# Patient Record
Sex: FEMALE | Race: WHITE | NOT HISPANIC OR LATINO | Employment: OTHER | URBAN - METROPOLITAN AREA
[De-identification: names, ages, dates, MRNs, and addresses within clinical notes are randomized per-mention and may not be internally consistent; named-entity substitution may affect disease eponyms.]

---

## 2017-08-18 ENCOUNTER — LAB REQUISITION (OUTPATIENT)
Dept: LAB | Facility: HOSPITAL | Age: 27
End: 2017-08-18

## 2017-08-18 DIAGNOSIS — R13.10 DYSPHAGIA: ICD-10-CM

## 2017-08-18 PROCEDURE — 88305 TISSUE EXAM BY PATHOLOGIST: CPT | Performed by: INTERNAL MEDICINE

## 2017-08-18 PROCEDURE — 88342 IMHCHEM/IMCYTCHM 1ST ANTB: CPT | Performed by: INTERNAL MEDICINE

## 2017-08-22 LAB
CYTO UR: NORMAL
LAB AP CASE REPORT: NORMAL
LAB AP CLINICAL INFORMATION: NORMAL
Lab: NORMAL
PATH REPORT.FINAL DX SPEC: NORMAL
PATH REPORT.GROSS SPEC: NORMAL

## 2017-09-25 ENCOUNTER — HOSPITAL ENCOUNTER (EMERGENCY)
Facility: HOSPITAL | Age: 27
Discharge: HOME | End: 2017-09-25
Attending: EMERGENCY MEDICINE | Admitting: EMERGENCY MEDICINE

## 2017-09-25 VITALS
SYSTOLIC BLOOD PRESSURE: 109 MMHG | OXYGEN SATURATION: 98 % | HEIGHT: 62 IN | RESPIRATION RATE: 14 BRPM | BODY MASS INDEX: 21.71 KG/M2 | HEART RATE: 77 BPM | TEMPERATURE: 97.5 F | WEIGHT: 118 LBS | DIASTOLIC BLOOD PRESSURE: 85 MMHG

## 2017-09-25 DIAGNOSIS — E83.51 HYPOCALCEMIA: ICD-10-CM

## 2017-09-25 DIAGNOSIS — E20.9: Primary | ICD-10-CM

## 2017-09-25 LAB — CA-I SERPL ISE-MCNC: 1.33 MMOL/L (ref 1.12–1.32)

## 2017-09-25 PROCEDURE — 82330 ASSAY OF CALCIUM: CPT | Performed by: EMERGENCY MEDICINE

## 2017-09-25 PROCEDURE — 25010000002 ONDANSETRON PER 1 MG: Performed by: EMERGENCY MEDICINE

## 2017-09-25 PROCEDURE — 25010000002 FENTANYL CITRATE (PF) 100 MCG/2ML SOLUTION: Performed by: EMERGENCY MEDICINE

## 2017-09-25 PROCEDURE — 96375 TX/PRO/DX INJ NEW DRUG ADDON: CPT

## 2017-09-25 PROCEDURE — 25010000002 CALCIUM GLUCONATE PER 10 ML: Performed by: EMERGENCY MEDICINE

## 2017-09-25 PROCEDURE — 96365 THER/PROPH/DIAG IV INF INIT: CPT

## 2017-09-25 PROCEDURE — 99284 EMERGENCY DEPT VISIT MOD MDM: CPT

## 2017-09-25 PROCEDURE — 25010000002 HYDROMORPHONE PER 4 MG: Performed by: EMERGENCY MEDICINE

## 2017-09-25 PROCEDURE — 25010000002 HEPARIN FLUSH (PORCINE) 100 UNIT/ML SOLUTION: Performed by: EMERGENCY MEDICINE

## 2017-09-25 PROCEDURE — 96376 TX/PRO/DX INJ SAME DRUG ADON: CPT

## 2017-09-25 RX ORDER — ALBUTEROL SULFATE 90 UG/1
2 AEROSOL, METERED RESPIRATORY (INHALATION) EVERY 4 HOURS PRN
COMMUNITY
Start: 2017-05-22

## 2017-09-25 RX ORDER — ONDANSETRON 2 MG/ML
8 INJECTION INTRAMUSCULAR; INTRAVENOUS ONCE
Status: COMPLETED | OUTPATIENT
Start: 2017-09-25 | End: 2017-09-25

## 2017-09-25 RX ORDER — CALCIUM GLUCONATE 94 MG/ML
1 INJECTION, SOLUTION INTRAVENOUS ONCE
Status: DISCONTINUED | OUTPATIENT
Start: 2017-09-25 | End: 2017-09-25 | Stop reason: ALTCHOICE

## 2017-09-25 RX ORDER — DRONABINOL 5 MG/1
5 CAPSULE ORAL 4 TIMES DAILY PRN
COMMUNITY
Start: 2015-10-06

## 2017-09-25 RX ORDER — HYDROCORTISONE 20 MG/1
20 TABLET ORAL EVERY MORNING
COMMUNITY

## 2017-09-25 RX ORDER — FENTANYL CITRATE 50 UG/ML
100 INJECTION, SOLUTION INTRAMUSCULAR; INTRAVENOUS AS NEEDED
Status: COMPLETED | OUTPATIENT
Start: 2017-09-25 | End: 2017-09-25

## 2017-09-25 RX ORDER — FLUTICASONE PROPIONATE 50 MCG
1 SPRAY, SUSPENSION (ML) NASAL DAILY
COMMUNITY
Start: 2016-06-03

## 2017-09-25 RX ORDER — CALCITRIOL 0.5 UG/1
0.5 CAPSULE, LIQUID FILLED ORAL 2 TIMES DAILY
COMMUNITY

## 2017-09-25 RX ORDER — CALCIUM CARBONATE 750 MG/1
750 TABLET, CHEWABLE ORAL 3 TIMES DAILY
COMMUNITY
End: 2020-01-15 | Stop reason: HOSPADM

## 2017-09-25 RX ORDER — RANITIDINE HCL 75 MG
150 TABLET ORAL DAILY
COMMUNITY
Start: 2016-06-03 | End: 2019-01-28 | Stop reason: DRUGHIGH

## 2017-09-25 RX ORDER — BUTALBITAL, ACETAMINOPHEN AND CAFFEINE 50; 325; 40 MG/1; MG/1; MG/1
1 TABLET ORAL EVERY 24 HOURS
COMMUNITY
Start: 2017-08-30 | End: 2021-04-02 | Stop reason: ALTCHOICE

## 2017-09-25 RX ORDER — DIAZEPAM 5 MG/1
5 TABLET ORAL
COMMUNITY
Start: 2017-08-09 | End: 2021-04-02 | Stop reason: SDUPTHER

## 2017-09-25 RX ORDER — CYPROHEPTADINE HYDROCHLORIDE 4 MG/1
4 TABLET ORAL 2 TIMES DAILY
COMMUNITY
End: 2021-04-14 | Stop reason: HOSPADM

## 2017-09-25 RX ORDER — LEVOTHYROXINE SODIUM 0.05 MG/1
75 TABLET ORAL
COMMUNITY
Start: 2016-06-03 | End: 2019-01-28 | Stop reason: DRUGHIGH

## 2017-09-25 RX ORDER — FLUOXETINE 10 MG/1
20 TABLET, FILM COATED ORAL DAILY
COMMUNITY
End: 2019-10-18 | Stop reason: HOSPADM

## 2017-09-25 RX ORDER — FLUDROCORTISONE ACETATE 0.1 MG/1
0.1 TABLET ORAL DAILY
COMMUNITY
Start: 2015-07-22

## 2017-09-25 RX ORDER — FAMOTIDINE 20 MG
1000 TABLET ORAL 2 TIMES DAILY
COMMUNITY
Start: 2016-06-03

## 2017-09-25 RX ORDER — CALCIUM GLUCONATE 94 MG/ML
2 INJECTION, SOLUTION INTRAVENOUS ONCE
Status: DISCONTINUED | OUTPATIENT
Start: 2017-09-25 | End: 2017-09-25

## 2017-09-25 RX ORDER — AZATHIOPRINE 50 MG/1
150 TABLET ORAL
COMMUNITY
End: 2019-10-22 | Stop reason: SDUPTHER

## 2017-09-25 RX ORDER — CALCIUM GLUCONATE 94 MG/ML
2 INJECTION, SOLUTION INTRAVENOUS ONCE
Status: COMPLETED | OUTPATIENT
Start: 2017-09-25 | End: 2017-09-25

## 2017-09-25 RX ORDER — GABAPENTIN 300 MG/1
900 CAPSULE ORAL 3 TIMES DAILY
COMMUNITY
Start: 2017-01-02 | End: 2019-01-28 | Stop reason: DRUGHIGH

## 2017-09-25 RX ORDER — CYCLOSPORINE 0.5 MG/ML
1 EMULSION OPHTHALMIC EVERY 12 HOURS
COMMUNITY

## 2017-09-25 RX ORDER — NADOLOL 20 MG/1
20 TABLET ORAL 2 TIMES DAILY
COMMUNITY
End: 2021-09-13 | Stop reason: SDUPTHER

## 2017-09-25 RX ADMIN — FENTANYL CITRATE 100 MCG: 50 INJECTION, SOLUTION INTRAMUSCULAR; INTRAVENOUS at 15:10

## 2017-09-25 RX ADMIN — CALCIUM GLUCONATE 1 G: 94 INJECTION, SOLUTION INTRAVENOUS at 16:06

## 2017-09-25 RX ADMIN — ONDANSETRON 8 MG: 2 INJECTION INTRAMUSCULAR; INTRAVENOUS at 12:59

## 2017-09-25 RX ADMIN — CALCIUM GLUCONATE 2 G: 94 INJECTION, SOLUTION INTRAVENOUS at 12:56

## 2017-09-25 RX ADMIN — FENTANYL CITRATE 100 MCG: 50 INJECTION, SOLUTION INTRAMUSCULAR; INTRAVENOUS at 15:41

## 2017-09-25 RX ADMIN — HYDROMORPHONE HYDROCHLORIDE 1 MG: 1 INJECTION, SOLUTION INTRAMUSCULAR; INTRAVENOUS; SUBCUTANEOUS at 17:43

## 2017-09-25 RX ADMIN — HEPARIN 300 UNITS: 100 SYRINGE at 17:45

## 2017-09-25 RX ADMIN — HYDROMORPHONE HYDROCHLORIDE 1 MG: 1 INJECTION, SOLUTION INTRAMUSCULAR; INTRAVENOUS; SUBCUTANEOUS at 12:47

## 2017-09-25 NOTE — ED PROVIDER NOTES
Subjective   HPI Comments: Krista Richter is a 27 y.o.female who presents to the ED with c/o hypocalcemia. She reports waking up this morning in severe diffuse pain with muscle spasms and nausea. She presented to [facility] and had blood drawn showing evidence of low calcium levels. Her total calcium measured 8.6 and her ionized calcium measured 1.12. She presents to [facility] for further evaluation and treatment. She has history of recurrent hypocalcemic episodes characterized by the same symptoms she developed this morning. Her most recent episode occurred 3 days ago. She was treated with 2 grams of calcium at that time with resolution of her symptoms. She denies any other complaints at this time.       Patient is a 27 y.o. female presenting with general illness.   History provided by:  Patient  Illness   Location:  Abnormal lab  Quality:  Hypocalcemia  Severity:  Moderate  Onset quality:  Sudden  Timing:  Constant  Progression:  Unchanged  Chronicity:  Recurrent  Context:  She reports waking up this morning in severe diffuse pain with muscle spasms and nausea. She presented to [facility] and had blood drawn showing evidence of low calcium levels. Her total calcium measured 8.6 and her ionized calcium measured 1.12. She presents to [facility] for further evaluation and treatment. She has history of recurrent hypocalcemic episodes characterized by the same symptoms she developed this morning.  Relieved by:  None tried so far today  Worsened by:  Nothing  Ineffective treatments:  None tried so far today   Associated symptoms: nausea        Review of Systems   Gastrointestinal: Positive for nausea.   Musculoskeletal:        Diffuse pain and muscle spasms.       Past Medical History:   Diagnosis Date   • APS type 1    • Disease of thyroid gland    • Insulin dependent diabetes mellitus    • Long Q-T syndrome    • Parathyroid abnormality    • Spleen absent        Allergies   Allergen Reactions   • Penicillins Hives        Past Surgical History:   Procedure Laterality Date   • CARDIAC SURGERY      LOOP RECORDER   • CHOLECYSTECTOMY     • HERNIA REPAIR     • MUSCLE BIOPSY     • PORTACATH PLACEMENT         History reviewed. No pertinent family history.    Social History     Social History   • Marital status: Single     Spouse name: N/A   • Number of children: N/A   • Years of education: N/A     Social History Main Topics   • Smoking status: Never Smoker   • Smokeless tobacco: Never Used   • Alcohol use Yes      Comment: SOCIAL   • Drug use: No   • Sexual activity: Defer     Other Topics Concern   • None     Social History Narrative   • None         Objective   Physical Exam   Constitutional: She is oriented to person, place, and time. She appears well-developed and well-nourished. No distress.   Young adult female. Teeth tightly clenched. Arms behind her back. Legs are flexed fully at the knees. Plantar flexed at the ankles.    HENT:   Head: Normocephalic and atraumatic.   Nose: Nose normal.   Airway patent. Unable to evaluate pharynx secondary to the teeth being clenched.    Eyes: Conjunctivae are normal. No scleral icterus.   Neck: Neck supple.   Cardiovascular: Normal rate, regular rhythm and normal heart sounds.    No murmur heard.  Pulmonary/Chest: Effort normal and breath sounds normal. No respiratory distress.   Abdominal: Soft. Bowel sounds are normal. There is no tenderness.   Musculoskeletal: She exhibits no edema.   Significant spasm and rigidity at the arms, legs, neck, and face.    Neurological: She is alert and oriented to person, place, and time.   Skin: Skin is warm and dry.   Psychiatric: She has a normal mood and affect. Her behavior is normal.   Nursing note and vitals reviewed.      Procedures         ED Course  ED Course   Comment By Time   Upon reevaluation the patient is much more relaxed and is no longer experiencing the spasms she had been having upon arrival. She is now talking, eating, and drinking.  Noni Rocha 09/25 1521   Tetany resolved, still having some pain.  Difficult, long-standing problem with no easy answers. Andrea Medley MD 09/25 1729     Recent Results (from the past 24 hour(s))   Calcium, Ionized    Collection Time: 09/25/17  3:02 PM   Result Value Ref Range    Ionized Calcium 1.33 (H) 1.12 - 1.32 mmol/L     Note: In addition to lab results from this visit, the labs listed above may include labs taken at another facility or during a different encounter within the last 24 hours. Please correlate lab times with ED admission and discharge times for further clarification of the services performed during this visit.    No orders to display     Vitals:    09/25/17 1353 09/25/17 1512 09/25/17 1630 09/25/17 1752   BP:  104/73 109/70 109/85   BP Location:  Right arm  Right arm   Patient Position:  Lying  Lying   Pulse: 70 67 67 77   Resp:  16  14   Temp:    97.5 °F (36.4 °C)   TempSrc:    Oral   SpO2: 98% 96% 94% 98%   Weight:       Height:         Medications   calcium gluconate injection 2 g (2 g Intravenous Given 9/25/17 1256)   HYDROmorphone (DILAUDID) injection 1 mg (1 mg Intravenous Given 9/25/17 1247)   ondansetron (ZOFRAN) injection 8 mg (8 mg Intravenous Given 9/25/17 1259)   fentaNYL citrate (PF) (SUBLIMAZE) injection 100 mcg (100 mcg Intravenous Given 9/25/17 1541)   calcium gluconate 1 g in sodium chloride 0.9 % 100 mL IVPB (0 g Intravenous Stopped 9/25/17 1718)   HYDROmorphone (DILAUDID) injection 1 mg (1 mg Intravenous Given 9/25/17 1743)   heparin flush (porcine) 100 UNIT/ML injection 300 Units (300 Units Intravenous Given 9/25/17 1745)     ECG/EMG Results (last 24 hours)     ** No results found for the last 24 hours. **                        MDM    Final diagnoses:   Tetany due to low calcium from parathyroid disease   Hypocalcemia       Documentation assistance provided by artemio Rocha.  Information recorded by the scribe was done at my direction and has been verified  and validated by me.     Noni Rocha  09/25/17 1256       Noni Rocha  09/25/17 1518       Noni Rocha  09/25/17 1714       Andrea Medley MD  09/25/17 0882

## 2017-09-25 NOTE — DISCHARGE INSTRUCTIONS
Continue your care with your doctors at [facility] as well as in [place].     Follow up with Dr. Pearce as needed. Call for appointment.    Immediately return to the ED for any concerns or worsening symptoms.

## 2017-09-30 ENCOUNTER — HOSPITAL ENCOUNTER (EMERGENCY)
Facility: HOSPITAL | Age: 27
Discharge: HOME | End: 2017-09-30
Attending: EMERGENCY MEDICINE | Admitting: EMERGENCY MEDICINE

## 2017-09-30 VITALS
HEIGHT: 62 IN | DIASTOLIC BLOOD PRESSURE: 88 MMHG | WEIGHT: 125 LBS | RESPIRATION RATE: 16 BRPM | OXYGEN SATURATION: 97 % | BODY MASS INDEX: 23 KG/M2 | HEART RATE: 97 BPM | TEMPERATURE: 98.7 F | SYSTOLIC BLOOD PRESSURE: 127 MMHG

## 2017-09-30 DIAGNOSIS — R29.0 TETANY: Primary | ICD-10-CM

## 2017-09-30 DIAGNOSIS — E83.51 HYPOCALCEMIA: ICD-10-CM

## 2017-09-30 LAB
ALBUMIN SERPL-MCNC: 3.5 G/DL (ref 3.2–4.8)
ALBUMIN/GLOB SERPL: 1.2 G/DL (ref 1.5–2.5)
ALP SERPL-CCNC: 153 U/L (ref 25–100)
ALT SERPL W P-5'-P-CCNC: 40 U/L (ref 7–40)
AMPHET+METHAMPHET UR QL: NEGATIVE
AMPHETAMINES UR QL: NEGATIVE
ANION GAP SERPL CALCULATED.3IONS-SCNC: 5 MMOL/L (ref 3–11)
AST SERPL-CCNC: 42 U/L (ref 0–33)
B-HCG UR QL: NEGATIVE
BARBITURATES UR QL SCN: NEGATIVE
BASOPHILS # BLD AUTO: 0.1 10*3/MM3 (ref 0–0.2)
BASOPHILS NFR BLD AUTO: 0.8 % (ref 0–1)
BENZODIAZ UR QL SCN: POSITIVE
BILIRUB SERPL-MCNC: 0.5 MG/DL (ref 0.3–1.2)
BILIRUB UR QL STRIP: NEGATIVE
BUN BLD-MCNC: 15 MG/DL (ref 9–23)
BUN/CREAT SERPL: 30 (ref 7–25)
BUPRENORPHINE SERPL-MCNC: NEGATIVE NG/ML
CA-I SERPL ISE-MCNC: 1.1 MMOL/L (ref 1.12–1.32)
CALCIUM SPEC-SCNC: 7.8 MG/DL (ref 8.7–10.4)
CANNABINOIDS SERPL QL: POSITIVE
CHLORIDE SERPL-SCNC: 108 MMOL/L (ref 99–109)
CLARITY UR: CLEAR
CO2 SERPL-SCNC: 27 MMOL/L (ref 20–31)
COCAINE UR QL: NEGATIVE
COLOR UR: YELLOW
CREAT BLD-MCNC: 0.5 MG/DL (ref 0.6–1.3)
DEPRECATED RDW RBC AUTO: 58.8 FL (ref 37–54)
EOSINOPHIL # BLD AUTO: 0.13 10*3/MM3 (ref 0–0.3)
EOSINOPHIL NFR BLD AUTO: 1.1 % (ref 0–3)
ERYTHROCYTE [DISTWIDTH] IN BLOOD BY AUTOMATED COUNT: 17.4 % (ref 11.3–14.5)
GFR SERPL CREATININE-BSD FRML MDRD: 147 ML/MIN/1.73
GLOBULIN UR ELPH-MCNC: 2.9 GM/DL
GLUCOSE BLD-MCNC: 177 MG/DL (ref 70–100)
GLUCOSE UR STRIP-MCNC: NEGATIVE MG/DL
HCT VFR BLD AUTO: 29.1 % (ref 34.5–44)
HGB BLD-MCNC: 8.8 G/DL (ref 11.5–15.5)
HGB UR QL STRIP.AUTO: NEGATIVE
IMM GRANULOCYTES # BLD: 0.08 10*3/MM3 (ref 0–0.03)
IMM GRANULOCYTES NFR BLD: 0.7 % (ref 0–0.6)
INTERNAL NEGATIVE CONTROL: NEGATIVE
INTERNAL POSITIVE CONTROL: POSITIVE
KETONES UR QL STRIP: NEGATIVE
LEUKOCYTE ESTERASE UR QL STRIP.AUTO: NEGATIVE
LYMPHOCYTES # BLD AUTO: 0.96 10*3/MM3 (ref 0.6–4.8)
LYMPHOCYTES NFR BLD AUTO: 7.8 % (ref 24–44)
Lab: NORMAL
MAGNESIUM SERPL-MCNC: 1.6 MG/DL (ref 1.3–2.7)
MCH RBC QN AUTO: 28 PG (ref 27–31)
MCHC RBC AUTO-ENTMCNC: 30.2 G/DL (ref 32–36)
MCV RBC AUTO: 92.7 FL (ref 80–99)
METHADONE UR QL SCN: NEGATIVE
MONOCYTES # BLD AUTO: 0.66 10*3/MM3 (ref 0–1)
MONOCYTES NFR BLD AUTO: 5.4 % (ref 0–12)
NEUTROPHILS # BLD AUTO: 10.31 10*3/MM3 (ref 1.5–8.3)
NEUTROPHILS NFR BLD AUTO: 84.2 % (ref 41–71)
NITRITE UR QL STRIP: NEGATIVE
OPIATES UR QL: NEGATIVE
OXYCODONE UR QL SCN: NEGATIVE
PCP UR QL SCN: NEGATIVE
PH UR STRIP.AUTO: 7 [PH] (ref 5–8)
PLATELET # BLD AUTO: 328 10*3/MM3 (ref 150–450)
PMV BLD AUTO: 10.2 FL (ref 6–12)
POTASSIUM BLD-SCNC: 4.2 MMOL/L (ref 3.5–5.5)
PROPOXYPH UR QL: NEGATIVE
PROT SERPL-MCNC: 6.4 G/DL (ref 5.7–8.2)
PROT UR QL STRIP: NEGATIVE
RBC # BLD AUTO: 3.14 10*6/MM3 (ref 3.89–5.14)
SODIUM BLD-SCNC: 140 MMOL/L (ref 132–146)
SP GR UR STRIP: 1.01 (ref 1–1.03)
TRICYCLICS UR QL SCN: NEGATIVE
TSH SERPL DL<=0.05 MIU/L-ACNC: 0.38 MIU/ML (ref 0.35–5.35)
UROBILINOGEN UR QL STRIP: NORMAL
WBC NRBC COR # BLD: 12.24 10*3/MM3 (ref 3.5–10.8)

## 2017-09-30 PROCEDURE — 84443 ASSAY THYROID STIM HORMONE: CPT | Performed by: NURSE PRACTITIONER

## 2017-09-30 PROCEDURE — 96375 TX/PRO/DX INJ NEW DRUG ADDON: CPT

## 2017-09-30 PROCEDURE — 25010000002 FENTANYL CITRATE (PF) 100 MCG/2ML SOLUTION: Performed by: EMERGENCY MEDICINE

## 2017-09-30 PROCEDURE — 80053 COMPREHEN METABOLIC PANEL: CPT | Performed by: NURSE PRACTITIONER

## 2017-09-30 PROCEDURE — 25010000002 HYDROMORPHONE PER 4 MG: Performed by: EMERGENCY MEDICINE

## 2017-09-30 PROCEDURE — 85025 COMPLETE CBC W/AUTO DIFF WBC: CPT | Performed by: NURSE PRACTITIONER

## 2017-09-30 PROCEDURE — 81003 URINALYSIS AUTO W/O SCOPE: CPT | Performed by: NURSE PRACTITIONER

## 2017-09-30 PROCEDURE — 96361 HYDRATE IV INFUSION ADD-ON: CPT

## 2017-09-30 PROCEDURE — 25010000002 CALCIUM GLUCONATE PER 10 ML: Performed by: EMERGENCY MEDICINE

## 2017-09-30 PROCEDURE — 25010000002 ONDANSETRON PER 1 MG: Performed by: EMERGENCY MEDICINE

## 2017-09-30 PROCEDURE — 99284 EMERGENCY DEPT VISIT MOD MDM: CPT

## 2017-09-30 PROCEDURE — 83735 ASSAY OF MAGNESIUM: CPT | Performed by: NURSE PRACTITIONER

## 2017-09-30 PROCEDURE — 96376 TX/PRO/DX INJ SAME DRUG ADON: CPT

## 2017-09-30 PROCEDURE — 25010000002 LORAZEPAM PER 2 MG: Performed by: EMERGENCY MEDICINE

## 2017-09-30 PROCEDURE — 82330 ASSAY OF CALCIUM: CPT | Performed by: NURSE PRACTITIONER

## 2017-09-30 PROCEDURE — 80306 DRUG TEST PRSMV INSTRMNT: CPT | Performed by: NURSE PRACTITIONER

## 2017-09-30 PROCEDURE — 96374 THER/PROPH/DIAG INJ IV PUSH: CPT

## 2017-09-30 PROCEDURE — 93005 ELECTROCARDIOGRAM TRACING: CPT | Performed by: NURSE PRACTITIONER

## 2017-09-30 PROCEDURE — 25010000002 CALCIUM GLUCONATE PER 10 ML: Performed by: NURSE PRACTITIONER

## 2017-09-30 RX ORDER — HYDROMORPHONE HYDROCHLORIDE 1 MG/ML
0.5 INJECTION, SOLUTION INTRAMUSCULAR; INTRAVENOUS; SUBCUTANEOUS ONCE
Status: COMPLETED | OUTPATIENT
Start: 2017-09-30 | End: 2017-09-30

## 2017-09-30 RX ORDER — LORAZEPAM 2 MG/ML
1 INJECTION INTRAMUSCULAR ONCE
Status: COMPLETED | OUTPATIENT
Start: 2017-09-30 | End: 2017-09-30

## 2017-09-30 RX ORDER — ONDANSETRON 2 MG/ML
4 INJECTION INTRAMUSCULAR; INTRAVENOUS ONCE
Status: DISCONTINUED | OUTPATIENT
Start: 2017-09-30 | End: 2017-09-30

## 2017-09-30 RX ORDER — ONDANSETRON 2 MG/ML
8 INJECTION INTRAMUSCULAR; INTRAVENOUS ONCE
Status: COMPLETED | OUTPATIENT
Start: 2017-09-30 | End: 2017-09-30

## 2017-09-30 RX ORDER — SODIUM CHLORIDE 0.9 % (FLUSH) 0.9 %
10 SYRINGE (ML) INJECTION AS NEEDED
Status: DISCONTINUED | OUTPATIENT
Start: 2017-09-30 | End: 2017-09-30 | Stop reason: HOSPADM

## 2017-09-30 RX ORDER — DIAZEPAM 5 MG/ML
5 INJECTION, SOLUTION INTRAMUSCULAR; INTRAVENOUS ONCE
Status: DISCONTINUED | OUTPATIENT
Start: 2017-09-30 | End: 2017-09-30

## 2017-09-30 RX ORDER — CALCIUM GLUCONATE 94 MG/ML
1 INJECTION, SOLUTION INTRAVENOUS ONCE
Status: COMPLETED | OUTPATIENT
Start: 2017-09-30 | End: 2017-09-30

## 2017-09-30 RX ORDER — FENTANYL CITRATE 50 UG/ML
100 INJECTION, SOLUTION INTRAMUSCULAR; INTRAVENOUS ONCE
Status: COMPLETED | OUTPATIENT
Start: 2017-09-30 | End: 2017-09-30

## 2017-09-30 RX ADMIN — HYDROMORPHONE HYDROCHLORIDE 0.5 MG: 1 INJECTION, SOLUTION INTRAMUSCULAR; INTRAVENOUS; SUBCUTANEOUS at 16:06

## 2017-09-30 RX ADMIN — Medication 10 ML: at 18:37

## 2017-09-30 RX ADMIN — CALCIUM GLUCONATE 1 G: 94 INJECTION, SOLUTION INTRAVENOUS at 18:34

## 2017-09-30 RX ADMIN — ONDANSETRON 8 MG: 2 INJECTION INTRAMUSCULAR; INTRAVENOUS at 17:22

## 2017-09-30 RX ADMIN — FENTANYL CITRATE 100 MCG: 50 INJECTION, SOLUTION INTRAMUSCULAR; INTRAVENOUS at 19:49

## 2017-09-30 RX ADMIN — LORAZEPAM 1 MG: 2 INJECTION INTRAMUSCULAR; INTRAVENOUS at 16:38

## 2017-09-30 RX ADMIN — CALCIUM GLUCONATE 1 G: 94 INJECTION, SOLUTION INTRAVENOUS at 17:08

## 2017-09-30 RX ADMIN — SODIUM CHLORIDE 1000 ML: 9 INJECTION, SOLUTION INTRAVENOUS at 15:55

## 2017-09-30 RX ADMIN — SODIUM CHLORIDE 100 ML: 9 INJECTION, SOLUTION INTRAVENOUS at 17:08

## 2017-09-30 RX ADMIN — HYDROMORPHONE HYDROCHLORIDE 0.5 MG: 1 INJECTION, SOLUTION INTRAMUSCULAR; INTRAVENOUS; SUBCUTANEOUS at 17:22

## 2017-09-30 RX ADMIN — LORAZEPAM 1 MG: 2 INJECTION INTRAMUSCULAR; INTRAVENOUS at 18:37

## 2017-12-27 ENCOUNTER — LAB REQUISITION (OUTPATIENT)
Dept: LAB | Facility: HOSPITAL | Age: 27
End: 2017-12-27

## 2017-12-27 DIAGNOSIS — R13.10 DYSPHAGIA: ICD-10-CM

## 2017-12-27 PROCEDURE — 88305 TISSUE EXAM BY PATHOLOGIST: CPT | Performed by: INTERNAL MEDICINE

## 2018-04-26 ENCOUNTER — ANESTHESIA (OUTPATIENT)
Dept: GASTROENTEROLOGY | Facility: HOSPITAL | Age: 28
End: 2018-04-26

## 2018-04-26 ENCOUNTER — HOSPITAL ENCOUNTER (OUTPATIENT)
Facility: HOSPITAL | Age: 28
Setting detail: HOSPITAL OUTPATIENT SURGERY
Discharge: HOME | End: 2018-04-26
Attending: INTERNAL MEDICINE | Admitting: INTERNAL MEDICINE

## 2018-04-26 ENCOUNTER — ANESTHESIA EVENT (OUTPATIENT)
Dept: GASTROENTEROLOGY | Facility: HOSPITAL | Age: 28
End: 2018-04-26

## 2018-04-26 VITALS
SYSTOLIC BLOOD PRESSURE: 133 MMHG | DIASTOLIC BLOOD PRESSURE: 104 MMHG | TEMPERATURE: 97 F | OXYGEN SATURATION: 95 % | RESPIRATION RATE: 18 BRPM | HEART RATE: 114 BPM

## 2018-04-26 LAB
ALBUMIN SERPL-MCNC: 4.3 G/DL (ref 3.2–4.8)
ALBUMIN/GLOB SERPL: 1.3 G/DL (ref 1.5–2.5)
ALP SERPL-CCNC: 135 U/L (ref 25–100)
ALT SERPL W P-5'-P-CCNC: 30 U/L (ref 7–40)
ANION GAP SERPL CALCULATED.3IONS-SCNC: 8 MMOL/L (ref 3–11)
AST SERPL-CCNC: 25 U/L (ref 0–33)
BILIRUB SERPL-MCNC: 0.4 MG/DL (ref 0.3–1.2)
BUN BLD-MCNC: 18 MG/DL (ref 9–23)
BUN/CREAT SERPL: 25.7 (ref 7–25)
CALCIUM SPEC-SCNC: 8.9 MG/DL (ref 8.7–10.4)
CHLORIDE SERPL-SCNC: 96 MMOL/L (ref 99–109)
CO2 SERPL-SCNC: 29 MMOL/L (ref 20–31)
CREAT BLD-MCNC: 0.7 MG/DL (ref 0.6–1.3)
GFR SERPL CREATININE-BSD FRML MDRD: 100 ML/MIN/1.73
GLOBULIN UR ELPH-MCNC: 3.3 GM/DL
GLUCOSE BLD-MCNC: 159 MG/DL (ref 70–100)
MAGNESIUM SERPL-MCNC: 1.6 MG/DL (ref 1.3–2.7)
POTASSIUM BLD-SCNC: 3.7 MMOL/L (ref 3.5–5.5)
PROT SERPL-MCNC: 7.6 G/DL (ref 5.7–8.2)
SODIUM BLD-SCNC: 133 MMOL/L (ref 132–146)

## 2018-04-26 PROCEDURE — 25010000002 PROPOFOL 10 MG/ML EMULSION: Performed by: NURSE ANESTHETIST, CERTIFIED REGISTERED

## 2018-04-26 PROCEDURE — 25010000002 DEXAMETHASONE PER 1 MG: Performed by: NURSE ANESTHETIST, CERTIFIED REGISTERED

## 2018-04-26 PROCEDURE — 25010000002 PROPOFOL 1000 MG/ML EMULSION: Performed by: NURSE ANESTHETIST, CERTIFIED REGISTERED

## 2018-04-26 PROCEDURE — 25010000002 HYDROMORPHONE PER 4 MG: Performed by: NURSE ANESTHETIST, CERTIFIED REGISTERED

## 2018-04-26 PROCEDURE — 25010000002 PROMETHAZINE PER 50 MG: Performed by: NURSE ANESTHETIST, CERTIFIED REGISTERED

## 2018-04-26 PROCEDURE — 25010000002 DIPHENHYDRAMINE PER 50 MG: Performed by: INTERNAL MEDICINE

## 2018-04-26 PROCEDURE — 25010000002 HYDROMORPHONE PER 4 MG: Performed by: INTERNAL MEDICINE

## 2018-04-26 PROCEDURE — 25010000002 ONDANSETRON PER 1 MG: Performed by: NURSE ANESTHETIST, CERTIFIED REGISTERED

## 2018-04-26 PROCEDURE — 83735 ASSAY OF MAGNESIUM: CPT | Performed by: INTERNAL MEDICINE

## 2018-04-26 PROCEDURE — 80053 COMPREHEN METABOLIC PANEL: CPT | Performed by: INTERNAL MEDICINE

## 2018-04-26 PROCEDURE — C1726 CATH, BAL DIL, NON-VASCULAR: HCPCS | Performed by: INTERNAL MEDICINE

## 2018-04-26 PROCEDURE — 25010000002 CALCIUM GLUCONATE PER 10 ML: Performed by: INTERNAL MEDICINE

## 2018-04-26 RX ORDER — PROMETHAZINE HYDROCHLORIDE 25 MG/1
25 TABLET ORAL ONCE AS NEEDED
Status: DISCONTINUED | OUTPATIENT
Start: 2018-04-26 | End: 2018-04-26 | Stop reason: HOSPADM

## 2018-04-26 RX ORDER — IPRATROPIUM BROMIDE AND ALBUTEROL SULFATE 2.5; .5 MG/3ML; MG/3ML
3 SOLUTION RESPIRATORY (INHALATION) ONCE AS NEEDED
Status: DISCONTINUED | OUTPATIENT
Start: 2018-04-26 | End: 2018-04-26 | Stop reason: HOSPADM

## 2018-04-26 RX ORDER — PROPOFOL 10 MG/ML
VIAL (ML) INTRAVENOUS AS NEEDED
Status: DISCONTINUED | OUTPATIENT
Start: 2018-04-26 | End: 2018-04-26 | Stop reason: SURG

## 2018-04-26 RX ORDER — DEXAMETHASONE SODIUM PHOSPHATE 4 MG/ML
INJECTION, SOLUTION INTRA-ARTICULAR; INTRALESIONAL; INTRAMUSCULAR; INTRAVENOUS; SOFT TISSUE AS NEEDED
Status: DISCONTINUED | OUTPATIENT
Start: 2018-04-26 | End: 2018-04-26 | Stop reason: SURG

## 2018-04-26 RX ORDER — PROMETHAZINE HYDROCHLORIDE 25 MG/ML
6.25 INJECTION, SOLUTION INTRAMUSCULAR; INTRAVENOUS ONCE AS NEEDED
Status: DISCONTINUED | OUTPATIENT
Start: 2018-04-26 | End: 2018-04-26 | Stop reason: HOSPADM

## 2018-04-26 RX ORDER — FAMOTIDINE 20 MG/1
20 TABLET, FILM COATED ORAL ONCE
Status: DISCONTINUED | OUTPATIENT
Start: 2018-04-26 | End: 2018-04-26 | Stop reason: HOSPADM

## 2018-04-26 RX ORDER — CALCIUM CHLORIDE 100 MG/ML
1 INJECTION INTRAVENOUS; INTRAVENTRICULAR ONCE
Status: DISCONTINUED | OUTPATIENT
Start: 2018-04-26 | End: 2018-04-26

## 2018-04-26 RX ORDER — ACETAMINOPHEN 325 MG/1
650 TABLET ORAL ONCE AS NEEDED
Status: DISCONTINUED | OUTPATIENT
Start: 2018-04-26 | End: 2018-04-26 | Stop reason: HOSPADM

## 2018-04-26 RX ORDER — ONDANSETRON 2 MG/ML
INJECTION INTRAMUSCULAR; INTRAVENOUS AS NEEDED
Status: DISCONTINUED | OUTPATIENT
Start: 2018-04-26 | End: 2018-04-26 | Stop reason: SURG

## 2018-04-26 RX ORDER — LIDOCAINE HYDROCHLORIDE 10 MG/ML
0.5 INJECTION, SOLUTION EPIDURAL; INFILTRATION; INTRACAUDAL; PERINEURAL ONCE AS NEEDED
Status: DISCONTINUED | OUTPATIENT
Start: 2018-04-26 | End: 2018-04-26 | Stop reason: HOSPADM

## 2018-04-26 RX ORDER — PROMETHAZINE HYDROCHLORIDE 25 MG/ML
INJECTION, SOLUTION INTRAMUSCULAR; INTRAVENOUS AS NEEDED
Status: DISCONTINUED | OUTPATIENT
Start: 2018-04-26 | End: 2018-04-26 | Stop reason: SURG

## 2018-04-26 RX ORDER — CALCIUM CHLORIDE 100 MG/ML
2 INJECTION INTRAVENOUS; INTRAVENTRICULAR ONCE
Status: COMPLETED | OUTPATIENT
Start: 2018-04-26 | End: 2018-04-26

## 2018-04-26 RX ORDER — HYDROMORPHONE HCL 110MG/55ML
PATIENT CONTROLLED ANALGESIA SYRINGE INTRAVENOUS AS NEEDED
Status: DISCONTINUED | OUTPATIENT
Start: 2018-04-26 | End: 2018-04-26 | Stop reason: SURG

## 2018-04-26 RX ORDER — PANTOPRAZOLE SODIUM 40 MG/10ML
40 INJECTION, POWDER, LYOPHILIZED, FOR SOLUTION INTRAVENOUS ONCE
Status: COMPLETED | OUTPATIENT
Start: 2018-04-26 | End: 2018-04-26

## 2018-04-26 RX ORDER — HYDROMORPHONE HYDROCHLORIDE 1 MG/ML
1 INJECTION, SOLUTION INTRAMUSCULAR; INTRAVENOUS; SUBCUTANEOUS
Status: DISCONTINUED | OUTPATIENT
Start: 2018-04-26 | End: 2018-04-26 | Stop reason: HOSPADM

## 2018-04-26 RX ORDER — SODIUM CHLORIDE 0.9 % (FLUSH) 0.9 %
1-10 SYRINGE (ML) INJECTION AS NEEDED
Status: DISCONTINUED | OUTPATIENT
Start: 2018-04-26 | End: 2018-04-26 | Stop reason: HOSPADM

## 2018-04-26 RX ORDER — SODIUM CHLORIDE, SODIUM LACTATE, POTASSIUM CHLORIDE, CALCIUM CHLORIDE 600; 310; 30; 20 MG/100ML; MG/100ML; MG/100ML; MG/100ML
9 INJECTION, SOLUTION INTRAVENOUS CONTINUOUS
Status: DISCONTINUED | OUTPATIENT
Start: 2018-04-26 | End: 2018-04-27 | Stop reason: HOSPADM

## 2018-04-26 RX ORDER — HYDROMORPHONE HYDROCHLORIDE 1 MG/ML
0.5 INJECTION, SOLUTION INTRAMUSCULAR; INTRAVENOUS; SUBCUTANEOUS
Status: DISCONTINUED | OUTPATIENT
Start: 2018-04-26 | End: 2018-04-26 | Stop reason: HOSPADM

## 2018-04-26 RX ORDER — PROMETHAZINE HYDROCHLORIDE 25 MG/1
25 SUPPOSITORY RECTAL ONCE AS NEEDED
Status: DISCONTINUED | OUTPATIENT
Start: 2018-04-26 | End: 2018-04-26 | Stop reason: HOSPADM

## 2018-04-26 RX ORDER — DIPHENHYDRAMINE HYDROCHLORIDE 50 MG/ML
25 INJECTION INTRAMUSCULAR; INTRAVENOUS ONCE
Status: COMPLETED | OUTPATIENT
Start: 2018-04-26 | End: 2018-04-26

## 2018-04-26 RX ORDER — ONDANSETRON 2 MG/ML
4 INJECTION INTRAMUSCULAR; INTRAVENOUS ONCE AS NEEDED
Status: DISCONTINUED | OUTPATIENT
Start: 2018-04-26 | End: 2018-04-26 | Stop reason: HOSPADM

## 2018-04-26 RX ADMIN — HYDROMORPHONE HYDROCHLORIDE 0.5 MG: 1 INJECTION, SOLUTION INTRAMUSCULAR; INTRAVENOUS; SUBCUTANEOUS at 16:36

## 2018-04-26 RX ADMIN — HYDROMORPHONE HYDROCHLORIDE 1 MG: 10 INJECTION INTRAMUSCULAR; INTRAVENOUS; SUBCUTANEOUS at 15:34

## 2018-04-26 RX ADMIN — SODIUM CHLORIDE, POTASSIUM CHLORIDE, SODIUM LACTATE AND CALCIUM CHLORIDE: 600; 310; 30; 20 INJECTION, SOLUTION INTRAVENOUS at 13:53

## 2018-04-26 RX ADMIN — DEXAMETHASONE SODIUM PHOSPHATE 8 MG: 4 INJECTION, SOLUTION INTRAMUSCULAR; INTRAVENOUS at 14:04

## 2018-04-26 RX ADMIN — HYDROMORPHONE HYDROCHLORIDE 1 MG: 10 INJECTION INTRAMUSCULAR; INTRAVENOUS; SUBCUTANEOUS at 13:36

## 2018-04-26 RX ADMIN — PROMETHAZINE HYDROCHLORIDE 12.5 MG: 25 INJECTION INTRAMUSCULAR; INTRAVENOUS at 14:33

## 2018-04-26 RX ADMIN — HYDROMORPHONE HYDROCHLORIDE 1 MG: 10 INJECTION INTRAMUSCULAR; INTRAVENOUS; SUBCUTANEOUS at 15:52

## 2018-04-26 RX ADMIN — PROPOFOL 160 MCG/KG/MIN: 10 INJECTION, EMULSION INTRAVENOUS at 13:56

## 2018-04-26 RX ADMIN — PANTOPRAZOLE SODIUM 40 MG: 40 INJECTION, POWDER, FOR SOLUTION INTRAVENOUS at 13:15

## 2018-04-26 RX ADMIN — PROMETHAZINE HYDROCHLORIDE 12.5 MG: 25 INJECTION INTRAMUSCULAR; INTRAVENOUS at 14:20

## 2018-04-26 RX ADMIN — ONDANSETRON 4 MG: 2 INJECTION INTRAMUSCULAR; INTRAVENOUS at 14:04

## 2018-04-26 RX ADMIN — PROPOFOL 40 MG: 10 INJECTION, EMULSION INTRAVENOUS at 14:01

## 2018-04-26 RX ADMIN — HYDROMORPHONE HYDROCHLORIDE 1 MG: 2 INJECTION, SOLUTION INTRAMUSCULAR; INTRAVENOUS; SUBCUTANEOUS at 14:09

## 2018-04-26 RX ADMIN — CALCIUM GLUCONATE 2 G: 98 INJECTION, SOLUTION INTRAVENOUS at 12:53

## 2018-04-26 RX ADMIN — HYDROMORPHONE HYDROCHLORIDE 1 MG: 10 INJECTION INTRAMUSCULAR; INTRAVENOUS; SUBCUTANEOUS at 12:43

## 2018-04-26 RX ADMIN — HYDROMORPHONE HYDROCHLORIDE 0.5 MG: 2 INJECTION, SOLUTION INTRAMUSCULAR; INTRAVENOUS; SUBCUTANEOUS at 14:05

## 2018-04-26 RX ADMIN — PROPOFOL 60 MG: 10 INJECTION, EMULSION INTRAVENOUS at 13:56

## 2018-04-26 RX ADMIN — HYDROMORPHONE HYDROCHLORIDE 1 MG: 2 INJECTION, SOLUTION INTRAMUSCULAR; INTRAVENOUS; SUBCUTANEOUS at 14:15

## 2018-04-26 RX ADMIN — HYDROMORPHONE HYDROCHLORIDE 0.5 MG: 2 INJECTION, SOLUTION INTRAMUSCULAR; INTRAVENOUS; SUBCUTANEOUS at 14:00

## 2018-04-26 RX ADMIN — CALCIUM CHLORIDE 2 G: 100 INJECTION INTRAVENOUS; INTRAVENTRICULAR at 15:55

## 2018-04-26 RX ADMIN — SODIUM CHLORIDE, POTASSIUM CHLORIDE, SODIUM LACTATE AND CALCIUM CHLORIDE 9 ML/HR: 600; 310; 30; 20 INJECTION, SOLUTION INTRAVENOUS at 13:14

## 2018-04-26 RX ADMIN — HYDROMORPHONE HYDROCHLORIDE 1 MG: 2 INJECTION, SOLUTION INTRAMUSCULAR; INTRAVENOUS; SUBCUTANEOUS at 14:19

## 2018-04-26 RX ADMIN — DIPHENHYDRAMINE HYDROCHLORIDE 25 MG: 50 INJECTION, SOLUTION INTRAMUSCULAR; INTRAVENOUS at 14:53

## 2018-04-26 NOTE — ANESTHESIA POSTPROCEDURE EVALUATION
Patient: Krista Richter    Procedure Summary     Date:  04/26/18 Room / Location:   [facility]    Anesthesia Start:  1353 Anesthesia Stop:      Procedure:  ESOPHAGOGASTRODUODENOSCOPY (N/A Esophagus) Diagnosis:      Surgeon:  Gavin Vargas MD Provider:  Jorje Robin MD    Anesthesia Type:  general ASA Status:  4          Anesthesia Type: general  Last vitals  BP   110/75 (04/26/18 1429)   Temp   97 °F (36.1 °C) (04/26/18 1429)   Pulse   104 (04/26/18 1429)   Resp   18 (04/26/18 1429)     SpO2   95 % (04/26/18 1429)     Post Anesthesia Care and Evaluation    Patient location during evaluation: PACU  Patient participation: complete - patient participated  Level of consciousness: awake and alert  Pain score: 0  Pain management: adequate  Airway patency: patent  Anesthetic complications: No anesthetic complications  PONV Status: none  Cardiovascular status: hemodynamically stable and acceptable  Respiratory status: nonlabored ventilation, acceptable and nasal cannula  Hydration status: acceptable

## 2018-04-26 NOTE — ANESTHESIA PREPROCEDURE EVALUATION
Anesthesia Evaluation                  Airway   TM distance: >3 FB  Neck ROM: full  No difficulty expected  Dental      Pulmonary    Cardiovascular       ROS comment: Long qt    Neuro/Psych  GI/Hepatic/Renal/Endo    (+)   diabetes mellitus,     ROS Comment: mutiple endocrine problems noted    Musculoskeletal     Abdominal    Substance History      OB/GYN          Other                        Anesthesia Plan    ASA 4     general     intravenous induction   Anesthetic plan and risks discussed with patient.    Plan discussed with CRNA.

## 2018-08-08 ENCOUNTER — ANESTHESIA EVENT (OUTPATIENT)
Dept: GASTROENTEROLOGY | Facility: HOSPITAL | Age: 28
End: 2018-08-08

## 2018-08-08 RX ORDER — FENTANYL CITRATE 50 UG/ML
50 INJECTION, SOLUTION INTRAMUSCULAR; INTRAVENOUS
Status: DISCONTINUED | OUTPATIENT
Start: 2018-08-08 | End: 2018-08-09 | Stop reason: SDUPTHER

## 2018-08-09 ENCOUNTER — ANESTHESIA (OUTPATIENT)
Dept: GASTROENTEROLOGY | Facility: HOSPITAL | Age: 28
End: 2018-08-09

## 2018-08-09 ENCOUNTER — HOSPITAL ENCOUNTER (OUTPATIENT)
Facility: HOSPITAL | Age: 28
Setting detail: HOSPITAL OUTPATIENT SURGERY
Discharge: HOME | End: 2018-08-09
Attending: INTERNAL MEDICINE | Admitting: ANESTHESIOLOGY

## 2018-08-09 VITALS
OXYGEN SATURATION: 96 % | WEIGHT: 192 LBS | TEMPERATURE: 97.4 F | HEART RATE: 99 BPM | BODY MASS INDEX: 35.33 KG/M2 | HEIGHT: 62 IN | SYSTOLIC BLOOD PRESSURE: 112 MMHG | RESPIRATION RATE: 16 BRPM | DIASTOLIC BLOOD PRESSURE: 79 MMHG

## 2018-08-09 PROBLEM — R13.10 DYSPHAGIA: Status: ACTIVE | Noted: 2018-08-09

## 2018-08-09 LAB
B-HCG UR QL: NEGATIVE
CA-I BLDA-SCNC: 1.19 MMOL/L (ref 1.12–1.3)
INTERNAL NEGATIVE CONTROL: NORMAL
INTERNAL POSITIVE CONTROL: NORMAL
Lab: NORMAL
POTASSIUM BLDA-SCNC: 4.01 MMOL/L (ref 3.5–5.3)

## 2018-08-09 PROCEDURE — 93005 ELECTROCARDIOGRAM TRACING: CPT | Performed by: ANESTHESIOLOGY

## 2018-08-09 PROCEDURE — 25010000002 HEPARIN (PORCINE) PER 1000 UNITS: Performed by: INTERNAL MEDICINE

## 2018-08-09 PROCEDURE — 82330 ASSAY OF CALCIUM: CPT | Performed by: ANESTHESIOLOGY

## 2018-08-09 PROCEDURE — 84132 ASSAY OF SERUM POTASSIUM: CPT | Performed by: ANESTHESIOLOGY

## 2018-08-09 PROCEDURE — C1726 CATH, BAL DIL, NON-VASCULAR: HCPCS

## 2018-08-09 PROCEDURE — 93010 ELECTROCARDIOGRAM REPORT: CPT | Performed by: INTERNAL MEDICINE

## 2018-08-09 PROCEDURE — 25010000002 PROPOFOL 10 MG/ML EMULSION: Performed by: NURSE ANESTHETIST, CERTIFIED REGISTERED

## 2018-08-09 PROCEDURE — 25010000002 HYDROMORPHONE PER 4 MG: Performed by: NURSE ANESTHETIST, CERTIFIED REGISTERED

## 2018-08-09 PROCEDURE — 25010000002 DIPHENHYDRAMINE PER 50 MG: Performed by: ANESTHESIOLOGY

## 2018-08-09 PROCEDURE — 25010000002 METHYLPREDNISOLONE PER 125 MG: Performed by: NURSE ANESTHETIST, CERTIFIED REGISTERED

## 2018-08-09 PROCEDURE — 25010000002 HYDROMORPHONE PER 4 MG: Performed by: ANESTHESIOLOGY

## 2018-08-09 PROCEDURE — 81025 URINE PREGNANCY TEST: CPT | Performed by: ANESTHESIOLOGY

## 2018-08-09 RX ORDER — PROPOFOL 10 MG/ML
VIAL (ML) INTRAVENOUS AS NEEDED
Status: DISCONTINUED | OUTPATIENT
Start: 2018-08-09 | End: 2018-08-09 | Stop reason: SURG

## 2018-08-09 RX ORDER — FENTANYL CITRATE 50 UG/ML
50 INJECTION, SOLUTION INTRAMUSCULAR; INTRAVENOUS
Status: DISCONTINUED | OUTPATIENT
Start: 2018-08-09 | End: 2018-08-09 | Stop reason: HOSPADM

## 2018-08-09 RX ORDER — HEPARIN SODIUM 10000 [USP'U]/ML
5000 INJECTION, SOLUTION INTRAVENOUS; SUBCUTANEOUS ONCE
Status: COMPLETED | OUTPATIENT
Start: 2018-08-09 | End: 2018-08-09

## 2018-08-09 RX ORDER — FAMOTIDINE 10 MG/ML
20 INJECTION, SOLUTION INTRAVENOUS ONCE
Status: DISCONTINUED | OUTPATIENT
Start: 2018-08-09 | End: 2018-08-09 | Stop reason: HOSPADM

## 2018-08-09 RX ORDER — VENLAFAXINE 25 MG/1
75 TABLET ORAL 3 TIMES DAILY
COMMUNITY

## 2018-08-09 RX ORDER — OMEPRAZOLE 20 MG/1
20 CAPSULE, DELAYED RELEASE ORAL DAILY
COMMUNITY
End: 2020-06-06 | Stop reason: HOSPADM

## 2018-08-09 RX ORDER — DIPHENHYDRAMINE HYDROCHLORIDE 50 MG/ML
25 INJECTION INTRAMUSCULAR; INTRAVENOUS ONCE
Status: COMPLETED | OUTPATIENT
Start: 2018-08-09 | End: 2018-08-09

## 2018-08-09 RX ORDER — LIDOCAINE HYDROCHLORIDE 10 MG/ML
0.5 INJECTION, SOLUTION EPIDURAL; INFILTRATION; INTRACAUDAL; PERINEURAL ONCE AS NEEDED
Status: DISCONTINUED | OUTPATIENT
Start: 2018-08-09 | End: 2018-08-09 | Stop reason: HOSPADM

## 2018-08-09 RX ORDER — SODIUM CHLORIDE 0.9 % (FLUSH) 0.9 %
1-10 SYRINGE (ML) INJECTION AS NEEDED
Status: DISCONTINUED | OUTPATIENT
Start: 2018-08-09 | End: 2018-08-09 | Stop reason: HOSPADM

## 2018-08-09 RX ORDER — METHYLPREDNISOLONE SODIUM SUCCINATE 125 MG/2ML
INJECTION, POWDER, LYOPHILIZED, FOR SOLUTION INTRAMUSCULAR; INTRAVENOUS AS NEEDED
Status: DISCONTINUED | OUTPATIENT
Start: 2018-08-09 | End: 2018-08-09 | Stop reason: SURG

## 2018-08-09 RX ORDER — HYDROMORPHONE HCL 110MG/55ML
PATIENT CONTROLLED ANALGESIA SYRINGE INTRAVENOUS AS NEEDED
Status: DISCONTINUED | OUTPATIENT
Start: 2018-08-09 | End: 2018-08-09 | Stop reason: SURG

## 2018-08-09 RX ORDER — FAMOTIDINE 20 MG/1
20 TABLET, FILM COATED ORAL ONCE
Status: DISCONTINUED | OUTPATIENT
Start: 2018-08-09 | End: 2018-08-09 | Stop reason: HOSPADM

## 2018-08-09 RX ORDER — SODIUM CHLORIDE, SODIUM LACTATE, POTASSIUM CHLORIDE, CALCIUM CHLORIDE 600; 310; 30; 20 MG/100ML; MG/100ML; MG/100ML; MG/100ML
9 INJECTION, SOLUTION INTRAVENOUS CONTINUOUS
Status: DISCONTINUED | OUTPATIENT
Start: 2018-08-09 | End: 2018-08-09 | Stop reason: HOSPADM

## 2018-08-09 RX ADMIN — METHYLPREDNISOLONE SODIUM SUCCINATE 125 MG: 125 INJECTION, POWDER, FOR SOLUTION INTRAMUSCULAR; INTRAVENOUS at 11:20

## 2018-08-09 RX ADMIN — HYDROMORPHONE HYDROCHLORIDE 1 MG: 2 INJECTION, SOLUTION INTRAMUSCULAR; INTRAVENOUS; SUBCUTANEOUS at 11:32

## 2018-08-09 RX ADMIN — HEPARIN SODIUM 500 UNITS: 10000 INJECTION, SOLUTION INTRAVENOUS; SUBCUTANEOUS at 12:10

## 2018-08-09 RX ADMIN — DIPHENHYDRAMINE HYDROCHLORIDE 25 MG: 50 INJECTION, SOLUTION INTRAMUSCULAR; INTRAVENOUS at 12:07

## 2018-08-09 RX ADMIN — PROPOFOL 100 MG: 10 INJECTION, EMULSION INTRAVENOUS at 11:26

## 2018-08-09 RX ADMIN — HYDROMORPHONE HYDROCHLORIDE 1 MG: 2 INJECTION, SOLUTION INTRAMUSCULAR; INTRAVENOUS; SUBCUTANEOUS at 11:20

## 2018-08-09 RX ADMIN — PROPOFOL 100 MG: 10 INJECTION, EMULSION INTRAVENOUS at 11:30

## 2018-08-09 RX ADMIN — SODIUM CHLORIDE, POTASSIUM CHLORIDE, SODIUM LACTATE AND CALCIUM CHLORIDE: 600; 310; 30; 20 INJECTION, SOLUTION INTRAVENOUS at 11:14

## 2018-08-09 RX ADMIN — HYDROMORPHONE HYDROCHLORIDE 0.5 MG: 1 INJECTION, SOLUTION INTRAMUSCULAR; INTRAVENOUS; SUBCUTANEOUS at 11:59

## 2018-08-09 NOTE — H&P
See scanned in 8-1-18 office H&P .  No interval changes noted since visit.   IMP: Dysphagia  PLAN: dilation to 20mm.

## 2018-08-09 NOTE — ANESTHESIA POSTPROCEDURE EVALUATION
"Patient: Krista Richter    Procedure Summary     Date:  08/09/18 Room / Location:   [facility]    Anesthesia Start:  1120 Anesthesia Stop:      Procedure:  ESOPHAGOGASTRODUODENOSCOPY-DILATION (N/A ) Diagnosis:      Surgeon:  Gavin Vargas MD Provider:  Neal Danielle MD    Anesthesia Type:  general ASA Status:  3          Anesthesia Type: general  Last vitals  BP   106/66 (08/09/18 1140)   Temp   97.4 °F (36.3 °C) (08/09/18 1140)   Pulse   97 (08/09/18 1140)   Resp   18 (08/09/18 1140)     SpO2   91 % (08/09/18 1140)     Post Anesthesia Care and Evaluation    Patient location during evaluation: PACU  Patient participation: complete - patient participated  Level of consciousness: awake and responsive to verbal stimuli  Pain score: 2  Pain management: adequate  Airway patency: patent  Anesthetic complications: No anesthetic complications    Cardiovascular status: acceptable  Respiratory status: acceptable  Hydration status: acceptable    Comments: Pt awake and responsive. SV. VSS. Report to RN. Patient Vitals in the past 24 hrs:  08/09/18 1140, BP:106/66, Temp:97.4 °F (36.3 °C), Temp src:Temporal Art, Pulse:97, Resp:18, SpO2:91 %  08/09/18 1036, BP:121/82, Temp:98 °F (36.7 °C), Temp src:Temporal Art, Pulse:93, Resp:17, SpO2:96 %, Height:157.5 cm (62\"), Weight:87.1 kg (192 lb)  133/78. p 72. r 16. t 98.1                  "

## 2018-08-09 NOTE — ANESTHESIA PREPROCEDURE EVALUATION
Anesthesia Evaluation     Patient summary reviewed and Nursing notes reviewed                Airway   Mallampati: I  TM distance: >3 FB  Neck ROM: full  No difficulty expected  Dental - normal exam     Pulmonary - negative pulmonary ROS and normal exam   Cardiovascular - negative cardio ROS and normal exam      ROS comment: H/O LONG QT SYNDROME    Neuro/Psych- negative ROS  GI/Hepatic/Renal/Endo    (+)   diabetes mellitus, hypothyroidism,     ROS Comment: ABSENT SPLEEN    Musculoskeletal (-) negative ROS    Abdominal  - normal exam    Bowel sounds: normal.   Substance History - negative use     OB/GYN negative ob/gyn ROS         Other          Other Comment: AUTOIMMUNE POLYGLANDULAR SYNDROME  ADRENAL INSUFFICIENCY                Anesthesia Plan    ASA 3     general   (PROPOFOL)  intravenous induction   Anesthetic plan and risks discussed with patient.    Plan discussed with CRNA.

## 2018-09-24 ENCOUNTER — HOSPITAL ENCOUNTER (OUTPATIENT)
Dept: INFUSION THERAPY | Facility: HOSPITAL | Age: 28
Discharge: HOME | End: 2018-09-24
Attending: INTERNAL MEDICINE

## 2018-09-24 ENCOUNTER — TRANSCRIBE ORDERS (OUTPATIENT)
Dept: ADMINISTRATIVE | Facility: HOSPITAL | Age: 28
End: 2018-09-24

## 2018-09-24 VITALS
OXYGEN SATURATION: 90 % | HEART RATE: 98 BPM | DIASTOLIC BLOOD PRESSURE: 70 MMHG | RESPIRATION RATE: 18 BRPM | TEMPERATURE: 98.3 F | SYSTOLIC BLOOD PRESSURE: 105 MMHG

## 2018-09-24 DIAGNOSIS — T85.868D THROMBUS DUE TO ANY DEVICE, IMPLANT OR GRAFT, SUBSEQUENT ENCOUNTER: ICD-10-CM

## 2018-09-24 DIAGNOSIS — T85.868D THROMBUS DUE TO ANY DEVICE, IMPLANT OR GRAFT, SUBSEQUENT ENCOUNTER: Primary | ICD-10-CM

## 2018-09-24 PROCEDURE — 63710000001 PROMETHAZINE PER 25 MG: Performed by: INTERNAL MEDICINE

## 2018-09-24 RX ORDER — PROMETHAZINE HYDROCHLORIDE 25 MG/1
25 TABLET ORAL ONCE
Status: COMPLETED | OUTPATIENT
Start: 2018-09-24 | End: 2018-09-24

## 2018-09-24 RX ADMIN — PROMETHAZINE HYDROCHLORIDE 25 MG: 25 TABLET ORAL at 13:42

## 2018-09-26 ENCOUNTER — HOSPITAL ENCOUNTER (EMERGENCY)
Facility: HOSPITAL | Age: 28
Discharge: HOME | End: 2018-09-26
Attending: EMERGENCY MEDICINE | Admitting: EMERGENCY MEDICINE

## 2018-09-26 VITALS
TEMPERATURE: 98.7 F | SYSTOLIC BLOOD PRESSURE: 131 MMHG | OXYGEN SATURATION: 90 % | WEIGHT: 190 LBS | BODY MASS INDEX: 34.96 KG/M2 | HEART RATE: 82 BPM | HEIGHT: 62 IN | RESPIRATION RATE: 24 BRPM | DIASTOLIC BLOOD PRESSURE: 78 MMHG

## 2018-09-26 DIAGNOSIS — R52 PAIN: ICD-10-CM

## 2018-09-26 DIAGNOSIS — D72.829 LEUKOCYTOSIS, UNSPECIFIED TYPE: Primary | ICD-10-CM

## 2018-09-26 DIAGNOSIS — R29.0 TETANY: ICD-10-CM

## 2018-09-26 LAB
ALBUMIN SERPL-MCNC: 3.95 G/DL (ref 3.2–4.8)
ALBUMIN/GLOB SERPL: 1.3 G/DL (ref 1.5–2.5)
ALP SERPL-CCNC: 84 U/L (ref 25–100)
ALT SERPL W P-5'-P-CCNC: 23 U/L (ref 7–40)
ANION GAP SERPL CALCULATED.3IONS-SCNC: 11 MMOL/L (ref 3–11)
AST SERPL-CCNC: 36 U/L (ref 0–33)
BASOPHILS # BLD AUTO: 0.08 10*3/MM3 (ref 0–0.2)
BASOPHILS NFR BLD AUTO: 0.4 % (ref 0–1)
BILIRUB SERPL-MCNC: 0.4 MG/DL (ref 0.3–1.2)
BUN BLD-MCNC: 16 MG/DL (ref 9–23)
BUN/CREAT SERPL: 24.2 (ref 7–25)
BURR CELLS BLD QL SMEAR: NORMAL
CA-I SERPL ISE-MCNC: 1.28 MMOL/L (ref 1.12–1.32)
CALCIUM SPEC-SCNC: 9.2 MG/DL (ref 8.7–10.4)
CHLORIDE SERPL-SCNC: 99 MMOL/L (ref 99–109)
CO2 SERPL-SCNC: 29 MMOL/L (ref 20–31)
CREAT BLD-MCNC: 0.66 MG/DL (ref 0.6–1.3)
DEPRECATED RDW RBC AUTO: 56 FL (ref 37–54)
EOSINOPHIL # BLD AUTO: 0.19 10*3/MM3 (ref 0–0.3)
EOSINOPHIL NFR BLD AUTO: 1 % (ref 0–3)
ERYTHROCYTE [DISTWIDTH] IN BLOOD BY AUTOMATED COUNT: 18.9 % (ref 11.3–14.5)
GFR SERPL CREATININE-BSD FRML MDRD: 106 ML/MIN/1.73
GLOBULIN UR ELPH-MCNC: 3.2 GM/DL
GLUCOSE BLD-MCNC: 113 MG/DL (ref 70–100)
HCT VFR BLD AUTO: 35.2 % (ref 34.5–44)
HGB BLD-MCNC: 10.2 G/DL (ref 11.5–15.5)
HOLD SPECIMEN: NORMAL
HOLD SPECIMEN: NORMAL
HYPOCHROMIA BLD QL: NORMAL
IMM GRANULOCYTES # BLD: 0.14 10*3/MM3 (ref 0–0.03)
IMM GRANULOCYTES NFR BLD: 0.7 % (ref 0–0.6)
LARGE PLATELETS: NORMAL
LYMPHOCYTES # BLD AUTO: 1.41 10*3/MM3 (ref 0.6–4.8)
LYMPHOCYTES NFR BLD AUTO: 7.5 % (ref 24–44)
MAGNESIUM SERPL-MCNC: 2 MG/DL (ref 1.3–2.7)
MCH RBC QN AUTO: 23.6 PG (ref 27–31)
MCHC RBC AUTO-ENTMCNC: 29 G/DL (ref 32–36)
MCV RBC AUTO: 81.5 FL (ref 80–99)
MONOCYTES # BLD AUTO: 2.44 10*3/MM3 (ref 0–1)
MONOCYTES NFR BLD AUTO: 13 % (ref 0–12)
NEUTROPHILS # BLD AUTO: 14.58 10*3/MM3 (ref 1.5–8.3)
NEUTROPHILS NFR BLD AUTO: 77.4 % (ref 41–71)
PLATELET # BLD AUTO: 549 10*3/MM3 (ref 150–450)
PMV BLD AUTO: 11.5 FL (ref 6–12)
POTASSIUM BLD-SCNC: 5.2 MMOL/L (ref 3.5–5.5)
PROT SERPL-MCNC: 7.1 G/DL (ref 5.7–8.2)
RBC # BLD AUTO: 4.32 10*6/MM3 (ref 3.89–5.14)
SODIUM BLD-SCNC: 139 MMOL/L (ref 132–146)
WBC MORPH BLD: NORMAL
WBC NRBC COR # BLD: 18.84 10*3/MM3 (ref 3.5–10.8)
WHOLE BLOOD HOLD SPECIMEN: NORMAL

## 2018-09-26 PROCEDURE — 80053 COMPREHEN METABOLIC PANEL: CPT | Performed by: EMERGENCY MEDICINE

## 2018-09-26 PROCEDURE — 83735 ASSAY OF MAGNESIUM: CPT | Performed by: EMERGENCY MEDICINE

## 2018-09-26 PROCEDURE — 25010000002 PROMETHAZINE PER 50 MG: Performed by: EMERGENCY MEDICINE

## 2018-09-26 PROCEDURE — 99285 EMERGENCY DEPT VISIT HI MDM: CPT

## 2018-09-26 PROCEDURE — 96375 TX/PRO/DX INJ NEW DRUG ADDON: CPT

## 2018-09-26 PROCEDURE — 85025 COMPLETE CBC W/AUTO DIFF WBC: CPT | Performed by: EMERGENCY MEDICINE

## 2018-09-26 PROCEDURE — 25010000002 HYDROMORPHONE PER 4 MG: Performed by: EMERGENCY MEDICINE

## 2018-09-26 PROCEDURE — 96365 THER/PROPH/DIAG IV INF INIT: CPT

## 2018-09-26 PROCEDURE — 82330 ASSAY OF CALCIUM: CPT | Performed by: EMERGENCY MEDICINE

## 2018-09-26 PROCEDURE — 25010000002 CALCIUM GLUCONATE PER 10 ML: Performed by: EMERGENCY MEDICINE

## 2018-09-26 PROCEDURE — 85007 BL SMEAR W/DIFF WBC COUNT: CPT | Performed by: EMERGENCY MEDICINE

## 2018-09-26 PROCEDURE — 96376 TX/PRO/DX INJ SAME DRUG ADON: CPT

## 2018-09-26 RX ORDER — SODIUM CHLORIDE 0.9 % (FLUSH) 0.9 %
10 SYRINGE (ML) INJECTION AS NEEDED
Status: DISCONTINUED | OUTPATIENT
Start: 2018-09-26 | End: 2018-09-26 | Stop reason: HOSPADM

## 2018-09-26 RX ORDER — HYDROMORPHONE HYDROCHLORIDE 1 MG/ML
0.5 INJECTION, SOLUTION INTRAMUSCULAR; INTRAVENOUS; SUBCUTANEOUS ONCE
Status: COMPLETED | OUTPATIENT
Start: 2018-09-26 | End: 2018-09-26

## 2018-09-26 RX ORDER — SODIUM CHLORIDE 9 MG/ML
50 INJECTION, SOLUTION INTRAVENOUS ONCE
Status: COMPLETED | OUTPATIENT
Start: 2018-09-26 | End: 2018-09-26

## 2018-09-26 RX ORDER — PROMETHAZINE HYDROCHLORIDE 25 MG/ML
12.5 INJECTION, SOLUTION INTRAMUSCULAR; INTRAVENOUS ONCE
Status: COMPLETED | OUTPATIENT
Start: 2018-09-26 | End: 2018-09-26

## 2018-09-26 RX ADMIN — HYDROMORPHONE HYDROCHLORIDE 0.5 MG: 1 INJECTION, SOLUTION INTRAMUSCULAR; INTRAVENOUS; SUBCUTANEOUS at 15:46

## 2018-09-26 RX ADMIN — PROMETHAZINE HYDROCHLORIDE 12.5 MG: 25 INJECTION, SOLUTION INTRAMUSCULAR; INTRAVENOUS at 14:39

## 2018-09-26 RX ADMIN — Medication 10 ML: at 15:47

## 2018-09-26 RX ADMIN — SODIUM CHLORIDE 50 ML/HR: 9 INJECTION, SOLUTION INTRAVENOUS at 13:26

## 2018-09-26 RX ADMIN — SODIUM CHLORIDE 1000 ML: 9 INJECTION, SOLUTION INTRAVENOUS at 14:39

## 2018-09-26 RX ADMIN — Medication 10 ML: at 14:39

## 2018-09-26 RX ADMIN — HYDROMORPHONE HYDROCHLORIDE 0.5 MG: 1 INJECTION, SOLUTION INTRAMUSCULAR; INTRAVENOUS; SUBCUTANEOUS at 14:40

## 2018-09-26 RX ADMIN — CALCIUM GLUCONATE: 98 INJECTION, SOLUTION INTRAVENOUS at 14:42

## 2018-09-26 RX ADMIN — Medication 10 ML: at 15:48

## 2018-09-26 NOTE — DISCHARGE INSTRUCTIONS
Follow up with your [facility] vascular physician this week as previously scheduled.     Return to the emergency department immediately for new or changing concerns.  Take medications as prescribed.  Follow up as instructed.    Please review the medications you are supposed to be taking according to prior physician recommendations. I have not changed your home medications during this visit. If your discharge instructions indicate that I have changed your home medications, this is not the case, and you should disregard. If you have any questions about the medication you should be taking at home, please call your physician.

## 2018-09-26 NOTE — ED PROVIDER NOTES
Subjective   This patient is a 28-year-old female with a long-standing history of unique disease states and multiple emergency department visits to a variety of hospitals in the local area.  Patient tells me that approximately once per week, she'll develop tetany.  The tetany is unrelated to actual calcium levels.  She cannot low calcium, high calcium, or normal calcium during these episodes.  She tells me that she has a port at home and that her mom generally will give her 1-2 g of calcium gluconate, as well as pain medication and nausea medication, following which time, she is able to get back around as before.  She was with the infectious disease team today in the clinic when she developed one of these tetany episodes.  She comes in to the emergency department with her hands locked behind her back and her legs crossed, per usual.  I confirmed that this is her usual presentation from a body positioning standpoint by talking to outside physicians as well as reviewing the patient's past medical documentation.  The patient is here with her mother who provides most of the history.  Patient is very knowledgeable about her past medical history and tends to confirm diagnoses with multiple details related to position seen, test ordered, and test results.  In summary, we have a 28-year-old female here for chronic tetany episode, stating that this occurs approximately 40-60 times per year and is not new.  The patient recently had her port removed secondary to a clot and has a peripheral IV at this time.    Past Medical Hx: APS Type 1, parathyroid abnormality, DM, Long Q-T Syndrome, recurrent tetany, anemia, port-a-cath placement, hernia repair, cholecystectomy, EGD, exploratory laparotomy, anemia, port-a-cath infection/blockage, tetany, adrenal insufficiency, splenic infarct, polyglandular syndrome, autoimmune enteropathy, ovarian failure, chronic myocutaneous,alopecia totalis     Past Family Hx: APS, maternal grandmother.              History provided by:  Patient  Illness   Location:  Generalized Spasms  Severity:  Moderate  Onset quality:  Sudden  Duration:  1 day  Timing:  Constant  Progression:  Unchanged  Chronicity:  Recurrent  Context:  APS Type 1  Associated symptoms: no chest pain, no diarrhea, no ear pain, no fatigue, no fever, no headaches, no myalgias, no nausea, no shortness of breath and no vomiting        Review of Systems   Constitutional: Negative for chills, fatigue, fever and unexpected weight change.   HENT: Negative for dental problem, ear pain, hearing loss and sinus pressure.    Eyes: Negative for pain and visual disturbance.   Respiratory: Negative for chest tightness and shortness of breath.    Cardiovascular: Negative for chest pain, palpitations and leg swelling.   Gastrointestinal: Negative for diarrhea, nausea and vomiting.   Genitourinary: Negative for difficulty urinating, dyspareunia, hematuria and urgency.   Musculoskeletal: Positive for arthralgias. Negative for myalgias, neck pain and neck stiffness.        Generalized tetany and muscle spasms.  Arm stuck behind back and legs crossed without ability to move them.   Neurological: Negative for seizures, syncope, speech difficulty, light-headedness and headaches.   Psychiatric/Behavioral: Negative for confusion.   All other systems reviewed and are negative.      Past Medical History:   Diagnosis Date   • Anemia    • APS type 1 (CMS/HCC)    • Disease of thyroid gland    • Insulin dependent diabetes mellitus (CMS/HCC)    • Long Q-T syndrome    • Parathyroid abnormality (CMS/HCC)    • Spleen absent    • Tetany     HAS EPISODES   • Transfusion history        Allergies   Allergen Reactions   • Cefpodoxime Hives   • Clarithromycin Hives     biaxin   • Nystatin Hives   • Penicillins Hives   • Sulfa Antibiotics Hives       Past Surgical History:   Procedure Laterality Date   • CARDIAC SURGERY      LOOP RECORDER   • CHOLECYSTECTOMY     • ENDOSCOPY N/A 4/26/2018     Procedure: ESOPHAGOGASTRODUODENOSCOPY;  Surgeon: Gavin Vargas MD;  Location:  [facility];  Service: Gastroenterology   • ENDOSCOPY N/A 8/9/2018    Procedure: ESOPHAGOGASTRODUODENOSCOPY-DILATION;  Surgeon: Gavin Vargas MD;  Location:  [facility];  Service: Gastroenterology   • EXPLORATORY LAPAROTOMY      MULTIPLE   • HAND SURGERY      4 TOTAL   • HERNIA REPAIR     • LIVER BIOPSY     • MUSCLE BIOPSY     • PEG TUBE INSERTION     • PEG TUBE REMOVAL     • PORTACATH PLACEMENT         History reviewed. No pertinent family history.    Social History     Social History   • Marital status: Single     Social History Main Topics   • Smoking status: Never Smoker   • Smokeless tobacco: Never Used   • Alcohol use Yes      Comment: SOCIAL   • Drug use: No   • Sexual activity: Defer     Other Topics Concern   • Not on file         Objective   Physical Exam   Constitutional: She is oriented to person, place, and time. She appears well-developed. She appears distressed.   HENT:   Head: Normocephalic and atraumatic.   Right Ear: External ear normal.   Left Ear: External ear normal.   Nose: Nose normal.   Mouth/Throat: Oropharynx is clear and moist.   Eyes: Pupils are equal, round, and reactive to light. EOM are normal.   Neck: Normal range of motion. Neck supple. No JVD present.   Cardiovascular: Normal rate, regular rhythm and normal heart sounds.  Exam reveals no gallop and no friction rub.    Pulmonary/Chest: Effort normal and breath sounds normal. She has no wheezes. She has no rales. She exhibits no tenderness.   Abdominal: Soft. Bowel sounds are normal. She exhibits no distension and no mass. There is no tenderness. There is no rebound and no guarding.   No signs of acute abdomen.  No pain at McBurney's point.  No pulsatile abdominal mass   Musculoskeletal: She exhibits tenderness. She exhibits no edema.   Patient with contortion.  Arms wrapped around her body and behind her back with  legs crossed.  Muscles tight and clenched.  Patient with muscles of face clenched.  Able to talk but does not move muscles of face to a great deal.   Lymphadenopathy:     She has no cervical adenopathy.   Neurological: She is alert and oriented to person, place, and time. No cranial nerve deficit or sensory deficit. She exhibits normal muscle tone.   5/5 strength bilaterally with flexion and extension of fingers, wrist, elbows, knees and hips as well as plantar and dorsiflexion of the foot.   Skin: Skin is warm and dry. No erythema. No pallor.   Healing port site at the right chest with no signs of infection, fluctuance, drainage or induration.   Psychiatric: She has a normal mood and affect. Her behavior is normal. Judgment and thought content normal.   Nursing note and vitals reviewed.      Procedures         ED Course  ED Course as of Sep 26 1538   Wed Sep 26, 2018   1343 Dr. Tubbs discussed with Dr. Nash at [facility]  [AT]   1408 I spent a fair amount of time on the phone talking to a provider at an outside hospital included recently cared for the patient and who has seen the patient numerous times in the past.  I confirmed that the patient has received calcium gluconate administration as well as pain medication and nausea medication as well as IV fluids.  This provider was very helpful and also confirmed that he had confirmed the patient's diagnosis by calling the [facility] in the recent past.  Her ionized calcium here is normal.  It is 1.28.  Magnesium is 2.0.  CBC shows a white count of 18,000 with a hemoglobin and hematocrit of 10.2 35.2 and a platelet count of 549.  CMP shows a calcium of 9.2 glucose 113 otherwise no normalities.  I ordered calcium, anti-medic, and pain medication as well as IV fluid rehydration.  I anticipate the patient will respond nicely, as she has several times in the past.  When she is moving around and feeling better, do anticipate ultimate discharge home.  Patient and brother  are appreciative for care and in agreement with the plan.  [AMARI]   1440 Dr. Tubbs re-examined pt and updated on current results.   [AT]   1443 I checked in on the patient at approximately 240.  She is just now receiving her medications.  Her legs are actually far less contorted that they were earlier.  Mom is now the bedside and had a good conversation with her.  She was very happy with care and reinforced the story that I heard earlier from both patient and patient's brother.  Patient is receiving the calcium, pain medication, and anti-medic as well as IV fluid rehydration ordered.  I do anticipate ultimate discharge home with mom.  Mom confirmed that a new port is being placed by the vascular surgery team at [facility].  Their evaluation is in 2 days.  [AMARI]   1526 I reexamined the patient at approximately 3:15 PM.  She was sitting up in the bed, talking actively completely back to normal.  She was very appreciative for care and asked if she can get one more dose of pain medication before she left.  I told her I would be okay with this.  Patient will be discharged home with an impression that includes leukocytosis, tetany, acute on chronic pain syndrome.  Patient will be discharged home to follow-up with her primary care physician in vascular surgery as scheduled.  Return for new concerns.  Patient, brother, and mom were appreciative for care and patient was discharged home accordingly.  [AMARI]      ED Course User Index  [AT] Silverio Posadas  [AMARI] Jimenez Tubbs MD     Recent Results (from the past 24 hour(s))   Green Top (Gel)    Collection Time: 09/26/18  1:18 PM   Result Value Ref Range    Extra Tube Hold for add-ons.    Lavender Top    Collection Time: 09/26/18  1:18 PM   Result Value Ref Range    Extra Tube hold for add-on    Gold Top - SST    Collection Time: 09/26/18  1:18 PM   Result Value Ref Range    Extra Tube Hold for add-ons.    Comprehensive Metabolic Panel    Collection Time:  09/26/18  1:18 PM   Result Value Ref Range    Glucose 113 (H) 70 - 100 mg/dL    BUN 16 9 - 23 mg/dL    Creatinine 0.66 0.60 - 1.30 mg/dL    Sodium 139 132 - 146 mmol/L    Potassium 5.2 3.5 - 5.5 mmol/L    Chloride 99 99 - 109 mmol/L    CO2 29.0 20.0 - 31.0 mmol/L    Calcium 9.2 8.7 - 10.4 mg/dL    Total Protein 7.1 5.7 - 8.2 g/dL    Albumin 3.95 3.20 - 4.80 g/dL    ALT (SGPT) 23 7 - 40 U/L    AST (SGOT) 36 (H) 0 - 33 U/L    Alkaline Phosphatase 84 25 - 100 U/L    Total Bilirubin 0.4 0.3 - 1.2 mg/dL    eGFR Non African Amer 106 >60 mL/min/1.73    Globulin 3.2 gm/dL    A/G Ratio 1.3 (L) 1.5 - 2.5 g/dL    BUN/Creatinine Ratio 24.2 7.0 - 25.0    Anion Gap 11.0 3.0 - 11.0 mmol/L   Magnesium    Collection Time: 09/26/18  1:18 PM   Result Value Ref Range    Magnesium 2.0 1.3 - 2.7 mg/dL   Calcium, Ionized    Collection Time: 09/26/18  1:18 PM   Result Value Ref Range    Ionized Calcium 1.28 1.12 - 1.32 mmol/L   CBC Auto Differential    Collection Time: 09/26/18  1:18 PM   Result Value Ref Range    WBC 18.84 (H) 3.50 - 10.80 10*3/mm3    RBC 4.32 3.89 - 5.14 10*6/mm3    Hemoglobin 10.2 (L) 11.5 - 15.5 g/dL    Hematocrit 35.2 34.5 - 44.0 %    MCV 81.5 80.0 - 99.0 fL    MCH 23.6 (L) 27.0 - 31.0 pg    MCHC 29.0 (L) 32.0 - 36.0 g/dL    RDW 18.9 (H) 11.3 - 14.5 %    RDW-SD 56.0 (H) 37.0 - 54.0 fl    MPV 11.5 6.0 - 12.0 fL    Platelets 549 (H) 150 - 450 10*3/mm3    Neutrophil % 77.4 (H) 41.0 - 71.0 %    Lymphocyte % 7.5 (L) 24.0 - 44.0 %    Monocyte % 13.0 (H) 0.0 - 12.0 %    Eosinophil % 1.0 0.0 - 3.0 %    Basophil % 0.4 0.0 - 1.0 %    Immature Grans % 0.7 (H) 0.0 - 0.6 %    Neutrophils, Absolute 14.58 (H) 1.50 - 8.30 10*3/mm3    Lymphocytes, Absolute 1.41 0.60 - 4.80 10*3/mm3    Monocytes, Absolute 2.44 (H) 0.00 - 1.00 10*3/mm3    Eosinophils, Absolute 0.19 0.00 - 0.30 10*3/mm3    Basophils, Absolute 0.08 0.00 - 0.20 10*3/mm3    Immature Grans, Absolute 0.14 (H) 0.00 - 0.03 10*3/mm3   Scan Slide    Collection Time: 09/26/18   1:18 PM   Result Value Ref Range    [place] Cells Slight/1+ None Seen    Hypochromia Mod/2+ None Seen    WBC Morphology Normal Normal    Large Platelets Slight/1+ None Seen     Note: In addition to lab results from this visit, the labs listed above may include labs taken at another facility or during a different encounter within the last 24 hours. Please correlate lab times with ED admission and discharge times for further clarification of the services performed during this visit.    No orders to display     Vitals:    09/26/18 1330 09/26/18 1430 09/26/18 1500 09/26/18 1530   BP: 119/72 124/93 131/78    BP Location:       Patient Position:       Pulse: 92 91 87 82   Resp: 24 24 24    Temp:       TempSrc:       SpO2: 91% 90% 90% 90%   Weight:       Height:         Medications   sodium chloride 0.9 % flush 10 mL (10 mL Intravenous Given 9/26/18 1439)   calcium gluconate 1 g in sodium chloride 0.9 % 100 mL ( Intravenous New Bag 9/26/18 1442)   sodium chloride 0.9 % bolus 1,000 mL (1,000 mL Intravenous New Bag 9/26/18 1439)   HYDROmorphone (DILAUDID) injection 0.5 mg (not administered)   sodium chloride 0.9 % infusion (50 mL/hr Intravenous New Bag 9/26/18 1326)   HYDROmorphone (DILAUDID) injection 0.5 mg (0.5 mg Intravenous Given 9/26/18 1440)   promethazine (PHENERGAN) injection 12.5 mg (12.5 mg Intravenous Given 9/26/18 1439)     ECG/EMG Results (last 24 hours)     ** No results found for the last 24 hours. **                        [facility]    Final diagnoses:   Leukocytosis, unspecified type   Tetany   Pain       Documentation assistance provided by artemio Posadas.  Information recorded by the scribe was done at my direction and has been verified and validated by me.     Silverio Posadas  09/26/18 5916       Jimenez Tubbs MD  09/26/18 4739

## 2018-12-09 ENCOUNTER — HOSPITAL ENCOUNTER (EMERGENCY)
Facility: HOSPITAL | Age: 28
Discharge: HOME | End: 2018-12-09
Attending: EMERGENCY MEDICINE | Admitting: EMERGENCY MEDICINE

## 2018-12-09 VITALS
WEIGHT: 183 LBS | OXYGEN SATURATION: 96 % | RESPIRATION RATE: 20 BRPM | BODY MASS INDEX: 33.68 KG/M2 | DIASTOLIC BLOOD PRESSURE: 71 MMHG | SYSTOLIC BLOOD PRESSURE: 114 MMHG | TEMPERATURE: 98.2 F | HEART RATE: 77 BPM | HEIGHT: 62 IN

## 2018-12-09 DIAGNOSIS — M62.40 INTRACTABLE MUSCLE SPASM: Primary | ICD-10-CM

## 2018-12-09 LAB
ACANTHOCYTES BLD QL SMEAR: NORMAL
ALBUMIN SERPL-MCNC: 4.61 G/DL (ref 3.2–4.8)
ALBUMIN/GLOB SERPL: 1.4 G/DL (ref 1.5–2.5)
ALP SERPL-CCNC: 114 U/L (ref 25–100)
ALT SERPL W P-5'-P-CCNC: 24 U/L (ref 7–40)
ANION GAP SERPL CALCULATED.3IONS-SCNC: 14 MMOL/L (ref 3–11)
AST SERPL-CCNC: 36 U/L (ref 0–33)
BASOPHILS # BLD AUTO: 0.08 10*3/MM3 (ref 0–0.2)
BASOPHILS NFR BLD AUTO: 0.4 % (ref 0–1)
BILIRUB SERPL-MCNC: 0.4 MG/DL (ref 0.3–1.2)
BUN BLD-MCNC: 17 MG/DL (ref 9–23)
BUN/CREAT SERPL: 23 (ref 7–25)
BURR CELLS BLD QL SMEAR: NORMAL
CALCIUM SPEC-SCNC: 10.2 MG/DL (ref 8.7–10.4)
CHLORIDE SERPL-SCNC: 100 MMOL/L (ref 99–109)
CO2 SERPL-SCNC: 24 MMOL/L (ref 20–31)
CREAT BLD-MCNC: 0.74 MG/DL (ref 0.6–1.3)
DEPRECATED RDW RBC AUTO: 58.7 FL (ref 37–54)
EOSINOPHIL # BLD AUTO: 0.04 10*3/MM3 (ref 0–0.3)
EOSINOPHIL NFR BLD AUTO: 0.2 % (ref 0–3)
ERYTHROCYTE [DISTWIDTH] IN BLOOD BY AUTOMATED COUNT: 20.7 % (ref 11.3–14.5)
GFR SERPL CREATININE-BSD FRML MDRD: 93 ML/MIN/1.73
GLOBULIN UR ELPH-MCNC: 3.3 GM/DL
GLUCOSE BLD-MCNC: 102 MG/DL (ref 70–100)
HCT VFR BLD AUTO: 39.8 % (ref 34.5–44)
HGB BLD-MCNC: 12 G/DL (ref 11.5–15.5)
HOLD SPECIMEN: NORMAL
HOLD SPECIMEN: NORMAL
IMM GRANULOCYTES # BLD: 0.11 10*3/MM3 (ref 0–0.03)
IMM GRANULOCYTES NFR BLD: 0.5 % (ref 0–0.6)
LARGE PLATELETS: NORMAL
LIPASE SERPL-CCNC: 44 U/L (ref 6–51)
LYMPHOCYTES # BLD AUTO: 2.29 10*3/MM3 (ref 0.6–4.8)
LYMPHOCYTES NFR BLD AUTO: 11.2 % (ref 24–44)
MCH RBC QN AUTO: 24 PG (ref 27–31)
MCHC RBC AUTO-ENTMCNC: 30.2 G/DL (ref 32–36)
MCV RBC AUTO: 79.4 FL (ref 80–99)
MONOCYTES # BLD AUTO: 1.7 10*3/MM3 (ref 0–1)
MONOCYTES NFR BLD AUTO: 8.3 % (ref 0–12)
NEUTROPHILS # BLD AUTO: 16.16 10*3/MM3 (ref 1.5–8.3)
NEUTROPHILS NFR BLD AUTO: 79.4 % (ref 41–71)
PLATELET # BLD AUTO: 257 10*3/MM3 (ref 150–450)
PMV BLD AUTO: 11.2 FL (ref 6–12)
POTASSIUM BLD-SCNC: 4.6 MMOL/L (ref 3.5–5.5)
PROT SERPL-MCNC: 7.9 G/DL (ref 5.7–8.2)
RBC # BLD AUTO: 5.01 10*6/MM3 (ref 3.89–5.14)
SODIUM BLD-SCNC: 138 MMOL/L (ref 132–146)
WBC MORPH BLD: NORMAL
WBC NRBC COR # BLD: 20.38 10*3/MM3 (ref 3.5–10.8)
WHOLE BLOOD HOLD SPECIMEN: NORMAL
WHOLE BLOOD HOLD SPECIMEN: NORMAL

## 2018-12-09 PROCEDURE — 25010000002 CALCIUM GLUCONATE PER 10 ML: Performed by: NURSE PRACTITIONER

## 2018-12-09 PROCEDURE — 80053 COMPREHEN METABOLIC PANEL: CPT | Performed by: EMERGENCY MEDICINE

## 2018-12-09 PROCEDURE — 96375 TX/PRO/DX INJ NEW DRUG ADDON: CPT

## 2018-12-09 PROCEDURE — 25010000002 HYDROMORPHONE 1 MG/ML SOLUTION: Performed by: EMERGENCY MEDICINE

## 2018-12-09 PROCEDURE — 25010000002 ONDANSETRON PER 1 MG: Performed by: EMERGENCY MEDICINE

## 2018-12-09 PROCEDURE — 25010000002 ONDANSETRON PER 1 MG: Performed by: NURSE PRACTITIONER

## 2018-12-09 PROCEDURE — 83690 ASSAY OF LIPASE: CPT | Performed by: EMERGENCY MEDICINE

## 2018-12-09 PROCEDURE — 96361 HYDRATE IV INFUSION ADD-ON: CPT

## 2018-12-09 PROCEDURE — 85007 BL SMEAR W/DIFF WBC COUNT: CPT | Performed by: EMERGENCY MEDICINE

## 2018-12-09 PROCEDURE — 96376 TX/PRO/DX INJ SAME DRUG ADON: CPT

## 2018-12-09 PROCEDURE — 99283 EMERGENCY DEPT VISIT LOW MDM: CPT

## 2018-12-09 PROCEDURE — 85025 COMPLETE CBC W/AUTO DIFF WBC: CPT | Performed by: EMERGENCY MEDICINE

## 2018-12-09 PROCEDURE — 96374 THER/PROPH/DIAG INJ IV PUSH: CPT

## 2018-12-09 RX ORDER — CALCIUM GLUCONATE 94 MG/ML
2 INJECTION, SOLUTION INTRAVENOUS ONCE
Status: COMPLETED | OUTPATIENT
Start: 2018-12-09 | End: 2018-12-09

## 2018-12-09 RX ORDER — ONDANSETRON 2 MG/ML
INJECTION INTRAMUSCULAR; INTRAVENOUS
Status: DISCONTINUED
Start: 2018-12-09 | End: 2018-12-09 | Stop reason: HOSPADM

## 2018-12-09 RX ORDER — ONDANSETRON 2 MG/ML
4 INJECTION INTRAMUSCULAR; INTRAVENOUS ONCE
Status: COMPLETED | OUTPATIENT
Start: 2018-12-09 | End: 2018-12-09

## 2018-12-09 RX ORDER — SODIUM CHLORIDE 0.9 % (FLUSH) 0.9 %
10 SYRINGE (ML) INJECTION AS NEEDED
Status: DISCONTINUED | OUTPATIENT
Start: 2018-12-09 | End: 2018-12-09 | Stop reason: HOSPADM

## 2018-12-09 RX ADMIN — SODIUM CHLORIDE 1000 ML: 9 INJECTION, SOLUTION INTRAVENOUS at 01:18

## 2018-12-09 RX ADMIN — ONDANSETRON 4 MG: 2 INJECTION INTRAMUSCULAR; INTRAVENOUS at 02:35

## 2018-12-09 RX ADMIN — HEPARIN 300 UNITS: 100 SYRINGE at 03:37

## 2018-12-09 RX ADMIN — ONDANSETRON 4 MG: 2 INJECTION INTRAMUSCULAR; INTRAVENOUS at 01:18

## 2018-12-09 RX ADMIN — HYDROMORPHONE HYDROCHLORIDE 1 MG: 1 INJECTION, SOLUTION INTRAMUSCULAR; INTRAVENOUS; SUBCUTANEOUS at 01:18

## 2018-12-09 RX ADMIN — CALCIUM GLUCONATE 2 G: 98 INJECTION, SOLUTION INTRAVENOUS at 02:15

## 2018-12-09 RX ADMIN — HYDROMORPHONE HYDROCHLORIDE 1 MG: 1 INJECTION, SOLUTION INTRAMUSCULAR; INTRAVENOUS; SUBCUTANEOUS at 02:35

## 2018-12-09 NOTE — ED PROVIDER NOTES
"Subjective   Ms. Krista Richter is a 28 y.o. female who presents to the ED with c/o vomiting and nausea onset approximately 30 minutes ago. Pt reports approximately 30 minutes ago she ate chicken tenders and had sudden onset of nausea and vomiting. She states she has hx of APS type 1 with recurrent exacerbations consisting of tetany and hypocalcemia. She states exacerbations of these episodes are preceded by nausea and vomiting, both of which are consistent with current sx. Since ED arrival she has developed tetany which she describes as \"whole body kandi horse\" due to hypocalcemia. She denies fever and any other acute sx at this time. She is followed by a specialist regarding her APS type I.     Pt also has hx of parathyroidectomy, insulin dependent DM, long QT syndrome, tachycardia, anemia.             History provided by:  Patient  Vomiting   The primary symptoms include vomiting. Primary symptoms do not include fever. The illness began today. The onset was sudden.       Review of Systems   Constitutional: Negative for fever.   Gastrointestinal: Positive for vomiting.   Musculoskeletal:        Tetany   All other systems reviewed and are negative.      Past Medical History:   Diagnosis Date   • Anemia    • APS type 1 (CMS/HCC)    • Disease of thyroid gland    • Insulin dependent diabetes mellitus (CMS/HCC)    • Long Q-T syndrome    • Parathyroid abnormality (CMS/HCC)    • Spleen absent    • Tetany     HAS EPISODES   • Transfusion history        Allergies   Allergen Reactions   • Cefpodoxime Hives   • Clarithromycin Hives     biaxin   • Nystatin Hives   • Penicillins Hives   • Sulfa Antibiotics Hives       Past Surgical History:   Procedure Laterality Date   • CARDIAC SURGERY      LOOP RECORDER   • CHOLECYSTECTOMY     • EXPLORATORY LAPAROTOMY      MULTIPLE   • HAND SURGERY      4 TOTAL   • HERNIA REPAIR     • LIVER BIOPSY     • MUSCLE BIOPSY     • PEG TUBE INSERTION     • PEG TUBE REMOVAL     • PORTACATH " PLACEMENT         History reviewed. No pertinent family history.    Social History     Socioeconomic History   • Marital status: Single     Spouse name: Not on file   • Number of children: Not on file   • Years of education: Not on file   • Highest education level: Not on file   Tobacco Use   • Smoking status: Never Smoker   • Smokeless tobacco: Never Used   Substance and Sexual Activity   • Alcohol use: Yes     Comment: SOCIAL   • Drug use: No   • Sexual activity: Defer         Objective   Physical Exam   Constitutional: She is oriented to person, place, and time. She appears well-developed and well-nourished. No distress.   HENT:   Head: Normocephalic and atraumatic.   Right Ear: External ear normal.   Left Ear: External ear normal.   Nose: Nose normal.   Pt gives very thorough history through grit teeth.    Eyes: Conjunctivae are normal. No scleral icterus.   Neck: Normal range of motion.   Cardiovascular: Normal rate, regular rhythm and normal heart sounds. Exam reveals no gallop and no friction rub.   No murmur heard.  Pulmonary/Chest: Effort normal and breath sounds normal. No respiratory distress. She has no wheezes. She has no rales. She exhibits no tenderness.   Abdominal: Soft. Bowel sounds are normal.   Musculoskeletal:   She is in posture where she is involuntary has right arm behind her. Bilateral knees are in flexion.    Neurological: She is alert and oriented to person, place, and time.   Skin: Skin is warm and dry. She is not diaphoretic.   Psychiatric: She has a normal mood and affect. Her behavior is normal.   Nursing note and vitals reviewed.      Procedures         ED Course  ED Course as of Dec 09 0333   Sun Dec 09, 2018   0111 WBC: (!) 20.38 [ML]      ED Course User Index  [ML] Judi Alvarenga APRN     Recent Results (from the past 24 hour(s))   Comprehensive Metabolic Panel    Collection Time: 12/09/18 12:51 AM   Result Value Ref Range    Glucose 102 (H) 70 - 100 mg/dL    BUN 17 9 - 23  mg/dL    Creatinine 0.74 0.60 - 1.30 mg/dL    Sodium 138 132 - 146 mmol/L    Potassium 4.6 3.5 - 5.5 mmol/L    Chloride 100 99 - 109 mmol/L    CO2 24.0 20.0 - 31.0 mmol/L    Calcium 10.2 8.7 - 10.4 mg/dL    Total Protein 7.9 5.7 - 8.2 g/dL    Albumin 4.61 3.20 - 4.80 g/dL    ALT (SGPT) 24 7 - 40 U/L    AST (SGOT) 36 (H) 0 - 33 U/L    Alkaline Phosphatase 114 (H) 25 - 100 U/L    Total Bilirubin 0.4 0.3 - 1.2 mg/dL    eGFR Non African Amer 93 >60 mL/min/1.73    Globulin 3.3 gm/dL    A/G Ratio 1.4 (L) 1.5 - 2.5 g/dL    BUN/Creatinine Ratio 23.0 7.0 - 25.0    Anion Gap 14.0 (H) 3.0 - 11.0 mmol/L   Lipase    Collection Time: 12/09/18 12:51 AM   Result Value Ref Range    Lipase 44 6 - 51 U/L   Light Blue Top    Collection Time: 12/09/18 12:51 AM   Result Value Ref Range    Extra Tube hold for add-on    Green Top (Gel)    Collection Time: 12/09/18 12:51 AM   Result Value Ref Range    Extra Tube Hold for add-ons.    Lavender Top    Collection Time: 12/09/18 12:51 AM   Result Value Ref Range    Extra Tube hold for add-on    Gold Top - SST    Collection Time: 12/09/18 12:51 AM   Result Value Ref Range    Extra Tube Hold for add-ons.    CBC Auto Differential    Collection Time: 12/09/18 12:51 AM   Result Value Ref Range    WBC 20.38 (H) 3.50 - 10.80 10*3/mm3    RBC 5.01 3.89 - 5.14 10*6/mm3    Hemoglobin 12.0 11.5 - 15.5 g/dL    Hematocrit 39.8 34.5 - 44.0 %    MCV 79.4 (L) 80.0 - 99.0 fL    MCH 24.0 (L) 27.0 - 31.0 pg    MCHC 30.2 (L) 32.0 - 36.0 g/dL    RDW 20.7 (H) 11.3 - 14.5 %    RDW-SD 58.7 (H) 37.0 - 54.0 fl    MPV 11.2 6.0 - 12.0 fL    Platelets 257 150 - 450 10*3/mm3    Neutrophil % 79.4 (H) 41.0 - 71.0 %    Lymphocyte % 11.2 (L) 24.0 - 44.0 %    Monocyte % 8.3 0.0 - 12.0 %    Eosinophil % 0.2 0.0 - 3.0 %    Basophil % 0.4 0.0 - 1.0 %    Immature Grans % 0.5 0.0 - 0.6 %    Neutrophils, Absolute 16.16 (H) 1.50 - 8.30 10*3/mm3    Lymphocytes, Absolute 2.29 0.60 - 4.80 10*3/mm3    Monocytes, Absolute 1.70 (H) 0.00 - 1.00  "10*3/mm3    Eosinophils, Absolute 0.04 0.00 - 0.30 10*3/mm3    Basophils, Absolute 0.08 0.00 - 0.20 10*3/mm3    Immature Grans, Absolute 0.11 (H) 0.00 - 0.03 10*3/mm3   Scan Slide    Collection Time: 12/09/18 12:51 AM   Result Value Ref Range    Acanthocytes Slight/1+ None Seen    [place] Cells Slight/1+ None Seen    WBC Morphology Normal Normal    Large Platelets Slight/1+ None Seen     Note: In addition to lab results from this visit, the labs listed above may include labs taken at another facility or during a different encounter within the last 24 hours. Please correlate lab times with ED admission and discharge times for further clarification of the services performed during this visit.    No orders to display     Vitals:    12/09/18 0022 12/09/18 0031 12/09/18 0230 12/09/18 0254   BP: 135/89  114/71    BP Location: Left arm      Patient Position: Sitting      Pulse: 110  81 77   Resp: 28 20     Temp: 98.2 °F (36.8 °C)      TempSrc: Oral      SpO2: 93%  92% 96%   Weight: 83 kg (183 lb)      Height: 157.5 cm (62\")        Medications   sodium chloride 0.9 % flush 10 mL (not administered)   HYDROmorphone (DILAUDID) injection 1 mg (1 mg Intravenous Given 12/9/18 0235)   heparin flush (porcine) 100 UNIT/ML injection 300 Units (not administered)   sodium chloride 0.9 % bolus 1,000 mL (0 mL Intravenous Stopped 12/9/18 0326)   ondansetron (ZOFRAN) injection 4 mg (4 mg Intravenous Given 12/9/18 0118)   calcium gluconate injection 2 g (2 g Intravenous Given 12/9/18 0215)   ondansetron (ZOFRAN) injection 4 mg (4 mg Intravenous Given 12/9/18 0235)     ECG/EMG Results (last 24 hours)     ** No results found for the last 24 hours. **                        [facility]    Final diagnoses:   Intractable muscle spasm       Documentation assistance provided by artemio Posadas.  Information recorded by the scribindira was done at my direction and has been verified and validated by me.     Silverio Posadas  12/09/18 0113     "   Lyle, Judi Rose, APRN  12/09/18 0330

## 2019-01-14 ENCOUNTER — HOSPITAL ENCOUNTER (EMERGENCY)
Facility: HOSPITAL | Age: 29
Discharge: HOME | End: 2019-01-14
Attending: EMERGENCY MEDICINE | Admitting: EMERGENCY MEDICINE

## 2019-01-14 VITALS
HEART RATE: 87 BPM | TEMPERATURE: 100.1 F | WEIGHT: 180 LBS | DIASTOLIC BLOOD PRESSURE: 84 MMHG | RESPIRATION RATE: 26 BRPM | BODY MASS INDEX: 33.13 KG/M2 | HEIGHT: 62 IN | SYSTOLIC BLOOD PRESSURE: 119 MMHG | OXYGEN SATURATION: 92 %

## 2019-01-14 DIAGNOSIS — E20.9: Primary | ICD-10-CM

## 2019-01-14 DIAGNOSIS — M79.18 MUSCLE PAIN, MYOFASCIAL: ICD-10-CM

## 2019-01-14 LAB
ALBUMIN SERPL-MCNC: 4.48 G/DL (ref 3.2–4.8)
ALBUMIN/GLOB SERPL: 1.4 G/DL (ref 1.5–2.5)
ALP SERPL-CCNC: 121 U/L (ref 25–100)
ALT SERPL W P-5'-P-CCNC: 22 U/L (ref 7–40)
ANION GAP SERPL CALCULATED.3IONS-SCNC: 8 MMOL/L (ref 3–11)
AST SERPL-CCNC: 23 U/L (ref 0–33)
BASOPHILS # BLD AUTO: 0.04 10*3/MM3 (ref 0–0.2)
BASOPHILS NFR BLD AUTO: 0.2 % (ref 0–1)
BILIRUB SERPL-MCNC: 0.6 MG/DL (ref 0.3–1.2)
BUN BLD-MCNC: 20 MG/DL (ref 9–23)
BUN/CREAT SERPL: 28.6 (ref 7–25)
CA-I SERPL ISE-MCNC: 1.27 MMOL/L (ref 1.12–1.32)
CALCIUM SPEC-SCNC: 9.4 MG/DL (ref 8.7–10.4)
CHLORIDE SERPL-SCNC: 97 MMOL/L (ref 99–109)
CK SERPL-CCNC: 51 U/L (ref 26–174)
CO2 SERPL-SCNC: 26 MMOL/L (ref 20–31)
CREAT BLD-MCNC: 0.7 MG/DL (ref 0.6–1.3)
DEPRECATED RDW RBC AUTO: 61.8 FL (ref 37–54)
EOSINOPHIL # BLD AUTO: 0.02 10*3/MM3 (ref 0–0.3)
EOSINOPHIL NFR BLD AUTO: 0.1 % (ref 0–3)
ERYTHROCYTE [DISTWIDTH] IN BLOOD BY AUTOMATED COUNT: 21.4 % (ref 11.3–14.5)
GFR SERPL CREATININE-BSD FRML MDRD: 99 ML/MIN/1.73
GLOBULIN UR ELPH-MCNC: 3.1 GM/DL
GLUCOSE BLD-MCNC: 130 MG/DL (ref 70–100)
HCT VFR BLD AUTO: 39.6 % (ref 34.5–44)
HGB BLD-MCNC: 11.2 G/DL (ref 11.5–15.5)
HOLD SPECIMEN: NORMAL
IMM GRANULOCYTES # BLD AUTO: 0.23 10*3/MM3 (ref 0–0.03)
IMM GRANULOCYTES NFR BLD AUTO: 1 % (ref 0–0.6)
LYMPHOCYTES # BLD AUTO: 1.96 10*3/MM3 (ref 0.6–4.8)
LYMPHOCYTES NFR BLD AUTO: 8.6 % (ref 24–44)
MAGNESIUM SERPL-MCNC: 1.8 MG/DL (ref 1.3–2.7)
MCH RBC QN AUTO: 22.9 PG (ref 27–31)
MCHC RBC AUTO-ENTMCNC: 28.3 G/DL (ref 32–36)
MCV RBC AUTO: 80.8 FL (ref 80–99)
MONOCYTES # BLD AUTO: 1.83 10*3/MM3 (ref 0–1)
MONOCYTES NFR BLD AUTO: 8.1 % (ref 0–12)
NEUTROPHILS # BLD AUTO: 18.86 10*3/MM3 (ref 1.5–8.3)
NEUTROPHILS NFR BLD AUTO: 83 % (ref 41–71)
PLATELET # BLD AUTO: 581 10*3/MM3 (ref 150–450)
PMV BLD AUTO: 11.9 FL (ref 6–12)
POTASSIUM BLD-SCNC: 4.6 MMOL/L (ref 3.5–5.5)
PROT SERPL-MCNC: 7.6 G/DL (ref 5.7–8.2)
RBC # BLD AUTO: 4.9 10*6/MM3 (ref 3.89–5.14)
SODIUM BLD-SCNC: 131 MMOL/L (ref 132–146)
WBC NRBC COR # BLD: 22.71 10*3/MM3 (ref 3.5–10.8)
WHOLE BLOOD HOLD SPECIMEN: NORMAL
WHOLE BLOOD HOLD SPECIMEN: NORMAL

## 2019-01-14 PROCEDURE — 82550 ASSAY OF CK (CPK): CPT | Performed by: EMERGENCY MEDICINE

## 2019-01-14 PROCEDURE — 36415 COLL VENOUS BLD VENIPUNCTURE: CPT | Performed by: EMERGENCY MEDICINE

## 2019-01-14 PROCEDURE — 25010000002 CALCIUM GLUCONATE PER 10 ML: Performed by: EMERGENCY MEDICINE

## 2019-01-14 PROCEDURE — 80053 COMPREHEN METABOLIC PANEL: CPT | Performed by: EMERGENCY MEDICINE

## 2019-01-14 PROCEDURE — 25010000002 PROMETHAZINE PER 50 MG: Performed by: EMERGENCY MEDICINE

## 2019-01-14 PROCEDURE — 96365 THER/PROPH/DIAG IV INF INIT: CPT

## 2019-01-14 PROCEDURE — 96375 TX/PRO/DX INJ NEW DRUG ADDON: CPT

## 2019-01-14 PROCEDURE — 99284 EMERGENCY DEPT VISIT MOD MDM: CPT

## 2019-01-14 PROCEDURE — 25010000002 ONDANSETRON PER 1 MG: Performed by: EMERGENCY MEDICINE

## 2019-01-14 PROCEDURE — 82330 ASSAY OF CALCIUM: CPT | Performed by: EMERGENCY MEDICINE

## 2019-01-14 PROCEDURE — 85025 COMPLETE CBC W/AUTO DIFF WBC: CPT | Performed by: EMERGENCY MEDICINE

## 2019-01-14 PROCEDURE — 83735 ASSAY OF MAGNESIUM: CPT | Performed by: EMERGENCY MEDICINE

## 2019-01-14 PROCEDURE — 25010000002 HYDROMORPHONE PER 4 MG: Performed by: EMERGENCY MEDICINE

## 2019-01-14 PROCEDURE — 96376 TX/PRO/DX INJ SAME DRUG ADON: CPT

## 2019-01-14 RX ORDER — HYDROMORPHONE HYDROCHLORIDE 1 MG/ML
0.5 INJECTION, SOLUTION INTRAMUSCULAR; INTRAVENOUS; SUBCUTANEOUS ONCE
Status: COMPLETED | OUTPATIENT
Start: 2019-01-14 | End: 2019-01-14

## 2019-01-14 RX ORDER — ONDANSETRON 2 MG/ML
4 INJECTION INTRAMUSCULAR; INTRAVENOUS ONCE
Status: COMPLETED | OUTPATIENT
Start: 2019-01-14 | End: 2019-01-14

## 2019-01-14 RX ORDER — PROMETHAZINE HYDROCHLORIDE 25 MG/ML
12.5 INJECTION, SOLUTION INTRAMUSCULAR; INTRAVENOUS
Status: DISCONTINUED | OUTPATIENT
Start: 2019-01-14 | End: 2019-01-15 | Stop reason: HOSPADM

## 2019-01-14 RX ORDER — HYDROMORPHONE HYDROCHLORIDE 1 MG/ML
0.5 INJECTION, SOLUTION INTRAMUSCULAR; INTRAVENOUS; SUBCUTANEOUS
Status: DISCONTINUED | OUTPATIENT
Start: 2019-01-14 | End: 2019-01-15 | Stop reason: HOSPADM

## 2019-01-14 RX ADMIN — CALCIUM GLUCONATE: 98 INJECTION, SOLUTION INTRAVENOUS at 20:16

## 2019-01-14 RX ADMIN — PROMETHAZINE HYDROCHLORIDE 12.5 MG: 25 INJECTION INTRAMUSCULAR; INTRAVENOUS at 22:04

## 2019-01-14 RX ADMIN — ONDANSETRON 4 MG: 2 INJECTION INTRAMUSCULAR; INTRAVENOUS at 20:02

## 2019-01-14 RX ADMIN — HYDROMORPHONE HYDROCHLORIDE 0.5 MG: 1 INJECTION, SOLUTION INTRAMUSCULAR; INTRAVENOUS; SUBCUTANEOUS at 20:03

## 2019-01-14 RX ADMIN — HYDROMORPHONE HYDROCHLORIDE 0.5 MG: 1 INJECTION, SOLUTION INTRAMUSCULAR; INTRAVENOUS; SUBCUTANEOUS at 22:12

## 2019-01-14 NOTE — ED NOTES
Answered Call bell for room 4, advised patient to undress from the waist up. Patient states that her arms will not come out from behind her back and her legs are contorted into a crossed position. patient has implanted port. Advised patient that in a hospital setting the nurse is required to use sterile technique when accessing ports. Patient advises her mother accesses the port at home. Dr. Dorothea pompa.     Silverio Ballard RN  01/14/19 [id number]

## 2019-01-14 NOTE — ED PROVIDER NOTES
Subjective   Krista Wallerred is a 28 y.o.female who presents to the ED with complaints of extremity weakness. The patient reports her symptoms suddenly onset earlier today. She has not taken any medication for her discomfort. She also complains of nausea and vomiting. Additionally, she does not have adrenal glands, for she has a history of APS1. There are no other complaints at this time.         History provided by:  Patient and parent  Illness   Location:  Extremity  Quality:  Weakness  Severity:  Severe  Onset quality:  Sudden  Duration:  1 day  Timing:  Constant  Progression:  Unchanged  Chronicity:  New  Context:  Pt suddenly became weak in her extremities  Relieved by:  None tried  Worsened by:  Nothing  Ineffective treatments:  None tried  Associated symptoms: nausea and vomiting      Review of Previous Visits    Reviewed the patient's previous ER visits.    Review of Systems   Gastrointestinal: Positive for nausea and vomiting.   Neurological: Positive for weakness (extremities).   All other systems reviewed and are negative.      Past Medical History:   Diagnosis Date   • Anemia    • APS type 1 (CMS/HCC)    • Disease of thyroid gland    • Insulin dependent diabetes mellitus (CMS/HCC)    • Long Q-T syndrome    • Parathyroid abnormality (CMS/HCC)    • Spleen absent    • Tetany     HAS EPISODES   • Transfusion history        Allergies   Allergen Reactions   • Cefpodoxime Hives   • Clarithromycin Hives     biaxin   • Nystatin Hives   • Penicillins Hives   • Sulfa Antibiotics Hives       Past Surgical History:   Procedure Laterality Date   • CARDIAC SURGERY      LOOP RECORDER   • CHOLECYSTECTOMY     • ENDOSCOPY N/A 4/26/2018    Procedure: ESOPHAGOGASTRODUODENOSCOPY;  Surgeon: Gavin Vargas MD;  Location: [facility] ENDOSCOPY;  Service: Gastroenterology   • ENDOSCOPY N/A 8/9/2018    Procedure: ESOPHAGOGASTRODUODENOSCOPY-DILATION;  Surgeon: Gavin Vargas MD;  Location: [facility]  ENDOSCOPY;  Service: Gastroenterology   • EXPLORATORY LAPAROTOMY      MULTIPLE   • HAND SURGERY      4 TOTAL   • HERNIA REPAIR     • LIVER BIOPSY     • MUSCLE BIOPSY     • PEG TUBE INSERTION     • PEG TUBE REMOVAL     • PORTACATH PLACEMENT         History reviewed. No pertinent family history.    Social History     Socioeconomic History   • Marital status: Single     Spouse name: Not on file   • Number of children: Not on file   • Years of education: Not on file   • Highest education level: Not on file   Tobacco Use   • Smoking status: Never Smoker   • Smokeless tobacco: Never Used   Substance and Sexual Activity   • Alcohol use: Yes     Comment: SOCIAL   • Drug use: No   • Sexual activity: Defer         Objective   Physical Exam   Constitutional: She is oriented to person, place, and time. She appears well-developed and well-nourished.   Pt is able to speak   HENT:   Head: Normocephalic and atraumatic.   Nose: Nose normal.   Mouth/Throat: Oropharynx is clear and moist.   Eyes: Conjunctivae and EOM are normal. Pupils are equal, round, and reactive to light. No scleral icterus.   Neck: Normal range of motion. Neck supple. Carotid bruit is not present. No thyromegaly present.   Cardiovascular: Regular rhythm and normal heart sounds. Tachycardia present. Exam reveals no gallop and no friction rub.   No murmur heard.  Pulmonary/Chest: Effort normal. No respiratory distress. She has no wheezes. She has rhonchi. She has no rales.   Abdominal: Soft. Bowel sounds are normal. She exhibits no mass. There is no tenderness. There is no rebound and no guarding.   Musculoskeletal: She exhibits no edema.   Pt has arms and legs contracted severely, for she cannot be moved without applying pressure.    Lymphadenopathy:     She has no cervical adenopathy.   Neurological: She is alert and oriented to person, place, and time.   Knee jerks are depressed bilaterally.    Skin: Skin is warm. She is diaphoretic.   Psychiatric: She has a  normal mood and affect. Her behavior is normal.   Nursing note and vitals reviewed.      Procedures         ED Course     Recent Results (from the past 24 hour(s))   Comprehensive Metabolic Panel    Collection Time: 01/14/19  7:29 PM   Result Value Ref Range    Glucose 130 (H) 70 - 100 mg/dL    BUN 20 9 - 23 mg/dL    Creatinine 0.70 0.60 - 1.30 mg/dL    Sodium 131 (L) 132 - 146 mmol/L    Potassium 4.6 3.5 - 5.5 mmol/L    Chloride 97 (L) 99 - 109 mmol/L    CO2 26.0 20.0 - 31.0 mmol/L    Calcium 9.4 8.7 - 10.4 mg/dL    Total Protein 7.6 5.7 - 8.2 g/dL    Albumin 4.48 3.20 - 4.80 g/dL    ALT (SGPT) 22 7 - 40 U/L    AST (SGOT) 23 0 - 33 U/L    Alkaline Phosphatase 121 (H) 25 - 100 U/L    Total Bilirubin 0.6 0.3 - 1.2 mg/dL    eGFR Non African Amer 99 >60 mL/min/1.73    Globulin 3.1 gm/dL    A/G Ratio 1.4 (L) 1.5 - 2.5 g/dL    BUN/Creatinine Ratio 28.6 (H) 7.0 - 25.0    Anion Gap 8.0 3.0 - 11.0 mmol/L   Magnesium    Collection Time: 01/14/19  7:29 PM   Result Value Ref Range    Magnesium 1.8 1.3 - 2.7 mg/dL   CK    Collection Time: 01/14/19  7:29 PM   Result Value Ref Range    Creatine Kinase 51 26 - 174 U/L   Calcium, Ionized    Collection Time: 01/14/19  7:29 PM   Result Value Ref Range    Ionized Calcium 1.27 1.12 - 1.32 mmol/L   CBC Auto Differential    Collection Time: 01/14/19  7:29 PM   Result Value Ref Range    WBC 22.71 (H) 3.50 - 10.80 10*3/mm3    RBC 4.90 3.89 - 5.14 10*6/mm3    Hemoglobin 11.2 (L) 11.5 - 15.5 g/dL    Hematocrit 39.6 34.5 - 44.0 %    MCV 80.8 80.0 - 99.0 fL    MCH 22.9 (L) 27.0 - 31.0 pg    MCHC 28.3 (L) 32.0 - 36.0 g/dL    RDW 21.4 (H) 11.3 - 14.5 %    RDW-SD 61.8 (H) 37.0 - 54.0 fl    MPV 11.9 6.0 - 12.0 fL    Platelets 581 (H) 150 - 450 10*3/mm3    Neutrophil % 83.0 (H) 41.0 - 71.0 %    Lymphocyte % 8.6 (L) 24.0 - 44.0 %    Monocyte % 8.1 0.0 - 12.0 %    Eosinophil % 0.1 0.0 - 3.0 %    Basophil % 0.2 0.0 - 1.0 %    Immature Grans % 1.0 (H) 0.0 - 0.6 %    Neutrophils, Absolute 18.86 (H) 1.50  - 8.30 10*3/mm3    Lymphocytes, Absolute 1.96 0.60 - 4.80 10*3/mm3    Monocytes, Absolute 1.83 (H) 0.00 - 1.00 10*3/mm3    Eosinophils, Absolute 0.02 0.00 - 0.30 10*3/mm3    Basophils, Absolute 0.04 0.00 - 0.20 10*3/mm3    Immature Grans, Absolute 0.23 (H) 0.00 - 0.03 10*3/mm3   Light Blue Top    Collection Time: 01/14/19  7:29 PM   Result Value Ref Range    Extra Tube hold for add-on    Green Top (Gel)    Collection Time: 01/14/19  7:29 PM   Result Value Ref Range    Extra Tube Hold for add-ons.    Lavender Top    Collection Time: 01/14/19  7:29 PM   Result Value Ref Range    Extra Tube hold for add-on      Note: In addition to lab results from this visit, the labs listed above may include labs taken at another facility or during a different encounter within the last 24 hours. Please correlate lab times with ED admission and discharge times for further clarification of the services performed during this visit.    No orders to display     Vitals:    01/14/19 2029 01/14/19 2030 01/14/19 2100 01/14/19 2200   BP:  123/87  128/91   BP Location:    Right arm   Patient Position:    Lying   Pulse: 91  113 91   Resp:       Temp:       TempSrc:       SpO2: 95%   91%   Weight:       Height:         Medications   HYDROmorphone (DILAUDID) injection 0.5 mg (0.5 mg Intravenous Given 1/14/19 2003)   promethazine (PHENERGAN) injection 12.5 mg (12.5 mg Intravenous Given 1/14/19 2204)   ondansetron (ZOFRAN) injection 4 mg (4 mg Intravenous Given 1/14/19 2002)   calcium gluconate 2 g in dextrose (D5W) 5 % 500 mL IVPB ( Intravenous Currently Infusing 1/14/19 2216)   HYDROmorphone (DILAUDID) injection 0.5 mg (0.5 mg Intravenous Given 1/14/19 2212)     ECG/EMG Results (last 24 hours)     ** No results found for the last 24 hours. **                        MDM  Number of Diagnoses or Management Options  Muscle pain, myofascial:   Tetany, parathyroid gland (CMS/HCC):   Diagnosis management comments:       Lady has a very complex an unusual  syndrome involving autoimmune endocrine problems.    She presents today and Knee which apparently happens even though she has a normal ionized calcium level.  Some be due to a fairly sudden drop in her calcium.    She is improved dramatically here with IV calcium and some pain medicine.    On reexamination her weight is in place and she feels ready to go home.    We'll discharge her to home and follow-up with her primary care doctor and her expert in [place].    All are agreeable plan       Amount and/or Complexity of Data Reviewed  Clinical lab tests: reviewed  Decide to obtain previous medical records or to obtain history from someone other than the patient: yes        Final diagnoses:   Tetany, parathyroid gland (CMS/HCC)   Muscle pain, myofascial       Documentation assistance provided by artemio Lion.  Information recorded by the scribe was done at my direction and has been verified and validated by me.     Pilo Lion  01/14/19 1946       Henry Piedra MD  01/14/19 7230

## 2019-01-28 ENCOUNTER — PREP FOR SURGERY (OUTPATIENT)
Dept: OTHER | Facility: HOSPITAL | Age: 29
End: 2019-01-28

## 2019-01-28 ENCOUNTER — OFFICE VISIT (OUTPATIENT)
Dept: GASTROENTEROLOGY | Facility: CLINIC | Age: 29
End: 2019-01-28

## 2019-01-28 VITALS
HEART RATE: 96 BPM | DIASTOLIC BLOOD PRESSURE: 59 MMHG | HEIGHT: 62 IN | SYSTOLIC BLOOD PRESSURE: 102 MMHG | BODY MASS INDEX: 35.19 KG/M2 | WEIGHT: 191.2 LBS

## 2019-01-28 DIAGNOSIS — R13.19 ESOPHAGEAL DYSPHAGIA: ICD-10-CM

## 2019-01-28 DIAGNOSIS — K58.1 IRRITABLE BOWEL SYNDROME WITH CONSTIPATION: ICD-10-CM

## 2019-01-28 DIAGNOSIS — K31.84 GASTROPARESIS: Primary | ICD-10-CM

## 2019-01-28 DIAGNOSIS — R13.19 ESOPHAGEAL DYSPHAGIA: Primary | ICD-10-CM

## 2019-01-28 PROCEDURE — 99213 OFFICE O/P EST LOW 20 MIN: CPT | Performed by: INTERNAL MEDICINE

## 2019-01-28 RX ORDER — CLARITHROMYCIN 500 MG/1
TABLET, COATED ORAL EVERY 12 HOURS
COMMUNITY
End: 2020-01-15 | Stop reason: HOSPADM

## 2019-01-28 RX ORDER — BACLOFEN 20 MG/1
20 TABLET ORAL
COMMUNITY
Start: 2019-01-25 | End: 2019-10-18 | Stop reason: HOSPADM

## 2019-01-28 RX ORDER — UBIQUINOL 100 MG
CAPSULE ORAL
COMMUNITY
Start: 2017-01-25

## 2019-01-28 RX ORDER — ALBUTEROL SULFATE 90 UG/1
AEROSOL, METERED RESPIRATORY (INHALATION)
COMMUNITY
Start: 2017-05-22 | End: 2020-06-06 | Stop reason: HOSPADM

## 2019-01-28 RX ORDER — BLOOD-GLUCOSE METER
EACH MISCELLANEOUS
COMMUNITY
Start: 2017-01-25

## 2019-01-28 RX ORDER — EPINEPHRINE 0.3 MG/.3ML
INJECTION SUBCUTANEOUS
COMMUNITY

## 2019-01-28 RX ORDER — ZINC SULFATE 50(220)MG
220 CAPSULE ORAL DAILY
COMMUNITY
Start: 2017-04-11

## 2019-01-28 RX ORDER — FENTANYL 25 UG/H
1 PATCH TRANSDERMAL
COMMUNITY
Start: 2019-01-25 | End: 2019-03-26

## 2019-01-28 RX ORDER — LANCETS 33 GAUGE
EACH MISCELLANEOUS
COMMUNITY
Start: 2017-01-25

## 2019-01-28 RX ORDER — PROMETHAZINE HYDROCHLORIDE 25 MG/1
TABLET ORAL
COMMUNITY
Start: 2017-11-30 | End: 2019-01-30 | Stop reason: ALTCHOICE

## 2019-01-28 RX ORDER — CITALOPRAM 10 MG/1
TABLET ORAL
COMMUNITY
Start: 2017-10-17 | End: 2020-01-15 | Stop reason: HOSPADM

## 2019-01-28 RX ORDER — HYDROCORTISONE 10 MG/1
10 TABLET ORAL NIGHTLY
COMMUNITY
Start: 2017-03-01

## 2019-01-28 RX ORDER — VALACYCLOVIR HYDROCHLORIDE 1 G/1
TABLET, FILM COATED ORAL
COMMUNITY
End: 2021-04-02 | Stop reason: ALTCHOICE

## 2019-01-28 RX ORDER — BLOOD-GLUCOSE CONTROL, NORMAL
EACH MISCELLANEOUS
COMMUNITY
Start: 2017-01-25

## 2019-01-28 RX ORDER — GABAPENTIN 600 MG/1
900 TABLET ORAL 3 TIMES DAILY
COMMUNITY
Start: 2019-01-25 | End: 2023-02-20 | Stop reason: SDUPTHER

## 2019-01-28 RX ORDER — RANITIDINE 150 MG/1
TABLET ORAL
COMMUNITY
End: 2021-01-12

## 2019-01-28 RX ORDER — TRANSPARENT DRESSING 2.37X2.75"
BANDAGE TOPICAL
COMMUNITY
Start: 2015-04-16

## 2019-01-28 RX ORDER — CALCIUM CARBONATE/VITAMIN D3 500-10/5ML
LIQUID (ML) ORAL
COMMUNITY
Start: 2017-10-23 | End: 2020-01-15 | Stop reason: HOSPADM

## 2019-01-28 RX ORDER — LEVOTHYROXINE SODIUM 0.07 MG/1
75 TABLET ORAL DAILY
COMMUNITY
Start: 2017-10-23

## 2019-01-28 RX ORDER — AZELASTINE 1 MG/ML
SPRAY, METERED NASAL
COMMUNITY
Start: 2014-08-20 | End: 2021-04-02 | Stop reason: ALTCHOICE

## 2019-01-28 NOTE — PROGRESS NOTES
GASTROENTEROLOGY OFFICE NOTE  Krista Richter  [id number]  1990    CARE TEAM  Patient Care Team:  Arlene Pearce MD as PCP - General (Internal Medicine)    No ref. provider found     Chief Complaint   Patient presents with   • Follow-up   • Nausea   • Vomiting   • Abdominal Pain   • Constipation   • Diarrhea        HISTORY OF PRESENT ILLNESS:  28-year-old white female with multiple significant medical problems who is here today with mostly GI complaints characterized by exacerbation of her known gastroparesis.  She has postprandial vomiting abdominal distention and abdominal pain as well as some constipation and nausea.    She denies odynophagia but she does have intermittent dysphagia to solids which has required esophageal dilations in the past and is due for repeat dilation.  In fact she had a recent appointment she had cancel because of problems with her Port-A-Cath which required replacement.    She is currently vomiting perhaps once per day or more.  She states that she is known to have a mildly elevated QT interval.  She is requesting EGD for esophageal dilation and to discuss further therapy of her gastroparesis    PAST MEDICAL HISTORY  Past Medical History:   Diagnosis Date   • Adrenal insufficiency (CMS/HCC)    • Alopecia    • Anemia    • Anxiety    • APS type 1 (CMS/HCC)    • Asthma    • Autoimmune hepatitis (CMS/HCC)    • Depression    • Dermatomyositis (CMS/HCC)    • Disease of thyroid gland    • Fibromyalgia    • Functional asplenia    • History of kidney stones    • Hypoparathyroidism (CMS/HCC)    • Insulin dependent diabetes mellitus (CMS/HCC)    • Long Q-T syndrome    • Pancreatic insufficiency    • Parathyroid abnormality (CMS/HCC)    • Polyglandular deficiency syndrome (CMS/HCC)    • Premature ovarian failure    • Sleep apnea    • Spleen absent    • Tetany     HAS EPISODES   • Transfusion history         PAST SURGICAL HISTORY  Past Surgical History:   Procedure Laterality Date   •  APPENDECTOMY     • CARDIAC SURGERY      LOOP RECORDER   • CHOLECYSTECTOMY     • ENDOSCOPY N/A 4/26/2018    Procedure: ESOPHAGOGASTRODUODENOSCOPY;  Surgeon: Gavin Vargas MD;  Location:  [facility];  Service: Gastroenterology   • ENDOSCOPY N/A 8/9/2018    Procedure: ESOPHAGOGASTRODUODENOSCOPY-DILATION;  Surgeon: Gavin Vargas MD;  Location:  [facility];  Service: Gastroenterology   • ENDOSCOPY     • EXPLORATORY LAPAROTOMY      MULTIPLE   • HAND SURGERY      4 TOTAL   • HERNIA REPAIR     • LIVER BIOPSY     • MUSCLE BIOPSY     • MUSCLE BIOPSY     • PEG TUBE INSERTION     • PEG TUBE REMOVAL     • PORTACATH PLACEMENT          MEDICATIONS:    Current Outpatient Medications:   •  albuterol sulfate HFA (PROAIR HFA) 108 (90 Base) MCG/ACT inhaler, ProAir HFA 90 mcg/actuation aerosol inhaler  2 PUFFS Q 4 HOURS SHORTNESS OF BREATH, Disp: , Rfl:   •  Alcohol Swabs (ALCOHOL PREP) 70 % pads, Check blood glucose up to 2 times daily, Disp: , Rfl:   •  azelastine (ASTELIN) 0.1 % nasal spray, Astelin 137 mcg (0.1 %) nasal spray aerosol  Daily, Disp: , Rfl:   •  baclofen (LIORESAL) 20 MG tablet, Take 20 mg by mouth., Disp: , Rfl:   •  Blood Glucose Calibration (OT ULTRA/FASTTK CNTRL SOLN) solution, Use weekly and as needed to calibrate meter, Disp: , Rfl:   •  Blood Glucose Monitoring Suppl (ONE TOUCH ULTRA 2) w/Device kit, Use to check blood glucose up to 2 times daily, Disp: , Rfl:   •  calcium gluconate 1 g/100 mL, Infuse 2 g into a venous catheter., Disp: , Rfl:   •  citalopram (CELEXA) 10 MG tablet, Celexa 10 mg tablet, Disp: , Rfl:   •  Erenumab-aooe (AIMOVIG) 70 MG/ML prefilled syringe, Inject 70 mg under the skin into the appropriate area as directed., Disp: , Rfl:   •  fentaNYL (DURAGESIC) 25 MCG/HR patch, Place 1 patch on the skin as directed by provider., Disp: , Rfl:   •  gabapentin (NEURONTIN) 600 MG tablet, Take 600 mg by mouth., Disp: , Rfl:   •  glucose blood test strip, Use as  "directed by provider to test up to 2 times a day, Disp: , Rfl:   •  hydrocortisone (CORTEF) 10 MG tablet, Cortef 10 mg tablet, Disp: , Rfl:   •  hydrocortisone sodium succinate (SOLU-CORTEF) 100 MG injection, Give 100 mg ( 2 mL ) intramuscular as needed for vomiting, severe illness Dispense actovial, Disp: , Rfl:   •  Insulin Pen Needle 32G X 4 MM misc, Use to administer Natpara subcutaneously daily, Disp: , Rfl:   •  levothyroxine (SYNTHROID, LEVOTHROID) 75 MCG tablet, levothyroxine 75 mcg tablet, Disp: , Rfl:   •  Magnesium Oxide 400 MG capsule, magnesium oxide 400 mg (241.3 mg magnesium) tablet, Disp: , Rfl:   •  Misc Natural Products (RA XYDRA EF PO), Take  by mouth., Disp: , Rfl:   •  ONETOUCH DELICA LANCETS 33G misc, Use to check blood glucose up to 2 times daily, Disp: , Rfl:   •  promethazine (PHENERGAN) 25 MG tablet, promethazine 25 mg tablet  Take one tab every 8 hours prn nausea/vomiting, Disp: , Rfl:   •  Transparent Dressings (TEGADERM FILM 2-3/8\"X2-3/4\") misc, Apply over pump insertion site every 2 days.., Disp: , Rfl:   •  zinc sulfate (ZINCATE) 220 (50 Zn) MG capsule, zinc sulfate 220 mg (50 mg) capsule, Disp: , Rfl:   •  albuterol (PROVENTIL HFA;VENTOLIN HFA) 108 (90 Base) MCG/ACT inhaler, Take 2 Puffs by inhalation every 4 hours as needed for wheezing., Disp: , Rfl:   •  apixaban (ELIQUIS) 5 MG tablet tablet, Take 5 mg by mouth 2 (Two) Times a Day., Disp: , Rfl:   •  azaTHIOprine (IMURAN) 50 MG tablet, Take 150 mg by mouth., Disp: , Rfl:   •  butalbital-acetaminophen-caffeine (FIORICET, ESGIC) -40 MG per tablet, Take 1 tablet by mouth Daily., Disp: , Rfl:   •  calcitriol (ROCALTROL) 0.5 MCG capsule, Take 0.5 mcg by mouth 2 (Two) Times a Day., Disp: , Rfl:   •  calcium carbonate EX (TUMS EX) 750 MG chewable tablet, Chew 750 mg 3 (Three) Times a Day. 3 TUMS PER TIME, Disp: , Rfl:   •  CALCIUM CARBONATE PO, calcium carbonate  1250mg 1 tid with meals, Disp: , Rfl:   •  clarithromycin (BIAXIN) 500 " MG tablet, Every 12 (Twelve) Hours., Disp: , Rfl:   •  cycloSPORINE (RESTASIS) 0.05 % ophthalmic emulsion, 1 drop. PRN, Disp: , Rfl:   •  cyproheptadine (PERIACTIN) 4 MG tablet, Take 4 mg by mouth 2 (Two) Times a Day., Disp: , Rfl:   •  diazePAM (VALIUM) 5 MG tablet, 5 mg., Disp: , Rfl:   •  diclofenac (VOLTAREN) 1 % gel gel, Voltaren 1 % topical gel  APPLY 2 GRAM TO THE AFFECTED AREA(S) BY TOPICAL ROUTE 4 TIMES PER DAY  WASH HANDS AFTER APPLICATION, Disp: , Rfl:   •  dronabinol (MARINOL) 2.5 MG capsule, 5 mg 4 (Four) Times a Day As Needed., Disp: , Rfl:   •  EPINEPHrine (EPIPEN 2-TEDDY) 0.3 MG/0.3ML solution auto-injector injection, EpiPen 0.3 mg/0.3 mL injection, auto-injector  Take 1 auto by injection route., Disp: , Rfl:   •  fludrocortisone 0.1 MG tablet, TAKE ONE TABLET BY MOUTH DAILY..05MG PER PT, Disp: , Rfl:   •  FLUoxetine (PROzac) 10 MG tablet, Take 20 mg by mouth Daily., Disp: , Rfl:   •  fluticasone (FLONASE) 50 MCG/ACT nasal spray, 1 spray., Disp: , Rfl:   •  hydrocortisone (CORTEF) 20 MG tablet, Take 20 mg by mouth 2 (Two) Times a Day. 20 MG IN AM AND 10 MG AT NIGHT, Disp: , Rfl:   •  Hypromellose (SYSTANE OVERNIGHT THERAPY) 0.3 % gel, Systane Gel 0.3 % eye gel, Disp: , Rfl:   •  lidocaine viscous (XYLOCAINE) 2 % solution, 15 mL., Disp: , Rfl:   •  magnesium oxide (MAGOX) 400 (241.3 Mg) MG tablet tablet, Take 4,000 mg by mouth Daily., Disp: , Rfl:   •  Multiple Vitamins-Minerals (MULTIVITAMIN ADULT PO), Take 1 tablet by mouth., Disp: , Rfl:   •  nadolol (CORGARD) 40 MG tablet, Take 40 mg by mouth., Disp: , Rfl:   •  norelgestromin-ethinyl estradiol (ORTHO EVRA) 150-35 MCG/24HR, Apply 1 patch topically every 7 days., Disp: , Rfl:   •  nystatin (MYCOSTATIN) 496266 UNIT/ML suspension, 5 mL Every 6 (Six) Hours., Disp: , Rfl:   •  omeprazole (priLOSEC) 20 MG capsule, Take 20 mg by mouth Daily., Disp: , Rfl:   •  Potassium Chloride (KLOR-CON 10 PO), Take 40 mEq by mouth 3 (Three) Times a Day., Disp: , Rfl:    •  raNITIdine (ZANTAC) 150 MG tablet, ranitidine 150 mg tablet  1 Two times a day, Disp: , Rfl:   •  SPECIALTY VITAMINS PRODUCTS PO, , Disp: , Rfl:   •  teriparatide (FORTEO) 600 MCG/2.4ML injection, Inject 20 mcg under the skin into the appropriate area as directed. .2UNITS/HR, Disp: , Rfl:   •  valACYclovir (VALTREX) 1000 MG tablet, Valtrex 1 gram tablet  Two times a day, Disp: , Rfl:   •  venlafaxine (EFFEXOR) 25 MG tablet, Take 5 mg by mouth 2 (Two) Times a Day., Disp: , Rfl:   •  Vitamin D, Cholecalciferol, 1000 units capsule, Take 1,000 unit marking on U-100 syringe by mouth 2 (Two) Times a Day., Disp: , Rfl:     ALLERGIES  Allergies   Allergen Reactions   • Cefpodoxime Hives   • Cephalosporins Other (See Comments)   • Clarithromycin Hives     biaxin   • Levofloxacin Other (See Comments)   • Morphine Other (See Comments)   • Nitrofurantoin Hives   • Nitrofurantoin Macrocrystal Other (See Comments)   • Nystatin Hives   • Ondansetron Other (See Comments)   • Penicillins Hives   • Sulfa Antibiotics Hives       FAMILY HISTORY:  Family History   Problem Relation Age of Onset   • Kidney disease Father        SOCIAL HISTORY  Social History     Socioeconomic History   • Marital status: Single     Spouse name: Not on file   • Number of children: Not on file   • Years of education: Not on file   • Highest education level: Not on file   Tobacco Use   • Smoking status: Never Smoker   • Smokeless tobacco: Never Used   Substance and Sexual Activity   • Alcohol use: Yes     Comment: SOCIAL   • Drug use: No   • Sexual activity: Defer     Socioeconomic History: She is single.  She is a law student.  She lives with her parents.  She does not have children.         REVIEW OF SYSTEMS  Review of Systems   Constitutional: Negative for unexpected weight loss.   HENT: Positive for trouble swallowing.    Eyes: Negative.    Respiratory: Negative.    Gastrointestinal: Positive for abdominal distention, abdominal pain, constipation,  "diarrhea, nausea, vomiting, GERD and indigestion. Negative for anal bleeding, blood in stool and rectal pain.   Endocrine: Negative.    Genitourinary: Negative.    Musculoskeletal: Negative.    Skin: Negative.    Allergic/Immunologic: Negative.    Neurological: Negative.    Hematological: Negative.    Psychiatric/Behavioral: Negative.        PHYSICAL EXAM   /59 (BP Location: Right arm, Patient Position: Sitting, Cuff Size: Adult)   Pulse 96   Ht 157.5 cm (62\")   Wt 86.7 kg (191 lb 3.2 oz)   BMI 34.97 kg/m²   General: Alert and oriented x 3. In no apparent or acute distress.  and No stigmata of chronic liver disease  HEENT: Anicteric slcera. Normal oropharynx  Neck: Supple. Without lymphadenopathy  CV: Regular rate and rhythm, S1, S2  Lungs: Clear to ausculation. Without rales, robchi and wheezing  Abdomen:  Soft,non-distended without palpable masses or hepatosplenomeagaly, areas of rebound tenderness or guarding.   Extremeties: without clubbing, cyanosis or edema  Neurologic:  Alert and oriented x 3 without focal motor or sensory deficits  Rectal exam: deferred     Results for orders placed or performed during the hospital encounter of 01/14/19   Comprehensive Metabolic Panel   Result Value Ref Range    Glucose 130 (H) 70 - 100 mg/dL    BUN 20 9 - 23 mg/dL    Creatinine 0.70 0.60 - 1.30 mg/dL    Sodium 131 (L) 132 - 146 mmol/L    Potassium 4.6 3.5 - 5.5 mmol/L    Chloride 97 (L) 99 - 109 mmol/L    CO2 26.0 20.0 - 31.0 mmol/L    Calcium 9.4 8.7 - 10.4 mg/dL    Total Protein 7.6 5.7 - 8.2 g/dL    Albumin 4.48 3.20 - 4.80 g/dL    ALT (SGPT) 22 7 - 40 U/L    AST (SGOT) 23 0 - 33 U/L    Alkaline Phosphatase 121 (H) 25 - 100 U/L    Total Bilirubin 0.6 0.3 - 1.2 mg/dL    eGFR Non African Amer 99 >60 mL/min/1.73    Globulin 3.1 gm/dL    A/G Ratio 1.4 (L) 1.5 - 2.5 g/dL    BUN/Creatinine Ratio 28.6 (H) 7.0 - 25.0    Anion Gap 8.0 3.0 - 11.0 mmol/L   Magnesium   Result Value Ref Range    Magnesium 1.8 1.3 - 2.7 " mg/dL   CK   Result Value Ref Range    Creatine Kinase 51 26 - 174 U/L   Calcium, Ionized   Result Value Ref Range    Ionized Calcium 1.27 1.12 - 1.32 mmol/L   CBC Auto Differential   Result Value Ref Range    WBC 22.71 (H) 3.50 - 10.80 10*3/mm3    RBC 4.90 3.89 - 5.14 10*6/mm3    Hemoglobin 11.2 (L) 11.5 - 15.5 g/dL    Hematocrit 39.6 34.5 - 44.0 %    MCV 80.8 80.0 - 99.0 fL    MCH 22.9 (L) 27.0 - 31.0 pg    MCHC 28.3 (L) 32.0 - 36.0 g/dL    RDW 21.4 (H) 11.3 - 14.5 %    RDW-SD 61.8 (H) 37.0 - 54.0 fl    MPV 11.9 6.0 - 12.0 fL    Platelets 581 (H) 150 - 450 10*3/mm3    Neutrophil % 83.0 (H) 41.0 - 71.0 %    Lymphocyte % 8.6 (L) 24.0 - 44.0 %    Monocyte % 8.1 0.0 - 12.0 %    Eosinophil % 0.1 0.0 - 3.0 %    Basophil % 0.2 0.0 - 1.0 %    Immature Grans % 1.0 (H) 0.0 - 0.6 %    Neutrophils, Absolute 18.86 (H) 1.50 - 8.30 10*3/mm3    Lymphocytes, Absolute 1.96 0.60 - 4.80 10*3/mm3    Monocytes, Absolute 1.83 (H) 0.00 - 1.00 10*3/mm3    Eosinophils, Absolute 0.02 0.00 - 0.30 10*3/mm3    Basophils, Absolute 0.04 0.00 - 0.20 10*3/mm3    Immature Grans, Absolute 0.23 (H) 0.00 - 0.03 10*3/mm3   Light Blue Top   Result Value Ref Range    Extra Tube hold for add-on    Green Top (Gel)   Result Value Ref Range    Extra Tube Hold for add-ons.    Lavender Top   Result Value Ref Range    Extra Tube hold for add-on         Results Review:  I reviewed the patient's new clinical results.      ASSESSMENT  1.-Dysphagia to solids. EGD with dilation is appropriate and therefore offered.  She wishes to proceed.  2.-Gastroparesis.  Her prolonged QT interval makes her a poor candidate for cisapride however she tells me her cardiologist as indicated that any drug never lungs QT interval is probably safe for her because her QT interval prolongation is minimal.  I think domperidone may be an option for her as erythromycin and cisapride are certainly not   3.-Polyglandular insufficiency  syndrome.  4.-Dermatomyositis  5.-Hypoparathyroidism    PLAN   1.-Patient will obtain domperidone from [employer] online and let us know when she starts taking it.  10 mg by mouth 3 times a day half Before meals is recommended  2.-Schedule EGD with esophageal dilation.  [facility].      I discussed the patients findings and my recommendations with patient    Gavin Vikram Vargas MD  1/28/2019   1:40 PM    Much of this note is an electronic transcription of spoken language to printed text. Electronic transcription of spoken language may permit erroneous, nonsensical word phrases to be inadvertently transcribed.  Although I have reviewed the note for these errors, some may still be present.

## 2019-01-29 ENCOUNTER — PREP FOR SURGERY (OUTPATIENT)
Dept: OTHER | Facility: HOSPITAL | Age: 29
End: 2019-01-29

## 2019-01-29 PROBLEM — R13.19 ESOPHAGEAL DYSPHAGIA: Status: ACTIVE | Noted: 2018-08-09

## 2019-01-29 PROBLEM — K58.1 IRRITABLE BOWEL SYNDROME WITH CONSTIPATION: Status: ACTIVE | Noted: 2019-01-29

## 2019-01-29 PROBLEM — K31.84 GASTROPARESIS: Status: ACTIVE | Noted: 2019-01-29

## 2019-01-30 RX ORDER — PROMETHAZINE HYDROCHLORIDE 25 MG/1
25 SUPPOSITORY RECTAL EVERY 6 HOURS PRN
Qty: 20 SUPPOSITORY | Refills: 0 | Status: SHIPPED | OUTPATIENT
Start: 2019-01-30 | End: 2019-03-29 | Stop reason: SDUPTHER

## 2019-02-05 ENCOUNTER — HOSPITAL ENCOUNTER (OUTPATIENT)
Dept: NUTRITION | Facility: HOSPITAL | Age: 29
Setting detail: RECURRING SERIES
Discharge: HOME | End: 2019-02-05

## 2019-02-05 ENCOUNTER — ANESTHESIA EVENT (OUTPATIENT)
Dept: GASTROENTEROLOGY | Facility: HOSPITAL | Age: 29
End: 2019-02-05

## 2019-02-05 VITALS — WEIGHT: 191 LBS | BODY MASS INDEX: 35.15 KG/M2 | HEIGHT: 62 IN

## 2019-02-05 PROCEDURE — 97802 MEDICAL NUTRITION INDIV IN: CPT | Performed by: DIETITIAN, REGISTERED

## 2019-02-06 ENCOUNTER — HOSPITAL ENCOUNTER (OUTPATIENT)
Facility: HOSPITAL | Age: 29
Setting detail: HOSPITAL OUTPATIENT SURGERY
Discharge: HOME | End: 2019-02-06
Attending: INTERNAL MEDICINE | Admitting: INTERNAL MEDICINE

## 2019-02-06 ENCOUNTER — ANESTHESIA (OUTPATIENT)
Dept: GASTROENTEROLOGY | Facility: HOSPITAL | Age: 29
End: 2019-02-06

## 2019-02-06 VITALS
HEIGHT: 62 IN | OXYGEN SATURATION: 92 % | HEART RATE: 74 BPM | DIASTOLIC BLOOD PRESSURE: 57 MMHG | WEIGHT: 191 LBS | TEMPERATURE: 97.6 F | BODY MASS INDEX: 35.15 KG/M2 | SYSTOLIC BLOOD PRESSURE: 121 MMHG | RESPIRATION RATE: 18 BRPM

## 2019-02-06 LAB
B-HCG UR QL: NEGATIVE
INTERNAL NEGATIVE CONTROL: NORMAL
INTERNAL POSITIVE CONTROL: NEGATIVE
Lab: NORMAL

## 2019-02-06 PROCEDURE — 93005 ELECTROCARDIOGRAM TRACING: CPT | Performed by: ANESTHESIOLOGY

## 2019-02-06 PROCEDURE — 25010000002 FENTANYL CITRATE (PF) 100 MCG/2ML SOLUTION: Performed by: NURSE ANESTHETIST, CERTIFIED REGISTERED

## 2019-02-06 PROCEDURE — C1726 CATH, BAL DIL, NON-VASCULAR: HCPCS | Performed by: INTERNAL MEDICINE

## 2019-02-06 PROCEDURE — 93010 ELECTROCARDIOGRAM REPORT: CPT | Performed by: INTERNAL MEDICINE

## 2019-02-06 PROCEDURE — 25010000002 HYDROCORTISONE SODIUM SUCCINATE 100 MG RECONSTITUTED SOLUTION: Performed by: NURSE ANESTHETIST, CERTIFIED REGISTERED

## 2019-02-06 PROCEDURE — 25010000002 PROMETHAZINE PER 50 MG: Performed by: ANESTHESIOLOGY

## 2019-02-06 PROCEDURE — 25010000002 PROPOFOL 10 MG/ML EMULSION: Performed by: NURSE ANESTHETIST, CERTIFIED REGISTERED

## 2019-02-06 PROCEDURE — 81025 URINE PREGNANCY TEST: CPT | Performed by: ANESTHESIOLOGY

## 2019-02-06 PROCEDURE — 25010000002 HYDROMORPHONE 1 MG/ML SOLUTION: Performed by: NURSE ANESTHETIST, CERTIFIED REGISTERED

## 2019-02-06 RX ORDER — FAMOTIDINE 20 MG/1
20 TABLET, FILM COATED ORAL ONCE
Status: DISCONTINUED | OUTPATIENT
Start: 2019-02-06 | End: 2019-02-06 | Stop reason: HOSPADM

## 2019-02-06 RX ORDER — PROPOFOL 10 MG/ML
VIAL (ML) INTRAVENOUS AS NEEDED
Status: DISCONTINUED | OUTPATIENT
Start: 2019-02-06 | End: 2019-02-06 | Stop reason: SURG

## 2019-02-06 RX ORDER — SODIUM CHLORIDE 0.9 % (FLUSH) 0.9 %
3-10 SYRINGE (ML) INJECTION AS NEEDED
Status: DISCONTINUED | OUTPATIENT
Start: 2019-02-06 | End: 2019-02-06 | Stop reason: HOSPADM

## 2019-02-06 RX ORDER — FAMOTIDINE 10 MG/ML
20 INJECTION, SOLUTION INTRAVENOUS ONCE
Status: COMPLETED | OUTPATIENT
Start: 2019-02-06 | End: 2019-02-06

## 2019-02-06 RX ORDER — FENTANYL CITRATE 50 UG/ML
50 INJECTION, SOLUTION INTRAMUSCULAR; INTRAVENOUS
Status: DISCONTINUED | OUTPATIENT
Start: 2019-02-06 | End: 2019-02-06 | Stop reason: HOSPADM

## 2019-02-06 RX ORDER — SODIUM CHLORIDE 0.9 % (FLUSH) 0.9 %
3 SYRINGE (ML) INJECTION EVERY 12 HOURS SCHEDULED
Status: DISCONTINUED | OUTPATIENT
Start: 2019-02-06 | End: 2019-02-06 | Stop reason: HOSPADM

## 2019-02-06 RX ORDER — PROMETHAZINE HYDROCHLORIDE 25 MG/ML
25 INJECTION, SOLUTION INTRAMUSCULAR; INTRAVENOUS EVERY 6 HOURS PRN
Status: DISCONTINUED | OUTPATIENT
Start: 2019-02-06 | End: 2019-02-06 | Stop reason: HOSPADM

## 2019-02-06 RX ORDER — LIDOCAINE HYDROCHLORIDE 10 MG/ML
0.5 INJECTION, SOLUTION EPIDURAL; INFILTRATION; INTRACAUDAL; PERINEURAL ONCE AS NEEDED
Status: DISCONTINUED | OUTPATIENT
Start: 2019-02-06 | End: 2019-02-06 | Stop reason: HOSPADM

## 2019-02-06 RX ORDER — SODIUM CHLORIDE, SODIUM LACTATE, POTASSIUM CHLORIDE, CALCIUM CHLORIDE 600; 310; 30; 20 MG/100ML; MG/100ML; MG/100ML; MG/100ML
9 INJECTION, SOLUTION INTRAVENOUS CONTINUOUS
Status: DISCONTINUED | OUTPATIENT
Start: 2019-02-06 | End: 2019-02-06 | Stop reason: HOSPADM

## 2019-02-06 RX ORDER — PROMETHAZINE HYDROCHLORIDE 25 MG/ML
12.5 INJECTION, SOLUTION INTRAMUSCULAR; INTRAVENOUS EVERY 6 HOURS PRN
Status: DISCONTINUED | OUTPATIENT
Start: 2019-02-06 | End: 2019-02-06

## 2019-02-06 RX ADMIN — HYDROMORPHONE HYDROCHLORIDE 1 MG: 1 INJECTION, SOLUTION INTRAMUSCULAR; INTRAVENOUS; SUBCUTANEOUS at 09:31

## 2019-02-06 RX ADMIN — HYDROCORTISONE SODIUM SUCCINATE 100 MG: 100 INJECTION, POWDER, FOR SOLUTION INTRAMUSCULAR; INTRAVENOUS at 09:53

## 2019-02-06 RX ADMIN — FAMOTIDINE 20 MG: 10 INJECTION, SOLUTION INTRAVENOUS at 08:04

## 2019-02-06 RX ADMIN — FENTANYL CITRATE 50 MCG: 50 INJECTION INTRAMUSCULAR; INTRAVENOUS at 10:25

## 2019-02-06 RX ADMIN — SODIUM CHLORIDE, POTASSIUM CHLORIDE, SODIUM LACTATE AND CALCIUM CHLORIDE: 600; 310; 30; 20 INJECTION, SOLUTION INTRAVENOUS at 09:22

## 2019-02-06 RX ADMIN — PROPOFOL 100 MG: 10 INJECTION, EMULSION INTRAVENOUS at 09:31

## 2019-02-06 RX ADMIN — HYDROMORPHONE HYDROCHLORIDE 1 MG: 1 INJECTION, SOLUTION INTRAMUSCULAR; INTRAVENOUS; SUBCUTANEOUS at 09:21

## 2019-02-06 RX ADMIN — FENTANYL CITRATE 25 MCG: 50 INJECTION INTRAMUSCULAR; INTRAVENOUS at 10:50

## 2019-02-06 RX ADMIN — PROMETHAZINE HYDROCHLORIDE 3.12 MG: 25 INJECTION INTRAMUSCULAR; INTRAVENOUS at 10:50

## 2019-02-06 RX ADMIN — PROPOFOL 50 MG: 10 INJECTION, EMULSION INTRAVENOUS at 09:34

## 2019-02-06 RX ADMIN — SODIUM CHLORIDE, PRESERVATIVE FREE 500 UNITS: 5 INJECTION INTRAVENOUS at 11:02

## 2019-02-06 RX ADMIN — PROMETHAZINE HYDROCHLORIDE 25 MG: 25 INJECTION INTRAMUSCULAR; INTRAVENOUS at 08:20

## 2019-02-06 RX ADMIN — SODIUM CHLORIDE, POTASSIUM CHLORIDE, SODIUM LACTATE AND CALCIUM CHLORIDE 9 ML/HR: 600; 310; 30; 20 INJECTION, SOLUTION INTRAVENOUS at 08:04

## 2019-02-06 NOTE — ANESTHESIA PREPROCEDURE EVALUATION
Anesthesia Evaluation     Patient summary reviewed and Nursing notes reviewed   no history of anesthetic complications:  NPO Solid Status: > 8 hours  NPO Liquid Status: > 8 hours           Airway   Mallampati: II  TM distance: >3 FB  Neck ROM: full  No difficulty expected  Dental - normal exam     Pulmonary - normal exam    breath sounds clear to auscultation  (+) sleep apnea (No CPAP),     ROS comment: Pulmonary process secondary to autoimmune disease  Cardiovascular - normal exam    ECG reviewed  Rhythm: regular  Rate: normal        Neuro/Psych    ROS Comment: Tetany  GI/Hepatic/Renal/Endo    (+) obesity,  GERD well controlled,  hepatitis (autoimmune non-infection hepatitis), liver disease, diabetes mellitus type 1 using insulin,     ROS Comment: Autoimmune polyendocrine syndrome type 1    Musculoskeletal (-) negative ROS    Abdominal    Substance History - negative use     OB/GYN          Other - negative ROS                       Anesthesia Plan    ASA 3     general     intravenous induction   Anesthetic plan, all risks, benefits, and alternatives have been provided, discussed and informed consent has been obtained with: patient.    Plan discussed with CRNA.

## 2019-02-06 NOTE — ANESTHESIA POSTPROCEDURE EVALUATION
"Patient: Krista Richter    Procedure Summary     Date:  02/06/19 Room / Location:   [facility]    Anesthesia Start:  0927 Anesthesia Stop:      Procedure:  ESOPHAGOGASTRODUODENOSCOPY (N/A ) Diagnosis:       Esophageal dysphagia      (Esophageal dysphagia [R13.10])    Surgeon:  Gavin Vargas MD Provider:  Bakari Valiente MD    Anesthesia Type:  general ASA Status:  3          Anesthesia Type: general  Last vitals  BP   103/89 (02/06/19 0955)   Temp   97.6 °F (36.4 °C) (02/06/19 0955)   Pulse   90 (02/06/19 0955)   Resp   16 (02/06/19 0955)     SpO2   93 % (02/06/19 0955)     Post Anesthesia Care and Evaluation    Patient location during evaluation: PACU  Patient participation: complete - patient participated  Level of consciousness: awake and responsive to verbal stimuli  Pain score: 2  Pain management: adequate  Airway patency: patent  Anesthetic complications: No anesthetic complications    Cardiovascular status: acceptable  Respiratory status: acceptable  Hydration status: acceptable    Comments: Pt awake and responsive. SV. VSS. Report to RN. Patient Vitals in the past 24 hrs:  02/06/19 0955, BP:103/89, Temp:97.6 °F (36.4 °C), Pulse:90, Resp:16, SpO2:93 %  02/06/19 0945, BP:99/60, Temp:97.6 °F (36.4 °C), Pulse:82, Resp:16, SpO2:95 %  02/06/19 0751, BP:121/80, Temp:97 °F (36.1 °C), Temp src:Tympanic, Pulse:90, Resp:15, SpO2:93 %, Height:157.5 cm (62\"), Weight:86.6 kg (191 lb)  133/78. p 72. r 16. t 98.1                  "

## 2019-02-06 NOTE — PERIOPERATIVE NURSING NOTE
Port a cath flushed with NSx2 and heparin flush-see mar.  Saline lock left on port, RN mother manages accessing port and did so this am prior to procedure, wants saline lock left in place in case meds are needed at home for tetany.

## 2019-02-14 ENCOUNTER — HOSPITAL ENCOUNTER (EMERGENCY)
Facility: HOSPITAL | Age: 29
Discharge: HOME | End: 2019-02-15
Attending: EMERGENCY MEDICINE | Admitting: EMERGENCY MEDICINE

## 2019-02-14 DIAGNOSIS — E83.51 HYPOCALCEMIA: ICD-10-CM

## 2019-02-14 DIAGNOSIS — M79.18 MUSCLE PAIN, MYOFASCIAL: ICD-10-CM

## 2019-02-14 DIAGNOSIS — E20.9: Primary | ICD-10-CM

## 2019-02-14 LAB
ACANTHOCYTES BLD QL SMEAR: NORMAL
ALBUMIN SERPL-MCNC: 4.17 G/DL (ref 3.2–4.8)
ALBUMIN/GLOB SERPL: 1.4 G/DL (ref 1.5–2.5)
ALP SERPL-CCNC: 94 U/L (ref 25–100)
ALT SERPL W P-5'-P-CCNC: 22 U/L (ref 7–40)
ANION GAP SERPL CALCULATED.3IONS-SCNC: 10 MMOL/L (ref 3–11)
AST SERPL-CCNC: 23 U/L (ref 0–33)
BASOPHILS # BLD AUTO: 0.05 10*3/MM3 (ref 0–0.2)
BASOPHILS NFR BLD AUTO: 0.2 % (ref 0–1)
BILIRUB SERPL-MCNC: 0.4 MG/DL (ref 0.3–1.2)
BUN BLD-MCNC: 9 MG/DL (ref 9–23)
BUN/CREAT SERPL: 14.5 (ref 7–25)
CALCIUM SPEC-SCNC: 8.4 MG/DL (ref 8.7–10.4)
CHLORIDE SERPL-SCNC: 101 MMOL/L (ref 99–109)
CO2 SERPL-SCNC: 28 MMOL/L (ref 20–31)
CREAT BLD-MCNC: 0.62 MG/DL (ref 0.6–1.3)
DEPRECATED RDW RBC AUTO: 58.5 FL (ref 37–54)
EOSINOPHIL # BLD AUTO: 0.04 10*3/MM3 (ref 0–0.3)
EOSINOPHIL NFR BLD AUTO: 0.2 % (ref 0–3)
ERYTHROCYTE [DISTWIDTH] IN BLOOD BY AUTOMATED COUNT: 19.9 % (ref 11.3–14.5)
GFR SERPL CREATININE-BSD FRML MDRD: 114 ML/MIN/1.73
GLOBULIN UR ELPH-MCNC: 2.9 GM/DL
GLUCOSE BLD-MCNC: 129 MG/DL (ref 70–100)
HCT VFR BLD AUTO: 31.9 % (ref 34.5–44)
HGB BLD-MCNC: 9.6 G/DL (ref 11.5–15.5)
HOWELL-JOLLY BOD BLD QL SMEAR: PRESENT
IMM GRANULOCYTES # BLD AUTO: 0.14 10*3/MM3 (ref 0–0.05)
IMM GRANULOCYTES NFR BLD AUTO: 0.6 % (ref 0–0.6)
LIPASE SERPL-CCNC: 43 U/L (ref 6–51)
LYMPHOCYTES # BLD AUTO: 2.96 10*3/MM3 (ref 0.6–4.8)
LYMPHOCYTES NFR BLD AUTO: 13.7 % (ref 24–44)
MCH RBC QN AUTO: 23.9 PG (ref 27–31)
MCHC RBC AUTO-ENTMCNC: 30.1 G/DL (ref 32–36)
MCV RBC AUTO: 79.6 FL (ref 80–99)
MONOCYTES # BLD AUTO: 2.39 10*3/MM3 (ref 0–1)
MONOCYTES NFR BLD AUTO: 11 % (ref 0–12)
NEUTROPHILS # BLD AUTO: 16.06 10*3/MM3 (ref 1.5–8.3)
NEUTROPHILS NFR BLD AUTO: 74.3 % (ref 41–71)
PLAT MORPH BLD: NORMAL
PLATELET # BLD AUTO: 415 10*3/MM3 (ref 150–450)
PMV BLD AUTO: 10.6 FL (ref 6–12)
POTASSIUM BLD-SCNC: 3.4 MMOL/L (ref 3.5–5.5)
PROT SERPL-MCNC: 7.1 G/DL (ref 5.7–8.2)
RBC # BLD AUTO: 4.01 10*6/MM3 (ref 3.89–5.14)
SODIUM BLD-SCNC: 139 MMOL/L (ref 132–146)
TARGETS BLD QL SMEAR: NORMAL
WBC MORPH BLD: NORMAL
WBC NRBC COR # BLD: 21.64 10*3/MM3 (ref 3.5–10.8)

## 2019-02-14 PROCEDURE — 85025 COMPLETE CBC W/AUTO DIFF WBC: CPT | Performed by: NURSE PRACTITIONER

## 2019-02-14 PROCEDURE — 85007 BL SMEAR W/DIFF WBC COUNT: CPT | Performed by: NURSE PRACTITIONER

## 2019-02-14 PROCEDURE — 96365 THER/PROPH/DIAG IV INF INIT: CPT

## 2019-02-14 PROCEDURE — 25010000002 PROMETHAZINE PER 50 MG: Performed by: EMERGENCY MEDICINE

## 2019-02-14 PROCEDURE — 25010000002 HYDROMORPHONE PER 4 MG: Performed by: EMERGENCY MEDICINE

## 2019-02-14 PROCEDURE — 99284 EMERGENCY DEPT VISIT MOD MDM: CPT

## 2019-02-14 PROCEDURE — 25010000002 CALCIUM GLUCONATE PER 10 ML: Performed by: NURSE PRACTITIONER

## 2019-02-14 PROCEDURE — 83690 ASSAY OF LIPASE: CPT | Performed by: NURSE PRACTITIONER

## 2019-02-14 PROCEDURE — 93005 ELECTROCARDIOGRAM TRACING: CPT | Performed by: NURSE PRACTITIONER

## 2019-02-14 PROCEDURE — 96375 TX/PRO/DX INJ NEW DRUG ADDON: CPT

## 2019-02-14 PROCEDURE — 96376 TX/PRO/DX INJ SAME DRUG ADON: CPT

## 2019-02-14 PROCEDURE — 80053 COMPREHEN METABOLIC PANEL: CPT | Performed by: NURSE PRACTITIONER

## 2019-02-14 RX ORDER — CALCIUM GLUCONATE 94 MG/ML
2 INJECTION, SOLUTION INTRAVENOUS ONCE
Status: DISCONTINUED | OUTPATIENT
Start: 2019-02-14 | End: 2019-02-14 | Stop reason: SDUPTHER

## 2019-02-14 RX ORDER — PROMETHAZINE HYDROCHLORIDE 25 MG/ML
12.5 INJECTION, SOLUTION INTRAMUSCULAR; INTRAVENOUS ONCE
Status: COMPLETED | OUTPATIENT
Start: 2019-02-14 | End: 2019-02-14

## 2019-02-14 RX ORDER — SODIUM CHLORIDE 0.9 % (FLUSH) 0.9 %
10 SYRINGE (ML) INJECTION AS NEEDED
Status: DISCONTINUED | OUTPATIENT
Start: 2019-02-14 | End: 2019-02-15 | Stop reason: HOSPADM

## 2019-02-14 RX ORDER — HYDROMORPHONE HYDROCHLORIDE 1 MG/ML
0.5 INJECTION, SOLUTION INTRAMUSCULAR; INTRAVENOUS; SUBCUTANEOUS ONCE
Status: COMPLETED | OUTPATIENT
Start: 2019-02-14 | End: 2019-02-14

## 2019-02-14 RX ORDER — HYDROCODONE BITARTRATE AND ACETAMINOPHEN 5; 325 MG/1; MG/1
1 TABLET ORAL ONCE
Status: COMPLETED | OUTPATIENT
Start: 2019-02-14 | End: 2019-02-15

## 2019-02-14 RX ADMIN — HYDROMORPHONE HYDROCHLORIDE 0.5 MG: 1 INJECTION, SOLUTION INTRAMUSCULAR; INTRAVENOUS; SUBCUTANEOUS at 23:02

## 2019-02-14 RX ADMIN — PROMETHAZINE HYDROCHLORIDE 12.5 MG: 25 INJECTION INTRAMUSCULAR; INTRAVENOUS at 21:24

## 2019-02-14 RX ADMIN — HYDROMORPHONE HYDROCHLORIDE 0.5 MG: 1 INJECTION, SOLUTION INTRAMUSCULAR; INTRAVENOUS; SUBCUTANEOUS at 21:23

## 2019-02-14 RX ADMIN — PROMETHAZINE HYDROCHLORIDE 12.5 MG: 25 INJECTION INTRAMUSCULAR; INTRAVENOUS at 23:01

## 2019-02-14 RX ADMIN — SODIUM CHLORIDE 1000 ML: 9 INJECTION, SOLUTION INTRAVENOUS at 21:31

## 2019-02-14 RX ADMIN — CALCIUM GLUCONATE 2 G: 98 INJECTION, SOLUTION INTRAVENOUS at 21:33

## 2019-02-15 VITALS
WEIGHT: 185 LBS | HEIGHT: 62 IN | HEART RATE: 89 BPM | BODY MASS INDEX: 34.04 KG/M2 | DIASTOLIC BLOOD PRESSURE: 63 MMHG | SYSTOLIC BLOOD PRESSURE: 110 MMHG | RESPIRATION RATE: 16 BRPM | OXYGEN SATURATION: 91 % | TEMPERATURE: 98.2 F

## 2019-02-15 RX ADMIN — HYDROCODONE BITARTRATE AND ACETAMINOPHEN 1 TABLET: 5; 325 TABLET ORAL at 00:13

## 2019-02-15 RX ADMIN — HEPARIN 300 UNITS: 100 SYRINGE at 00:14

## 2019-02-15 NOTE — ED PROVIDER NOTES
"Grace Richter is a 28 y.o.female who presents to the emergency department with complaints of weakness. The patient states that her nausea and vomiting onset 30 minutes PTA following eating at [employer]. She has a history of autoimmune polyglandular syndrome type 1 and the vomiting triggered tetany. She mentions that she is having myalgias which she describes as \"full body kandi horse.\" She reports that she is in a [employer] study along with 70 other people. She denies fever or chills. These symptoms are recurrent and this is similar to prior episodes. There are no other acute complaints at this time.              History provided by:  Patient  Vomiting   The primary symptoms include nausea, vomiting and myalgias. Primary symptoms do not include fever or abdominal pain. The illness began today. The onset was sudden. The problem has not changed since onset.  The myalgias are not associated with weakness.   The illness does not include chills.       Review of Systems   Constitutional: Negative for chills and fever.   Respiratory: Negative for cough and shortness of breath.    Cardiovascular: Negative for chest pain.   Gastrointestinal: Positive for nausea and vomiting. Negative for abdominal pain.   Musculoskeletal: Positive for myalgias.   Neurological: Negative for dizziness and weakness.   All other systems reviewed and are negative.      Past Medical History:   Diagnosis Date   • Adrenal insufficiency (CMS/HCC)    • Alopecia    • Anemia    • Anxiety    • APS type 1 (CMS/HCC)    • Asthma    • Autoimmune hepatitis (CMS/HCC)    • Depression    • Dermatomyositis (CMS/HCC)    • Disease of thyroid gland    • Fibromyalgia    • Functional asplenia    • History of kidney stones    • Hypoparathyroidism (CMS/HCC)    • Insulin dependent diabetes mellitus (CMS/HCC)    • Long Q-T syndrome    • Pancreatic insufficiency    • Parathyroid abnormality (CMS/HCC)    • Polyglandular deficiency syndrome (CMS/HCC)    • " Premature ovarian failure    • Sleep apnea    • Spleen absent    • Tetany     HAS EPISODES   • Transfusion history        Allergies   Allergen Reactions   • Cefpodoxime Hives   • Cephalosporins Other (See Comments)   • Clarithromycin Hives     biaxin   • Levofloxacin Other (See Comments)   • Morphine Other (See Comments)   • Nitrofurantoin Hives   • Nitrofurantoin Macrocrystal Other (See Comments)   • Nystatin Hives   • Ondansetron Other (See Comments)   • Penicillins Hives   • Sulfa Antibiotics Hives       Past Surgical History:   Procedure Laterality Date   • APPENDECTOMY     • CARDIAC SURGERY      LOOP RECORDER   • CHOLECYSTECTOMY     • ENDOSCOPY N/A 4/26/2018    Procedure: ESOPHAGOGASTRODUODENOSCOPY;  Surgeon: Gavin Vargas MD;  Location:  [facility];  Service: Gastroenterology   • ENDOSCOPY N/A 8/9/2018    Procedure: ESOPHAGOGASTRODUODENOSCOPY-DILATION;  Surgeon: Gavin Vargas MD;  Location:  [facility];  Service: Gastroenterology   • ENDOSCOPY     • ENDOSCOPY N/A 2/6/2019    Procedure: ESOPHAGOGASTRODUODENOSCOPY;  Surgeon: Gavin Vargas MD;  Location:  [facility];  Service: Gastroenterology   • EXPLORATORY LAPAROTOMY      MULTIPLE   • HAND SURGERY      4 TOTAL   • HERNIA REPAIR     • LIVER BIOPSY     • MUSCLE BIOPSY     • MUSCLE BIOPSY     • PEG TUBE INSERTION     • PEG TUBE REMOVAL     • PORTACATH PLACEMENT         Family History   Problem Relation Age of Onset   • Kidney disease Father        Social History     Socioeconomic History   • Marital status: Single     Spouse name: Not on file   • Number of children: Not on file   • Years of education: Not on file   • Highest education level: Not on file   Tobacco Use   • Smoking status: Never Smoker   • Smokeless tobacco: Never Used   Substance and Sexual Activity   • Alcohol use: Yes     Comment: SOCIAL   • Drug use: No   • Sexual activity: Defer         Objective   Physical Exam   Constitutional: She  is oriented to person, place, and time. She appears well-developed and well-nourished. She appears distressed.   Pt is contracted with both arms behind her back, and legs folded underneath her. Pt is talking through grit teeth,    HENT:   Head: Normocephalic and atraumatic.   Nose: Nose normal.   Eyes: Conjunctivae are normal. No scleral icterus.   Neck: Normal range of motion. Neck supple.   Cardiovascular: Normal rate, regular rhythm and normal heart sounds.   Pulmonary/Chest: Effort normal and breath sounds normal. No respiratory distress.   Abdominal: Soft. Bowel sounds are normal. She exhibits no distension. There is no tenderness.   Musculoskeletal: Normal range of motion.   Contracted arms, contracted legs.    Neurological: She is alert and oriented to person, place, and time.   Skin: Skin is warm and dry.   Psychiatric: She has a normal mood and affect. Her behavior is normal.   Nursing note and vitals reviewed.      Procedures         ED Course  ED Course as of Feb 15 0434   Thu Feb 14, 2019   2304 Calcium: (!) 8.4 [KG]   2304 WBC: (!) 21.64 [KG]   2357 Pt is feeling better at this time. Pt will be dchome. Pt to f/u with PCP. Pt agrees and verb understanding.    [KG]      ED Course User Index  [KG] Emma Cervantes, BEAR     Recent Results (from the past 24 hour(s))   Comprehensive Metabolic Panel    Collection Time: 02/14/19  9:38 PM   Result Value Ref Range    Glucose 129 (H) 70 - 100 mg/dL    BUN 9 9 - 23 mg/dL    Creatinine 0.62 0.60 - 1.30 mg/dL    Sodium 139 132 - 146 mmol/L    Potassium 3.4 (L) 3.5 - 5.5 mmol/L    Chloride 101 99 - 109 mmol/L    CO2 28.0 20.0 - 31.0 mmol/L    Calcium 8.4 (L) 8.7 - 10.4 mg/dL    Total Protein 7.1 5.7 - 8.2 g/dL    Albumin 4.17 3.20 - 4.80 g/dL    ALT (SGPT) 22 7 - 40 U/L    AST (SGOT) 23 0 - 33 U/L    Alkaline Phosphatase 94 25 - 100 U/L    Total Bilirubin 0.4 0.3 - 1.2 mg/dL    eGFR Non African Amer 114 >60 mL/min/1.73    Globulin 2.9 gm/dL    A/G Ratio 1.4 (L) 1.5  - 2.5 g/dL    BUN/Creatinine Ratio 14.5 7.0 - 25.0    Anion Gap 10.0 3.0 - 11.0 mmol/L   Lipase    Collection Time: 02/14/19  9:38 PM   Result Value Ref Range    Lipase 43 6 - 51 U/L   CBC Auto Differential    Collection Time: 02/14/19  9:38 PM   Result Value Ref Range    WBC 21.64 (H) 3.50 - 10.80 10*3/mm3    RBC 4.01 3.89 - 5.14 10*6/mm3    Hemoglobin 9.6 (L) 11.5 - 15.5 g/dL    Hematocrit 31.9 (L) 34.5 - 44.0 %    MCV 79.6 (L) 80.0 - 99.0 fL    MCH 23.9 (L) 27.0 - 31.0 pg    MCHC 30.1 (L) 32.0 - 36.0 g/dL    RDW 19.9 (H) 11.3 - 14.5 %    RDW-SD 58.5 (H) 37.0 - 54.0 fl    MPV 10.6 6.0 - 12.0 fL    Platelets 415 150 - 450 10*3/mm3    Neutrophil % 74.3 (H) 41.0 - 71.0 %    Lymphocyte % 13.7 (L) 24.0 - 44.0 %    Monocyte % 11.0 0.0 - 12.0 %    Eosinophil % 0.2 0.0 - 3.0 %    Basophil % 0.2 0.0 - 1.0 %    Immature Grans % 0.6 0.0 - 0.6 %    Neutrophils, Absolute 16.06 (H) 1.50 - 8.30 10*3/mm3    Lymphocytes, Absolute 2.96 0.60 - 4.80 10*3/mm3    Monocytes, Absolute 2.39 (H) 0.00 - 1.00 10*3/mm3    Eosinophils, Absolute 0.04 0.00 - 0.30 10*3/mm3    Basophils, Absolute 0.05 0.00 - 0.20 10*3/mm3    Immature Grans, Absolute 0.14 (H) 0.00 - 0.05 10*3/mm3   Scan Slide    Collection Time: 02/14/19  9:38 PM   Result Value Ref Range    Acanthocytes Slight/1+ None Seen    Yarbrough-Hargill Bodies Present None Seen    Target Cells Slight/1+ None Seen    WBC Morphology Normal Normal    Platelet Morphology Normal Normal     Note: In addition to lab results from this visit, the labs listed above may include labs taken at another facility or during a different encounter within the last 24 hours. Please correlate lab times with ED admission and discharge times for further clarification of the services performed during this visit.    No orders to display     Vitals:    02/14/19 2330 02/15/19 0000 02/15/19 0002 02/15/19 0014   BP: 121/79 110/63     BP Location:       Patient Position:       Pulse: 95  85 89   Resp:       Temp:        TempSrc:       SpO2: 93%  95% 91%   Weight:       Height:         Medications   sodium chloride 0.9 % bolus 1,000 mL (0 mL Intravenous Stopped 2/14/19 2331)   promethazine (PHENERGAN) injection 12.5 mg (12.5 mg Intravenous Given 2/14/19 2124)   HYDROmorphone (DILAUDID) injection 0.5 mg (0.5 mg Intravenous Given 2/14/19 2123)   calcium gluconate 2 g in sodium chloride 0.9 % 100 mL IVPB (0 g Intravenous Stopped 2/14/19 2301)   HYDROmorphone (DILAUDID) injection 0.5 mg (0.5 mg Intravenous Given 2/14/19 2302)   promethazine (PHENERGAN) injection 12.5 mg (12.5 mg Intravenous Given 2/14/19 2301)   HYDROcodone-acetaminophen (NORCO) 5-325 MG per tablet 1 tablet (1 tablet Oral Given 2/15/19 0013)   heparin flush (porcine) 100 UNIT/ML injection 300 Units (300 Units Intravenous Given 2/15/19 0014)     ECG/EMG Results (last 24 hours)     ** No results found for the last 24 hours. **        ECG 12 Lead                             MDM    Final diagnoses:   Tetany, parathyroid gland (CMS/HCC)   Muscle pain, myofascial   Hypocalcemia       Documentation assistance provided by artemio Gutierrez.  Information recorded by the artemio was done at my direction and has been verified and validated by me.     Omar Gutierrez  02/14/19 2019       Emma Cervantes, BEAR  02/15/19 2936

## 2019-03-29 RX ORDER — PROMETHAZINE HYDROCHLORIDE 25 MG/1
25 SUPPOSITORY RECTAL EVERY 6 HOURS PRN
Qty: 10 SUPPOSITORY | Refills: 0 | Status: SHIPPED | OUTPATIENT
Start: 2019-03-29 | End: 2020-06-06 | Stop reason: HOSPADM

## 2019-03-29 RX ORDER — OMEPRAZOLE 40 MG/1
CAPSULE, DELAYED RELEASE ORAL
Qty: 30 CAPSULE | Refills: 4 | Status: SHIPPED | OUTPATIENT
Start: 2019-03-29 | End: 2019-08-30 | Stop reason: SDUPTHER

## 2019-03-29 NOTE — ADDENDUM NOTE
Addended by: JACOBO URBAN on: 3/29/2019 08:57 AM     Modules accepted: Level of Service, SmartSet

## 2019-04-03 ENCOUNTER — HOSPITAL ENCOUNTER (EMERGENCY)
Facility: HOSPITAL | Age: 29
Discharge: HOME | End: 2019-04-03
Attending: EMERGENCY MEDICINE | Admitting: EMERGENCY MEDICINE

## 2019-04-03 VITALS
WEIGHT: 185 LBS | BODY MASS INDEX: 32.78 KG/M2 | HEIGHT: 63 IN | RESPIRATION RATE: 18 BRPM | SYSTOLIC BLOOD PRESSURE: 115 MMHG | DIASTOLIC BLOOD PRESSURE: 69 MMHG | TEMPERATURE: 98.2 F | HEART RATE: 79 BPM | OXYGEN SATURATION: 100 %

## 2019-04-03 DIAGNOSIS — E20.9: Primary | ICD-10-CM

## 2019-04-03 LAB
ALBUMIN SERPL-MCNC: 4.42 G/DL (ref 3.2–4.8)
ALBUMIN/GLOB SERPL: 1.4 G/DL (ref 1.5–2.5)
ALP SERPL-CCNC: 95 U/L (ref 25–100)
ALT SERPL W P-5'-P-CCNC: 26 U/L (ref 7–40)
ANION GAP SERPL CALCULATED.3IONS-SCNC: 15 MMOL/L (ref 3–11)
AST SERPL-CCNC: 30 U/L (ref 0–33)
BASOPHILS # BLD AUTO: 0.05 10*3/MM3 (ref 0–0.2)
BASOPHILS NFR BLD AUTO: 0.3 % (ref 0–1)
BILIRUB SERPL-MCNC: 0.6 MG/DL (ref 0.3–1.2)
BUN BLD-MCNC: 14 MG/DL (ref 9–23)
BUN BLDA-MCNC: 13 MG/DL (ref 8–26)
BUN/CREAT SERPL: 21.5 (ref 7–25)
CA-I BLDA-SCNC: 1.08 MMOL/L (ref 1.2–1.32)
CA-I SERPL ISE-MCNC: 1.2 MMOL/L (ref 1.12–1.32)
CALCIUM SPEC-SCNC: 9.3 MG/DL (ref 8.7–10.4)
CHLORIDE BLDA-SCNC: 93 MMOL/L (ref 98–109)
CHLORIDE SERPL-SCNC: 95 MMOL/L (ref 99–109)
CK SERPL-CCNC: 145 U/L (ref 26–174)
CO2 BLDA-SCNC: 28 MMOL/L (ref 24–29)
CO2 SERPL-SCNC: 27 MMOL/L (ref 20–31)
CREAT BLD-MCNC: 0.65 MG/DL (ref 0.6–1.3)
CREAT BLDA-MCNC: 0.6 MG/DL (ref 0.6–1.3)
DEPRECATED RDW RBC AUTO: 53.9 FL (ref 37–54)
EOSINOPHIL # BLD AUTO: 0.02 10*3/MM3 (ref 0–0.3)
EOSINOPHIL NFR BLD AUTO: 0.1 % (ref 0–3)
ERYTHROCYTE [DISTWIDTH] IN BLOOD BY AUTOMATED COUNT: 19.1 % (ref 11.3–14.5)
GFR SERPL CREATININE-BSD FRML MDRD: 108 ML/MIN/1.73
GLOBULIN UR ELPH-MCNC: 3.1 GM/DL
GLUCOSE BLD-MCNC: 95 MG/DL (ref 70–100)
GLUCOSE BLDC GLUCOMTR-MCNC: 98 MG/DL (ref 70–130)
HCT VFR BLD AUTO: 35.8 % (ref 34.5–44)
HCT VFR BLDA CALC: 39 % (ref 38–51)
HGB BLD-MCNC: 10.1 G/DL (ref 11.5–15.5)
HGB BLDA-MCNC: 13.3 G/DL (ref 12–17)
IMM GRANULOCYTES # BLD AUTO: 0.05 10*3/MM3 (ref 0–0.05)
IMM GRANULOCYTES NFR BLD AUTO: 0.3 % (ref 0–0.6)
LYMPHOCYTES # BLD AUTO: 2.49 10*3/MM3 (ref 0.6–4.8)
LYMPHOCYTES NFR BLD AUTO: 13 % (ref 24–44)
MAGNESIUM SERPL-MCNC: 1.5 MG/DL (ref 1.3–2.7)
MCH RBC QN AUTO: 22 PG (ref 27–31)
MCHC RBC AUTO-ENTMCNC: 28.2 G/DL (ref 32–36)
MCV RBC AUTO: 78 FL (ref 80–99)
MONOCYTES # BLD AUTO: 2.15 10*3/MM3 (ref 0–1)
MONOCYTES NFR BLD AUTO: 11.3 % (ref 0–12)
NEUTROPHILS # BLD AUTO: 14.39 10*3/MM3 (ref 1.5–8.3)
NEUTROPHILS NFR BLD AUTO: 75.3 % (ref 41–71)
PLATELET # BLD AUTO: 330 10*3/MM3 (ref 150–450)
PMV BLD AUTO: 11.3 FL (ref 6–12)
POTASSIUM BLD-SCNC: 3.8 MMOL/L (ref 3.5–5.5)
POTASSIUM BLDA-SCNC: 3.9 MMOL/L (ref 3.5–4.9)
PROT SERPL-MCNC: 7.5 G/DL (ref 5.7–8.2)
RBC # BLD AUTO: 4.59 10*6/MM3 (ref 3.89–5.14)
SODIUM BLD-SCNC: 137 MMOL/L (ref 132–146)
SODIUM BLDA-SCNC: 135 MMOL/L (ref 138–146)
WBC NRBC COR # BLD: 19.1 10*3/MM3 (ref 3.5–10.8)

## 2019-04-03 PROCEDURE — 80053 COMPREHEN METABOLIC PANEL: CPT | Performed by: EMERGENCY MEDICINE

## 2019-04-03 PROCEDURE — 96375 TX/PRO/DX INJ NEW DRUG ADDON: CPT

## 2019-04-03 PROCEDURE — 99284 EMERGENCY DEPT VISIT MOD MDM: CPT

## 2019-04-03 PROCEDURE — 96365 THER/PROPH/DIAG IV INF INIT: CPT

## 2019-04-03 PROCEDURE — 25010000002 HYDROCORTISONE SODIUM SUCCINATE 100 MG RECONSTITUTED SOLUTION: Performed by: EMERGENCY MEDICINE

## 2019-04-03 PROCEDURE — 96376 TX/PRO/DX INJ SAME DRUG ADON: CPT

## 2019-04-03 PROCEDURE — 25010000002 HYDROMORPHONE PER 4 MG: Performed by: EMERGENCY MEDICINE

## 2019-04-03 PROCEDURE — 82550 ASSAY OF CK (CPK): CPT | Performed by: EMERGENCY MEDICINE

## 2019-04-03 PROCEDURE — 83735 ASSAY OF MAGNESIUM: CPT | Performed by: EMERGENCY MEDICINE

## 2019-04-03 PROCEDURE — 80047 BASIC METABLC PNL IONIZED CA: CPT

## 2019-04-03 PROCEDURE — 85025 COMPLETE CBC W/AUTO DIFF WBC: CPT | Performed by: EMERGENCY MEDICINE

## 2019-04-03 PROCEDURE — 25010000002 CALCIUM GLUCONATE PER 10 ML: Performed by: EMERGENCY MEDICINE

## 2019-04-03 PROCEDURE — 85014 HEMATOCRIT: CPT

## 2019-04-03 PROCEDURE — 82330 ASSAY OF CALCIUM: CPT | Performed by: EMERGENCY MEDICINE

## 2019-04-03 PROCEDURE — 25010000002 PROMETHAZINE PER 50 MG: Performed by: EMERGENCY MEDICINE

## 2019-04-03 PROCEDURE — 25010000002 HYDROMORPHONE 1 MG/ML SOLUTION: Performed by: EMERGENCY MEDICINE

## 2019-04-03 RX ORDER — HYDROMORPHONE HYDROCHLORIDE 1 MG/ML
0.5 INJECTION, SOLUTION INTRAMUSCULAR; INTRAVENOUS; SUBCUTANEOUS ONCE
Status: COMPLETED | OUTPATIENT
Start: 2019-04-03 | End: 2019-04-03

## 2019-04-03 RX ORDER — PROMETHAZINE HYDROCHLORIDE 25 MG/ML
12.5 INJECTION, SOLUTION INTRAMUSCULAR; INTRAVENOUS ONCE
Status: COMPLETED | OUTPATIENT
Start: 2019-04-03 | End: 2019-04-03

## 2019-04-03 RX ORDER — HYDROMORPHONE HYDROCHLORIDE 1 MG/ML
0.25 INJECTION, SOLUTION INTRAMUSCULAR; INTRAVENOUS; SUBCUTANEOUS ONCE
Status: COMPLETED | OUTPATIENT
Start: 2019-04-03 | End: 2019-04-03

## 2019-04-03 RX ADMIN — SODIUM CHLORIDE 1000 ML: 9 INJECTION, SOLUTION INTRAVENOUS at 20:53

## 2019-04-03 RX ADMIN — PROMETHAZINE HYDROCHLORIDE 12.5 MG: 25 INJECTION, SOLUTION INTRAMUSCULAR; INTRAVENOUS at 19:39

## 2019-04-03 RX ADMIN — HYDROCORTISONE SODIUM SUCCINATE 100 MG: 100 INJECTION, POWDER, FOR SOLUTION INTRAMUSCULAR; INTRAVENOUS at 20:55

## 2019-04-03 RX ADMIN — HYDROMORPHONE HYDROCHLORIDE 0.25 MG: 1 INJECTION, SOLUTION INTRAMUSCULAR; INTRAVENOUS; SUBCUTANEOUS at 22:09

## 2019-04-03 RX ADMIN — CALCIUM GLUCONATE: 98 INJECTION, SOLUTION INTRAVENOUS at 19:23

## 2019-04-03 RX ADMIN — PROMETHAZINE HYDROCHLORIDE 12.5 MG: 25 INJECTION INTRAMUSCULAR; INTRAVENOUS at 20:51

## 2019-04-03 RX ADMIN — HYDROMORPHONE HYDROCHLORIDE 1 MG: 1 INJECTION, SOLUTION INTRAMUSCULAR; INTRAVENOUS; SUBCUTANEOUS at 19:11

## 2019-04-03 RX ADMIN — HYDROMORPHONE HYDROCHLORIDE 0.5 MG: 1 INJECTION, SOLUTION INTRAMUSCULAR; INTRAVENOUS; SUBCUTANEOUS at 20:48

## 2019-04-03 RX ADMIN — SODIUM CHLORIDE, PRESERVATIVE FREE 300 UNITS: 5 INJECTION INTRAVENOUS at 22:34

## 2019-04-03 RX ADMIN — SODIUM CHLORIDE 1000 ML: 9 INJECTION, SOLUTION INTRAVENOUS at 18:48

## 2019-04-03 NOTE — ED PROVIDER NOTES
Subjective   Ms. Krista Richter is a 28-year-old female presenting to the emergency department with complaints of diffuse muscle spasms. The patient has a history of parathyroidectomy and states that this has caused recurrent episodes of symptomatic hypocalcemia. Today, she complains of significant diffuse muscular spasms and pain, which she states is similar to her prior episodes. She also notes that her symptomatic hypocalcemia often occurs in absence of significantly depressed calcium concentration in her blood. On arrival, her body is contorted and she is in severe distress. She is able to provide the history and confirms that this is similar to past flare-ups. She denies any shortness of breath, fevers, nausea, or vomiting. There are no further acute complaints at this time.        History provided by:  Patient  Muscle Pain   Location:  Diffuse  Quality:  Painful, spasms  Severity:  Severe  Onset quality:  Sudden  Duration:  1 hour  Timing:  Constant  Progression:  Worsening  Chronicity:  Recurrent  Context:  Typically related to symptomatic hypocalcemia  Relieved by:  None tried  Worsened by:  Nothing  Ineffective treatments:  None tried  Associated symptoms: myalgias (Diffuse)    Associated symptoms: no fever, no nausea, no shortness of breath and no vomiting        Review of Systems   Constitutional: Negative.  Negative for fever.   HENT: Negative.    Respiratory: Negative.  Negative for shortness of breath.    Cardiovascular: Negative.    Gastrointestinal: Negative.  Negative for nausea and vomiting.   Genitourinary: Negative.    Musculoskeletal: Positive for myalgias (Diffuse).   Skin: Negative.    Neurological: Negative.    Psychiatric/Behavioral: Negative.    All other systems reviewed and are negative.      Past Medical History:   Diagnosis Date   • Adrenal insufficiency (CMS/HCC)    • Alopecia    • Anemia    • Anxiety    • APS type 1 (CMS/HCC)    • Asthma    • Autoimmune hepatitis (CMS/HCC)    •  Depression    • Dermatomyositis (CMS/HCC)    • Disease of thyroid gland    • Fibromyalgia    • Functional asplenia    • History of kidney stones    • Hypoparathyroidism (CMS/HCC)    • Insulin dependent diabetes mellitus (CMS/HCC)    • Long Q-T syndrome    • Pancreatic insufficiency    • Parathyroid abnormality (CMS/HCC)    • Polyglandular deficiency syndrome (CMS/HCC)    • Premature ovarian failure    • Sleep apnea    • Spleen absent    • Tetany     HAS EPISODES   • Transfusion history        Allergies   Allergen Reactions   • Cefpodoxime Hives   • Cephalosporins Other (See Comments)   • Clarithromycin Hives     biaxin   • Levofloxacin Other (See Comments)   • Morphine Other (See Comments)   • Nitrofurantoin Hives   • Nitrofurantoin Macrocrystal Other (See Comments)   • Nystatin Hives   • Ondansetron Other (See Comments)   • Penicillins Hives   • Sulfa Antibiotics Hives       Past Surgical History:   Procedure Laterality Date   • APPENDECTOMY     • CARDIAC SURGERY      LOOP RECORDER   • CHOLECYSTECTOMY     • ENDOSCOPY N/A 4/26/2018    Procedure: ESOPHAGOGASTRODUODENOSCOPY;  Surgeon: Gavin Vargas MD;  Location:  [facility];  Service: Gastroenterology   • ENDOSCOPY N/A 8/9/2018    Procedure: ESOPHAGOGASTRODUODENOSCOPY-DILATION;  Surgeon: Gavin Vargas MD;  Location:  [facility];  Service: Gastroenterology   • ENDOSCOPY     • ENDOSCOPY N/A 2/6/2019    Procedure: ESOPHAGOGASTRODUODENOSCOPY;  Surgeon: Gavin Vargas MD;  Location:  [facility];  Service: Gastroenterology   • EXPLORATORY LAPAROTOMY      MULTIPLE   • HAND SURGERY      4 TOTAL   • HERNIA REPAIR     • LIVER BIOPSY     • MUSCLE BIOPSY     • MUSCLE BIOPSY     • PEG TUBE INSERTION     • PEG TUBE REMOVAL     • PORTACATH PLACEMENT         Family History   Problem Relation Age of Onset   • Kidney disease Father        Social History     Socioeconomic History   • Marital status: Single     Spouse name:  Not on file   • Number of children: Not on file   • Years of education: Not on file   • Highest education level: Not on file   Tobacco Use   • Smoking status: Never Smoker   • Smokeless tobacco: Never Used   Substance and Sexual Activity   • Alcohol use: Yes     Comment: SOCIAL   • Drug use: No   • Sexual activity: Defer         Objective   Physical Exam   Constitutional: She is oriented to person, place, and time. She appears well-developed and well-nourished.   Appears quite uncomfortable and in spasm.   HENT:   Head: Normocephalic and atraumatic.   Nose: Nose normal.   Eyes: Conjunctivae are normal. No scleral icterus.   Neck: Normal range of motion. Neck supple.   Cardiovascular: Regular rhythm and normal heart sounds. Tachycardia present.   No murmur heard.  Pulmonary/Chest: Effort normal and breath sounds normal. No respiratory distress.   Abdominal: Soft. Bowel sounds are normal. There is no tenderness.   Musculoskeletal:   Patient is contorted with both forearms touching the lower back. Both legs are contracted with heels close to the buttocks.   Neurological: She is alert and oriented to person, place, and time.   Skin: Skin is warm and dry.   Scars from port surgeries in the anterior upper chest.   Nursing note and vitals reviewed.      Procedures         ED Course  ED Course as of Apr 05 1714 Wed Apr 03, 2019 1949 Dr. Sharma re-evaluated the patient at the bedside. The patient is feeling somewhat improved and is receiving a liter of normal saline, calcium glucanate (2g) and small doses of Dilaudid. Arms are in front of her body and she appears much more comfortable than on arrival.  [CB]   2207 Patient feeling much improved and feels almost ready for discharge.  We will give a very small dose in addition to previous half milligram doses of Dilaudid prior to discharge.  [MS]      ED Course User Index  [CB] Shad Ward  [MS] Juancarlos Sharma MD     No results found for this or any previous visit  (from the past 24 hour(s)).  Note: In addition to lab results from this visit, the labs listed above may include labs taken at another facility or during a different encounter within the last 24 hours. Please correlate lab times with ED admission and discharge times for further clarification of the services performed during this visit.    No orders to display     Vitals:    04/03/19 2201 04/03/19 2204 04/03/19 2211 04/03/19 2244   BP:   115/69    BP Location:       Patient Position:       Pulse: 87 82 82 79   Resp:    18   Temp:       TempSrc:       SpO2:  100%     Weight:       Height:         Medications   sodium chloride 0.9 % bolus 1,000 mL (0 mL Intravenous Stopped 4/3/19 2022)   calcium gluconate 2 g in sodium chloride 0.9 % 100 mL ( Intravenous Stopped 4/3/19 2022)   HYDROmorphone (DILAUDID) injection 1 mg (1 mg Intravenous Given 4/3/19 1911)   promethazine (PHENERGAN) injection 12.5 mg (12.5 mg Intravenous Given 4/3/19 1939)   sodium chloride 0.9 % bolus 1,000 mL (0 mL Intravenous Stopped 4/3/19 2203)   HYDROmorphone (DILAUDID) injection 0.5 mg (0.5 mg Intravenous Given 4/3/19 2048)   promethazine (PHENERGAN) injection 12.5 mg (12.5 mg Intravenous Given 4/3/19 2051)   hydrocortisone sodium succinate (Solu-CORTEF) injection 100 mg (100 mg Intravenous Given 4/3/19 2055)   HYDROmorphone (DILAUDID) injection 0.25 mg (0.25 mg Intravenous Given 4/3/19 2209)   heparin flush (porcine) 100 UNIT/ML injection 300 Units (300 Units Intravenous Given 4/3/19 2234)     ECG/EMG Results (last 24 hours)     ** No results found for the last 24 hours. **        No orders to display                       MDM    Final diagnoses:   Parathyroid tetany (CMS/HCC)       Documentation assistance provided by artemio Ward.  Information recorded by the artemio was done at my direction and has been verified and validated by me.     Shad Ward  04/03/19 1857       Shad Ward  04/03/19 2022       Juancarlos Sharma MD  04/05/19  1712

## 2019-05-06 ENCOUNTER — HOSPITAL ENCOUNTER (EMERGENCY)
Facility: HOSPITAL | Age: 29
Discharge: HOME | End: 2019-05-06
Attending: EMERGENCY MEDICINE | Admitting: EMERGENCY MEDICINE

## 2019-05-06 VITALS
HEART RATE: 87 BPM | DIASTOLIC BLOOD PRESSURE: 83 MMHG | BODY MASS INDEX: 27.6 KG/M2 | OXYGEN SATURATION: 93 % | WEIGHT: 150 LBS | RESPIRATION RATE: 17 BRPM | HEIGHT: 62 IN | TEMPERATURE: 97.9 F | SYSTOLIC BLOOD PRESSURE: 131 MMHG

## 2019-05-06 DIAGNOSIS — R29.0 TETANY: Primary | ICD-10-CM

## 2019-05-06 LAB
ACANTHOCYTES BLD QL SMEAR: NORMAL
ANION GAP SERPL CALCULATED.3IONS-SCNC: 14 MMOL/L
BASOPHILS # BLD AUTO: 0.08 10*3/MM3 (ref 0–0.2)
BASOPHILS NFR BLD AUTO: 0.5 % (ref 0–1.5)
BUN BLD-MCNC: 15 MG/DL (ref 6–20)
BUN/CREAT SERPL: 21.7 (ref 7–25)
CA-I SERPL ISE-MCNC: 1.28 MMOL/L (ref 1.12–1.32)
CALCIUM SPEC-SCNC: 9.2 MG/DL (ref 8.6–10.5)
CHLORIDE SERPL-SCNC: 96 MMOL/L (ref 98–107)
CK SERPL-CCNC: 99 U/L (ref 20–180)
CO2 SERPL-SCNC: 26 MMOL/L (ref 22–29)
CREAT BLD-MCNC: 0.69 MG/DL (ref 0.57–1)
DEPRECATED RDW RBC AUTO: 59.6 FL (ref 37–54)
EOSINOPHIL # BLD AUTO: 0.02 10*3/MM3 (ref 0–0.4)
EOSINOPHIL NFR BLD AUTO: 0.1 % (ref 0.3–6.2)
ERYTHROCYTE [DISTWIDTH] IN BLOOD BY AUTOMATED COUNT: 20.9 % (ref 12.3–15.4)
GFR SERPL CREATININE-BSD FRML MDRD: 101 ML/MIN/1.73
GLUCOSE BLD-MCNC: 169 MG/DL (ref 65–99)
HCT VFR BLD AUTO: 36.6 % (ref 34–46.6)
HGB BLD-MCNC: 10.1 G/DL (ref 12–15.9)
HYPOCHROMIA BLD QL: NORMAL
IMM GRANULOCYTES # BLD AUTO: 0.08 10*3/MM3 (ref 0–0.05)
IMM GRANULOCYTES NFR BLD AUTO: 0.5 % (ref 0–0.5)
LARGE PLATELETS: NORMAL
LYMPHOCYTES # BLD AUTO: 1.92 10*3/MM3 (ref 0.7–3.1)
LYMPHOCYTES NFR BLD AUTO: 12.8 % (ref 19.6–45.3)
MAGNESIUM SERPL-MCNC: 1.9 MG/DL (ref 1.6–2.6)
MCH RBC QN AUTO: 21.9 PG (ref 26.6–33)
MCHC RBC AUTO-ENTMCNC: 27.6 G/DL (ref 31.5–35.7)
MCV RBC AUTO: 79.2 FL (ref 79–97)
MICROCYTES BLD QL: NORMAL
MONOCYTES # BLD AUTO: 1.71 10*3/MM3 (ref 0.1–0.9)
MONOCYTES NFR BLD AUTO: 11.4 % (ref 5–12)
NEUTROPHILS # BLD AUTO: 11.28 10*3/MM3 (ref 1.7–7)
NEUTROPHILS NFR BLD AUTO: 75.2 % (ref 42.7–76)
PHOSPHATE SERPL-MCNC: 2.5 MG/DL (ref 2.5–4.5)
PLATELET # BLD AUTO: 562 10*3/MM3 (ref 140–450)
PMV BLD AUTO: 11.2 FL (ref 6–12)
POTASSIUM BLD-SCNC: 4.8 MMOL/L (ref 3.5–5.2)
RBC # BLD AUTO: 4.62 10*6/MM3 (ref 3.77–5.28)
SMALL PLATELETS BLD QL SMEAR: NORMAL
SODIUM BLD-SCNC: 136 MMOL/L (ref 136–145)
WBC MORPH BLD: NORMAL
WBC NRBC COR # BLD: 15.01 10*3/MM3 (ref 3.4–10.8)

## 2019-05-06 PROCEDURE — 82330 ASSAY OF CALCIUM: CPT | Performed by: EMERGENCY MEDICINE

## 2019-05-06 PROCEDURE — 25010000002 HYDROMORPHONE PER 4 MG: Performed by: EMERGENCY MEDICINE

## 2019-05-06 PROCEDURE — 96376 TX/PRO/DX INJ SAME DRUG ADON: CPT

## 2019-05-06 PROCEDURE — 99284 EMERGENCY DEPT VISIT MOD MDM: CPT

## 2019-05-06 PROCEDURE — 25010000002 HYDROCORTISONE SODIUM SUCCINATE 100 MG RECONSTITUTED SOLUTION: Performed by: EMERGENCY MEDICINE

## 2019-05-06 PROCEDURE — 96366 THER/PROPH/DIAG IV INF ADDON: CPT

## 2019-05-06 PROCEDURE — 84100 ASSAY OF PHOSPHORUS: CPT | Performed by: EMERGENCY MEDICINE

## 2019-05-06 PROCEDURE — 85025 COMPLETE CBC W/AUTO DIFF WBC: CPT | Performed by: EMERGENCY MEDICINE

## 2019-05-06 PROCEDURE — 25010000002 CALCIUM GLUCONATE PER 10 ML: Performed by: EMERGENCY MEDICINE

## 2019-05-06 PROCEDURE — 25010000002 PROMETHAZINE PER 50 MG: Performed by: EMERGENCY MEDICINE

## 2019-05-06 PROCEDURE — 93005 ELECTROCARDIOGRAM TRACING: CPT | Performed by: EMERGENCY MEDICINE

## 2019-05-06 PROCEDURE — 96365 THER/PROPH/DIAG IV INF INIT: CPT

## 2019-05-06 PROCEDURE — 82550 ASSAY OF CK (CPK): CPT | Performed by: EMERGENCY MEDICINE

## 2019-05-06 PROCEDURE — 85007 BL SMEAR W/DIFF WBC COUNT: CPT | Performed by: EMERGENCY MEDICINE

## 2019-05-06 PROCEDURE — 25010000002 HYDROMORPHONE 1 MG/ML SOLUTION: Performed by: EMERGENCY MEDICINE

## 2019-05-06 PROCEDURE — 96375 TX/PRO/DX INJ NEW DRUG ADDON: CPT

## 2019-05-06 PROCEDURE — 80048 BASIC METABOLIC PNL TOTAL CA: CPT | Performed by: EMERGENCY MEDICINE

## 2019-05-06 PROCEDURE — 83735 ASSAY OF MAGNESIUM: CPT | Performed by: EMERGENCY MEDICINE

## 2019-05-06 RX ORDER — HYDROMORPHONE HYDROCHLORIDE 1 MG/ML
0.5 INJECTION, SOLUTION INTRAMUSCULAR; INTRAVENOUS; SUBCUTANEOUS ONCE
Status: COMPLETED | OUTPATIENT
Start: 2019-05-06 | End: 2019-05-06

## 2019-05-06 RX ORDER — PROMETHAZINE HYDROCHLORIDE 25 MG/ML
6.25 INJECTION, SOLUTION INTRAMUSCULAR; INTRAVENOUS ONCE
Status: COMPLETED | OUTPATIENT
Start: 2019-05-06 | End: 2019-05-06

## 2019-05-06 RX ORDER — PROMETHAZINE HYDROCHLORIDE 25 MG/ML
12.5 INJECTION, SOLUTION INTRAMUSCULAR; INTRAVENOUS ONCE
Status: COMPLETED | OUTPATIENT
Start: 2019-05-06 | End: 2019-05-06

## 2019-05-06 RX ADMIN — CALCIUM GLUCONATE: 98 INJECTION, SOLUTION INTRAVENOUS at 18:51

## 2019-05-06 RX ADMIN — HYDROMORPHONE HYDROCHLORIDE 1 MG: 1 INJECTION, SOLUTION INTRAMUSCULAR; INTRAVENOUS; SUBCUTANEOUS at 16:27

## 2019-05-06 RX ADMIN — SODIUM CHLORIDE, PRESERVATIVE FREE 300 UNITS: 5 INJECTION INTRAVENOUS at 20:50

## 2019-05-06 RX ADMIN — PROMETHAZINE HYDROCHLORIDE 6.25 MG: 25 INJECTION INTRAMUSCULAR; INTRAVENOUS at 16:46

## 2019-05-06 RX ADMIN — SODIUM CHLORIDE 1000 ML: 9 INJECTION, SOLUTION INTRAVENOUS at 18:06

## 2019-05-06 RX ADMIN — HYDROMORPHONE HYDROCHLORIDE 0.5 MG: 1 INJECTION, SOLUTION INTRAMUSCULAR; INTRAVENOUS; SUBCUTANEOUS at 18:04

## 2019-05-06 RX ADMIN — CALCIUM GLUCONATE: 98 INJECTION, SOLUTION INTRAVENOUS at 16:30

## 2019-05-06 RX ADMIN — PROMETHAZINE HYDROCHLORIDE 12.5 MG: 25 INJECTION INTRAMUSCULAR; INTRAVENOUS at 20:05

## 2019-05-06 RX ADMIN — HYDROMORPHONE HYDROCHLORIDE 0.5 MG: 1 INJECTION, SOLUTION INTRAMUSCULAR; INTRAVENOUS; SUBCUTANEOUS at 20:07

## 2019-05-06 RX ADMIN — HYDROCORTISONE SODIUM SUCCINATE 100 MG: 100 INJECTION, POWDER, FOR SOLUTION INTRAMUSCULAR; INTRAVENOUS at 20:09

## 2019-05-06 NOTE — ED PROVIDER NOTES
Grace Aiken Neelam is a 28 y.o.female who presents to the ED with c/o tetany with onset today. Of note, the patient has history of hypoparathyroidism and hypocalcemia and develops frequent episodes of tetany. The patient reports that she developed general spasm like sensations which prompted her visit to the ED. Family states that they are in the process of acquiring a synthetic parathyroid hormone medication. Family also states that the patient develops these episodes often even without a drop in her calcium. Further history and ROS is otherwise limited secondary to acuity of patient condition.        History provided by:  Patient and relative  Illness   Severity:  Moderate  Onset quality:  Sudden  Timing:  Constant  Progression:  Worsening  Chronicity:  New  Context:  Tetany      Review of Systems   Unable to perform ROS: Acuity of condition   Neurological:        Spasm sensations       Past Medical History:   Diagnosis Date   • Adrenal insufficiency (CMS/HCC)    • Alopecia    • Anemia    • Anxiety    • APS type 1 (CMS/HCC)    • Asthma    • Autoimmune hepatitis (CMS/HCC)    • Depression    • Dermatomyositis (CMS/HCC)    • Disease of thyroid gland    • Fibromyalgia    • Functional asplenia    • History of kidney stones    • Hypoparathyroidism (CMS/HCC)    • Insulin dependent diabetes mellitus (CMS/HCC)    • Long Q-T syndrome    • Pancreatic insufficiency    • Parathyroid abnormality (CMS/HCC)    • Polyglandular deficiency syndrome (CMS/HCC)    • Premature ovarian failure    • Sleep apnea    • Spleen absent    • Tetany     HAS EPISODES   • Transfusion history        Allergies   Allergen Reactions   • Cefpodoxime Hives   • Cephalosporins Other (See Comments)   • Clarithromycin Hives     biaxin   • Levofloxacin Other (See Comments)   • Morphine Other (See Comments)   • Nitrofurantoin Hives   • Nitrofurantoin Macrocrystal Other (See Comments)   • Nystatin Hives   • Ondansetron Other (See Comments)   •  Penicillins Hives   • Sulfa Antibiotics Hives       Past Surgical History:   Procedure Laterality Date   • APPENDECTOMY     • CARDIAC SURGERY      LOOP RECORDER   • CHOLECYSTECTOMY     • ENDOSCOPY N/A 4/26/2018    Procedure: ESOPHAGOGASTRODUODENOSCOPY;  Surgeon: Gavin Vargas MD;  Location:  [facility];  Service: Gastroenterology   • ENDOSCOPY N/A 8/9/2018    Procedure: ESOPHAGOGASTRODUODENOSCOPY-DILATION;  Surgeon: Gavin Vargas MD;  Location:  [facility];  Service: Gastroenterology   • ENDOSCOPY     • ENDOSCOPY N/A 2/6/2019    Procedure: ESOPHAGOGASTRODUODENOSCOPY;  Surgeon: Gavin Vargas MD;  Location:  [facility];  Service: Gastroenterology   • EXPLORATORY LAPAROTOMY      MULTIPLE   • HAND SURGERY      4 TOTAL   • HERNIA REPAIR     • LIVER BIOPSY     • MUSCLE BIOPSY     • MUSCLE BIOPSY     • PEG TUBE INSERTION     • PEG TUBE REMOVAL     • PORTACATH PLACEMENT         Family History   Problem Relation Age of Onset   • Kidney disease Father        Social History     Socioeconomic History   • Marital status: Single     Spouse name: Not on file   • Number of children: Not on file   • Years of education: Not on file   • Highest education level: Not on file   Tobacco Use   • Smoking status: Never Smoker   • Smokeless tobacco: Never Used   Substance and Sexual Activity   • Alcohol use: Yes     Comment: SOCIAL   • Drug use: No   • Sexual activity: Defer         Objective   Physical Exam   Constitutional: She is oriented to person, place, and time. She appears well-developed and well-nourished. No distress.   HENT:   Head: Normocephalic and atraumatic.   Nose: Nose normal.   Eyes: Conjunctivae are normal. No scleral icterus.   Neck: No JVD present. No tracheal deviation present.   Cardiovascular: Normal rate, regular rhythm and normal heart sounds.   No murmur heard.  Pulmonary/Chest: Effort normal and breath sounds normal. No respiratory distress.   Abdominal:  Soft. Bowel sounds are normal. There is no tenderness.   Musculoskeletal:   Her arms and legs are rigidly in flexion, with arms behind her back.   Neurological: She is alert and oriented to person, place, and time.   Skin: Skin is warm and dry.   Psychiatric: She has a normal mood and affect. Her behavior is normal.   Nursing note and vitals reviewed.      Procedures         ED Course     No improvement first round of meds, good improvement second round.  Reviewed several old charts.  A few mention concern of a psychogenic component which I get a whiff of today as well but this does appear to be grounded in a well documented real condition and I see no reason not to treat her as such.                MDM  Number of Diagnoses or Management Options  Tetany:      Amount and/or Complexity of Data Reviewed  Clinical lab tests: reviewed and ordered  Decide to obtain previous medical records or to obtain history from someone other than the patient: yes  Obtain history from someone other than the patient: yes  Review and summarize past medical records: yes        Final diagnoses:   Tetany       Documentation assistance provided by artemio Sorto.  Information recorded by the scribe was done at my direction and has been verified and validated by me.     Colt Sorto  05/06/19 7201       Isma Bae MD  05/07/19 004

## 2019-07-23 ENCOUNTER — PREP FOR SURGERY (OUTPATIENT)
Dept: OTHER | Facility: HOSPITAL | Age: 29
End: 2019-07-23

## 2019-07-23 DIAGNOSIS — K22.89 DILATATION OF ESOPHAGUS: Primary | ICD-10-CM

## 2019-07-24 ENCOUNTER — ANESTHESIA (OUTPATIENT)
Dept: GASTROENTEROLOGY | Facility: HOSPITAL | Age: 29
End: 2019-07-24

## 2019-07-24 ENCOUNTER — ANESTHESIA EVENT (OUTPATIENT)
Dept: GASTROENTEROLOGY | Facility: HOSPITAL | Age: 29
End: 2019-07-24

## 2019-07-24 ENCOUNTER — HOSPITAL ENCOUNTER (OUTPATIENT)
Facility: HOSPITAL | Age: 29
Setting detail: HOSPITAL OUTPATIENT SURGERY
Discharge: HOME | End: 2019-07-24
Attending: INTERNAL MEDICINE | Admitting: INTERNAL MEDICINE

## 2019-07-24 VITALS
HEIGHT: 62 IN | SYSTOLIC BLOOD PRESSURE: 110 MMHG | HEART RATE: 83 BPM | BODY MASS INDEX: 32.94 KG/M2 | OXYGEN SATURATION: 100 % | RESPIRATION RATE: 16 BRPM | WEIGHT: 179 LBS | DIASTOLIC BLOOD PRESSURE: 67 MMHG | TEMPERATURE: 97 F

## 2019-07-24 LAB
B-HCG UR QL: NEGATIVE
GLUCOSE BLDC GLUCOMTR-MCNC: 122 MG/DL (ref 70–130)
INTERNAL NEGATIVE CONTROL: NEGATIVE
INTERNAL POSITIVE CONTROL: POSITIVE
Lab: NORMAL

## 2019-07-24 PROCEDURE — 82962 GLUCOSE BLOOD TEST: CPT

## 2019-07-24 PROCEDURE — 25010000002 CALCIUM GLUCONATE PER 10 ML: Performed by: INTERNAL MEDICINE

## 2019-07-24 PROCEDURE — 81025 URINE PREGNANCY TEST: CPT | Performed by: ANESTHESIOLOGY

## 2019-07-24 PROCEDURE — 25010000002 PROPOFOL 10 MG/ML EMULSION: Performed by: NURSE ANESTHETIST, CERTIFIED REGISTERED

## 2019-07-24 PROCEDURE — 25010000002 PROMETHAZINE PER 50 MG: Performed by: NURSE ANESTHETIST, CERTIFIED REGISTERED

## 2019-07-24 PROCEDURE — C1726 CATH, BAL DIL, NON-VASCULAR: HCPCS | Performed by: INTERNAL MEDICINE

## 2019-07-24 PROCEDURE — 25010000002 HYDROMORPHONE 1 MG/ML SOLUTION: Performed by: NURSE ANESTHETIST, CERTIFIED REGISTERED

## 2019-07-24 PROCEDURE — 25010000002 MIDAZOLAM PER 1 MG: Performed by: ANESTHESIOLOGY

## 2019-07-24 RX ORDER — DIAZEPAM 2 MG/1
2 TABLET ORAL ONCE
Status: COMPLETED | OUTPATIENT
Start: 2019-07-24 | End: 2019-07-24

## 2019-07-24 RX ORDER — PROMETHAZINE HYDROCHLORIDE 25 MG/1
25 SUPPOSITORY RECTAL ONCE AS NEEDED
Status: DISCONTINUED | OUTPATIENT
Start: 2019-07-24 | End: 2019-07-24 | Stop reason: HOSPADM

## 2019-07-24 RX ORDER — PROPOFOL 10 MG/ML
VIAL (ML) INTRAVENOUS AS NEEDED
Status: DISCONTINUED | OUTPATIENT
Start: 2019-07-24 | End: 2019-07-24 | Stop reason: SURG

## 2019-07-24 RX ORDER — LIDOCAINE HYDROCHLORIDE 20 MG/ML
INJECTION, SOLUTION INFILTRATION; PERINEURAL AS NEEDED
Status: DISCONTINUED | OUTPATIENT
Start: 2019-07-24 | End: 2019-07-24 | Stop reason: SURG

## 2019-07-24 RX ORDER — PROMETHAZINE HYDROCHLORIDE 25 MG/ML
6.25 INJECTION, SOLUTION INTRAMUSCULAR; INTRAVENOUS ONCE
Status: COMPLETED | OUTPATIENT
Start: 2019-07-24 | End: 2019-07-24

## 2019-07-24 RX ORDER — SODIUM CHLORIDE, SODIUM LACTATE, POTASSIUM CHLORIDE, CALCIUM CHLORIDE 600; 310; 30; 20 MG/100ML; MG/100ML; MG/100ML; MG/100ML
INJECTION, SOLUTION INTRAVENOUS CONTINUOUS PRN
Status: DISCONTINUED | OUTPATIENT
Start: 2019-07-24 | End: 2019-07-24 | Stop reason: SURG

## 2019-07-24 RX ORDER — SODIUM CHLORIDE, SODIUM LACTATE, POTASSIUM CHLORIDE, AND CALCIUM CHLORIDE .6; .31; .03; .02 G/100ML; G/100ML; G/100ML; G/100ML
9 INJECTION, SOLUTION INTRAVENOUS CONTINUOUS
Status: DISCONTINUED | OUTPATIENT
Start: 2019-07-24 | End: 2019-07-24 | Stop reason: HOSPADM

## 2019-07-24 RX ORDER — PROMETHAZINE HYDROCHLORIDE 25 MG/ML
6.25 INJECTION, SOLUTION INTRAMUSCULAR; INTRAVENOUS ONCE AS NEEDED
Status: DISCONTINUED | OUTPATIENT
Start: 2019-07-24 | End: 2019-07-24 | Stop reason: HOSPADM

## 2019-07-24 RX ORDER — FENTANYL CITRATE 50 UG/ML
50 INJECTION, SOLUTION INTRAMUSCULAR; INTRAVENOUS
Status: DISCONTINUED | OUTPATIENT
Start: 2019-07-24 | End: 2019-07-24 | Stop reason: HOSPADM

## 2019-07-24 RX ORDER — PHENYLEPHRINE HCL IN 0.9% NACL 0.5 MG/5ML
.5-3 SYRINGE (ML) INTRAVENOUS
Status: DISCONTINUED | OUTPATIENT
Start: 2019-07-24 | End: 2019-07-24 | Stop reason: HOSPADM

## 2019-07-24 RX ORDER — PROMETHAZINE HYDROCHLORIDE 25 MG/1
25 TABLET ORAL ONCE AS NEEDED
Status: DISCONTINUED | OUTPATIENT
Start: 2019-07-24 | End: 2019-07-24 | Stop reason: HOSPADM

## 2019-07-24 RX ORDER — MIDAZOLAM HYDROCHLORIDE 1 MG/ML
2 INJECTION INTRAMUSCULAR; INTRAVENOUS ONCE
Status: COMPLETED | OUTPATIENT
Start: 2019-07-24 | End: 2019-07-24

## 2019-07-24 RX ORDER — LABETALOL HYDROCHLORIDE 5 MG/ML
5 INJECTION, SOLUTION INTRAVENOUS
Status: DISCONTINUED | OUTPATIENT
Start: 2019-07-24 | End: 2019-07-24 | Stop reason: HOSPADM

## 2019-07-24 RX ORDER — EPHEDRINE SULFATE 50 MG/ML
5 INJECTION, SOLUTION INTRAVENOUS ONCE AS NEEDED
Status: DISCONTINUED | OUTPATIENT
Start: 2019-07-24 | End: 2019-07-24 | Stop reason: HOSPADM

## 2019-07-24 RX ORDER — HYDRALAZINE HYDROCHLORIDE 20 MG/ML
5 INJECTION INTRAMUSCULAR; INTRAVENOUS
Status: DISCONTINUED | OUTPATIENT
Start: 2019-07-24 | End: 2019-07-24 | Stop reason: HOSPADM

## 2019-07-24 RX ORDER — FAMOTIDINE 10 MG/ML
20 INJECTION, SOLUTION INTRAVENOUS ONCE
Status: COMPLETED | OUTPATIENT
Start: 2019-07-24 | End: 2019-07-24

## 2019-07-24 RX ADMIN — MIDAZOLAM HYDROCHLORIDE 2 MG: 1 INJECTION, SOLUTION INTRAMUSCULAR; INTRAVENOUS at 14:30

## 2019-07-24 RX ADMIN — PROPOFOL 50 MG: 10 INJECTION, EMULSION INTRAVENOUS at 13:44

## 2019-07-24 RX ADMIN — SODIUM CHLORIDE, POTASSIUM CHLORIDE, SODIUM LACTATE AND CALCIUM CHLORIDE: 600; 310; 30; 20 INJECTION, SOLUTION INTRAVENOUS at 13:33

## 2019-07-24 RX ADMIN — CALCIUM GLUCONATE 2 G: 94 INJECTION, SOLUTION INTRAVENOUS at 14:45

## 2019-07-24 RX ADMIN — PROPOFOL 150 MG: 10 INJECTION, EMULSION INTRAVENOUS at 13:35

## 2019-07-24 RX ADMIN — DIAZEPAM 2 MG: 2 TABLET ORAL at 16:08

## 2019-07-24 RX ADMIN — FAMOTIDINE 20 MG: 10 INJECTION, SOLUTION INTRAVENOUS at 13:07

## 2019-07-24 RX ADMIN — PROPOFOL 50 MG: 10 INJECTION, EMULSION INTRAVENOUS at 13:40

## 2019-07-24 RX ADMIN — SODIUM CHLORIDE, POTASSIUM CHLORIDE, SODIUM LACTATE AND CALCIUM CHLORIDE 9 ML/HR: 600; 310; 30; 20 INJECTION, SOLUTION INTRAVENOUS at 13:08

## 2019-07-24 RX ADMIN — HYDROMORPHONE HYDROCHLORIDE 1 MG: 1 INJECTION, SOLUTION INTRAMUSCULAR; INTRAVENOUS; SUBCUTANEOUS at 15:10

## 2019-07-24 RX ADMIN — LIDOCAINE HYDROCHLORIDE 100 MG: 20 INJECTION, SOLUTION INFILTRATION; PERINEURAL at 13:35

## 2019-07-24 RX ADMIN — PROMETHAZINE HYDROCHLORIDE 6.25 MG: 25 INJECTION INTRAMUSCULAR; INTRAVENOUS at 14:59

## 2019-07-24 NOTE — ANESTHESIA PREPROCEDURE EVALUATION
Anesthesia Evaluation     Patient summary reviewed and Nursing notes reviewed   no history of anesthetic complications:  NPO Solid Status: > 8 hours  NPO Liquid Status: > 2 hours           Airway   Mallampati: I  TM distance: >3 FB  Neck ROM: full  No difficulty expected  Dental - normal exam     Pulmonary    (+) asthma, sleep apnea, decreased breath sounds,   Cardiovascular   Exercise tolerance: good (4-7 METS)    Rhythm: regular  Rate: normal        Neuro/Psych  (+) psychiatric history Depression,     GI/Hepatic/Renal/Endo    (+) obesity,   hepatitis, liver disease, diabetes mellitus type 2 well controlled, hypothyroidism,     Musculoskeletal     Abdominal   (+) obese,     Abdomen: soft.   Substance History      OB/GYN          Other   (+) arthritis     ROS/Med Hx Other: APS-1                  Anesthesia Plan    ASA 3     general     intravenous induction   Anesthetic plan, all risks, benefits, and alternatives have been provided, discussed and informed consent has been obtained with: patient.    Plan discussed with CRNA.

## 2019-07-24 NOTE — ANESTHESIA POSTPROCEDURE EVALUATION
Patient: Krista Richter    Procedure Summary     Date:  07/24/19 Room / Location:   [facility]    Anesthesia Start:  1333 Anesthesia Stop:      Procedure:  ESOPHAGOGASTRODUODENOSCOPY (N/A ) Diagnosis:       Dilatation of esophagus      (Dilatation of esophagus [K22.8])    Surgeon:  Gavin Vargas MD Provider:  Du Lucero MD    Anesthesia Type:  general ASA Status:  3          Anesthesia Type: general  Last vitals  BP   122/77 (07/24/19 1226) 110/91   Temp   96.8 °F (36 °C) (07/24/19 1226)97.5   Pulse   78 (07/24/19 1226) 61   Resp   22 (07/24/19 1226)  16   SpO2   96 % (07/24/19 1226) 100%     Post Anesthesia Care and Evaluation    Patient location during evaluation: PACU  Patient participation: complete - patient participated  Level of consciousness: awake and alert  Pain score: 0  Pain management: adequate  Airway patency: patent  Anesthetic complications: No anesthetic complications  PONV Status: none  Cardiovascular status: hemodynamically stable and acceptable  Respiratory status: nonlabored ventilation, acceptable and nasal cannula  Hydration status: acceptable

## 2019-08-30 RX ORDER — OMEPRAZOLE 40 MG/1
CAPSULE, DELAYED RELEASE ORAL
Qty: 30 CAPSULE | Refills: 3 | Status: SHIPPED | OUTPATIENT
Start: 2019-08-30 | End: 2019-09-10 | Stop reason: SDUPTHER

## 2019-09-10 RX ORDER — OMEPRAZOLE 40 MG/1
CAPSULE, DELAYED RELEASE ORAL
Qty: 30 CAPSULE | Refills: 3 | Status: SHIPPED | OUTPATIENT
Start: 2019-09-10 | End: 2019-10-18 | Stop reason: HOSPADM

## 2019-09-17 ENCOUNTER — APPOINTMENT (OUTPATIENT)
Dept: CT IMAGING | Facility: HOSPITAL | Age: 29
End: 2019-09-17

## 2019-09-17 ENCOUNTER — HOSPITAL ENCOUNTER (EMERGENCY)
Facility: HOSPITAL | Age: 29
Discharge: HOME | End: 2019-09-17
Attending: EMERGENCY MEDICINE | Admitting: EMERGENCY MEDICINE

## 2019-09-17 VITALS
TEMPERATURE: 98.9 F | HEART RATE: 95 BPM | DIASTOLIC BLOOD PRESSURE: 86 MMHG | BODY MASS INDEX: 33.13 KG/M2 | HEIGHT: 62 IN | SYSTOLIC BLOOD PRESSURE: 132 MMHG | RESPIRATION RATE: 20 BRPM | WEIGHT: 180 LBS | OXYGEN SATURATION: 89 %

## 2019-09-17 DIAGNOSIS — S09.90XA INJURY OF HEAD, INITIAL ENCOUNTER: ICD-10-CM

## 2019-09-17 DIAGNOSIS — E20.9: Primary | ICD-10-CM

## 2019-09-17 LAB
ALBUMIN SERPL-MCNC: 4.2 G/DL (ref 3.5–5.2)
ALBUMIN/GLOB SERPL: 1.4 G/DL
ALP SERPL-CCNC: 76 U/L (ref 39–117)
ALT SERPL W P-5'-P-CCNC: 21 U/L (ref 1–33)
ANION GAP SERPL CALCULATED.3IONS-SCNC: 13 MMOL/L (ref 5–15)
AST SERPL-CCNC: 24 U/L (ref 1–32)
BASOPHILS # BLD AUTO: 0.11 10*3/MM3 (ref 0–0.2)
BASOPHILS NFR BLD AUTO: 0.5 % (ref 0–1.5)
BILIRUB SERPL-MCNC: 0.6 MG/DL (ref 0.2–1.2)
BUN BLD-MCNC: 14 MG/DL (ref 6–20)
BUN/CREAT SERPL: 25 (ref 7–25)
CALCIUM SPEC-SCNC: 8 MG/DL (ref 8.6–10.5)
CHLORIDE SERPL-SCNC: 100 MMOL/L (ref 98–107)
CO2 SERPL-SCNC: 27 MMOL/L (ref 22–29)
CREAT BLD-MCNC: 0.56 MG/DL (ref 0.57–1)
DEPRECATED RDW RBC AUTO: 93 FL (ref 37–54)
EOSINOPHIL # BLD AUTO: 0.07 10*3/MM3 (ref 0–0.4)
EOSINOPHIL NFR BLD AUTO: 0.3 % (ref 0.3–6.2)
ERYTHROCYTE [DISTWIDTH] IN BLOOD BY AUTOMATED COUNT: 32.2 % (ref 12.3–15.4)
GFR SERPL CREATININE-BSD FRML MDRD: 128 ML/MIN/1.73
GLOBULIN UR ELPH-MCNC: 3.1 GM/DL
GLUCOSE BLD-MCNC: 155 MG/DL (ref 65–99)
HCT VFR BLD AUTO: 42.5 % (ref 34–46.6)
HGB BLD-MCNC: 12.5 G/DL (ref 12–15.9)
HOLD SPECIMEN: NORMAL
HOLD SPECIMEN: NORMAL
IMM GRANULOCYTES # BLD AUTO: 0.22 10*3/MM3 (ref 0–0.05)
IMM GRANULOCYTES NFR BLD AUTO: 1 % (ref 0–0.5)
LYMPHOCYTES # BLD AUTO: 0.94 10*3/MM3 (ref 0.7–3.1)
LYMPHOCYTES NFR BLD AUTO: 4.1 % (ref 19.6–45.3)
MCH RBC QN AUTO: 25.7 PG (ref 26.6–33)
MCHC RBC AUTO-ENTMCNC: 29.4 G/DL (ref 31.5–35.7)
MCV RBC AUTO: 87.3 FL (ref 79–97)
MONOCYTES # BLD AUTO: 1.46 10*3/MM3 (ref 0.1–0.9)
MONOCYTES NFR BLD AUTO: 6.3 % (ref 5–12)
NEUTROPHILS # BLD AUTO: 20.22 10*3/MM3 (ref 1.7–7)
NEUTROPHILS NFR BLD AUTO: 87.8 % (ref 42.7–76)
NRBC BLD AUTO-RTO: 0.1 /100 WBC (ref 0–0.2)
PLATELET # BLD AUTO: 232 10*3/MM3 (ref 140–450)
PMV BLD AUTO: 12.4 FL (ref 6–12)
POTASSIUM BLD-SCNC: 3.9 MMOL/L (ref 3.5–5.2)
PROT SERPL-MCNC: 7.3 G/DL (ref 6–8.5)
RBC # BLD AUTO: 4.87 10*6/MM3 (ref 3.77–5.28)
SODIUM BLD-SCNC: 140 MMOL/L (ref 136–145)
WBC NRBC COR # BLD: 23.02 10*3/MM3 (ref 3.4–10.8)
WHOLE BLOOD HOLD SPECIMEN: NORMAL
WHOLE BLOOD HOLD SPECIMEN: NORMAL

## 2019-09-17 PROCEDURE — 80053 COMPREHEN METABOLIC PANEL: CPT | Performed by: EMERGENCY MEDICINE

## 2019-09-17 PROCEDURE — 70450 CT HEAD/BRAIN W/O DYE: CPT

## 2019-09-17 PROCEDURE — 25010000002 METHYLPREDNISOLONE PER 125 MG: Performed by: EMERGENCY MEDICINE

## 2019-09-17 PROCEDURE — 99284 EMERGENCY DEPT VISIT MOD MDM: CPT

## 2019-09-17 PROCEDURE — 96374 THER/PROPH/DIAG INJ IV PUSH: CPT

## 2019-09-17 PROCEDURE — 25010000002 ONDANSETRON PER 1 MG: Performed by: EMERGENCY MEDICINE

## 2019-09-17 PROCEDURE — 96375 TX/PRO/DX INJ NEW DRUG ADDON: CPT

## 2019-09-17 PROCEDURE — 25010000002 CALCIUM GLUCONATE PER 10 ML: Performed by: NURSE PRACTITIONER

## 2019-09-17 PROCEDURE — 25010000002 HYDROMORPHONE PER 4 MG: Performed by: EMERGENCY MEDICINE

## 2019-09-17 PROCEDURE — 85025 COMPLETE CBC W/AUTO DIFF WBC: CPT | Performed by: EMERGENCY MEDICINE

## 2019-09-17 PROCEDURE — 25010000002 HYDROMORPHONE 1 MG/ML SOLUTION: Performed by: EMERGENCY MEDICINE

## 2019-09-17 PROCEDURE — 96376 TX/PRO/DX INJ SAME DRUG ADON: CPT

## 2019-09-17 RX ORDER — ONDANSETRON 2 MG/ML
4 INJECTION INTRAMUSCULAR; INTRAVENOUS ONCE
Status: COMPLETED | OUTPATIENT
Start: 2019-09-17 | End: 2019-09-17

## 2019-09-17 RX ORDER — SODIUM CHLORIDE 0.9 % (FLUSH) 0.9 %
10 SYRINGE (ML) INJECTION AS NEEDED
Status: DISCONTINUED | OUTPATIENT
Start: 2019-09-17 | End: 2019-09-17 | Stop reason: HOSPADM

## 2019-09-17 RX ORDER — HYDROMORPHONE HYDROCHLORIDE 1 MG/ML
0.5 INJECTION, SOLUTION INTRAMUSCULAR; INTRAVENOUS; SUBCUTANEOUS ONCE
Status: COMPLETED | OUTPATIENT
Start: 2019-09-17 | End: 2019-09-17

## 2019-09-17 RX ORDER — CALCIUM GLUCONATE 94 MG/ML
2 INJECTION, SOLUTION INTRAVENOUS ONCE
Status: COMPLETED | OUTPATIENT
Start: 2019-09-17 | End: 2019-09-17

## 2019-09-17 RX ORDER — METHYLPREDNISOLONE SODIUM SUCCINATE 125 MG/2ML
125 INJECTION, POWDER, LYOPHILIZED, FOR SOLUTION INTRAMUSCULAR; INTRAVENOUS ONCE
Status: COMPLETED | OUTPATIENT
Start: 2019-09-17 | End: 2019-09-17

## 2019-09-17 RX ORDER — HYDROMORPHONE HYDROCHLORIDE 1 MG/ML
0.25 INJECTION, SOLUTION INTRAMUSCULAR; INTRAVENOUS; SUBCUTANEOUS ONCE
Status: COMPLETED | OUTPATIENT
Start: 2019-09-17 | End: 2019-09-17

## 2019-09-17 RX ADMIN — HYDROMORPHONE HYDROCHLORIDE 0.5 MG: 1 INJECTION, SOLUTION INTRAMUSCULAR; INTRAVENOUS; SUBCUTANEOUS at 17:12

## 2019-09-17 RX ADMIN — CALCIUM GLUCONATE 2 G: 98 INJECTION, SOLUTION INTRAVENOUS at 17:21

## 2019-09-17 RX ADMIN — SODIUM CHLORIDE 1000 ML: 9 INJECTION, SOLUTION INTRAVENOUS at 16:52

## 2019-09-17 RX ADMIN — ONDANSETRON 4 MG: 2 INJECTION INTRAMUSCULAR; INTRAVENOUS at 17:09

## 2019-09-17 RX ADMIN — METHYLPREDNISOLONE SODIUM SUCCINATE 125 MG: 125 INJECTION, POWDER, FOR SOLUTION INTRAMUSCULAR; INTRAVENOUS at 16:52

## 2019-09-17 RX ADMIN — HYDROMORPHONE HYDROCHLORIDE 0.25 MG: 1 INJECTION, SOLUTION INTRAMUSCULAR; INTRAVENOUS; SUBCUTANEOUS at 18:36
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
## 2019-10-01 ENCOUNTER — PREP FOR SURGERY (OUTPATIENT)
Dept: OTHER | Facility: HOSPITAL | Age: 29
End: 2019-10-01

## 2019-10-01 DIAGNOSIS — K22.89 DILATATION OF ESOPHAGUS: Primary | ICD-10-CM

## 2019-10-16 ENCOUNTER — APPOINTMENT (OUTPATIENT)
Dept: PREADMISSION TESTING | Facility: HOSPITAL | Age: 29
End: 2019-10-16

## 2019-10-17 ENCOUNTER — ANESTHESIA EVENT (OUTPATIENT)
Dept: GASTROENTEROLOGY | Facility: HOSPITAL | Age: 29
End: 2019-10-17

## 2019-10-18 ENCOUNTER — ANESTHESIA (OUTPATIENT)
Dept: GASTROENTEROLOGY | Facility: HOSPITAL | Age: 29
End: 2019-10-18

## 2019-10-18 ENCOUNTER — HOSPITAL ENCOUNTER (OUTPATIENT)
Facility: HOSPITAL | Age: 29
Setting detail: HOSPITAL OUTPATIENT SURGERY
Discharge: HOME | End: 2019-10-18
Attending: INTERNAL MEDICINE | Admitting: INTERNAL MEDICINE

## 2019-10-18 VITALS
TEMPERATURE: 98.6 F | RESPIRATION RATE: 16 BRPM | HEART RATE: 86 BPM | OXYGEN SATURATION: 91 % | DIASTOLIC BLOOD PRESSURE: 76 MMHG | SYSTOLIC BLOOD PRESSURE: 123 MMHG

## 2019-10-18 LAB
B-HCG UR QL: NEGATIVE
INTERNAL NEGATIVE CONTROL: NEGATIVE
INTERNAL POSITIVE CONTROL: NEGATIVE
Lab: NORMAL

## 2019-10-18 PROCEDURE — 25010000002 PROPOFOL 10 MG/ML EMULSION: Performed by: NURSE ANESTHETIST, CERTIFIED REGISTERED

## 2019-10-18 PROCEDURE — 81025 URINE PREGNANCY TEST: CPT | Performed by: ANESTHESIOLOGY

## 2019-10-18 PROCEDURE — 25010000002 DIPHENHYDRAMINE PER 50 MG: Performed by: INTERNAL MEDICINE

## 2019-10-18 PROCEDURE — 25010000002 HYDROMORPHONE PER 4 MG: Performed by: NURSE ANESTHETIST, CERTIFIED REGISTERED

## 2019-10-18 PROCEDURE — 25010000002 HYDROMORPHONE 1 MG/ML SOLUTION: Performed by: INTERNAL MEDICINE

## 2019-10-18 PROCEDURE — 25010000002 PROMETHAZINE PER 50 MG: Performed by: NURSE ANESTHETIST, CERTIFIED REGISTERED

## 2019-10-18 PROCEDURE — 25010000002 FENTANYL CITRATE (PF) 100 MCG/2ML SOLUTION: Performed by: NURSE ANESTHETIST, CERTIFIED REGISTERED

## 2019-10-18 PROCEDURE — 25010000003 LIDOCAINE 1 % SOLUTION: Performed by: NURSE ANESTHETIST, CERTIFIED REGISTERED

## 2019-10-18 PROCEDURE — 25010000002 HYDROMORPHONE 1 MG/ML SOLUTION: Performed by: NURSE ANESTHETIST, CERTIFIED REGISTERED

## 2019-10-18 PROCEDURE — 25010000002 METHYLPREDNISOLONE PER 125 MG: Performed by: NURSE ANESTHETIST, CERTIFIED REGISTERED

## 2019-10-18 PROCEDURE — C1726 CATH, BAL DIL, NON-VASCULAR: HCPCS | Performed by: INTERNAL MEDICINE

## 2019-10-18 RX ORDER — PROMETHAZINE HYDROCHLORIDE 25 MG/1
25 TABLET ORAL ONCE AS NEEDED
Status: COMPLETED | OUTPATIENT
Start: 2019-10-18 | End: 2019-10-18

## 2019-10-18 RX ORDER — HYDROCODONE BITARTRATE AND ACETAMINOPHEN 5; 325 MG/1; MG/1
1 TABLET ORAL ONCE AS NEEDED
Status: DISCONTINUED | OUTPATIENT
Start: 2019-10-18 | End: 2019-10-18 | Stop reason: HOSPADM

## 2019-10-18 RX ORDER — SODIUM CHLORIDE 0.9 % (FLUSH) 0.9 %
3-10 SYRINGE (ML) INJECTION AS NEEDED
Status: DISCONTINUED | OUTPATIENT
Start: 2019-10-18 | End: 2019-10-18 | Stop reason: HOSPADM

## 2019-10-18 RX ORDER — HYDROMORPHONE HCL 110MG/55ML
PATIENT CONTROLLED ANALGESIA SYRINGE INTRAVENOUS AS NEEDED
Status: DISCONTINUED | OUTPATIENT
Start: 2019-10-18 | End: 2019-10-18 | Stop reason: SURG

## 2019-10-18 RX ORDER — SODIUM CHLORIDE 0.9 % (FLUSH) 0.9 %
3 SYRINGE (ML) INJECTION EVERY 12 HOURS SCHEDULED
Status: DISCONTINUED | OUTPATIENT
Start: 2019-10-18 | End: 2019-10-18 | Stop reason: HOSPADM

## 2019-10-18 RX ORDER — SODIUM CHLORIDE, SODIUM LACTATE, POTASSIUM CHLORIDE, CALCIUM CHLORIDE 600; 310; 30; 20 MG/100ML; MG/100ML; MG/100ML; MG/100ML
INJECTION, SOLUTION INTRAVENOUS CONTINUOUS PRN
Status: DISCONTINUED | OUTPATIENT
Start: 2019-10-18 | End: 2019-10-18 | Stop reason: SURG

## 2019-10-18 RX ORDER — HYDRALAZINE HYDROCHLORIDE 20 MG/ML
5 INJECTION INTRAMUSCULAR; INTRAVENOUS
Status: DISCONTINUED | OUTPATIENT
Start: 2019-10-18 | End: 2019-10-18 | Stop reason: HOSPADM

## 2019-10-18 RX ORDER — SODIUM CHLORIDE, SODIUM LACTATE, POTASSIUM CHLORIDE, CALCIUM CHLORIDE 600; 310; 30; 20 MG/100ML; MG/100ML; MG/100ML; MG/100ML
9 INJECTION, SOLUTION INTRAVENOUS CONTINUOUS
Status: DISCONTINUED | OUTPATIENT
Start: 2019-10-18 | End: 2019-10-18 | Stop reason: HOSPADM

## 2019-10-18 RX ORDER — PROMETHAZINE HYDROCHLORIDE 25 MG/ML
6.25 INJECTION, SOLUTION INTRAMUSCULAR; INTRAVENOUS ONCE AS NEEDED
Status: COMPLETED | OUTPATIENT
Start: 2019-10-18 | End: 2019-10-18

## 2019-10-18 RX ORDER — METHYLPREDNISOLONE SODIUM SUCCINATE 125 MG/2ML
INJECTION, POWDER, LYOPHILIZED, FOR SOLUTION INTRAMUSCULAR; INTRAVENOUS AS NEEDED
Status: DISCONTINUED | OUTPATIENT
Start: 2019-10-18 | End: 2019-10-18 | Stop reason: SURG

## 2019-10-18 RX ORDER — ONDANSETRON 2 MG/ML
4 INJECTION INTRAMUSCULAR; INTRAVENOUS ONCE AS NEEDED
Status: DISCONTINUED | OUTPATIENT
Start: 2019-10-18 | End: 2019-10-18 | Stop reason: HOSPADM

## 2019-10-18 RX ORDER — DIPHENHYDRAMINE HYDROCHLORIDE 50 MG/ML
12.5 INJECTION INTRAMUSCULAR; INTRAVENOUS ONCE
Status: COMPLETED | OUTPATIENT
Start: 2019-10-18 | End: 2019-10-18

## 2019-10-18 RX ORDER — PROPOFOL 10 MG/ML
VIAL (ML) INTRAVENOUS AS NEEDED
Status: DISCONTINUED | OUTPATIENT
Start: 2019-10-18 | End: 2019-10-18 | Stop reason: SURG

## 2019-10-18 RX ORDER — MEPERIDINE HYDROCHLORIDE 25 MG/ML
12.5 INJECTION INTRAMUSCULAR; INTRAVENOUS; SUBCUTANEOUS
Status: DISCONTINUED | OUTPATIENT
Start: 2019-10-18 | End: 2019-10-18 | Stop reason: HOSPADM

## 2019-10-18 RX ORDER — LIDOCAINE HYDROCHLORIDE 10 MG/ML
0.5 INJECTION, SOLUTION EPIDURAL; INFILTRATION; INTRACAUDAL; PERINEURAL ONCE AS NEEDED
Status: DISCONTINUED | OUTPATIENT
Start: 2019-10-18 | End: 2019-10-18 | Stop reason: HOSPADM

## 2019-10-18 RX ORDER — CALCIUM CHLORIDE 100 MG/ML
INJECTION INTRAVENOUS; INTRAVENTRICULAR AS NEEDED
Status: DISCONTINUED | OUTPATIENT
Start: 2019-10-18 | End: 2019-10-18 | Stop reason: SURG

## 2019-10-18 RX ORDER — NALOXONE HCL 0.4 MG/ML
0.4 VIAL (ML) INJECTION AS NEEDED
Status: DISCONTINUED | OUTPATIENT
Start: 2019-10-18 | End: 2019-10-18 | Stop reason: HOSPADM

## 2019-10-18 RX ORDER — PROMETHAZINE HYDROCHLORIDE 25 MG/1
25 SUPPOSITORY RECTAL ONCE AS NEEDED
Status: COMPLETED | OUTPATIENT
Start: 2019-10-18 | End: 2019-10-18

## 2019-10-18 RX ORDER — FENTANYL CITRATE 50 UG/ML
50 INJECTION, SOLUTION INTRAMUSCULAR; INTRAVENOUS
Status: COMPLETED | OUTPATIENT
Start: 2019-10-18 | End: 2019-10-18

## 2019-10-18 RX ORDER — CALCIUM CHLORIDE 100 MG/ML
1 INJECTION INTRAVENOUS; INTRAVENTRICULAR ONCE
Status: COMPLETED | OUTPATIENT
Start: 2019-10-18 | End: 2019-10-18

## 2019-10-18 RX ORDER — LABETALOL HYDROCHLORIDE 5 MG/ML
5 INJECTION, SOLUTION INTRAVENOUS
Status: DISCONTINUED | OUTPATIENT
Start: 2019-10-18 | End: 2019-10-18 | Stop reason: HOSPADM

## 2019-10-18 RX ORDER — IPRATROPIUM BROMIDE AND ALBUTEROL SULFATE 2.5; .5 MG/3ML; MG/3ML
3 SOLUTION RESPIRATORY (INHALATION) ONCE AS NEEDED
Status: DISCONTINUED | OUTPATIENT
Start: 2019-10-18 | End: 2019-10-18 | Stop reason: HOSPADM

## 2019-10-18 RX ORDER — LIDOCAINE HYDROCHLORIDE 10 MG/ML
INJECTION, SOLUTION INFILTRATION; PERINEURAL AS NEEDED
Status: DISCONTINUED | OUTPATIENT
Start: 2019-10-18 | End: 2019-10-18 | Stop reason: SURG

## 2019-10-18 RX ORDER — FAMOTIDINE 10 MG/ML
20 INJECTION, SOLUTION INTRAVENOUS ONCE
Status: DISCONTINUED | OUTPATIENT
Start: 2019-10-18 | End: 2019-10-18 | Stop reason: HOSPADM

## 2019-10-18 RX ORDER — FAMOTIDINE 20 MG/1
20 TABLET, FILM COATED ORAL ONCE
Status: DISCONTINUED | OUTPATIENT
Start: 2019-10-18 | End: 2019-10-18 | Stop reason: HOSPADM

## 2019-10-18 RX ADMIN — PROMETHAZINE HYDROCHLORIDE 12.5 MG: 25 INJECTION INTRAMUSCULAR; INTRAVENOUS at 13:06

## 2019-10-18 RX ADMIN — HYDROMORPHONE HYDROCHLORIDE 0.5 MG: 1 INJECTION, SOLUTION INTRAMUSCULAR; INTRAVENOUS; SUBCUTANEOUS at 14:06

## 2019-10-18 RX ADMIN — LIDOCAINE HYDROCHLORIDE 50 MG: 10 INJECTION, SOLUTION INFILTRATION; PERINEURAL at 12:57

## 2019-10-18 RX ADMIN — HYDROMORPHONE HYDROCHLORIDE 0.5 MG: 1 INJECTION, SOLUTION INTRAMUSCULAR; INTRAVENOUS; SUBCUTANEOUS at 14:38

## 2019-10-18 RX ADMIN — PROMETHAZINE HYDROCHLORIDE 12.5 MG: 25 INJECTION INTRAMUSCULAR; INTRAVENOUS at 13:01

## 2019-10-18 RX ADMIN — FENTANYL CITRATE 50 MCG: 0.05 INJECTION, SOLUTION INTRAMUSCULAR; INTRAVENOUS at 14:17

## 2019-10-18 RX ADMIN — HYDROMORPHONE HYDROCHLORIDE 0.5 MG: 1 INJECTION, SOLUTION INTRAMUSCULAR; INTRAVENOUS; SUBCUTANEOUS at 13:39

## 2019-10-18 RX ADMIN — FENTANYL CITRATE 50 MCG: 0.05 INJECTION, SOLUTION INTRAMUSCULAR; INTRAVENOUS at 13:49

## 2019-10-18 RX ADMIN — DIPHENHYDRAMINE HYDROCHLORIDE 12.5 MG: 50 INJECTION, SOLUTION INTRAMUSCULAR; INTRAVENOUS at 14:07

## 2019-10-18 RX ADMIN — HYDROMORPHONE HYDROCHLORIDE 0.5 MG: 1 INJECTION, SOLUTION INTRAMUSCULAR; INTRAVENOUS; SUBCUTANEOUS at 13:21

## 2019-10-18 RX ADMIN — FENTANYL CITRATE 50 MCG: 0.05 INJECTION, SOLUTION INTRAMUSCULAR; INTRAVENOUS at 13:56

## 2019-10-18 RX ADMIN — CALCIUM CHLORIDE 1 G: 100 INJECTION INTRAVENOUS; INTRAVENTRICULAR at 14:27

## 2019-10-18 RX ADMIN — FENTANYL CITRATE 50 MCG: 0.05 INJECTION, SOLUTION INTRAMUSCULAR; INTRAVENOUS at 14:55

## 2019-10-18 RX ADMIN — HYDROMORPHONE HYDROCHLORIDE 0.5 MG: 2 INJECTION, SOLUTION INTRAMUSCULAR; INTRAVENOUS; SUBCUTANEOUS at 12:57

## 2019-10-18 RX ADMIN — CALCIUM CHLORIDE 0.5 G: 100 INJECTION INTRAVENOUS; INTRAVENTRICULAR at 13:02

## 2019-10-18 RX ADMIN — PROPOFOL 50 MG: 10 INJECTION, EMULSION INTRAVENOUS at 12:57

## 2019-10-18 RX ADMIN — METHYLPREDNISOLONE SODIUM SUCCINATE 125 MG: 125 INJECTION, POWDER, FOR SOLUTION INTRAMUSCULAR; INTRAVENOUS at 12:57

## 2019-10-18 RX ADMIN — CALCIUM CHLORIDE 0.5 G: 100 INJECTION INTRAVENOUS; INTRAVENTRICULAR at 12:57

## 2019-10-18 RX ADMIN — SODIUM CHLORIDE, POTASSIUM CHLORIDE, SODIUM LACTATE AND CALCIUM CHLORIDE: 600; 310; 30; 20 INJECTION, SOLUTION INTRAVENOUS at 12:53

## 2019-10-18 RX ADMIN — HYDROMORPHONE HYDROCHLORIDE 0.5 MG: 2 INJECTION, SOLUTION INTRAMUSCULAR; INTRAVENOUS; SUBCUTANEOUS at 13:03

## 2019-10-18 RX ADMIN — HYDROMORPHONE HYDROCHLORIDE 1 MG: 1 INJECTION, SOLUTION INTRAMUSCULAR; INTRAVENOUS; SUBCUTANEOUS at 15:43

## 2019-10-18 RX ADMIN — PROPOFOL 100 MG: 10 INJECTION, EMULSION INTRAVENOUS at 13:01

## 2019-10-18 NOTE — ANESTHESIA PREPROCEDURE EVALUATION
Anesthesia Evaluation     Patient summary reviewed and Nursing notes reviewed   NPO Solid Status: > 2 hours  NPO Liquid Status: > 8 hours           Airway   Mallampati: II  TM distance: >3 FB  Neck ROM: full  No difficulty expected  Dental - normal exam     Pulmonary - normal exam    breath sounds clear to auscultation  (+) asthma, sleep apnea,   Cardiovascular - normal exam    ECG reviewed  Rhythm: regular  Rate: normal    (-) murmur      Neuro/Psych- negative ROS  (-) psychiatric history  GI/Hepatic/Renal/Endo    (+) obesity,   hepatitis, liver disease, diabetes mellitus type 2 well controlled,     Musculoskeletal     Abdominal   (+) obese,    Substance History      OB/GYN          Other          Other Comment: APS-1                  Anesthesia Plan    ASA 3     general     intravenous induction   Anesthetic plan, all risks, benefits, and alternatives have been provided, discussed and informed consent has been obtained with: patient.    Plan discussed with CRNA.

## 2019-10-18 NOTE — ANESTHESIA POSTPROCEDURE EVALUATION
Patient: Krista Richter    Procedure Summary     Date:  10/18/19 Room / Location:   [facility]    Anesthesia Start:  1253 Anesthesia Stop:      Procedure:  ESOPHAGOGASTRODUODENOSCOPY with balloon dilation (N/A ) Diagnosis:       Dilatation of esophagus      (Dilatation of esophagus [K22.8])    Surgeon:  Gavin Vargas MD Provider:  Prabhu Ley Jr., MD    Anesthesia Type:  general ASA Status:  3          Anesthesia Type: general  Last vitals  BP   115/67 (10/18/19 1318)   Temp   99.4 °F (37.4 °C) (10/18/19 1318)   Pulse   86 (10/18/19 1318)   Resp   16 (10/18/19 1318)     SpO2   97 % (10/18/19 1318)     Post Anesthesia Care and Evaluation    Patient location during evaluation: PACU  Patient participation: complete - patient participated  Level of consciousness: awake and alert  Pain score: 0  Pain management: adequate  Airway patency: patent  Anesthetic complications: No anesthetic complications  PONV Status: none  Cardiovascular status: hemodynamically stable and acceptable  Respiratory status: nonlabored ventilation, acceptable and nasal cannula  Hydration status: acceptable

## 2019-10-18 NOTE — H&P
GASTROENTEROLOGY   Krista Richter  [id number]  1990    CARE TEAM  Patient Care Team:  Arlene Pearce MD as PCP - General (Internal Medicine)    Gavin Siddiqi*     No chief complaint on file.       HISTORY OF PRESENT ILLNESS:  Patient known to me presents for esophageal dilation.  She required esophageal dilation from time to time for intermittent dysphagia to solids presumed secondary to dysmotility which response to dilations.  She is otherwise without new GI complaints.   w    PAST MEDICAL HISTORY  Past Medical History:   Diagnosis Date   • Adrenal insufficiency (CMS/HCC)    • Alopecia    • Anemia    • Anxiety    • APS type 1 (CMS/HCC)    • Asthma    • Autoimmune hepatitis (CMS/HCC)    • Depression    • Dermatomyositis (CMS/HCC)    • Disease of thyroid gland    • Fibromyalgia    • Functional asplenia    • History of kidney stones    • Hypoparathyroidism (CMS/HCC)    • Insulin dependent diabetes mellitus (CMS/HCC)    • Long Q-T syndrome    • Pancreatic insufficiency    • Parathyroid abnormality (CMS/HCC)    • Polyglandular deficiency syndrome (CMS/HCC)    • Premature ovarian failure    • Sleep apnea    • Spleen absent    • Tetany     HAS EPISODES   • Transfusion history         PAST SURGICAL HISTORY  Past Surgical History:   Procedure Laterality Date   • APPENDECTOMY     • CARDIAC SURGERY      LOOP RECORDER   • CHOLECYSTECTOMY     • ENDOSCOPY N/A 4/26/2018    Procedure: ESOPHAGOGASTRODUODENOSCOPY;  Surgeon: Gavin Vargas MD;  Location:  [facility];  Service: Gastroenterology   • ENDOSCOPY N/A 8/9/2018    Procedure: ESOPHAGOGASTRODUODENOSCOPY-DILATION;  Surgeon: Gavin Vargas MD;  Location:  [facility];  Service: Gastroenterology   • ENDOSCOPY     • ENDOSCOPY N/A 2/6/2019    Procedure: ESOPHAGOGASTRODUODENOSCOPY;  Surgeon: Gavin Vargas MD;  Location:  [facility];  Service: Gastroenterology   • ENDOSCOPY N/A 7/24/2019     Procedure: ESOPHAGOGASTRODUODENOSCOPY;  Surgeon: Gavin Vargas MD;  [facility];  Service: Gastroenterology   • EXPLORATORY LAPAROTOMY      MULTIPLE   • HAND SURGERY      4 TOTAL   • HERNIA REPAIR     • LIVER BIOPSY     • MUSCLE BIOPSY     • MUSCLE BIOPSY     • PEG TUBE INSERTION     • PEG TUBE REMOVAL     • PORTACATH PLACEMENT          MEDICATIONS:    Current Facility-Administered Medications:   •  famotidine (PEPCID) injection 20 mg, 20 mg, Intravenous, Once, Prabhu Ley Jr., MD  •  famotidine (PEPCID) tablet 20 mg, 20 mg, Oral, Once, Prabhu Ley Jr., MD  •  fentaNYL citrate (PF) (SUBLIMAZE) injection 50 mcg, 50 mcg, Intravenous, Q5 Min PRN, Neidig, Agustin T, CRNA  •  hydrALAZINE (APRESOLINE) injection 5 mg, 5 mg, Intravenous, Q10 Min PRN, Neidig, Agustin T, CRNA  •  HYDROcodone-acetaminophen (NORCO) 5-325 MG per tablet 1 tablet, 1 tablet, Oral, Once PRN, Neidig, Agustin T, CRNA  •  HYDROmorphone (DILAUDID) injection 0.5 mg, 0.5 mg, Intravenous, Q10 Min PRN, Neidig, Agustin T, CRNA  •  ipratropium-albuterol (DUO-NEB) nebulizer solution 3 mL, 3 mL, Nebulization, Once PRN, Neidig, Agustin T, CRNA  •  labetalol (NORMODYNE,TRANDATE) injection 5 mg, 5 mg, Intravenous, Q5 Min PRN, Neidig, Agustin T, CRNA  •  lactated ringers bolus 500 mL, 500 mL, Intravenous, Once PRN, Neidig, Agustin T, CRNA  •  lactated ringers infusion, 9 mL/hr, Intravenous, Continuous, Prabhu Ley Jr., MD  •  lidocaine PF 1% (XYLOCAINE) injection 0.5 mL, 0.5 mL, Injection, Once PRN, Prabhu Ley Jr., MD  •  meperidine (DEMEROL) injection 12.5 mg, 12.5 mg, Intravenous, Q5 Min PRN, Neidig, Agustin T, CRNA  •  naloxone (NARCAN) injection 0.4 mg, 0.4 mg, Intravenous, PRN, Neidig, Agustin T, CRNA  •  ondansetron (ZOFRAN) injection 4 mg, 4 mg, Intravenous, Once PRN, Agustin Bowen CRNA  •  promethazine (PHENERGAN) injection 6.25 mg, 6.25 mg, Intravenous, Once PRN **OR** promethazine (PHENERGAN) injection  6.25 mg, 6.25 mg, Intramuscular, Once PRN **OR** promethazine (PHENERGAN) suppository 25 mg, 25 mg, Rectal, Once PRN **OR** promethazine (PHENERGAN) tablet 25 mg, 25 mg, Oral, Once PRN, Neidig, Agustin T, CRNA  •  sodium chloride 0.9 % flush 3 mL, 3 mL, Intravenous, Q12H, Prabhu Ley Jr., MD  •  sodium chloride 0.9 % flush 3 mL, 3 mL, Intravenous, Q12H, Neidig, Agustin T, CRNA  •  sodium chloride 0.9 % flush 3-10 mL, 3-10 mL, Intravenous, PRN, Prabhu Ley Jr., MD  •  sodium chloride 0.9 % flush 3-10 mL, 3-10 mL, Intravenous, PRN, Neidig, Agustin T, CRNA    ALLERGIES  Allergies   Allergen Reactions   • Cefpodoxime Hives   • Cephalosporins Other (See Comments)   • Clarithromycin Hives     biaxin   • Levofloxacin Other (See Comments)   • Morphine Other (See Comments)   • Nitrofurantoin Hives   • Nitrofurantoin Macrocrystal Other (See Comments)   • Nystatin Hives   • Ondansetron Other (See Comments)   • Penicillins Hives   • Sulfa Antibiotics Hives       FAMILY HISTORY:  Family History   Problem Relation Age of Onset   • Kidney disease Father        SOCIAL HISTORY  Social History     Socioeconomic History   • Marital status: Single     Spouse name: Not on file   • Number of children: Not on file   • Years of education: Not on file   • Highest education level: Not on file   Tobacco Use   • Smoking status: Never Smoker   • Smokeless tobacco: Never Used   Substance and Sexual Activity   • Alcohol use: Yes     Comment: SOCIAL   • Drug use: No   • Sexual activity: Defer     Socioeconomic History:  Single.  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 In no apparent or acute distress.  and No stigmata of chronic liver disease  HEENT: Alopecia.  Neck: Supple. Without lymphadenopathy  CV: Regular rate and rhythm, S1, S2  Lungs: Clear to  ausculation. Without rales, robchi and wheezing  Abdomen:  Soft,non-distended without palpable masses or hepatosplenomeagaly, areas of rebound tenderness or guarding.   Extremeties: without clubbing, cyanosis or edema  Neurologic:  Alert and oriented x 3 without focal motor or sensory deficits  Rectal exam: deferred        Results Review:  I reviewed the patient's new clinical results.      ASSESSMENT  1.-  Dysphagia to solids.  Responsive to dilations  2.-  Other medical problems as detailed above    PLAN  1.-  EGD with dilation.  Risk benefits options are known to her she and her mother are agreeable to proceeding      I discussed the patients findings and my recommendations with patient    Gavin Vikram Vargas MD  10/18/2019   12:40 PM    Much of this note is an electronic transcription of spoken language to printed text. Electronic transcription of spoken language may permit erroneous, nonsensical word phrases to be inadvertently transcribed.  Although I have reviewed the note for these errors, some may still be present.

## 2019-10-22 RX ORDER — AZATHIOPRINE 50 MG/1
150 TABLET ORAL DAILY
Qty: 90 TABLET | Refills: 1 | Status: SHIPPED | OUTPATIENT
Start: 2019-10-22 | End: 2020-02-21 | Stop reason: SDUPTHER

## 2019-11-28 ENCOUNTER — HOSPITAL ENCOUNTER (EMERGENCY)
Facility: HOSPITAL | Age: 29
Discharge: HOME | End: 2019-11-28
Attending: EMERGENCY MEDICINE | Admitting: EMERGENCY MEDICINE

## 2019-11-28 VITALS
RESPIRATION RATE: 18 BRPM | DIASTOLIC BLOOD PRESSURE: 60 MMHG | TEMPERATURE: 97.8 F | HEART RATE: 86 BPM | BODY MASS INDEX: 31.83 KG/M2 | HEIGHT: 62 IN | WEIGHT: 173 LBS | SYSTOLIC BLOOD PRESSURE: 108 MMHG | OXYGEN SATURATION: 99 %

## 2019-11-28 DIAGNOSIS — E31.8: Primary | ICD-10-CM

## 2019-11-28 DIAGNOSIS — E20.9: ICD-10-CM

## 2019-11-28 LAB
ALBUMIN SERPL-MCNC: 4.4 G/DL (ref 3.5–5.2)
ALBUMIN/GLOB SERPL: 1 G/DL
ALP SERPL-CCNC: 109 U/L (ref 39–117)
ALT SERPL W P-5'-P-CCNC: 18 U/L (ref 1–33)
ANION GAP SERPL CALCULATED.3IONS-SCNC: 13 MMOL/L (ref 5–15)
AST SERPL-CCNC: 29 U/L (ref 1–32)
BASOPHILS # BLD MANUAL: 0 10*3/MM3 (ref 0–0.2)
BASOPHILS NFR BLD AUTO: 0 % (ref 0–1.5)
BILIRUB SERPL-MCNC: 0.2 MG/DL (ref 0.2–1.2)
BUN BLD-MCNC: 16 MG/DL (ref 6–20)
BUN/CREAT SERPL: 21.1 (ref 7–25)
BURR CELLS BLD QL SMEAR: ABNORMAL
CA-I SERPL ISE-MCNC: 1.39 MMOL/L (ref 1.12–1.32)
CALCIUM SPEC-SCNC: 11 MG/DL (ref 8.6–10.5)
CALCIUM SPEC-SCNC: 11 MG/DL (ref 8.6–10.5)
CHLORIDE SERPL-SCNC: 91 MMOL/L (ref 98–107)
CO2 SERPL-SCNC: 32 MMOL/L (ref 22–29)
CORTIS SERPL-MCNC: 0.31 MCG/DL
CREAT BLD-MCNC: 0.76 MG/DL (ref 0.57–1)
DEPRECATED RDW RBC AUTO: 58.2 FL (ref 37–54)
EOSINOPHIL # BLD MANUAL: 0.26 10*3/MM3 (ref 0–0.4)
EOSINOPHIL NFR BLD MANUAL: 2 % (ref 0.3–6.2)
ERYTHROCYTE [DISTWIDTH] IN BLOOD BY AUTOMATED COUNT: 17.9 % (ref 12.3–15.4)
GFR SERPL CREATININE-BSD FRML MDRD: 89 ML/MIN/1.73
GLOBULIN UR ELPH-MCNC: 4.2 GM/DL
GLUCOSE BLD-MCNC: 89 MG/DL (ref 65–99)
HCT VFR BLD AUTO: 47.8 % (ref 34–46.6)
HGB BLD-MCNC: 15.2 G/DL (ref 12–15.9)
LYMPHOCYTES # BLD MANUAL: 2.37 10*3/MM3 (ref 0.7–3.1)
LYMPHOCYTES NFR BLD MANUAL: 13 % (ref 5–12)
LYMPHOCYTES NFR BLD MANUAL: 18 % (ref 19.6–45.3)
MAGNESIUM SERPL-MCNC: 1.7 MG/DL (ref 1.6–2.6)
MCH RBC QN AUTO: 30.7 PG (ref 26.6–33)
MCHC RBC AUTO-ENTMCNC: 31.8 G/DL (ref 31.5–35.7)
MCV RBC AUTO: 96.6 FL (ref 79–97)
MONOCYTES # BLD AUTO: 1.71 10*3/MM3 (ref 0.1–0.9)
NEUTROPHILS # BLD AUTO: 8.28 10*3/MM3 (ref 1.7–7)
NEUTROPHILS NFR BLD MANUAL: 63 % (ref 42.7–76)
PHOSPHATE SERPL-MCNC: 3.4 MG/DL (ref 2.5–4.5)
PLAT MORPH BLD: NORMAL
PLATELET # BLD AUTO: 327 10*3/MM3 (ref 140–450)
PMV BLD AUTO: 11.7 FL (ref 6–12)
POTASSIUM BLD-SCNC: 4.6 MMOL/L (ref 3.5–5.2)
PROT SERPL-MCNC: 8.6 G/DL (ref 6–8.5)
PTH-INTACT SERPL-MCNC: 3.6 PG/ML (ref 15–65)
RBC # BLD AUTO: 4.95 10*6/MM3 (ref 3.77–5.28)
SODIUM BLD-SCNC: 136 MMOL/L (ref 136–145)
T4 FREE SERPL-MCNC: 1.34 NG/DL (ref 0.93–1.7)
TARGETS BLD QL SMEAR: ABNORMAL
TSH SERPL DL<=0.05 MIU/L-ACNC: 2.97 UIU/ML (ref 0.27–4.2)
VARIANT LYMPHS NFR BLD MANUAL: 4 % (ref 0–5)
WBC MORPH BLD: NORMAL
WBC NRBC COR # BLD: 13.14 10*3/MM3 (ref 3.4–10.8)

## 2019-11-28 PROCEDURE — 25010000002 CALCIUM GLUCONATE PER 10 ML: Performed by: PHYSICIAN ASSISTANT

## 2019-11-28 PROCEDURE — 25010000002 METHYLPREDNISOLONE PER 125 MG: Performed by: PHYSICIAN ASSISTANT

## 2019-11-28 PROCEDURE — 82310 ASSAY OF CALCIUM: CPT | Performed by: PHYSICIAN ASSISTANT

## 2019-11-28 PROCEDURE — 83735 ASSAY OF MAGNESIUM: CPT | Performed by: PHYSICIAN ASSISTANT

## 2019-11-28 PROCEDURE — 83970 ASSAY OF PARATHORMONE: CPT | Performed by: PHYSICIAN ASSISTANT

## 2019-11-28 PROCEDURE — 25010000002 DIAZEPAM PER 5 MG: Performed by: EMERGENCY MEDICINE

## 2019-11-28 PROCEDURE — 82533 TOTAL CORTISOL: CPT | Performed by: PHYSICIAN ASSISTANT

## 2019-11-28 PROCEDURE — 25010000002 HYDROMORPHONE 1 MG/ML SOLUTION: Performed by: EMERGENCY MEDICINE

## 2019-11-28 PROCEDURE — 80053 COMPREHEN METABOLIC PANEL: CPT | Performed by: PHYSICIAN ASSISTANT

## 2019-11-28 PROCEDURE — 96375 TX/PRO/DX INJ NEW DRUG ADDON: CPT

## 2019-11-28 PROCEDURE — 25010000002 PROMETHAZINE PER 50 MG: Performed by: PHYSICIAN ASSISTANT

## 2019-11-28 PROCEDURE — 82330 ASSAY OF CALCIUM: CPT | Performed by: PHYSICIAN ASSISTANT

## 2019-11-28 PROCEDURE — 85025 COMPLETE CBC W/AUTO DIFF WBC: CPT | Performed by: PHYSICIAN ASSISTANT

## 2019-11-28 PROCEDURE — 25010000002 HYDROMORPHONE PER 4 MG: Performed by: EMERGENCY MEDICINE

## 2019-11-28 PROCEDURE — 96361 HYDRATE IV INFUSION ADD-ON: CPT

## 2019-11-28 PROCEDURE — 96376 TX/PRO/DX INJ SAME DRUG ADON: CPT

## 2019-11-28 PROCEDURE — 84100 ASSAY OF PHOSPHORUS: CPT | Performed by: PHYSICIAN ASSISTANT

## 2019-11-28 PROCEDURE — 84443 ASSAY THYROID STIM HORMONE: CPT | Performed by: PHYSICIAN ASSISTANT

## 2019-11-28 PROCEDURE — 96365 THER/PROPH/DIAG IV INF INIT: CPT

## 2019-11-28 PROCEDURE — 85007 BL SMEAR W/DIFF WBC COUNT: CPT | Performed by: PHYSICIAN ASSISTANT

## 2019-11-28 PROCEDURE — 99284 EMERGENCY DEPT VISIT MOD MDM: CPT

## 2019-11-28 PROCEDURE — 84439 ASSAY OF FREE THYROXINE: CPT | Performed by: PHYSICIAN ASSISTANT

## 2019-11-28 RX ORDER — ORPHENADRINE CITRATE 100 MG/1
100 TABLET, EXTENDED RELEASE ORAL 2 TIMES DAILY
Qty: 14 TABLET | Refills: 0 | OUTPATIENT
Start: 2019-11-28 | End: 2020-01-15 | Stop reason: HOSPADM

## 2019-11-28 RX ORDER — METHYLPREDNISOLONE SODIUM SUCCINATE 125 MG/2ML
125 INJECTION, POWDER, LYOPHILIZED, FOR SOLUTION INTRAMUSCULAR; INTRAVENOUS ONCE
Status: COMPLETED | OUTPATIENT
Start: 2019-11-28 | End: 2019-11-28

## 2019-11-28 RX ORDER — SODIUM CHLORIDE 9 MG/ML
125 INJECTION, SOLUTION INTRAVENOUS CONTINUOUS
Status: DISCONTINUED | OUTPATIENT
Start: 2019-11-28 | End: 2019-11-28 | Stop reason: HOSPADM

## 2019-11-28 RX ORDER — HYDROMORPHONE HYDROCHLORIDE 1 MG/ML
0.5 INJECTION, SOLUTION INTRAMUSCULAR; INTRAVENOUS; SUBCUTANEOUS ONCE
Status: COMPLETED | OUTPATIENT
Start: 2019-11-28 | End: 2019-11-28

## 2019-11-28 RX ORDER — PROMETHAZINE HYDROCHLORIDE 25 MG/ML
12.5 INJECTION, SOLUTION INTRAMUSCULAR; INTRAVENOUS ONCE
Status: COMPLETED | OUTPATIENT
Start: 2019-11-28 | End: 2019-11-28

## 2019-11-28 RX ORDER — SODIUM CHLORIDE 0.9 % (FLUSH) 0.9 %
10 SYRINGE (ML) INJECTION AS NEEDED
Status: DISCONTINUED | OUTPATIENT
Start: 2019-11-28 | End: 2019-11-28 | Stop reason: HOSPADM

## 2019-11-28 RX ORDER — DIAZEPAM 5 MG/ML
5 INJECTION, SOLUTION INTRAMUSCULAR; INTRAVENOUS ONCE
Status: COMPLETED | OUTPATIENT
Start: 2019-11-28 | End: 2019-11-28

## 2019-11-28 RX ORDER — CALCIUM GLUCONATE 94 MG/ML
1 INJECTION, SOLUTION INTRAVENOUS ONCE
Status: DISCONTINUED | OUTPATIENT
Start: 2019-11-28 | End: 2019-11-28 | Stop reason: SDUPTHER

## 2019-11-28 RX ORDER — HYDROCODONE BITARTRATE AND ACETAMINOPHEN 7.5; 325 MG/1; MG/1
1 TABLET ORAL EVERY 6 HOURS PRN
Qty: 12 TABLET | Refills: 0 | OUTPATIENT
Start: 2019-11-28 | End: 2020-01-15 | Stop reason: HOSPADM

## 2019-11-28 RX ADMIN — PROMETHAZINE HYDROCHLORIDE 12.5 MG: 25 INJECTION INTRAMUSCULAR; INTRAVENOUS at 17:16

## 2019-11-28 RX ADMIN — HEPARIN 300 UNITS: 100 SYRINGE at 21:02

## 2019-11-28 RX ADMIN — DIAZEPAM 5 MG: 5 INJECTION, SOLUTION INTRAMUSCULAR; INTRAVENOUS at 18:49

## 2019-11-28 RX ADMIN — HYDROMORPHONE HYDROCHLORIDE 0.5 MG: 1 INJECTION, SOLUTION INTRAMUSCULAR; INTRAVENOUS; SUBCUTANEOUS at 19:27

## 2019-11-28 RX ADMIN — METHYLPREDNISOLONE SODIUM SUCCINATE 125 MG: 125 INJECTION, POWDER, FOR SOLUTION INTRAMUSCULAR; INTRAVENOUS at 18:46

## 2019-11-28 RX ADMIN — CALCIUM GLUCONATE 1 G: 98 INJECTION, SOLUTION INTRAVENOUS at 18:50

## 2019-11-28 RX ADMIN — SODIUM CHLORIDE 125 ML/HR: 9 INJECTION, SOLUTION INTRAVENOUS at 17:22

## 2019-11-28 RX ADMIN — HYDROMORPHONE HYDROCHLORIDE 1 MG: 1 INJECTION, SOLUTION INTRAMUSCULAR; INTRAVENOUS; SUBCUTANEOUS at 17:50

## 2020-01-15 ENCOUNTER — HOSPITAL ENCOUNTER (EMERGENCY)
Facility: HOSPITAL | Age: 30
Discharge: HOME | End: 2020-01-15
Attending: EMERGENCY MEDICINE | Admitting: EMERGENCY MEDICINE

## 2020-01-15 VITALS
BODY MASS INDEX: 31.28 KG/M2 | RESPIRATION RATE: 26 BRPM | OXYGEN SATURATION: 97 % | HEIGHT: 62 IN | WEIGHT: 170 LBS | TEMPERATURE: 98.3 F | SYSTOLIC BLOOD PRESSURE: 115 MMHG | DIASTOLIC BLOOD PRESSURE: 72 MMHG | HEART RATE: 84 BPM

## 2020-01-15 DIAGNOSIS — M62.838 MUSCLE SPASM: ICD-10-CM

## 2020-01-15 DIAGNOSIS — E31.8 POLYGLANDULAR AUTOIMMUNE SYNDROME, TYPE 1 (HCC): Primary | ICD-10-CM

## 2020-01-15 LAB
ALBUMIN SERPL-MCNC: 4.2 G/DL (ref 3.5–5.2)
ALBUMIN/GLOB SERPL: 1.2 G/DL
ALP SERPL-CCNC: 92 U/L (ref 39–117)
ALT SERPL W P-5'-P-CCNC: 14 U/L (ref 1–33)
ANION GAP SERPL CALCULATED.3IONS-SCNC: 13 MMOL/L (ref 5–15)
AST SERPL-CCNC: 23 U/L (ref 1–32)
BASOPHILS # BLD MANUAL: 0 10*3/MM3 (ref 0–0.2)
BASOPHILS NFR BLD AUTO: 0 % (ref 0–1.5)
BILIRUB SERPL-MCNC: 1.4 MG/DL (ref 0.2–1.2)
BUN BLD-MCNC: 8 MG/DL (ref 6–20)
BUN/CREAT SERPL: 13.8 (ref 7–25)
CA-I SERPL ISE-MCNC: 1.28 MMOL/L (ref 1.12–1.32)
CALCIUM SPEC-SCNC: 9.5 MG/DL (ref 8.6–10.5)
CALCIUM SPEC-SCNC: 9.5 MG/DL (ref 8.6–10.5)
CHLORIDE SERPL-SCNC: 96 MMOL/L (ref 98–107)
CO2 SERPL-SCNC: 27 MMOL/L (ref 22–29)
CREAT BLD-MCNC: 0.58 MG/DL (ref 0.57–1)
DEPRECATED RDW RBC AUTO: 52.8 FL (ref 37–54)
EOSINOPHIL # BLD MANUAL: 0.3 10*3/MM3 (ref 0–0.4)
EOSINOPHIL NFR BLD MANUAL: 3 % (ref 0.3–6.2)
ERYTHROCYTE [DISTWIDTH] IN BLOOD BY AUTOMATED COUNT: 14.4 % (ref 12.3–15.4)
GFR SERPL CREATININE-BSD FRML MDRD: 122 ML/MIN/1.73
GLOBULIN UR ELPH-MCNC: 3.5 GM/DL
GLUCOSE BLD-MCNC: 78 MG/DL (ref 65–99)
HCT VFR BLD AUTO: 42.8 % (ref 34–46.6)
HGB BLD-MCNC: 13.9 G/DL (ref 12–15.9)
LYMPHOCYTES # BLD MANUAL: 2.86 10*3/MM3 (ref 0.7–3.1)
LYMPHOCYTES NFR BLD MANUAL: 29 % (ref 19.6–45.3)
LYMPHOCYTES NFR BLD MANUAL: 7 % (ref 5–12)
MCH RBC QN AUTO: 32 PG (ref 26.6–33)
MCHC RBC AUTO-ENTMCNC: 32.5 G/DL (ref 31.5–35.7)
MCV RBC AUTO: 98.4 FL (ref 79–97)
MONOCYTES # BLD AUTO: 0.69 10*3/MM3 (ref 0.1–0.9)
NEUTROPHILS # BLD AUTO: 6.01 10*3/MM3 (ref 1.7–7)
NEUTROPHILS NFR BLD MANUAL: 61 % (ref 42.7–76)
PLAT MORPH BLD: NORMAL
PLATELET # BLD AUTO: 257 10*3/MM3 (ref 140–450)
PMV BLD AUTO: 12.6 FL (ref 6–12)
POTASSIUM BLD-SCNC: 4.2 MMOL/L (ref 3.5–5.2)
PROT SERPL-MCNC: 7.7 G/DL (ref 6–8.5)
PTH-INTACT SERPL-MCNC: 4.5 PG/ML (ref 15–65)
RBC # BLD AUTO: 4.35 10*6/MM3 (ref 3.77–5.28)
RBC MORPH BLD: NORMAL
SODIUM BLD-SCNC: 136 MMOL/L (ref 136–145)
WBC MORPH BLD: NORMAL
WBC NRBC COR # BLD: 9.86 10*3/MM3 (ref 3.4–10.8)

## 2020-01-15 PROCEDURE — 96374 THER/PROPH/DIAG INJ IV PUSH: CPT

## 2020-01-15 PROCEDURE — 96375 TX/PRO/DX INJ NEW DRUG ADDON: CPT

## 2020-01-15 PROCEDURE — 82330 ASSAY OF CALCIUM: CPT | Performed by: EMERGENCY MEDICINE

## 2020-01-15 PROCEDURE — 85025 COMPLETE CBC W/AUTO DIFF WBC: CPT | Performed by: EMERGENCY MEDICINE

## 2020-01-15 PROCEDURE — 85007 BL SMEAR W/DIFF WBC COUNT: CPT | Performed by: EMERGENCY MEDICINE

## 2020-01-15 PROCEDURE — 25010000002 HYDROMORPHONE 1 MG/ML SOLUTION: Performed by: EMERGENCY MEDICINE

## 2020-01-15 PROCEDURE — 80053 COMPREHEN METABOLIC PANEL: CPT | Performed by: EMERGENCY MEDICINE

## 2020-01-15 PROCEDURE — 96376 TX/PRO/DX INJ SAME DRUG ADON: CPT

## 2020-01-15 PROCEDURE — 99284 EMERGENCY DEPT VISIT MOD MDM: CPT

## 2020-01-15 PROCEDURE — 25010000002 CALCIUM GLUCONATE PER 10 ML: Performed by: EMERGENCY MEDICINE

## 2020-01-15 PROCEDURE — 83970 ASSAY OF PARATHORMONE: CPT | Performed by: EMERGENCY MEDICINE

## 2020-01-15 PROCEDURE — 25010000002 PROMETHAZINE PER 50 MG: Performed by: EMERGENCY MEDICINE

## 2020-01-15 PROCEDURE — 25010000002 METHYLPREDNISOLONE PER 125 MG: Performed by: EMERGENCY MEDICINE

## 2020-01-15 RX ORDER — CALCIUM GLUCONATE 94 MG/ML
1 INJECTION, SOLUTION INTRAVENOUS ONCE
Status: DISCONTINUED | OUTPATIENT
Start: 2020-01-15 | End: 2020-01-15

## 2020-01-15 RX ORDER — METHYLPREDNISOLONE SODIUM SUCCINATE 125 MG/2ML
125 INJECTION, POWDER, LYOPHILIZED, FOR SOLUTION INTRAMUSCULAR; INTRAVENOUS ONCE
Status: COMPLETED | OUTPATIENT
Start: 2020-01-15 | End: 2020-01-15

## 2020-01-15 RX ORDER — PROMETHAZINE HYDROCHLORIDE 25 MG/ML
12.5 INJECTION, SOLUTION INTRAMUSCULAR; INTRAVENOUS ONCE
Status: COMPLETED | OUTPATIENT
Start: 2020-01-15 | End: 2020-01-15

## 2020-01-15 RX ADMIN — HYDROMORPHONE HYDROCHLORIDE 1 MG: 1 INJECTION, SOLUTION INTRAMUSCULAR; INTRAVENOUS; SUBCUTANEOUS at 17:16

## 2020-01-15 RX ADMIN — HEPARIN 300 UNITS: 100 SYRINGE at 19:07

## 2020-01-15 RX ADMIN — METHYLPREDNISOLONE SODIUM SUCCINATE 125 MG: 125 INJECTION, POWDER, FOR SOLUTION INTRAMUSCULAR; INTRAVENOUS at 16:06

## 2020-01-15 RX ADMIN — PROMETHAZINE HYDROCHLORIDE 12.5 MG: 25 INJECTION INTRAMUSCULAR; INTRAVENOUS at 17:15

## 2020-01-15 RX ADMIN — HYDROMORPHONE HYDROCHLORIDE 1 MG: 1 INJECTION, SOLUTION INTRAMUSCULAR; INTRAVENOUS; SUBCUTANEOUS at 16:04

## 2020-01-15 RX ADMIN — SODIUM CHLORIDE 1000 ML: 9 INJECTION, SOLUTION INTRAVENOUS at 15:33

## 2020-01-15 RX ADMIN — HYDROMORPHONE HYDROCHLORIDE 1 MG: 1 INJECTION, SOLUTION INTRAMUSCULAR; INTRAVENOUS; SUBCUTANEOUS at 19:05

## 2020-01-15 RX ADMIN — CALCIUM GLUCONATE 1 G: 98 INJECTION, SOLUTION INTRAVENOUS at 15:54

## 2020-01-15 RX ADMIN — CALCIUM GLUCONATE 1 G: 94 INJECTION, SOLUTION INTRAVENOUS at 17:54

## 2020-01-15 NOTE — DISCHARGE INSTRUCTIONS
Continue to take your medications as previously prescribed, follow-up with your specialist at [facility] for reevaluation.  If you have any recurrent or worsening symptoms in the meantime, please return back to the emergency department for reevaluation.

## 2020-01-15 NOTE — ED PROVIDER NOTES
"    EMERGENCY DEPARTMENT ENCOUNTER      Pt Name: Krista Richter  MRN: [id number]  YOB: 1990  Date of evaluation: 1/15/2020  Provider: Hair Chisholm DO    CHIEF COMPLAINT       Chief Complaint   Patient presents with   • tetany         HISTORY OF PRESENT ILLNESS  (Location/Symptom, Timing/Onset, Context/Setting, Quality, Duration, Modifying Factors, Severity.)   Krista Richter is a 29 y.o. female who presents to the emergency department with complaints of tetany. She has a history of APS 1 and states she sometimes has episodes of tetany due to this. This episode began this morning and has been gradually worsening since onset. She currently complains of nausea and states she feels like her \"whole body is having a kandi horse\". Currently she is unable to move due to the pain. Her specialist who manages her APS1 is at [facility]. There are no other acute complaints at this time.      Nursing notes were reviewed.    REVIEW OF SYSTEMS    (2-9 systems for level 4, 10 or more for level 5)   ROS:  General:  No fevers, no chills, no weakness  Cardiovascular:  No chest pain, no palpitations  Respiratory:  No shortness of breath, no cough, no wheezing  Gastrointestinal:  No pain, no nausea, no vomiting, no diarrhea  Musculoskeletal:  + muscle pain, no joint pain  Skin:  No rash, no easy bruising  Neurologic:  No speech problems, no headache, no extremity numbness, no extremity tingling, no extremity weakness  Psychiatric:  No anxiety  Genitourinary:  No dysuria, no hematuria    Except as noted above the remainder of the review of systems was reviewed and negative.       PAST MEDICAL HISTORY     Past Medical History:   Diagnosis Date   • Adrenal insufficiency (CMS/HCC)    • Alopecia    • Anemia    • Anxiety    • APS type 1 (CMS/HCC)    • Asthma    • Autoimmune hepatitis (CMS/HCC)    • Depression    • Dermatomyositis (CMS/HCC)    • Disease of thyroid gland    • Fibromyalgia  "   • Functional asplenia    • History of kidney stones    • Hypoparathyroidism (CMS/HCC)    • Insulin dependent diabetes mellitus (CMS/HCC)    • Long Q-T syndrome    • Pancreatic insufficiency    • Parathyroid abnormality (CMS/HCC)    • Polyglandular deficiency syndrome (CMS/HCC)    • Premature ovarian failure    • Sleep apnea    • Spleen absent    • Tetany     HAS EPISODES   • Transfusion history          SURGICAL HISTORY       Past Surgical History:   Procedure Laterality Date   • APPENDECTOMY     • CARDIAC SURGERY      LOOP RECORDER   • CHOLECYSTECTOMY     • ENDOSCOPY N/A 4/26/2018    Procedure: ESOPHAGOGASTRODUODENOSCOPY;  Surgeon: Gavin Vargas MD;  Location:  [facility];  Service: Gastroenterology   • ENDOSCOPY N/A 8/9/2018    Procedure: ESOPHAGOGASTRODUODENOSCOPY-DILATION;  Surgeon: Gavin Vargas MD;  Location:  [facility];  Service: Gastroenterology   • ENDOSCOPY     • ENDOSCOPY N/A 2/6/2019    Procedure: ESOPHAGOGASTRODUODENOSCOPY;  Surgeon: Gavin Vargas MD;  Location:  [facility];  Service: Gastroenterology   • ENDOSCOPY N/A 7/24/2019    Procedure: ESOPHAGOGASTRODUODENOSCOPY;  Surgeon: Gavin Vargas MD;  Location:  [facility];  Service: Gastroenterology   • ENDOSCOPY N/A 10/18/2019    Procedure: ESOPHAGOGASTRODUODENOSCOPY WITH BALLOON DILATION;  Surgeon: Gavin Vargas MD;  Location:  [facility];  Service: Gastroenterology   • EXPLORATORY LAPAROTOMY      MULTIPLE   • HAND SURGERY      4 TOTAL   • HERNIA REPAIR     • LIVER BIOPSY     • MUSCLE BIOPSY     • MUSCLE BIOPSY     • PEG TUBE INSERTION     • PEG TUBE REMOVAL     • PORTACATH PLACEMENT           CURRENT MEDICATIONS       Current Facility-Administered Medications:   •  HYDROmorphone (DILAUDID) injection 1 mg, 1 mg, Intravenous, Q30 Min PRN, Hair Chisholm DO, 1 mg at 01/15/20 2446    Current Outpatient Medications:   •  albuterol (PROVENTIL  HFA;VENTOLIN HFA) 108 (90 Base) MCG/ACT inhaler, Take 2 Puffs by inhalation every 4 hours as needed for wheezing., Disp: , Rfl:   •  albuterol sulfate HFA (PROAIR HFA) 108 (90 Base) MCG/ACT inhaler, ProAir HFA 90 mcg/actuation aerosol inhaler  2 PUFFS Q 4 HOURS SHORTNESS OF BREATH, Disp: , Rfl:   •  Alcohol Swabs (ALCOHOL PREP) 70 % pads, Check blood glucose up to 2 times daily, Disp: , Rfl:   •  azaTHIOprine (IMURAN) 50 MG tablet, Take 3 tablets by mouth Daily., Disp: 90 tablet, Rfl: 1  •  azelastine (ASTELIN) 0.1 % nasal spray, Astelin 137 mcg (0.1 %) nasal spray aerosol  Daily, Disp: , Rfl:   •  Blood Glucose Calibration (OT ULTRA/FASTTK CNTRL SOLN) solution, Use weekly and as needed to calibrate meter, Disp: , Rfl:   •  Blood Glucose Monitoring Suppl (ONE TOUCH ULTRA 2) w/Device kit, Use to check blood glucose up to 2 times daily, Disp: , Rfl:   •  butalbital-acetaminophen-caffeine (FIORICET, ESGIC) -40 MG per tablet, Take 1 tablet by mouth Daily., Disp: , Rfl:   •  calcitriol (ROCALTROL) 0.5 MCG capsule, Take 0.5 mcg by mouth 2 (Two) Times a Day., Disp: , Rfl:   •  calcium gluconate 1 g/100 mL, Infuse 2 g into a venous catheter., Disp: , Rfl:   •  cycloSPORINE (RESTASIS) 0.05 % ophthalmic emulsion, 1 drop. PRN, Disp: , Rfl:   •  cyproheptadine (PERIACTIN) 4 MG tablet, Take 4 mg by mouth 2 (Two) Times a Day., Disp: , Rfl:   •  diazePAM (VALIUM) 5 MG tablet, 5 mg., Disp: , Rfl:   •  dronabinol (MARINOL) 2.5 MG capsule, 5 mg 4 (Four) Times a Day As Needed., Disp: , Rfl:   •  EPINEPHrine (EPIPEN 2-TEDDY) 0.3 MG/0.3ML solution auto-injector injection, EpiPen 0.3 mg/0.3 mL injection, auto-injector  Take 1 auto by injection route., Disp: , Rfl:   •  fludrocortisone 0.1 MG tablet, TAKE ONE TABLET BY MOUTH DAILY..05MG PER PT, Disp: , Rfl:   •  fluticasone (FLONASE) 50 MCG/ACT nasal spray, 1 spray., Disp: , Rfl:   •  gabapentin (NEURONTIN) 600 MG tablet, Take 600 mg by mouth., Disp: , Rfl:   •  hydrocortisone  "(CORTEF) 10 MG tablet, Cortef 10 mg tablet, Disp: , Rfl:   •  hydrocortisone (CORTEF) 20 MG tablet, Take 20 mg by mouth 2 (Two) Times a Day. 20 MG IN AM AND 10 MG AT NIGHT, Disp: , Rfl:   •  hydrocortisone sodium succinate (SOLU-CORTEF) 100 MG injection, Give 100 mg ( 2 mL ) intramuscular as needed for vomiting, severe illness Dispense actovial, Disp: , Rfl:   •  Hypromellose (SYSTANE OVERNIGHT THERAPY) 0.3 % gel, Systane Gel 0.3 % eye gel, Disp: , Rfl:   •  Insulin Pen Needle 32G X 4 MM misc, Use to administer Natpara subcutaneously daily, Disp: , Rfl:   •  levothyroxine (SYNTHROID, LEVOTHROID) 75 MCG tablet, levothyroxine 75 mcg tablet, Disp: , Rfl:   •  magnesium oxide (MAGOX) 400 (241.3 Mg) MG tablet tablet, Take 4,000 mg by mouth Daily., Disp: , Rfl:   •  metFORMIN (GLUCOPHAGE) 500 MG tablet, Take 500 mg by mouth 2 (Two) Times a Day With Meals., Disp: , Rfl:   •  Misc Natural Products (RA XYDRA EF PO), Take  by mouth., Disp: , Rfl:   •  Multiple Vitamins-Minerals (MULTIVITAMIN ADULT PO), Take 1 tablet by mouth., Disp: , Rfl:   •  nadolol (CORGARD) 40 MG tablet, Take 40 mg by mouth., Disp: , Rfl:   •  nystatin (MYCOSTATIN) 675168 UNIT/ML suspension, 5 mL Every 6 (Six) Hours., Disp: , Rfl:   •  omeprazole (priLOSEC) 20 MG capsule, Take 20 mg by mouth Daily., Disp: , Rfl:   •  ONETOUCH DELICA LANCETS 33G misc, Use to check blood glucose up to 2 times daily, Disp: , Rfl:   •  Parathyroid Hormone, Recomb, (NATPARA SC), Inject  under the skin into the appropriate area as directed., Disp: , Rfl:   •  Potassium Chloride (KLOR-CON 10 PO), Take 40 mEq by mouth 3 (Three) Times a Day., Disp: , Rfl:   •  promethazine (PHENERGAN) 25 MG suppository, Insert 1 suppository into the rectum Every 6 (Six) Hours As Needed for Nausea or Vomiting., Disp: 10 suppository, Rfl: 0  •  raNITIdine (ZANTAC) 150 MG tablet, ranitidine 150 mg tablet  1 Two times a day, Disp: , Rfl:   •  Transparent Dressings (TEGADERM FILM 2-3/8\"X2-3/4\") misc, " Apply over pump insertion site every 2 days.., Disp: , Rfl:   •  valACYclovir (VALTREX) 1000 MG tablet, Valtrex 1 gram tablet  Two times a day, Disp: , Rfl:   •  venlafaxine (EFFEXOR) 25 MG tablet, Take 5 mg by mouth 2 (Two) Times a Day., Disp: , Rfl:   •  Vitamin D, Cholecalciferol, 1000 units capsule, Take 1,000 unit marking on U-100 syringe by mouth 2 (Two) Times a Day., Disp: , Rfl:   •  zinc sulfate (ZINCATE) 220 (50 Zn) MG capsule, zinc sulfate 220 mg (50 mg) capsule, Disp: , Rfl:     ALLERGIES     Cefpodoxime; Cephalosporins; Clarithromycin; Levofloxacin; Morphine; Nitrofurantoin; Nitrofurantoin macrocrystal; Nystatin; Ondansetron; Penicillins; and Sulfa antibiotics    FAMILY HISTORY       Family History   Problem Relation Age of Onset   • Kidney disease Father           SOCIAL HISTORY       Social History     Socioeconomic History   • Marital status: Single     Spouse name: Not on file   • Number of children: Not on file   • Years of education: Not on file   • Highest education level: Not on file   Tobacco Use   • Smoking status: Never Smoker   • Smokeless tobacco: Never Used   Substance and Sexual Activity   • Alcohol use: Yes     Comment: SOCIAL   • Drug use: No   • Sexual activity: Defer         PHYSICAL EXAM    (up to 7 for level 4, 8 or more for level 5)     Vitals:    01/15/20 1716 01/15/20 1717 01/15/20 1800 01/15/20 1830   BP:   97/48 111/63   Pulse: 73  70 71   Resp:       Temp:  98.3 °F (36.8 °C)     TempSrc:  Oral     SpO2: 98%  91% 94%   Weight:       Height:           Physical Exam  General :Patient is awake with her eyes open, but her jaw is clenched shut. Patient is able to answer questions through clenched teeth. Patient is holding her bilateral arms in flexor contractors behind her back and sitting with knees and legs crossed.  HEENT: Pupils are equally round and reactive to light, EOMI, conjunctivae clear, sclerae white, there is no injection no icterus.  Oral mucosa is moist, no exudate.  Uvula is midline  Neck: Neck is supple, full range of motion, trachea midline  Cardiac: Heart regular rate, rhythm, no murmurs, rubs, or gallops  Lungs: Lungs are clear to auscultation, there is no wheezing, rhonchi, or rales. There is no use of accessory muscles.  Chest wall: There is no tenderness to palpation over the chest wall or over ribs  Abdomen: Abdomen is soft, nontender, nondistended. There is no firm or pulsatile masses, no rebound rigidity or guarding.   Musculoskeletal: Unable to assess range of motion due to body position and secondary to pain.   Neuro: Motor intact, sensory intact, level of consciousness is normal, cerebellar function is normal, reflexes are grossly normal. No evidence of incontinence or loss of bowel or bladder function, no saddle anesthesia noted   Dermatology: Skin is warm and dry  Psych: Mentation is grossly normal, cognition is grossly normal. Affect is appropriate.      DIAGNOSTIC RESULTS     EKG: All EKG's are interpreted by the Emergency Department Physician who either signs or Co-signs this chart in the absence of a cardiologist.    No orders to display       RADIOLOGY:   Non-plain film images such as CT, Ultrasound and MRI are read by the radiologist. Plain radiographic images are visualized and preliminarily interpreted by the emergency physician with the below findings:      [] Radiologist's Report Reviewed:  No orders to display         ED BEDSIDE ULTRASOUND:   Performed by ED Physician - none    LABS:    I have reviewed and interpreted all of the currently available lab results from this visit (if applicable):  Results for orders placed or performed during the hospital encounter of 01/15/20   Calcium, Ionized   Result Value Ref Range    Ionized Calcium 1.28 1.12 - 1.32 mmol/L   Comprehensive Metabolic Panel   Result Value Ref Range    Glucose 78 65 - 99 mg/dL    BUN 8 6 - 20 mg/dL    Creatinine 0.58 0.57 - 1.00 mg/dL    Sodium 136 136 - 145 mmol/L    Potassium 4.2 3.5 -  5.2 mmol/L    Chloride 96 (L) 98 - 107 mmol/L    CO2 27.0 22.0 - 29.0 mmol/L    Calcium 9.5 8.6 - 10.5 mg/dL    Total Protein 7.7 6.0 - 8.5 g/dL    Albumin 4.20 3.50 - 5.20 g/dL    ALT (SGPT) 14 1 - 33 U/L    AST (SGOT) 23 1 - 32 U/L    Alkaline Phosphatase 92 39 - 117 U/L    Total Bilirubin 1.4 (H) 0.2 - 1.2 mg/dL    eGFR Non African Amer 122 >60 mL/min/1.73    Globulin 3.5 gm/dL    A/G Ratio 1.2 g/dL    BUN/Creatinine Ratio 13.8 7.0 - 25.0    Anion Gap 13.0 5.0 - 15.0 mmol/L   CBC Auto Differential   Result Value Ref Range    WBC 9.86 3.40 - 10.80 10*3/mm3    RBC 4.35 3.77 - 5.28 10*6/mm3    Hemoglobin 13.9 12.0 - 15.9 g/dL    Hematocrit 42.8 34.0 - 46.6 %    MCV 98.4 (H) 79.0 - 97.0 fL    MCH 32.0 26.6 - 33.0 pg    MCHC 32.5 31.5 - 35.7 g/dL    RDW 14.4 12.3 - 15.4 %    RDW-SD 52.8 37.0 - 54.0 fl    MPV 12.6 (H) 6.0 - 12.0 fL    Platelets 257 140 - 450 10*3/mm3   Manual Differential   Result Value Ref Range    Neutrophil % 61.0 42.7 - 76.0 %    Lymphocyte % 29.0 19.6 - 45.3 %    Monocyte % 7.0 5.0 - 12.0 %    Eosinophil % 3.0 0.3 - 6.2 %    Basophil % 0.0 0.0 - 1.5 %    Neutrophils Absolute 6.01 1.70 - 7.00 10*3/mm3    Lymphocytes Absolute 2.86 0.70 - 3.10 10*3/mm3    Monocytes Absolute 0.69 0.10 - 0.90 10*3/mm3    Eosinophils Absolute 0.30 0.00 - 0.40 10*3/mm3    Basophils Absolute 0.00 0.00 - 0.20 10*3/mm3    RBC Morphology Normal Normal    WBC Morphology Normal Normal    Platelet Morphology Normal Normal        All other labs were within normal range or not returned as of this dictation.      EMERGENCY DEPARTMENT COURSE and DIFFERENTIAL DIAGNOSIS/MDM:   Vitals:    Vitals:    01/15/20 1716 01/15/20 1717 01/15/20 1800 01/15/20 1830   BP:   97/48 111/63   Pulse: 73  70 71   Resp:       Temp:  98.3 °F (36.8 °C)     TempSrc:  Oral     SpO2: 98%  91% 94%   Weight:       Height:            !    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  Patient states she does have symptomatic relief with treatment with IV calcium after this similar presentation in the past.  No fevers or chills, she does not appear acutely ill or toxic, her vital signs are stable upon arrival.  We did draw IV, labs, patient was given symptomatic treatments as well as IV calcium.  Results reviewed as above, patient symptoms did improve in the ED after symptomatic therapies.  On reevaluation patient sitting upright in bed, moving all 4 extremities, no further muscle spasms or tetany type formations noted.  She will continue her follow-up with her specialist at [facility], take her medications as previously prescribed, return to the ED if she has any worsening symptoms or further concerns. The patient will follow-up with their PCP in 1-2 days for reevaluation.  If the patient or family members have any further concerns or patient has any worsening symptoms they will return to the ED for reevaluation.      MEDICATIONS ADMINISTERED IN ED:  Medications   HYDROmorphone (DILAUDID) injection 1 mg (1 mg Intravenous Given 1/15/20 1716)   calcium gluconate 1 g in sodium chloride 0.9 % 100 mL IVPB (0 g Intravenous Stopped 1/15/20 1709)   sodium chloride 0.9 % bolus 1,000 mL (0 mL Intravenous Stopped 1/15/20 1843)   methylPREDNISolone sodium succinate (SOLU-Medrol) injection 125 mg (125 mg Intravenous Given 1/15/20 1606)   promethazine (PHENERGAN) injection 12.5 mg (12.5 mg Intravenous Given 1/15/20 1715)   calcium gluconate 1 g in sodium chloride 0.9 % 100 mL IVPB (1 g Intravenous New Bag 1/15/20 1754)       PROCEDURES:  Procedures    CRITICAL CARE  TIME    Total Critical Care time was 0 minutes, excluding separately reportable procedures.   There was a high probability of clinically significant/life threatening deterioration in the patient's condition which required my urgent intervention.      FINAL IMPRESSION      1. Polyglandular autoimmune syndrome, type 1 (CMS/HCC)    2. Muscle spasm          DISPOSITION/PLAN     ED Disposition     ED Disposition Condition Comment    Discharge Stable           PATIENT REFERRED TO:  Arlene Pearce MD  [address] [id number] [phone]    In 2 days      Your specialist at [facility]    Schedule an appointment as soon as possible for a visit       [facility] Emergency Department  [address] [address] [phone]    If symptoms worsen      DISCHARGE MEDICATIONS:     Medication List      CONTINUE taking these medications    * albuterol sulfate  (90 Base) MCG/ACT inhaler  Commonly known as:  PROVENTIL HFA;VENTOLIN HFA;PROAIR HFA     * PROAIR  (90 Base) MCG/ACT inhaler  Generic drug:  albuterol sulfate HFA     Alcohol Prep 70 % pads     azaTHIOprine 50 MG tablet  Commonly known as:  IMURAN  Take 3 tablets by mouth Daily.     azelastine 0.1 % nasal spray  Commonly known as:  ASTELIN     butalbital-acetaminophen-caffeine -40 MG per tablet  Commonly known as:  FIORICET, ESGIC     calcitriol 0.5 MCG capsule  Commonly known as:  ROCALTROL     calcium gluconate 1 g/100 mL      cycloSPORINE 0.05 % ophthalmic emulsion  Commonly known as:  RESTASIS     cyproheptadine 4 MG tablet  Commonly known as:  PERIACTIN     diazePAM 5 MG tablet  Commonly known as:  VALIUM     dronabinol 2.5 MG capsule  Commonly known as:  MARINOL     EPIPEN 2-TEDDY 0.3 MG/0.3ML solution auto-injector injection  Generic drug:  EPINEPHrine     fludrocortisone 0.1 MG tablet     fluticasone 50 MCG/ACT nasal spray  Commonly known as:  FLONASE     gabapentin 600 MG  "tablet  Commonly known as:  NEURONTIN     * hydrocortisone 20 MG tablet  Commonly known as:  CORTEF     * CORTEF 10 MG tablet  Generic drug:  hydrocortisone     Insulin Pen Needle 32G X 4 MM misc     KLOR-CON 10 PO     levothyroxine 75 MCG tablet  Commonly known as:  SYNTHROID, LEVOTHROID     magnesium oxide 400 (241.3 Mg) MG tablet tablet  Commonly known as:  MAGOX     metFORMIN 500 MG tablet  Commonly known as:  GLUCOPHAGE     MULTIVITAMIN ADULT PO     nadolol 40 MG tablet  Commonly known as:  CORGARD     NATPARA SC     nystatin 407416 UNIT/ML suspension  Commonly known as:  MYCOSTATIN     omeprazole 20 MG capsule  Commonly known as:  priLOSEC     ONE TOUCH ULTRA 2 w/Device kit     ONETOUCH DELICA LANCETS 33G misc     OT ULTRA/FASTTK CNTRL SOLN solution     promethazine 25 MG suppository  Commonly known as:  PHENERGAN  Insert 1 suppository into the rectum Every 6 (Six) Hours As Needed for   Nausea or Vomiting.     RA XYDRA EF PO     raNITIdine 150 MG tablet  Commonly known as:  ZANTAC     SOLU-CORTEF 100 MG injection  Generic drug:  hydrocortisone sodium succinate     SYSTANE OVERNIGHT THERAPY 0.3 % gel  Generic drug:  Hypromellose     TEGADERM FILM 2-3/8\"X2-3/4\" misc     VALTREX 1000 MG tablet  Generic drug:  valACYclovir     venlafaxine 25 MG tablet  Commonly known as:  EFFEXOR     Vitamin D (Cholecalciferol) 25 MCG (1000 UT) capsule     zinc sulfate 220 (50 Zn) MG capsule  Commonly known as:  ZINCATE         * This list has 4 medication(s) that are the same as other medications   prescribed for you. Read the directions carefully, and ask your doctor or   other care provider to review them with you.            STOP taking these medications    apixaban 5 MG tablet tablet  Commonly known as:  ELIQUIS     calcium carbonate  MG chewable tablet  Commonly known as:  TUMS EX     CALCIUM CARBONATE PO     CELEXA 10 MG tablet  Generic drug:  citalopram     clarithromycin 500 MG tablet  Commonly known as:  BIAXIN   "   glucose blood test strip     HYDROcodone-acetaminophen 7.5-325 MG per tablet  Commonly known as:  NORCO     Magnesium Oxide 400 MG capsule     orphenadrine 100 MG 12 hr tablet  Commonly known as:  NORFLEX     SPECIALTY VITAMINS PRODUCTS PO            Documentation assistance provided by Sabine Oreilly acting as scribe for Dr. Hair Chisholm.     The scribe's documentation has been prepared under my direction and personally reviewed by me in its entirety.  I confirm that the note above accurately reflects all work, treatment, procedures, and medical decision making performed by me.      Comment: Please note this report has been produced using speech recognition software.      Hair Chisholm DO  Attending Emergency Physician                 Sabine Oreilly  01/15/20 2642       Hair Chisholm DO  01/15/20 3565

## 2020-02-21 DIAGNOSIS — Z79.899 HIGH RISK MEDICATION USE: Primary | ICD-10-CM

## 2020-02-21 RX ORDER — AZATHIOPRINE 50 MG/1
150 TABLET ORAL DAILY
Qty: 90 TABLET | Refills: 1 | Status: SHIPPED | OUTPATIENT
Start: 2020-02-21 | End: 2020-02-25 | Stop reason: SDUPTHER

## 2020-02-21 NOTE — TELEPHONE ENCOUNTER
Called patient and  notified her per   to continuing refills on her Imuran we need to send her a new lab order and scheduled patient for office follow up.     Follow up scheduled for  03/11/2020 at 3:00PM    Standing lab order mailed to patient for CBC and CMP every 3 months.Patient confirmed she understood and had no additional questions.

## 2020-02-25 ENCOUNTER — TELEPHONE (OUTPATIENT)
Dept: GASTROENTEROLOGY | Facility: CLINIC | Age: 30
End: 2020-02-25

## 2020-02-25 RX ORDER — AZATHIOPRINE 50 MG/1
150 TABLET ORAL DAILY
Qty: 90 TABLET | Refills: 1 | Status: SHIPPED | OUTPATIENT
Start: 2020-02-25 | End: 2020-05-05 | Stop reason: SDUPTHER

## 2020-02-25 NOTE — TELEPHONE ENCOUNTER
Called [facility] pharmacy and cancelled refill prescription for Imuran because patient is requesting the refill be sent to [facility] pharmacy.     Talked to Keira at [facility] who confirmed she cancelled the prescription for Imuran 50 mg take 3 tablets by mouth daily #90 with 1 refill  And new prescription sent to [facility]

## 2020-03-09 ENCOUNTER — APPOINTMENT (OUTPATIENT)
Dept: GENERAL RADIOLOGY | Facility: HOSPITAL | Age: 30
End: 2020-03-09

## 2020-03-09 ENCOUNTER — HOSPITAL ENCOUNTER (EMERGENCY)
Facility: HOSPITAL | Age: 30
Discharge: HOME | End: 2020-03-09
Attending: EMERGENCY MEDICINE | Admitting: EMERGENCY MEDICINE

## 2020-03-09 VITALS
HEIGHT: 62 IN | WEIGHT: 173 LBS | HEART RATE: 79 BPM | RESPIRATION RATE: 20 BRPM | DIASTOLIC BLOOD PRESSURE: 100 MMHG | SYSTOLIC BLOOD PRESSURE: 135 MMHG | TEMPERATURE: 98.4 F | OXYGEN SATURATION: 100 % | BODY MASS INDEX: 31.83 KG/M2

## 2020-03-09 DIAGNOSIS — S46.912A STRAIN OF LEFT SHOULDER, INITIAL ENCOUNTER: Primary | ICD-10-CM

## 2020-03-09 DIAGNOSIS — S63.616A SPRAIN OF RIGHT LITTLE FINGER, UNSPECIFIED SITE OF FINGER, INITIAL ENCOUNTER: ICD-10-CM
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
## 2020-03-09 PROCEDURE — 73030 X-RAY EXAM OF SHOULDER: CPT

## 2020-03-09 PROCEDURE — 99283 EMERGENCY DEPT VISIT LOW MDM: CPT

## 2020-03-09 PROCEDURE — 72072 X-RAY EXAM THORAC SPINE 3VWS: CPT

## 2020-03-09 PROCEDURE — 73130 X-RAY EXAM OF HAND: CPT

## 2020-03-09 RX ORDER — METHOCARBAMOL 500 MG/1
500 TABLET, FILM COATED ORAL 3 TIMES DAILY
Qty: 14 TABLET | Refills: 0 | Status: SHIPPED | OUTPATIENT
Start: 2020-03-09

## 2020-03-09 RX ORDER — HYDROCODONE BITARTRATE AND ACETAMINOPHEN 5; 325 MG/1; MG/1
1 TABLET ORAL ONCE
Status: COMPLETED | OUTPATIENT
Start: 2020-03-09 | End: 2020-03-09

## 2020-03-09 RX ORDER — LIDOCAINE 50 MG/G
1 PATCH TOPICAL EVERY 24 HOURS
Qty: 14 PATCH | Refills: 0 | Status: SHIPPED | OUTPATIENT
Start: 2020-03-09

## 2020-03-09 RX ADMIN — HYDROCODONE BITARTRATE AND ACETAMINOPHEN 1 TABLET: 5; 325 TABLET ORAL at 20:20

## 2020-03-09 NOTE — DISCHARGE INSTRUCTIONS
After a fall.  You have a finger sprain and shoulder sprain.  Please return here if you have pale, cold extremity.  Please follow-up with the hand specialist as well as your PCP.  If you continue to have symptoms you will need referral to orthopedics.

## 2020-03-10 NOTE — ED PROVIDER NOTES
Subjective   Patient presents complaining of left shoulder pain and right hand pain.  She reports that she was attempting to pull something down from a banister and fell on some steps.  She denies hitting her head or loss of consciousness.  She reports decreased range of motion.        Fall   Injury location:  Shoulder/arm  Shoulder/arm injury location:  L shoulder and R hand  Incident location:  Home  Time since incident: JPTA.  Associated symptoms: no nausea and no vomiting        Review of Systems   Constitutional: Negative for chills and fever.   Gastrointestinal: Negative for nausea and vomiting.   Genitourinary:        Denies pregnancy     Musculoskeletal: Positive for arthralgias. Negative for joint swelling.   All other systems reviewed and are negative.      Past Medical History:   Diagnosis Date   • Adrenal insufficiency (CMS/HCC)    • Alopecia    • Anemia    • Anxiety    • APS type 1 (CMS/HCC)    • Asthma    • Autoimmune hepatitis (CMS/HCC)    • Depression    • Dermatomyositis (CMS/HCC)    • Disease of thyroid gland    • Fibromyalgia    • Functional asplenia    • History of kidney stones    • Hypoparathyroidism (CMS/HCC)    • Insulin dependent diabetes mellitus (CMS/HCC)    • Long Q-T syndrome    • Pancreatic insufficiency    • Parathyroid abnormality (CMS/HCC)    • Polyglandular deficiency syndrome (CMS/HCC)    • Premature ovarian failure    • Sleep apnea    • Spleen absent    • Tetany     HAS EPISODES   • Transfusion history        Allergies   Allergen Reactions   • Cefpodoxime Hives   • Cephalosporins Other (See Comments)   • Clarithromycin Hives     biaxin   • Levofloxacin Other (See Comments)   • Morphine Other (See Comments)   • Nitrofurantoin Hives   • Nitrofurantoin Macrocrystal Other (See Comments)   • Nystatin Hives   • Ondansetron Other (See Comments)   • Penicillins Hives   • Sulfa Antibiotics Hives       Past Surgical History:   Procedure Laterality Date   • APPENDECTOMY     • CARDIAC SURGERY       LOOP RECORDER   • CHOLECYSTECTOMY     • ENDOSCOPY N/A 4/26/2018    Procedure: ESOPHAGOGASTRODUODENOSCOPY;  Surgeon: Gavin Vargas MD;  Location:  [facility];  Service: Gastroenterology   • ENDOSCOPY N/A 8/9/2018    Procedure: ESOPHAGOGASTRODUODENOSCOPY-DILATION;  Surgeon: Gavin Vargas MD;  Location:  [facility];  Service: Gastroenterology   • ENDOSCOPY     • ENDOSCOPY N/A 2/6/2019    Procedure: ESOPHAGOGASTRODUODENOSCOPY;  Surgeon: Gavin Vargas MD;  Location:  [facility];  Service: Gastroenterology   • ENDOSCOPY N/A 7/24/2019    Procedure: ESOPHAGOGASTRODUODENOSCOPY;  Surgeon: Gavin Vargas MD;  Location:  [facility];  Service: Gastroenterology   • ENDOSCOPY N/A 10/18/2019    Procedure: ESOPHAGOGASTRODUODENOSCOPY WITH BALLOON DILATION;  Surgeon: Gavin Vargas MD;  Location:  [facility];  Service: Gastroenterology   • EXPLORATORY LAPAROTOMY      MULTIPLE   • HAND SURGERY      4 TOTAL   • HERNIA REPAIR     • LIVER BIOPSY     • MUSCLE BIOPSY     • MUSCLE BIOPSY     • PEG TUBE INSERTION     • PEG TUBE REMOVAL     • PORTACATH PLACEMENT         Family History   Problem Relation Age of Onset   • Kidney disease Father        Social History     Socioeconomic History   • Marital status: Single     Spouse name: Not on file   • Number of children: Not on file   • Years of education: Not on file   • Highest education level: Not on file   Tobacco Use   • Smoking status: Never Smoker   • Smokeless tobacco: Never Used   Substance and Sexual Activity   • Alcohol use: Yes     Comment: SOCIAL   • Drug use: No   • Sexual activity: Defer           Objective   Physical Exam   Constitutional: She is oriented to person, place, and time. She appears well-developed and well-nourished.   HENT:   Head: Normocephalic and atraumatic.   Eyes: EOM are normal.   Neck: Normal range of motion.   Cardiovascular: Normal rate, regular rhythm  and normal heart sounds. Exam reveals no gallop and no friction rub.   No murmur heard.  Pulmonary/Chest: Effort normal and breath sounds normal. No stridor. No respiratory distress. She has no wheezes.   Abdominal: Soft. Bowel sounds are normal. She exhibits no distension. There is no tenderness. There is no guarding.   Musculoskeletal:   Tender left shoulder laterally, limited active ROM, full passive ROM, N/V intact    Right hand limited extension of 5th digit, no significant tenderness    Mid thoracic perispinal tenderness, no cervical or lumbar tenderness   Neurological: She is alert and oriented to person, place, and time. She exhibits abnormal muscle tone (increased initally, resolved on recheck ).   Skin: Skin is warm and dry. Capillary refill takes less than 2 seconds.   Psychiatric: She has a normal mood and affect. Her behavior is normal.       Procedures           ED Course  ED Course as of Mar 09 2351   Mon Mar 09, 2020   1800 Hand     IMPRESSION:  No acute bony abnormality    [WT]   1800 Shoulder  IMPRESSION:  No acute bony abnormality    [WT]   1841 Patient has a history of endocrine disorder and hypocalcemia causing tetany.  Her endocrinologist has given her calcium infusions to use at home via port.  In the lobby she did start experiencing symptoms of tetany and wished to be discharged so that she could start her infusion.  I was not able to fully evaluate her given that she is having symptoms of tetany therefore I did allow her to start her infusion as this was more rapid than ED administration as no room was available (patient in consult room).    [WT]   1915 Tetany ended. Patient reports this is a chronic issue, does not require further workup.     [WT]   2351 T spine   IMPRESSION:  Negative thoracic spine.    [WT]      ED Course User Index  [WT] Keely Mistry, HAVEN                                           MDM  Number of Diagnoses or Management Options  Fall, initial encounter:   Sprain of  right little finger, unspecified site of finger, initial encounter:   Strain of left shoulder, initial encounter:   Diagnosis management comments: Patient presents after mechanical fall.  She had no head injury.  She complains of left shoulder and right hand pain.  She has no fracture on x-ray.  They wanted to be discharged home so that she could do her calcium infusion for tetany which is a chronic issue for her.  I did allow them to do her infusion here in the emergency department and her tetany completely resolved.      Final diagnoses:   Strain of left shoulder, initial encounter   Sprain of right little finger, unspecified site of finger, initial encounter   Fall, initial encounter            Keely Mistry PA-C  03/09/20 2425

## 2020-04-01 ENCOUNTER — TELEMEDICINE (OUTPATIENT)
Dept: GASTROENTEROLOGY | Facility: CLINIC | Age: 30
End: 2020-04-01

## 2020-04-01 DIAGNOSIS — R10.13 EPIGASTRIC PAIN: ICD-10-CM

## 2020-04-01 DIAGNOSIS — R13.19 ESOPHAGEAL DYSPHAGIA: Primary | ICD-10-CM

## 2020-04-01 PROCEDURE — 99214 OFFICE O/P EST MOD 30 MIN: CPT | Performed by: INTERNAL MEDICINE

## 2020-04-01 RX ORDER — OMEPRAZOLE 40 MG/1
40 CAPSULE, DELAYED RELEASE ORAL
Qty: 60 CAPSULE | Refills: 11 | Status: SHIPPED | OUTPATIENT
Start: 2020-04-01 | End: 2021-01-11 | Stop reason: SDUPTHER

## 2020-04-01 NOTE — PROGRESS NOTES
GASTROENTEROLOGY TELEMEDICINE  OFFICE NOTE  Krista Richter  [id number]  1990    CARE TEAM  Patient Care Team:  Agustin Turner MD as PCP - General (Internal Medicine)    Referring provider/primary care provider  Arlene Pearce MD    Chief complaint  Abdominal pain  Dysphasia      HISTORY OF PRESENT ILLNESS:  29-year-old white female with significant medical issues related to adrenal insufficiency.    She has recurrent problems currently of epigastric pain nonradiating is been going on for about 4 months it is daily and seems to be worse after meals.  She takes omeprazole just 20 mg daily.    Her dysphasia is again problematic is been over 4 months since we dilated her and that is about the time that her esophageal dilations will last in terms of improving her symptoms.    Her dysphagia is such that she has had to limit her food intake to about 1 meal per day.  Having said that, her appetite and weight have been stable and she denies hematemesis melena or bright red blood per rectum.    PAST MEDICAL HISTORY  Past Medical History:   Diagnosis Date   • Adrenal insufficiency (CMS/HCC)    • Alopecia    • Anemia    • Anxiety    • APS type 1 (CMS/HCC)    • Asthma    • Autoimmune hepatitis (CMS/HCC)    • Depression    • Dermatomyositis (CMS/HCC)    • Disease of thyroid gland    • Fibromyalgia    • Functional asplenia    • History of kidney stones    • Hypoparathyroidism (CMS/HCC)    • Insulin dependent diabetes mellitus (CMS/HCC)    • Long Q-T syndrome    • Pancreatic insufficiency    • Parathyroid abnormality (CMS/HCC)    • Polyglandular deficiency syndrome (CMS/HCC)    • Premature ovarian failure    • Sleep apnea    • Spleen absent    • Tetany     HAS EPISODES   • Transfusion history         PAST SURGICAL HISTORY  Past Surgical History:   Procedure Laterality Date   • APPENDECTOMY     • CARDIAC SURGERY      LOOP RECORDER   • CHOLECYSTECTOMY     • ENDOSCOPY N/A 4/26/2018    Procedure:  ESOPHAGOGASTRODUODENOSCOPY;  Surgeon: Gavin Vargas MD;  Location:  [facility];  Service: Gastroenterology   • ENDOSCOPY N/A 8/9/2018    Procedure: ESOPHAGOGASTRODUODENOSCOPY-DILATION;  Surgeon: Gavin Vargas MD;  Location:  [facility];  Service: Gastroenterology   • ENDOSCOPY     • ENDOSCOPY N/A 2/6/2019    Procedure: ESOPHAGOGASTRODUODENOSCOPY;  Surgeon: Gavin Vargas MD;  Location:  [facility];  Service: Gastroenterology   • ENDOSCOPY N/A 7/24/2019    Procedure: ESOPHAGOGASTRODUODENOSCOPY;  Surgeon: Gavin Vargas MD;  Location:  [facility];  Service: Gastroenterology   • ENDOSCOPY N/A 10/18/2019    Procedure: ESOPHAGOGASTRODUODENOSCOPY WITH BALLOON DILATION;  Surgeon: Gavin Vargas MD;  Location:  [facility];  Service: Gastroenterology   • EXPLORATORY LAPAROTOMY      MULTIPLE   • HAND SURGERY      4 TOTAL   • HERNIA REPAIR     • LIVER BIOPSY     • MUSCLE BIOPSY     • MUSCLE BIOPSY     • PEG TUBE INSERTION     • PEG TUBE REMOVAL     • PORTACATH PLACEMENT          MEDICATIONS:    Current Outpatient Medications:   •  albuterol (PROVENTIL HFA;VENTOLIN HFA) 108 (90 Base) MCG/ACT inhaler, Take 2 Puffs by inhalation every 4 hours as needed for wheezing., Disp: , Rfl:   •  albuterol sulfate HFA (PROAIR HFA) 108 (90 Base) MCG/ACT inhaler, ProAir HFA 90 mcg/actuation aerosol inhaler  2 PUFFS Q 4 HOURS SHORTNESS OF BREATH, Disp: , Rfl:   •  Alcohol Swabs (ALCOHOL PREP) 70 % pads, Check blood glucose up to 2 times daily, Disp: , Rfl:   •  azaTHIOprine (IMURAN) 50 MG tablet, Take 3 tablets by mouth Daily., Disp: 90 tablet, Rfl: 1  •  azelastine (ASTELIN) 0.1 % nasal spray, Astelin 137 mcg (0.1 %) nasal spray aerosol  Daily, Disp: , Rfl:   •  Blood Glucose Calibration (OT ULTRA/FASTTK CNTRL SOLN) solution, Use weekly and as needed to calibrate meter, Disp: , Rfl:   •  Blood Glucose Monitoring Suppl (ONE TOUCH ULTRA 2)  w/Device kit, Use to check blood glucose up to 2 times daily, Disp: , Rfl:   •  butalbital-acetaminophen-caffeine (FIORICET, ESGIC) -40 MG per tablet, Take 1 tablet by mouth Daily., Disp: , Rfl:   •  calcitriol (ROCALTROL) 0.5 MCG capsule, Take 0.5 mcg by mouth 2 (Two) Times a Day., Disp: , Rfl:   •  calcium gluconate 1 g/100 mL, Infuse 2 g into a venous catheter., Disp: , Rfl:   •  cycloSPORINE (RESTASIS) 0.05 % ophthalmic emulsion, 1 drop. PRN, Disp: , Rfl:   •  cyproheptadine (PERIACTIN) 4 MG tablet, Take 4 mg by mouth 2 (Two) Times a Day., Disp: , Rfl:   •  diazePAM (VALIUM) 5 MG tablet, 5 mg., Disp: , Rfl:   •  dronabinol (MARINOL) 2.5 MG capsule, 5 mg 4 (Four) Times a Day As Needed., Disp: , Rfl:   •  EPINEPHrine (EPIPEN 2-TEDDY) 0.3 MG/0.3ML solution auto-injector injection, EpiPen 0.3 mg/0.3 mL injection, auto-injector  Take 1 auto by injection route., Disp: , Rfl:   •  fludrocortisone 0.1 MG tablet, TAKE ONE TABLET BY MOUTH DAILY..05MG PER PT, Disp: , Rfl:   •  fluticasone (FLONASE) 50 MCG/ACT nasal spray, 1 spray., Disp: , Rfl:   •  gabapentin (NEURONTIN) 600 MG tablet, Take 600 mg by mouth., Disp: , Rfl:   •  hydrocortisone (CORTEF) 10 MG tablet, Cortef 10 mg tablet, Disp: , Rfl:   •  hydrocortisone (CORTEF) 20 MG tablet, Take 20 mg by mouth 2 (Two) Times a Day. 20 MG IN AM AND 10 MG AT NIGHT, Disp: , Rfl:   •  hydrocortisone sodium succinate (SOLU-CORTEF) 100 MG injection, Give 100 mg ( 2 mL ) intramuscular as needed for vomiting, severe illness Dispense actovial, Disp: , Rfl:   •  Hypromellose (SYSTANE OVERNIGHT THERAPY) 0.3 % gel, Systane Gel 0.3 % eye gel, Disp: , Rfl:   •  Insulin Pen Needle 32G X 4 MM misc, Use to administer Natpara subcutaneously daily, Disp: , Rfl:   •  levothyroxine (SYNTHROID, LEVOTHROID) 75 MCG tablet, levothyroxine 75 mcg tablet, Disp: , Rfl:   •  lidocaine (LIDODERM) 5 %, Place 1 patch on the skin as directed by provider Daily. Remove & Discard patch within 12 hours or as  "directed by MD, Disp: 14 patch, Rfl: 0  •  magnesium oxide (MAGOX) 400 (241.3 Mg) MG tablet tablet, Take 4,000 mg by mouth Daily., Disp: , Rfl:   •  metFORMIN (GLUCOPHAGE) 500 MG tablet, Take 500 mg by mouth 2 (Two) Times a Day With Meals., Disp: , Rfl:   •  methocarbamol (ROBAXIN) 500 MG tablet, Take 1 tablet by mouth 3 (Three) Times a Day., Disp: 14 tablet, Rfl: 0  •  Misc Natural Products (RA XYDRA EF PO), Take  by mouth., Disp: , Rfl:   •  Multiple Vitamins-Minerals (MULTIVITAMIN ADULT PO), Take 1 tablet by mouth., Disp: , Rfl:   •  nadolol (CORGARD) 40 MG tablet, Take 40 mg by mouth., Disp: , Rfl:   •  nystatin (MYCOSTATIN) 747415 UNIT/ML suspension, 5 mL Every 6 (Six) Hours., Disp: , Rfl:   •  omeprazole (priLOSEC) 20 MG capsule, Take 20 mg by mouth Daily., Disp: , Rfl:   •  ONETOUCH DELICA LANCETS 33G misc, Use to check blood glucose up to 2 times daily, Disp: , Rfl:   •  Parathyroid Hormone, Recomb, (NATPARA SC), Inject  under the skin into the appropriate area as directed., Disp: , Rfl:   •  Potassium Chloride (KLOR-CON 10 PO), Take 40 mEq by mouth 3 (Three) Times a Day., Disp: , Rfl:   •  promethazine (PHENERGAN) 25 MG suppository, Insert 1 suppository into the rectum Every 6 (Six) Hours As Needed for Nausea or Vomiting., Disp: 10 suppository, Rfl: 0  •  raNITIdine (ZANTAC) 150 MG tablet, ranitidine 150 mg tablet  1 Two times a day, Disp: , Rfl:   •  Transparent Dressings (TEGADERM FILM 2-3/8\"X2-3/4\") misc, Apply over pump insertion site every 2 days.., Disp: , Rfl:   •  valACYclovir (VALTREX) 1000 MG tablet, Valtrex 1 gram tablet  Two times a day, Disp: , Rfl:   •  venlafaxine (EFFEXOR) 25 MG tablet, Take 5 mg by mouth 2 (Two) Times a Day., Disp: , Rfl:   •  Vitamin D, Cholecalciferol, 1000 units capsule, Take 1,000 unit marking on U-100 syringe by mouth 2 (Two) Times a Day., Disp: , Rfl:   •  zinc sulfate (ZINCATE) 220 (50 Zn) MG capsule, zinc sulfate 220 mg (50 mg) capsule, Disp: , Rfl: "     ALLERGIES  Allergies   Allergen Reactions   • Cefpodoxime Hives   • Cephalosporins Other (See Comments)   • Clarithromycin Hives     biaxin   • Levofloxacin Other (See Comments)   • Morphine Other (See Comments)   • Nitrofurantoin Hives   • Nitrofurantoin Macrocrystal Other (See Comments)   • Nystatin Hives   • Ondansetron Other (See Comments)   • Penicillins Hives   • Sulfa Antibiotics Hives       FAMILY HISTORY:  Family History   Problem Relation Age of Onset   • Kidney disease Father        SOCIAL HISTORY  Social History     Socioeconomic History   • Marital status: Single     Spouse name: Not on file   • Number of children: Not on file   • Years of education: Not on file   • Highest education level: Not on file   Tobacco Use   • Smoking status: Never Smoker   • Smokeless tobacco: Never Used   Substance and Sexual Activity   • Alcohol use: Yes     Comment: SOCIAL   • Drug use: No   • Sexual activity: Defer     Socioeconomic History:  Single.  Law student.  Non-smoker/nondrinker.       REVIEW OF SYSTEMS  Review of Systems   Constitutional: Negative for unexpected weight gain and unexpected weight loss.   Respiratory: Positive for choking.    Gastrointestinal: Positive for abdominal pain, nausea, GERD and indigestion. Negative for blood in stool and vomiting.     I reviewed the above-noted review of systems.    PHYSICAL EXAM   There were no vitals taken for this visit.  General: Normal affect.  Good spirits.  No apparent or acute distress  HEENT: Anicteric sclera.  Alopecia noted  Neck: Normal neck movement observed  CV: Regular rate and rhythm, S1, S2  Lungs: Normal respirations  Neurologic: Without obvious focal motor deficits.  Normal cognition.  Alert and oriented  Rectal exam: deferred        Results Review:  I reviewed the patient's new clinical results.      ASSESSMENT  1.-  Recurrent dysphagia to solids.  She thinks we need to go ahead and redilate her although we are currently not scheduling elective  procedures due to the coronavirus pandemic.  She think she can wait it out for little bit longer but will let us know if this progresses.  She is having difficulty eating anything more than soups.  2.-  Epigastric pain.  Etiology unclear.  Increase omeprazole to 40 mg p.o. twice daily  3.-  Adrenal insufficiency    PLAN  1.-  Increase omeprazole to 40 mg p.o. twice daily  2.-  EGD with esophageal dilation is recommended although we will going to try to defer that for as long as we can.  She is agreeable but will call us if things are progressing  3.-  Return appointment in 2 weeks.      I discussed the patients findings and my recommendations with patient    Gavin Vikram Vargas MD  4/1/2020   09:38    Much of this note is an electronic transcription of spoken language to printed text. Electronic transcription of spoken language may permit erroneous, nonsensical word phrases to be inadvertently transcribed.  Although I have reviewed the note for these errors, some may still be present.

## 2020-04-02 PROBLEM — R10.13 EPIGASTRIC PAIN: Status: ACTIVE | Noted: 2020-04-02

## 2020-04-22 ENCOUNTER — TELEPHONE (OUTPATIENT)
Dept: GASTROENTEROLOGY | Facility: CLINIC | Age: 30
End: 2020-04-22

## 2020-04-22 NOTE — TELEPHONE ENCOUNTER
Called patient back and apologized that  was late to the appointment she had scheduled for 10:00AM.Offered to reschedule patient and again apologized that he was running behind and she was not notified. Patient confirmed It was okay but was unable to wait any longer and just requested that he respond to her message instead of having to reschedule the appointment. confirmed I completely understood and would have  look at the message.

## 2020-04-22 NOTE — TELEPHONE ENCOUNTER
----- Message from Krista Richter sent at 4/22/2020 10:21 AM EDT -----  Regarding: Non-Urgent Medical Question  Contact: [phone]  Hey I was in the meeting but I can't wait anymore I have lots of stuff to do today. I'm doing a little better stomach pain wise with the new med increase, but I'm starting to choke on my soups now. Also could I get a new script for Marinol and polyethylene glycol ?

## 2020-05-06 RX ORDER — AZATHIOPRINE 50 MG/1
150 TABLET ORAL DAILY
Qty: 90 TABLET | Refills: 1 | Status: SHIPPED | OUTPATIENT
Start: 2020-05-06 | End: 2020-05-06

## 2020-05-06 RX ORDER — AZATHIOPRINE 50 MG/1
150 TABLET ORAL DAILY
Qty: 90 TABLET | Refills: 0 | Status: SHIPPED | OUTPATIENT
Start: 2020-05-06 | End: 2020-06-15 | Stop reason: SDUPTHER

## 2020-06-03 ENCOUNTER — APPOINTMENT (OUTPATIENT)
Dept: OTHER | Facility: HOSPITAL | Age: 30
End: 2020-06-03

## 2020-06-03 ENCOUNTER — HOSPITAL ENCOUNTER (INPATIENT)
Facility: HOSPITAL | Age: 30
LOS: 3 days | Discharge: HOME | End: 2020-06-06
Attending: FAMILY MEDICINE | Admitting: INTERNAL MEDICINE

## 2020-06-03 DIAGNOSIS — Z00.6 EXAMINATION FOR NORMAL COMPARISON FOR CLINICAL RESEARCH: ICD-10-CM

## 2020-06-03 DIAGNOSIS — K31.84 GASTROPARESIS: ICD-10-CM

## 2020-06-03 DIAGNOSIS — K62.5 RECTAL BLEEDING: ICD-10-CM

## 2020-06-03 DIAGNOSIS — J18.9 PNEUMONITIS: ICD-10-CM

## 2020-06-03 DIAGNOSIS — K22.89 DILATATION OF ESOPHAGUS: ICD-10-CM

## 2020-06-03 DIAGNOSIS — R10.13 EPIGASTRIC PAIN: ICD-10-CM

## 2020-06-03 DIAGNOSIS — R13.19 ESOPHAGEAL DYSPHAGIA: Primary | ICD-10-CM

## 2020-06-03 PROBLEM — J40 BRONCHITIS: Status: ACTIVE | Noted: 2020-06-03

## 2020-06-03 PROBLEM — J98.4 PNEUMONITIS: Status: ACTIVE | Noted: 2020-06-03

## 2020-06-03 PROBLEM — E11.9 TYPE 2 DIABETES MELLITUS: Status: ACTIVE | Noted: 2020-06-03

## 2020-06-03 PROBLEM — E20.9 HYPOPARATHYROIDISM: Status: ACTIVE | Noted: 2020-06-03

## 2020-06-03 PROBLEM — E31.8: Status: ACTIVE | Noted: 2020-06-03

## 2020-06-03 PROBLEM — Q89.01 ASPLENIA: Status: ACTIVE | Noted: 2020-06-03

## 2020-06-03 PROBLEM — E28.39 PREMATURE OVARIAN FAILURE: Status: ACTIVE | Noted: 2020-06-03

## 2020-06-03 PROBLEM — R09.02 HYPOXIA: Status: ACTIVE | Noted: 2020-06-03

## 2020-06-03 PROBLEM — E27.1 ADRENAL INSUFFICIENCY (ADDISON'S DISEASE): Status: ACTIVE | Noted: 2020-06-03

## 2020-06-03 PROBLEM — R07.81 PLEURITIC CHEST PAIN: Status: ACTIVE | Noted: 2020-06-03

## 2020-06-03 LAB
ALBUMIN SERPL-MCNC: 4.3 G/DL (ref 3.5–5.2)
ALBUMIN/GLOB SERPL: 1.4 G/DL
ALP SERPL-CCNC: 85 U/L (ref 39–117)
ALT SERPL W P-5'-P-CCNC: 15 U/L (ref 1–33)
AMPHET+METHAMPHET UR QL: NEGATIVE
AMPHETAMINES UR QL: NEGATIVE
ANION GAP SERPL CALCULATED.3IONS-SCNC: 13 MMOL/L (ref 5–15)
AST SERPL-CCNC: 20 U/L (ref 1–32)
B PARAPERT DNA SPEC QL NAA+PROBE: NOT DETECTED
B PERT DNA SPEC QL NAA+PROBE: NOT DETECTED
B-HCG UR QL: NEGATIVE
BARBITURATES UR QL SCN: NEGATIVE
BASOPHILS # BLD AUTO: 0.02 10*3/MM3 (ref 0–0.2)
BASOPHILS # BLD AUTO: 0.03 10*3/MM3 (ref 0–0.2)
BASOPHILS NFR BLD AUTO: 0.1 % (ref 0–1.5)
BASOPHILS NFR BLD AUTO: 0.2 % (ref 0–1.5)
BENZODIAZ UR QL SCN: POSITIVE
BILIRUB SERPL-MCNC: 0.7 MG/DL (ref 0.2–1.2)
BUN BLD-MCNC: 16 MG/DL (ref 6–20)
BUN/CREAT SERPL: 34.8 (ref 7–25)
BUPRENORPHINE SERPL-MCNC: NEGATIVE NG/ML
C PNEUM DNA NPH QL NAA+NON-PROBE: NOT DETECTED
CA-I SERPL ISE-MCNC: 1.12 MMOL/L (ref 1.12–1.32)
CA-I SERPL ISE-MCNC: 1.29 MMOL/L (ref 1.12–1.32)
CALCIUM SPEC-SCNC: 8.5 MG/DL (ref 8.6–10.5)
CANNABINOIDS SERPL QL: POSITIVE
CHLORIDE SERPL-SCNC: 99 MMOL/L (ref 98–107)
CO2 SERPL-SCNC: 28 MMOL/L (ref 22–29)
COCAINE UR QL: NEGATIVE
CORTIS SERPL-MCNC: 120.9 MCG/DL
CREAT BLD-MCNC: 0.46 MG/DL (ref 0.57–1)
CRP SERPL-MCNC: 0.1 MG/DL (ref 0–0.5)
D-LACTATE SERPL-SCNC: 1.1 MMOL/L (ref 0.5–2)
DEPRECATED RDW RBC AUTO: 51.3 FL (ref 37–54)
DEPRECATED RDW RBC AUTO: 51.7 FL (ref 37–54)
EOSINOPHIL # BLD AUTO: 0 10*3/MM3 (ref 0–0.4)
EOSINOPHIL # BLD AUTO: 0 10*3/MM3 (ref 0–0.4)
EOSINOPHIL NFR BLD AUTO: 0 % (ref 0.3–6.2)
EOSINOPHIL NFR BLD AUTO: 0 % (ref 0.3–6.2)
ERYTHROCYTE [DISTWIDTH] IN BLOOD BY AUTOMATED COUNT: 14.1 % (ref 12.3–15.4)
ERYTHROCYTE [DISTWIDTH] IN BLOOD BY AUTOMATED COUNT: 14.3 % (ref 12.3–15.4)
FLUAV H1 2009 PAND RNA NPH QL NAA+PROBE: NOT DETECTED
FLUAV H1 HA GENE NPH QL NAA+PROBE: NOT DETECTED
FLUAV H3 RNA NPH QL NAA+PROBE: NOT DETECTED
FLUAV SUBTYP SPEC NAA+PROBE: NOT DETECTED
FLUBV RNA ISLT QL NAA+PROBE: NOT DETECTED
GFR SERPL CREATININE-BSD FRML MDRD: >150 ML/MIN/1.73
GLOBULIN UR ELPH-MCNC: 3 GM/DL
GLUCOSE BLD-MCNC: 159 MG/DL (ref 65–99)
GLUCOSE BLDC GLUCOMTR-MCNC: 102 MG/DL (ref 70–130)
GLUCOSE BLDC GLUCOMTR-MCNC: 108 MG/DL (ref 70–130)
GLUCOSE BLDC GLUCOMTR-MCNC: 137 MG/DL (ref 70–130)
GLUCOSE BLDC GLUCOMTR-MCNC: 144 MG/DL (ref 70–130)
HADV DNA SPEC NAA+PROBE: NOT DETECTED
HBA1C MFR BLD: 6.1 % (ref 4.8–5.6)
HCOV 229E RNA SPEC QL NAA+PROBE: NOT DETECTED
HCOV HKU1 RNA SPEC QL NAA+PROBE: NOT DETECTED
HCOV NL63 RNA SPEC QL NAA+PROBE: NOT DETECTED
HCOV OC43 RNA SPEC QL NAA+PROBE: NOT DETECTED
HCT VFR BLD AUTO: 41.8 % (ref 34–46.6)
HCT VFR BLD AUTO: 42.2 % (ref 34–46.6)
HGB BLD-MCNC: 13.7 G/DL (ref 12–15.9)
HGB BLD-MCNC: 13.9 G/DL (ref 12–15.9)
HMPV RNA NPH QL NAA+NON-PROBE: NOT DETECTED
HOLD SPECIMEN: NORMAL
HPIV1 RNA SPEC QL NAA+PROBE: NOT DETECTED
HPIV2 RNA SPEC QL NAA+PROBE: NOT DETECTED
HPIV3 RNA NPH QL NAA+PROBE: NOT DETECTED
HPIV4 P GENE NPH QL NAA+PROBE: NOT DETECTED
IMM GRANULOCYTES # BLD AUTO: 0.08 10*3/MM3 (ref 0–0.05)
IMM GRANULOCYTES # BLD AUTO: 0.08 10*3/MM3 (ref 0–0.05)
IMM GRANULOCYTES NFR BLD AUTO: 0.5 % (ref 0–0.5)
IMM GRANULOCYTES NFR BLD AUTO: 0.5 % (ref 0–0.5)
LDH SERPL-CCNC: 187 U/L (ref 135–214)
LYMPHOCYTES # BLD AUTO: 0.38 10*3/MM3 (ref 0.7–3.1)
LYMPHOCYTES # BLD AUTO: 0.68 10*3/MM3 (ref 0.7–3.1)
LYMPHOCYTES NFR BLD AUTO: 2.3 % (ref 19.6–45.3)
LYMPHOCYTES NFR BLD AUTO: 3.9 % (ref 19.6–45.3)
M PNEUMO IGG SER IA-ACNC: NOT DETECTED
MAGNESIUM SERPL-MCNC: 1.8 MG/DL (ref 1.6–2.6)
MAGNESIUM SERPL-MCNC: 1.9 MG/DL (ref 1.6–2.6)
MCH RBC QN AUTO: 32.1 PG (ref 26.6–33)
MCH RBC QN AUTO: 32.3 PG (ref 26.6–33)
MCHC RBC AUTO-ENTMCNC: 32.8 G/DL (ref 31.5–35.7)
MCHC RBC AUTO-ENTMCNC: 32.9 G/DL (ref 31.5–35.7)
MCV RBC AUTO: 97.9 FL (ref 79–97)
MCV RBC AUTO: 97.9 FL (ref 79–97)
METHADONE UR QL SCN: NEGATIVE
MONOCYTES # BLD AUTO: 0.44 10*3/MM3 (ref 0.1–0.9)
MONOCYTES # BLD AUTO: 0.98 10*3/MM3 (ref 0.1–0.9)
MONOCYTES NFR BLD AUTO: 2.7 % (ref 5–12)
MONOCYTES NFR BLD AUTO: 5.6 % (ref 5–12)
MRSA DNA SPEC QL NAA+PROBE: NEGATIVE
NEUTROPHILS # BLD AUTO: 15.48 10*3/MM3 (ref 1.7–7)
NEUTROPHILS # BLD AUTO: 15.71 10*3/MM3 (ref 1.7–7)
NEUTROPHILS NFR BLD AUTO: 89.9 % (ref 42.7–76)
NEUTROPHILS NFR BLD AUTO: 94.3 % (ref 42.7–76)
NRBC BLD AUTO-RTO: 0 /100 WBC (ref 0–0.2)
NRBC BLD AUTO-RTO: 0 /100 WBC (ref 0–0.2)
OPIATES UR QL: POSITIVE
OXYCODONE UR QL SCN: NEGATIVE
PCP UR QL SCN: NEGATIVE
PHOSPHATE SERPL-MCNC: 5.1 MG/DL (ref 2.5–4.5)
PLATELET # BLD AUTO: 297 10*3/MM3 (ref 140–450)
PLATELET # BLD AUTO: 307 10*3/MM3 (ref 140–450)
PMV BLD AUTO: 12.1 FL (ref 6–12)
PMV BLD AUTO: 12.2 FL (ref 6–12)
POTASSIUM BLD-SCNC: 4.3 MMOL/L (ref 3.5–5.2)
PROCALCITONIN SERPL-MCNC: 0.05 NG/ML (ref 0.1–0.25)
PROPOXYPH UR QL: NEGATIVE
PROT SERPL-MCNC: 7.3 G/DL (ref 6–8.5)
PTH-INTACT SERPL-MCNC: 2.6 PG/ML (ref 15–65)
RBC # BLD AUTO: 4.27 10*6/MM3 (ref 3.77–5.28)
RBC # BLD AUTO: 4.31 10*6/MM3 (ref 3.77–5.28)
RHINOVIRUS RNA SPEC NAA+PROBE: NOT DETECTED
RSV RNA NPH QL NAA+NON-PROBE: NOT DETECTED
S PNEUM AG SPEC QL LA: NEGATIVE
SARS-COV-2 RNA RESP QL NAA+PROBE: NOT DETECTED
SODIUM BLD-SCNC: 140 MMOL/L (ref 136–145)
TRICYCLICS UR QL SCN: NEGATIVE
TROPONIN T SERPL-MCNC: <0.01 NG/ML (ref 0–0.03)
TSH SERPL DL<=0.05 MIU/L-ACNC: 0.38 UIU/ML (ref 0.27–4.2)
WBC NRBC COR # BLD: 16.41 10*3/MM3 (ref 3.4–10.8)
WBC NRBC COR # BLD: 17.47 10*3/MM3 (ref 3.4–10.8)
WHOLE BLOOD HOLD SPECIMEN: NORMAL
WHOLE BLOOD HOLD SPECIMEN: NORMAL

## 2020-06-03 PROCEDURE — 83970 ASSAY OF PARATHORMONE: CPT | Performed by: INTERNAL MEDICINE

## 2020-06-03 PROCEDURE — 85025 COMPLETE CBC W/AUTO DIFF WBC: CPT | Performed by: PHYSICIAN ASSISTANT

## 2020-06-03 PROCEDURE — 82962 GLUCOSE BLOOD TEST: CPT

## 2020-06-03 PROCEDURE — 25010000002 MORPHINE PER 10 MG: Performed by: INTERNAL MEDICINE

## 2020-06-03 PROCEDURE — 82330 ASSAY OF CALCIUM: CPT | Performed by: PHYSICIAN ASSISTANT

## 2020-06-03 PROCEDURE — 82533 TOTAL CORTISOL: CPT | Performed by: INTERNAL MEDICINE

## 2020-06-03 PROCEDURE — 25010000002 ENOXAPARIN PER 10 MG: Performed by: PHYSICIAN ASSISTANT

## 2020-06-03 PROCEDURE — 86140 C-REACTIVE PROTEIN: CPT | Performed by: PHYSICIAN ASSISTANT

## 2020-06-03 PROCEDURE — 99223 1ST HOSP IP/OBS HIGH 75: CPT | Performed by: INTERNAL MEDICINE

## 2020-06-03 PROCEDURE — 93005 ELECTROCARDIOGRAM TRACING: CPT | Performed by: FAMILY MEDICINE

## 2020-06-03 PROCEDURE — 80306 DRUG TEST PRSMV INSTRMNT: CPT | Performed by: INTERNAL MEDICINE

## 2020-06-03 PROCEDURE — 87641 MR-STAPH DNA AMP PROBE: CPT | Performed by: HOSPITALIST

## 2020-06-03 PROCEDURE — 87040 BLOOD CULTURE FOR BACTERIA: CPT | Performed by: PHYSICIAN ASSISTANT

## 2020-06-03 PROCEDURE — 94640 AIRWAY INHALATION TREATMENT: CPT

## 2020-06-03 PROCEDURE — 84100 ASSAY OF PHOSPHORUS: CPT | Performed by: PHYSICIAN ASSISTANT

## 2020-06-03 PROCEDURE — 99253 IP/OBS CNSLTJ NEW/EST LOW 45: CPT | Performed by: INTERNAL MEDICINE

## 2020-06-03 PROCEDURE — 94799 UNLISTED PULMONARY SVC/PX: CPT

## 2020-06-03 PROCEDURE — 25010000002 HYDROMORPHONE PER 4 MG: Performed by: HOSPITALIST

## 2020-06-03 PROCEDURE — 84484 ASSAY OF TROPONIN QUANT: CPT | Performed by: PHYSICIAN ASSISTANT

## 2020-06-03 PROCEDURE — 25010000002 HYDROCORTISONE SODIUM SUCCINATE 100 MG RECONSTITUTED SOLUTION: Performed by: INTERNAL MEDICINE

## 2020-06-03 PROCEDURE — 80053 COMPREHEN METABOLIC PANEL: CPT | Performed by: PHYSICIAN ASSISTANT

## 2020-06-03 PROCEDURE — 84443 ASSAY THYROID STIM HORMONE: CPT | Performed by: PHYSICIAN ASSISTANT

## 2020-06-03 PROCEDURE — 81025 URINE PREGNANCY TEST: CPT | Performed by: INTERNAL MEDICINE

## 2020-06-03 PROCEDURE — 25010000002 CALCIUM GLUCONATE PER 10 ML: Performed by: HOSPITALIST

## 2020-06-03 PROCEDURE — 63710000001 PROMETHAZINE PER 12.5 MG: Performed by: INTERNAL MEDICINE

## 2020-06-03 PROCEDURE — 0100U HC BIOFIRE FILMARRAY RESP PANEL 2: CPT | Performed by: INTERNAL MEDICINE

## 2020-06-03 PROCEDURE — 84145 PROCALCITONIN (PCT): CPT | Performed by: INTERNAL MEDICINE

## 2020-06-03 PROCEDURE — 83735 ASSAY OF MAGNESIUM: CPT | Performed by: PHYSICIAN ASSISTANT

## 2020-06-03 PROCEDURE — 87635 SARS-COV-2 COVID-19 AMP PRB: CPT | Performed by: PHYSICIAN ASSISTANT

## 2020-06-03 PROCEDURE — 83036 HEMOGLOBIN GLYCOSYLATED A1C: CPT | Performed by: PHYSICIAN ASSISTANT

## 2020-06-03 PROCEDURE — 83615 LACTATE (LD) (LDH) ENZYME: CPT | Performed by: PHYSICIAN ASSISTANT

## 2020-06-03 PROCEDURE — 63710000001 PROMETHAZINE PER 12.5 MG: Performed by: PHYSICIAN ASSISTANT

## 2020-06-03 PROCEDURE — 87899 AGENT NOS ASSAY W/OPTIC: CPT | Performed by: PHYSICIAN ASSISTANT

## 2020-06-03 PROCEDURE — 82330 ASSAY OF CALCIUM: CPT | Performed by: HOSPITALIST

## 2020-06-03 PROCEDURE — 83605 ASSAY OF LACTIC ACID: CPT | Performed by: PHYSICIAN ASSISTANT

## 2020-06-03 RX ORDER — SODIUM CHLORIDE 0.9 % (FLUSH) 0.9 %
10 SYRINGE (ML) INJECTION AS NEEDED
Status: DISCONTINUED | OUTPATIENT
Start: 2020-06-03 | End: 2020-06-06 | Stop reason: HOSPADM

## 2020-06-03 RX ORDER — FLUDROCORTISONE ACETATE 0.1 MG/1
50 TABLET ORAL DAILY
Status: DISCONTINUED | OUTPATIENT
Start: 2020-06-03 | End: 2020-06-03

## 2020-06-03 RX ORDER — ACETAMINOPHEN 160 MG/5ML
650 SOLUTION ORAL EVERY 4 HOURS PRN
Status: DISCONTINUED | OUTPATIENT
Start: 2020-06-03 | End: 2020-06-06 | Stop reason: HOSPADM

## 2020-06-03 RX ORDER — FLUDROCORTISONE ACETATE 0.1 MG/1
100 TABLET ORAL DAILY
Status: DISCONTINUED | OUTPATIENT
Start: 2020-06-03 | End: 2020-06-06 | Stop reason: HOSPADM

## 2020-06-03 RX ORDER — AZITHROMYCIN 250 MG/1
250 TABLET, FILM COATED ORAL DAILY
COMMUNITY
End: 2020-06-06 | Stop reason: HOSPADM

## 2020-06-03 RX ORDER — DIAZEPAM 5 MG/1
5 TABLET ORAL EVERY 12 HOURS PRN
Status: DISCONTINUED | OUTPATIENT
Start: 2020-06-03 | End: 2020-06-06 | Stop reason: HOSPADM

## 2020-06-03 RX ORDER — ALBUTEROL SULFATE 2.5 MG/3ML
2.5 SOLUTION RESPIRATORY (INHALATION) EVERY 6 HOURS PRN
Status: DISCONTINUED | OUTPATIENT
Start: 2020-06-03 | End: 2020-06-06 | Stop reason: HOSPADM

## 2020-06-03 RX ORDER — AZATHIOPRINE 50 MG/1
150 TABLET ORAL DAILY
Status: DISCONTINUED | OUTPATIENT
Start: 2020-06-03 | End: 2020-06-03

## 2020-06-03 RX ORDER — PROMETHAZINE HYDROCHLORIDE 12.5 MG/1
12.5 TABLET ORAL EVERY 6 HOURS PRN
Status: DISCONTINUED | OUTPATIENT
Start: 2020-06-03 | End: 2020-06-04

## 2020-06-03 RX ORDER — VENLAFAXINE 37.5 MG/1
75 TABLET ORAL 2 TIMES DAILY WITH MEALS
Status: DISCONTINUED | OUTPATIENT
Start: 2020-06-03 | End: 2020-06-06 | Stop reason: HOSPADM

## 2020-06-03 RX ORDER — HEPARIN SODIUM 5000 [USP'U]/ML
5000 INJECTION, SOLUTION INTRAVENOUS; SUBCUTANEOUS EVERY 8 HOURS SCHEDULED
Status: DISCONTINUED | OUTPATIENT
Start: 2020-06-03 | End: 2020-06-03

## 2020-06-03 RX ORDER — ALBUTEROL SULFATE 2.5 MG/3ML
2.5 SOLUTION RESPIRATORY (INHALATION) ONCE
Status: DISCONTINUED | OUTPATIENT
Start: 2020-06-03 | End: 2020-06-06 | Stop reason: HOSPADM

## 2020-06-03 RX ORDER — FAMOTIDINE 20 MG/1
20 TABLET, FILM COATED ORAL
Status: DISCONTINUED | OUTPATIENT
Start: 2020-06-03 | End: 2020-06-06 | Stop reason: HOSPADM

## 2020-06-03 RX ORDER — FOLIC ACID 1 MG/1
4 TABLET ORAL DAILY
COMMUNITY

## 2020-06-03 RX ORDER — NICOTINE POLACRILEX 4 MG
15 LOZENGE BUCCAL
Status: DISCONTINUED | OUTPATIENT
Start: 2020-06-03 | End: 2020-06-06 | Stop reason: HOSPADM

## 2020-06-03 RX ORDER — CYCLOSPORINE 0.5 MG/ML
1 EMULSION OPHTHALMIC 2 TIMES DAILY
Status: DISCONTINUED | OUTPATIENT
Start: 2020-06-03 | End: 2020-06-06 | Stop reason: HOSPADM

## 2020-06-03 RX ORDER — ACETAMINOPHEN 650 MG/1
650 SUPPOSITORY RECTAL EVERY 4 HOURS PRN
Status: DISCONTINUED | OUTPATIENT
Start: 2020-06-03 | End: 2020-06-06 | Stop reason: HOSPADM

## 2020-06-03 RX ORDER — SODIUM CHLORIDE 0.9 % (FLUSH) 0.9 %
10 SYRINGE (ML) INJECTION EVERY 12 HOURS SCHEDULED
Status: DISCONTINUED | OUTPATIENT
Start: 2020-06-03 | End: 2020-06-06 | Stop reason: HOSPADM

## 2020-06-03 RX ORDER — GABAPENTIN 300 MG/1
600 CAPSULE ORAL NIGHTLY
Status: DISCONTINUED | OUTPATIENT
Start: 2020-06-03 | End: 2020-06-06 | Stop reason: HOSPADM

## 2020-06-03 RX ORDER — HYDROMORPHONE HYDROCHLORIDE 1 MG/ML
0.5 INJECTION, SOLUTION INTRAMUSCULAR; INTRAVENOUS; SUBCUTANEOUS
Status: DISCONTINUED | OUTPATIENT
Start: 2020-06-03 | End: 2020-06-06

## 2020-06-03 RX ORDER — ALBUTEROL SULFATE 90 UG/1
2 AEROSOL, METERED RESPIRATORY (INHALATION)
Status: DISCONTINUED | OUTPATIENT
Start: 2020-06-03 | End: 2020-06-06 | Stop reason: HOSPADM

## 2020-06-03 RX ORDER — CALCITRIOL 0.25 UG/1
0.5 CAPSULE, LIQUID FILLED ORAL EVERY 12 HOURS SCHEDULED
Status: DISCONTINUED | OUTPATIENT
Start: 2020-06-03 | End: 2020-06-06 | Stop reason: HOSPADM

## 2020-06-03 RX ORDER — NADOLOL 20 MG/1
40 TABLET ORAL
Status: DISCONTINUED | OUTPATIENT
Start: 2020-06-03 | End: 2020-06-03

## 2020-06-03 RX ORDER — HYDROCORTISONE 10 MG/1
10 TABLET ORAL NIGHTLY
Status: DISCONTINUED | OUTPATIENT
Start: 2020-06-03 | End: 2020-06-06 | Stop reason: HOSPADM

## 2020-06-03 RX ORDER — VENLAFAXINE 25 MG/1
12.5 TABLET ORAL 2 TIMES DAILY WITH MEALS
Status: DISCONTINUED | OUTPATIENT
Start: 2020-06-03 | End: 2020-06-03

## 2020-06-03 RX ORDER — HYDROCORTISONE 20 MG/1
20 TABLET ORAL EVERY MORNING
Status: DISCONTINUED | OUTPATIENT
Start: 2020-06-03 | End: 2020-06-06 | Stop reason: HOSPADM

## 2020-06-03 RX ORDER — POTASSIUM CHLORIDE 1.5 G/1.77G
40 POWDER, FOR SOLUTION ORAL 3 TIMES DAILY
Status: DISCONTINUED | OUTPATIENT
Start: 2020-06-03 | End: 2020-06-03

## 2020-06-03 RX ORDER — PROMETHAZINE HYDROCHLORIDE 25 MG/1
25 TABLET ORAL EVERY 6 HOURS PRN
COMMUNITY
End: 2020-06-06 | Stop reason: HOSPADM

## 2020-06-03 RX ORDER — VALACYCLOVIR HYDROCHLORIDE 500 MG/1
1000 TABLET, FILM COATED ORAL EVERY 12 HOURS SCHEDULED
Status: DISCONTINUED | OUTPATIENT
Start: 2020-06-03 | End: 2020-06-06 | Stop reason: HOSPADM

## 2020-06-03 RX ORDER — DRONABINOL 2.5 MG/1
5 CAPSULE ORAL 4 TIMES DAILY PRN
Status: DISCONTINUED | OUTPATIENT
Start: 2020-06-03 | End: 2020-06-03

## 2020-06-03 RX ORDER — ACETAMINOPHEN 325 MG/1
650 TABLET ORAL EVERY 4 HOURS PRN
Status: DISCONTINUED | OUTPATIENT
Start: 2020-06-03 | End: 2020-06-06 | Stop reason: HOSPADM

## 2020-06-03 RX ORDER — DEXTROSE MONOHYDRATE 25 G/50ML
25 INJECTION, SOLUTION INTRAVENOUS
Status: DISCONTINUED | OUTPATIENT
Start: 2020-06-03 | End: 2020-06-06 | Stop reason: HOSPADM

## 2020-06-03 RX ORDER — LEVOTHYROXINE SODIUM 0.07 MG/1
75 TABLET ORAL
Status: DISCONTINUED | OUTPATIENT
Start: 2020-06-03 | End: 2020-06-06 | Stop reason: HOSPADM

## 2020-06-03 RX ORDER — CHOLECALCIFEROL (VITAMIN D3) 125 MCG
5 CAPSULE ORAL NIGHTLY PRN
Status: DISCONTINUED | OUTPATIENT
Start: 2020-06-03 | End: 2020-06-06 | Stop reason: HOSPADM

## 2020-06-03 RX ORDER — PANTOPRAZOLE SODIUM 40 MG/1
40 TABLET, DELAYED RELEASE ORAL DAILY
Status: DISCONTINUED | OUTPATIENT
Start: 2020-06-03 | End: 2020-06-06 | Stop reason: HOSPADM

## 2020-06-03 RX ORDER — MORPHINE SULFATE 2 MG/ML
2 INJECTION, SOLUTION INTRAMUSCULAR; INTRAVENOUS EVERY 4 HOURS PRN
Status: DISPENSED | OUTPATIENT
Start: 2020-06-03 | End: 2020-06-05

## 2020-06-03 RX ORDER — POTASSIUM CHLORIDE 750 MG/1
40 CAPSULE, EXTENDED RELEASE ORAL 3 TIMES DAILY
Status: DISCONTINUED | OUTPATIENT
Start: 2020-06-03 | End: 2020-06-06 | Stop reason: HOSPADM

## 2020-06-03 RX ADMIN — FLUDROCORTISONE ACETATE 100 MCG: 0.1 TABLET ORAL at 09:53

## 2020-06-03 RX ADMIN — DOXYCYCLINE 100 MG: 100 INJECTION, POWDER, LYOPHILIZED, FOR SOLUTION INTRAVENOUS at 17:08

## 2020-06-03 RX ADMIN — PANTOPRAZOLE SODIUM 40 MG: 40 TABLET, DELAYED RELEASE ORAL at 09:53

## 2020-06-03 RX ADMIN — HYDROCORTISONE SODIUM SUCCINATE 50 MG: 100 INJECTION, POWDER, FOR SOLUTION INTRAMUSCULAR; INTRAVENOUS at 02:54

## 2020-06-03 RX ADMIN — PROMETHAZINE HYDROCHLORIDE 12.5 MG: 12.5 TABLET ORAL at 09:53

## 2020-06-03 RX ADMIN — MORPHINE SULFATE 2 MG: 2 INJECTION, SOLUTION INTRAMUSCULAR; INTRAVENOUS at 23:42

## 2020-06-03 RX ADMIN — MORPHINE SULFATE 2 MG: 2 INJECTION, SOLUTION INTRAMUSCULAR; INTRAVENOUS at 19:37

## 2020-06-03 RX ADMIN — GABAPENTIN 600 MG: 300 CAPSULE ORAL at 22:12

## 2020-06-03 RX ADMIN — GABAPENTIN 600 MG: 300 CAPSULE ORAL at 02:54

## 2020-06-03 RX ADMIN — HYDROCORTISONE 10 MG: 10 TABLET ORAL at 03:21

## 2020-06-03 RX ADMIN — ENOXAPARIN SODIUM 40 MG: 40 INJECTION SUBCUTANEOUS at 04:32

## 2020-06-03 RX ADMIN — MORPHINE SULFATE 2 MG: 2 INJECTION, SOLUTION INTRAMUSCULAR; INTRAVENOUS at 11:13

## 2020-06-03 RX ADMIN — POTASSIUM CHLORIDE 40 MEQ: 10 CAPSULE, COATED, EXTENDED RELEASE ORAL at 17:08

## 2020-06-03 RX ADMIN — VENLAFAXINE 75 MG: 37.5 TABLET ORAL at 17:08

## 2020-06-03 RX ADMIN — VALACYCLOVIR HYDROCHLORIDE 1000 MG: 500 TABLET, FILM COATED ORAL at 09:52

## 2020-06-03 RX ADMIN — POTASSIUM CHLORIDE 40 MEQ: 10 CAPSULE, COATED, EXTENDED RELEASE ORAL at 10:30

## 2020-06-03 RX ADMIN — FAMOTIDINE 20 MG: 20 TABLET, FILM COATED ORAL at 17:08

## 2020-06-03 RX ADMIN — Medication 1000 UNITS: at 22:20

## 2020-06-03 RX ADMIN — HYDROCORTISONE 10 MG: 10 TABLET ORAL at 22:13

## 2020-06-03 RX ADMIN — AZTREONAM 2 G: 2 INJECTION, POWDER, LYOPHILIZED, FOR SOLUTION INTRAMUSCULAR; INTRAVENOUS at 19:38

## 2020-06-03 RX ADMIN — SODIUM CHLORIDE, PRESERVATIVE FREE 10 ML: 5 INJECTION INTRAVENOUS at 09:54

## 2020-06-03 RX ADMIN — POTASSIUM CHLORIDE 40 MEQ: 10 CAPSULE, COATED, EXTENDED RELEASE ORAL at 22:13

## 2020-06-03 RX ADMIN — ALBUTEROL SULFATE 2.5 MG: 2.5 SOLUTION RESPIRATORY (INHALATION) at 18:51

## 2020-06-03 RX ADMIN — PROMETHAZINE HYDROCHLORIDE 12.5 MG: 12.5 TABLET ORAL at 19:37

## 2020-06-03 RX ADMIN — CALCIUM GLUCONATE 2 G: 98 INJECTION, SOLUTION INTRAVENOUS at 16:35

## 2020-06-03 RX ADMIN — HYDROMORPHONE HYDROCHLORIDE 0.5 MG: 1 INJECTION, SOLUTION INTRAMUSCULAR; INTRAVENOUS; SUBCUTANEOUS at 22:10

## 2020-06-03 RX ADMIN — HYDROMORPHONE HYDROCHLORIDE 0.5 MG: 1 INJECTION, SOLUTION INTRAMUSCULAR; INTRAVENOUS; SUBCUTANEOUS at 12:14

## 2020-06-03 RX ADMIN — HYDROMORPHONE HYDROCHLORIDE 0.5 MG: 1 INJECTION, SOLUTION INTRAMUSCULAR; INTRAVENOUS; SUBCUTANEOUS at 09:52

## 2020-06-03 RX ADMIN — VALACYCLOVIR HYDROCHLORIDE 1000 MG: 500 TABLET, FILM COATED ORAL at 22:14

## 2020-06-03 RX ADMIN — DOXYCYCLINE 100 MG: 100 INJECTION, POWDER, LYOPHILIZED, FOR SOLUTION INTRAVENOUS at 04:33

## 2020-06-03 RX ADMIN — PARATHYROID HORMONE 25 MCG: 25 INJECTION, POWDER, LYOPHILIZED, FOR SOLUTION SUBCUTANEOUS at 20:00

## 2020-06-03 RX ADMIN — ALBUTEROL SULFATE 200 PUFF: 90 AEROSOL, METERED RESPIRATORY (INHALATION) at 16:17

## 2020-06-03 RX ADMIN — DIAZEPAM 5 MG: 5 TABLET ORAL at 22:14

## 2020-06-03 RX ADMIN — Medication 1000 UNITS: at 10:30

## 2020-06-03 RX ADMIN — SODIUM CHLORIDE, PRESERVATIVE FREE 10 ML: 5 INJECTION INTRAVENOUS at 02:54

## 2020-06-03 RX ADMIN — HYDROMORPHONE HYDROCHLORIDE 0.5 MG: 1 INJECTION, SOLUTION INTRAMUSCULAR; INTRAVENOUS; SUBCUTANEOUS at 14:10

## 2020-06-03 RX ADMIN — CALCITRIOL CAPSULES 0.25 MCG 0.5 MCG: 0.25 CAPSULE ORAL at 22:15

## 2020-06-03 RX ADMIN — CYCLOSPORINE 1 DROP: 0.5 EMULSION OPHTHALMIC at 22:10

## 2020-06-03 RX ADMIN — AZTREONAM 2 G: 2 INJECTION, POWDER, LYOPHILIZED, FOR SOLUTION INTRAMUSCULAR; INTRAVENOUS at 03:18

## 2020-06-03 RX ADMIN — FAMOTIDINE 20 MG: 20 TABLET, FILM COATED ORAL at 09:52

## 2020-06-03 RX ADMIN — MORPHINE SULFATE 2 MG: 2 INJECTION, SOLUTION INTRAMUSCULAR; INTRAVENOUS at 07:01

## 2020-06-03 RX ADMIN — LEVOTHYROXINE SODIUM 75 MCG: 75 TABLET ORAL at 04:32

## 2020-06-03 RX ADMIN — CALCIUM GLUCONATE 2 G: 98 INJECTION, SOLUTION INTRAVENOUS at 11:24

## 2020-06-03 RX ADMIN — VENLAFAXINE 75 MG: 37.5 TABLET ORAL at 09:53

## 2020-06-03 RX ADMIN — CYCLOSPORINE 1 DROP: 0.5 EMULSION OPHTHALMIC at 09:49

## 2020-06-03 RX ADMIN — AZTREONAM 2 G: 2 INJECTION, POWDER, LYOPHILIZED, FOR SOLUTION INTRAMUSCULAR; INTRAVENOUS at 09:50

## 2020-06-03 RX ADMIN — HYDROCORTISONE 20 MG: 20 TABLET ORAL at 09:52

## 2020-06-03 RX ADMIN — PROMETHAZINE HYDROCHLORIDE 12.5 MG: 12.5 TABLET ORAL at 02:55

## 2020-06-03 RX ADMIN — HYDROMORPHONE HYDROCHLORIDE 0.5 MG: 1 INJECTION, SOLUTION INTRAMUSCULAR; INTRAVENOUS; SUBCUTANEOUS at 16:35

## 2020-06-03 RX ADMIN — CALCITRIOL CAPSULES 0.25 MCG 0.5 MCG: 0.25 CAPSULE ORAL at 09:53

## 2020-06-03 RX ADMIN — MORPHINE SULFATE 2 MG: 2 INJECTION, SOLUTION INTRAMUSCULAR; INTRAVENOUS at 02:55

## 2020-06-03 NOTE — CONSULTS
"[facility] Consult Note     LOS: 0 days   Patient Care Team:  Agustin Turner MD as PCP - General (Internal Medicine)    Chief Complaint: Pleuritic chest pain and dyspnea    Subjective     HPI: 29 y.o. female with autoimmune polyglandular syndrome type I apparently followed by the [facility] with adrenal insufficiency, hyperparathyroidism with recurrent tetany, alopecia, functional asplenia, diabetes, premature ovarian failure was transferred to the hospitalist service here from an outside facility with worsening cough, pleuritic chest pain, and dyspnea.  The patient has previously been followed by a pulmonologist in [place] but was not happy with that situation.  She reports \"stabbing pain\" beginning yesterday which developed after a one-week history of increasing shortness of breath and 2 weeks of progressive nonproductive cough.  She currently reports dyspnea walking short distances such as to the bathroom.  She reports a fever to 100.7 Fahrenheit yesterday but had no prior recorded fevers.  She does report some chills and night sweats.    The patient reports a prior history of pneumonitis treated with Rituxan.  She is on Imuran chronically.  She has had hypoxemia here requiring up to 3 L supplemental oxygen.  I turned her from 3 L down to 2 L while I was in the room and she maintained oxygen saturations in the mid to upper 90s.  She is somewhat drowsy during my interview and has recently received both morphine and Dilaudid.    History taken from: patient    Past Medical History:   Diagnosis Date   • Adrenal insufficiency (CMS/HCC)    • Alopecia    • Anemia    • Anxiety    • APS type 1 (CMS/HCC)    • Asthma    • Autoimmune hepatitis (CMS/HCC)    • Depression    • Dermatomyositis (CMS/HCC)    • Disease of thyroid gland    • Fibromyalgia    • Functional asplenia    • History of kidney stones    • Hypoparathyroidism (CMS/HCC)    • Insulin dependent diabetes mellitus (CMS/HCC)    • Long Q-T syndrome    • " Pancreatic insufficiency    • Parathyroid abnormality (CMS/HCC)    • Polyglandular deficiency syndrome (CMS/HCC)    • Premature ovarian failure    • Sleep apnea    • Spleen absent    • Tetany     HAS EPISODES   • Transfusion history        Past Surgical History:   Procedure Laterality Date   • APPENDECTOMY     • CARDIAC SURGERY      LOOP RECORDER   • CHOLECYSTECTOMY     • ENDOSCOPY N/A 4/26/2018    Procedure: ESOPHAGOGASTRODUODENOSCOPY;  Surgeon: Gavin Vargas MD;  Location:  [facility];  Service: Gastroenterology   • ENDOSCOPY N/A 8/9/2018    Procedure: ESOPHAGOGASTRODUODENOSCOPY-DILATION;  Surgeon: Gavin Vargas MD;  Location:  [facility];  Service: Gastroenterology   • ENDOSCOPY     • ENDOSCOPY N/A 2/6/2019    Procedure: ESOPHAGOGASTRODUODENOSCOPY;  Surgeon: Gavin Vargas MD;  Location:  [facility];  Service: Gastroenterology   • ENDOSCOPY N/A 7/24/2019    Procedure: ESOPHAGOGASTRODUODENOSCOPY;  Surgeon: Gavin Vargas MD;  Location:  [facility];  Service: Gastroenterology   • ENDOSCOPY N/A 10/18/2019    Procedure: ESOPHAGOGASTRODUODENOSCOPY WITH BALLOON DILATION;  Surgeon: Gavin Vargas MD;  Location:  [facility];  Service: Gastroenterology   • EXPLORATORY LAPAROTOMY      MULTIPLE   • HAND SURGERY      4 TOTAL   • HERNIA REPAIR     • LIVER BIOPSY     • MUSCLE BIOPSY     • MUSCLE BIOPSY     • PEG TUBE INSERTION     • PEG TUBE REMOVAL     • PORTACATH PLACEMENT         Family History   Problem Relation Age of Onset   • Kidney disease Father        Social History     Socioeconomic History   • Marital status: Single     Spouse name: Not on file   • Number of children: Not on file   • Years of education: Not on file   • Highest education level: Not on file   Tobacco Use   • Smoking status: Never Smoker   • Smokeless tobacco: Never Used   Substance and Sexual Activity   • Alcohol use: Yes     Comment: SOCIAL   • Drug  use: No   • Sexual activity: Defer       Allergies   Allergen Reactions   • Cefpodoxime Hives   • Cephalosporins Other (See Comments)   • Clarithromycin Hives     biaxin   • Levofloxacin Other (See Comments)   • Nitrofurantoin Hives   • Nitrofurantoin Macrocrystal Other (See Comments)   • Nystatin Hives   • Ondansetron Other (See Comments)   • Penicillins Hives   • Sulfa Antibiotics Hives       PMH/FH/SocH were reviewed by me and updates were made.     Review of Systems:    All other systems were reviewed and are negative.  Exceptions are noted in subjective or below.  She reports frequent nausea/vomiting/diarrhea.    Objective     Vital Signs  Temp:  [96.9 °F (36.1 °C)-97.6 °F (36.4 °C)] 96.9 °F (36.1 °C)  Heart Rate:  [61-85] 68  Resp:  [16] 16  BP: (100-110)/(65-71) 110/71    Physical Exam:     Constitutional:   Drowsy, cooperative, in no acute distress   Head:    Normocephalic, alopecia present, without obvious abnormality, atraumatic   Eyes:            Lids and lashes normal, conjunctivae and sclerae normal, no   icterus, no pallor, corneas clear, PER   ENMT:   Ears appear intact with no abnormalities noted      No oral lesions, no thrush, oral mucosa moist   Neck:   No adenopathy, supple, trachea midline, no thyromegaly, no   carotid bruit, no JVD       Lungs/Resp:     Normal effort, symmetric chest rise, no crepitus, minimal bibasilar rales, no chest wall tenderness, resonant to percussion throughout.                  Heart/CV:    Regular rhythm and normal rate, normal S1 and S2, no            murmur, no gallop, no rub, no click   Abdomen/GI:     Normal bowel sounds, no masses, no organomegaly, soft        non-tender, non-distended, no guarding, no rebound                tenderness   :     Deferred   Extremities/MSK:   No clubbing, cyanosis or edema.  No deformities.    Pulses:   Pulses palpable and equal bilaterally   Skin:   No bleeding, bruising or rash   Hem/Lymph:   No cervical or supraclavicular  "adenopathy.    Neurologic:   Moves all extremities with no obvious focal motor deficit.  Cranial nerves 2 - 12 grossly intact             Psychiatric: Normal mood and affect, oriented x 3.      Results Review:     I reviewed the patient's new clinical results.   Results from last 7 days   Lab Units 06/03/20  0056   SODIUM mmol/L 140   POTASSIUM mmol/L 4.3   CHLORIDE mmol/L 99   CO2 mmol/L 28.0   BUN mg/dL 16   CREATININE mg/dL 0.46*   CALCIUM mg/dL 8.5*   BILIRUBIN mg/dL 0.7   ALK PHOS U/L 85   ALT (SGPT) U/L 15   AST (SGOT) U/L 20   GLUCOSE mg/dL 159*     Results from last 7 days   Lab Units 06/03/20  0321 06/03/20  0030   WBC 10*3/mm3 17.47* 16.41*   HEMOGLOBIN g/dL 13.7 13.9   HEMATOCRIT % 41.8 42.2   PLATELETS 10*3/mm3 297 307           I reviewed the patient's new imaging including images and reports.  CT scan of the chest from June 2, 2020 shows low lung volumes with probable basilar atelectatic changes.  It was read as \"no evidence of pulmonary embolism with mild bilateral bronchial wall thickening suspicious for bronchitis or bronchial edema and patchy mosaic alveolar attenuation in the lung bases could relate to bronchiolitis with patchy areas of subsegmental atelectasis or edema, pneumonia cannot be fully excluded.\"      Medication Review:     albuterol 2.5 mg Nebulization Once   albuterol sulfate HFA 2 puff Inhalation 4x Daily - RT   aztreonam 2 g Intravenous Q8H   calcitriol 0.5 mcg Oral Q12H   Cholecalciferol 1,000 Units Oral BID   cycloSPORINE 1 drop Both Eyes BID   doxycycline 100 mg Intravenous Q12H   enoxaparin 40 mg Subcutaneous Q24H   famotidine 20 mg Oral BID AC   fludrocortisone 100 mcg Oral Daily   gabapentin 600 mg Oral Nightly   hydrocortisone 10 mg Oral Nightly   hydrocortisone 20 mg Oral QAM   insulin lispro 0-7 Units Subcutaneous TID AC   levothyroxine 75 mcg Oral Q AM   pantoprazole 40 mg Oral Daily   Parathyroid Hormone (Recomb) 25 mcg Subcutaneous Q12H   potassium chloride 40 mEq Oral " TID   sodium chloride 10 mL Intravenous Q12H   valACYclovir 1,000 mg Oral Q12H   venlafaxine 75 mg Oral BID With Meals          Assessment/Plan       Hypoxia    Bronchitis    Pleuritic chest pain    Pneumonitis    Premature ovarian failure    Polyglandular autoimmune syndrome, type 1 (CMS/HCC)    Type 2 diabetes mellitus (CMS/HCC)    Hypoparathyroidism (CMS/HCC)    Adrenal insufficiency ([place]anna's disease) (CMS/HCC)    Asplenia    29 y.o. with history of autoimmune polyglandular syndrome type I is admitted for pleuritic chest pain and dyspnea.  She has had some mild hypoxemia currently on 2 L nasal cannula oxygen.    CT scan of the chest shows no evidence of pulmonary embolism.  There are low lung volumes and probably posterior micro atelectatic changes.  I was reviewing the images with the patient and she did not seem interested in my opinion on her scan.  She stated that her physician at the [facility] would need to review everything and decide whether she needs to go on Rituxan.    Plan:  1.  Wean supplemental oxygen as tolerated.  2.  Agree with infectious evaluation.  3.  Agree with stress dose steroids.  4.  Would try to minimize narcotics as tolerated.  5.  I have asked the patient's nurse to have radiology make a disc with the patient's images.  I agree that her current scan will need to be compared with her prior images to determine treatment going forward.  She is not inclined, it seems, to accept my opinion on her care at this point.  I will be happy to facilitate discussions with the patient's primary physician at the [facility] if the patient wants me to.  6.  Patient states that, if she needs Rituxan, she will get it through Dr. Yost at [facility]/ oncology.    Discussed with Dr. Agustin Grant MD  06/03/20  15:04      • Please note that portions of this note were completed with [employer] - a voice recognition program.

## 2020-06-03 NOTE — H&P
[facility] Medicine Services  HISTORY AND PHYSICAL    Patient Name: Krista Richter  : 1990  MRN: [id number]  Primary Care Physician: Agustin Turner MD  Date of admission: 6/3/2020      Subjective   Subjective     Chief Complaint:  Transfer from [facility]  Hypoxia and acute bronchitis, rule out pneumonia    HPI:  Krista Richter is a 29 y.o. female with multiple medical problems including APS type 1, polyglandular deficiency syndrome, adrenal insufficiency, hypoparathyroidism (with recurrent tetany), alopecia, functional asplenia, IDDM2, premature ovarian failure who presented to outside hospital with a 5 day history of SOA, chest pain with inspiration.  soa rated 5/10 in severity. Pain improved with morphine.  In the ED, she was found to have hypoxia with sats in the mid 80s, placed on 2L NC. Reports fever of 100.7 at home. Pt states she has a history of recurrent autoimmune pneumonitis and this feels similar to episodes she has had in the past. She reports she is on imuran but has had to received rituximab infusions in the past due to flares similar to above. Pt is also on hormone replacement with progesterone and estradiol for her ovarian failure.  CTA of chest showed no evidence of PE, mild bilateral bronchial wall thickening suspicious for bronchitis or bronchial edema, and patchy mosaic alveolar attenuation in lung bases could relate to bronchiolitis with patchy areas of subsegmental atelectasis or edea, pneumonia cannot be fully excluded. COVID test pending. Labs significant for WBC 13.32, ddimer 394, procalcitonin 0.05, glucose 158.  Pt received phenergan, morphine, 1L NS, tylenol 500 mg. She also reportedly received a stress dose of steroids.  EKG showed SR with ST depression.      Review of Systems   Gen- reports fevers, chills  CV- reports chest pain,no  palpitations  Resp-reports cough, dyspnea  GI- No N/V/D, abd pain        All other systems  reviewed and are negative.     Personal History     Past Medical History:   Diagnosis Date   • Adrenal insufficiency (CMS/HCC)    • Alopecia    • Anemia    • Anxiety    • APS type 1 (CMS/HCC)    • Asthma    • Autoimmune hepatitis (CMS/HCC)    • Depression    • Dermatomyositis (CMS/HCC)    • Disease of thyroid gland    • Fibromyalgia    • Functional asplenia    • History of kidney stones    • Hypoparathyroidism (CMS/HCC)    • Insulin dependent diabetes mellitus (CMS/HCC)    • Long Q-T syndrome    • Pancreatic insufficiency    • Parathyroid abnormality (CMS/HCC)    • Polyglandular deficiency syndrome (CMS/HCC)    • Premature ovarian failure    • Sleep apnea    • Spleen absent    • Tetany     HAS EPISODES   • Transfusion history        Past Surgical History:   Procedure Laterality Date   • APPENDECTOMY     • CARDIAC SURGERY      LOOP RECORDER   • CHOLECYSTECTOMY     • ENDOSCOPY N/A 4/26/2018    Procedure: ESOPHAGOGASTRODUODENOSCOPY;  Surgeon: Gavin Vargas MD;  Location:  [facility];  Service: Gastroenterology   • ENDOSCOPY N/A 8/9/2018    Procedure: ESOPHAGOGASTRODUODENOSCOPY-DILATION;  Surgeon: Gavin Vargas MD;  Location:  [facility];  Service: Gastroenterology   • ENDOSCOPY     • ENDOSCOPY N/A 2/6/2019    Procedure: ESOPHAGOGASTRODUODENOSCOPY;  Surgeon: Gavin Vargas MD;  Location:  [facility];  Service: Gastroenterology   • ENDOSCOPY N/A 7/24/2019    Procedure: ESOPHAGOGASTRODUODENOSCOPY;  Surgeon: Gavin Vargas MD;  Location:  [facility];  Service: Gastroenterology   • ENDOSCOPY N/A 10/18/2019    Procedure: ESOPHAGOGASTRODUODENOSCOPY WITH BALLOON DILATION;  Surgeon: Gavin Vargas MD;  Location:  [facility];  Service: Gastroenterology   • EXPLORATORY LAPAROTOMY      MULTIPLE   • HAND SURGERY      4 TOTAL   • HERNIA REPAIR     • LIVER BIOPSY     • MUSCLE BIOPSY     • MUSCLE BIOPSY     • PEG TUBE INSERTION     •  PEG TUBE REMOVAL     • PORTACATH PLACEMENT         Family History: family history includes Kidney disease in her father. Otherwise pertinent FHx was reviewed and unremarkable.     Social History:  reports that she has never smoked. She has never used smokeless tobacco. She reports that she drinks alcohol. She reports that she does not use drugs.  Social History     Social History Narrative   • Not on file       Medications:    Available home medication information reviewed.  Medications Prior to Admission   Medication Sig Dispense Refill Last Dose   • albuterol (PROVENTIL HFA;VENTOLIN HFA) 108 (90 Base) MCG/ACT inhaler Take 2 Puffs by inhalation every 4 hours as needed for wheezing.   6/3/2020 at Unknown time   • albuterol sulfate HFA (PROAIR HFA) 108 (90 Base) MCG/ACT inhaler ProAir HFA 90 mcg/actuation aerosol inhaler   2 PUFFS Q 4 HOURS SHORTNESS OF BREATH   6/2/2020 at Unknown time   • azaTHIOprine (IMURAN) 50 MG tablet Take 3 tablets by mouth Daily. 90 tablet 0 6/2/2020 at 0900   • azelastine (ASTELIN) 0.1 % nasal spray Astelin 137 mcg (0.1 %) nasal spray aerosol   Daily   Past Month at Unknown time   • azithromycin (ZITHROMAX) 250 MG tablet Take 250 mg by mouth Daily.      • calcitriol (ROCALTROL) 0.5 MCG capsule Take 0.5 mcg by mouth 2 (Two) Times a Day.   6/2/2020 at 0900   • calcium gluconate 1 g/100 mL Infuse 2 g into a venous catheter.   6/2/2020 at 0900   • cycloSPORINE (RESTASIS) 0.05 % ophthalmic emulsion 1 drop. PRN   6/2/2020 at 0900   • cyproheptadine (PERIACTIN) 4 MG tablet Take 4 mg by mouth 2 (Two) Times a Day.   6/2/2020 at 0900   • diazePAM (VALIUM) 5 MG tablet 5 mg.   6/2/2020 at 0900   • dronabinol (MARINOL) 2.5 MG capsule 5 mg 4 (Four) Times a Day As Needed.   6/2/2020 at 0900   • fludrocortisone 0.1 MG tablet TAKE ONE TABLET BY MOUTH DAILY..05MG PER PT   6/2/2020 at 0900   • fluticasone (FLONASE) 50 MCG/ACT nasal spray 1 spray.   6/2/2020 at 0900   • folic acid (FOLVITE) 1 MG tablet Take 4  mg by mouth Daily.   6/2/2020 at 0900   • gabapentin (NEURONTIN) 600 MG tablet Take 600 mg by mouth.   6/2/2020 at 0900   • hydrocortisone (CORTEF) 10 MG tablet Cortef 10 mg tablet   6/2/2020 at 0900   • hydrocortisone (CORTEF) 20 MG tablet Take 20 mg by mouth 2 (Two) Times a Day. 20 MG IN AM AND 10 MG AT NIGHT   6/2/2020 at 0900   • hydrocortisone sodium succinate (SOLU-CORTEF) 100 MG injection Give 100 mg ( 2 mL ) intramuscular as needed for vomiting, severe illness  Dispense actovial   6/2/2020 at 0900   • Hypromellose (SYSTANE OVERNIGHT THERAPY) 0.3 % gel Systane Gel 0.3 % eye gel   6/2/2020 at 0900   • levothyroxine (SYNTHROID, LEVOTHROID) 75 MCG tablet levothyroxine 75 mcg tablet   6/2/2020 at 0900   • lidocaine (LIDODERM) 5 % Place 1 patch on the skin as directed by provider Daily. Remove & Discard patch within 12 hours or as directed by MD 14 patch 0 Past Week at Unknown time   • magnesium oxide (MAGOX) 400 (241.3 Mg) MG tablet tablet Take 4,000 mg by mouth Daily.   6/2/2020 at 0900   • metFORMIN (GLUCOPHAGE) 500 MG tablet Take 500 mg by mouth 2 (Two) Times a Day With Meals.   6/2/2020 at 0900   • Multiple Vitamins-Minerals (MULTIVITAMIN ADULT PO) Take 1 tablet by mouth.   6/2/2020 at 0900   • nadolol (CORGARD) 40 MG tablet Take 40 mg by mouth.   6/2/2020 at 0900   • omeprazole (priLOSEC) 20 MG capsule Take 20 mg by mouth Daily.   6/2/2020 at 0900   • Parathyroid Hormone, Recomb, (NATPARA SC) Inject  under the skin into the appropriate area as directed.   6/2/2020 at 0900   • Potassium Chloride (KLOR-CON 10 PO) Take 40 mEq by mouth 3 (Three) Times a Day.   6/2/2020 at 0900   • promethazine (PHENERGAN) 25 MG suppository Insert 1 suppository into the rectum Every 6 (Six) Hours As Needed for Nausea or Vomiting. 10 suppository 0 6/2/2020 at 0900   • promethazine (PHENERGAN) 25 MG tablet Take 25 mg by mouth Every 6 (Six) Hours As Needed for Nausea or Vomiting.      • raNITIdine (ZANTAC) 150 MG tablet ranitidine  150 mg tablet   1 Two times a day   6/2/2020 at 0900   • valACYclovir (VALTREX) 1000 MG tablet Valtrex 1 gram tablet   Two times a day   Past Month at Unknown time   • venlafaxine (EFFEXOR) 25 MG tablet Take 5 mg by mouth 2 (Two) Times a Day.   6/2/2020 at 0900   • Vitamin D, Cholecalciferol, 1000 units capsule Take 1,000 unit marking on U-100 syringe by mouth 2 (Two) Times a Day.   6/2/2020 at 0900   • zinc sulfate (ZINCATE) 220 (50 Zn) MG capsule zinc sulfate 220 mg (50 mg) capsule   6/2/2020 at 0900   • Alcohol Swabs (ALCOHOL PREP) 70 % pads Check blood glucose up to 2 times daily   Patient Taking Differently at Unknown time   • Blood Glucose Calibration (OT ULTRA/FASTTK CNTRL SOLN) solution Use weekly and as needed to calibrate meter   Past Week at Unknown time   • Blood Glucose Monitoring Suppl (ONE TOUCH ULTRA 2) w/Device kit Use to check blood glucose up to 2 times daily   10/18/2019 at Unknown time   • butalbital-acetaminophen-caffeine (FIORICET, ESGIC) -40 MG per tablet Take 1 tablet by mouth Daily.   More than a month at Unknown time   • EPINEPHrine (EPIPEN 2-TEDDY) 0.3 MG/0.3ML solution auto-injector injection EpiPen 0.3 mg/0.3 mL injection, auto-injector   Take 1 auto by injection route.   Unknown at Unknown time   • Insulin Pen Needle 32G X 4 MM misc Use to administer Natpara subcutaneously daily      • methocarbamol (ROBAXIN) 500 MG tablet Take 1 tablet by mouth 3 (Three) Times a Day. 14 tablet 0    • Misc Natural Products (RA XYDRA EF PO) Take  by mouth.   10/17/2019 at 0800   • nystatin (MYCOSTATIN) 692865 UNIT/ML suspension 5 mL Every 6 (Six) Hours.   More than a month at Unknown time   • omeprazole (priLOSEC) 40 MG capsule Take 1 capsule by mouth 2 (Two) Times a Day Before Meals. Take a half hour before breakfast 60 capsule 11    • ONETOUCH DELICA LANCETS 33G misc Use to check blood glucose up to 2 times daily   Patient Taking Differently at Unknown time   • Transparent Dressings (TEGADERM FILM  "2-3/8\"X2-3/4\") misc Apply over pump insertion site every 2 days..          Allergies   Allergen Reactions   • Cefpodoxime Hives   • Cephalosporins Other (See Comments)   • Clarithromycin Hives     biaxin   • Levofloxacin Other (See Comments)   • Nitrofurantoin Hives   • Nitrofurantoin Macrocrystal Other (See Comments)   • Nystatin Hives   • Ondansetron Other (See Comments)   • Penicillins Hives   • Sulfa Antibiotics Hives       Objective   Objective     Vital Signs:   Temp:  [97.6 °F (36.4 °C)] 97.6 °F (36.4 °C)  Resp:  [16] 16  BP: (100)/(65) 100/65        Physical Exam   Constitutional: Awake, alert  Eyes: PERRLA, sclerae anicteric, no conjunctival injection  HENT: NCAT, mucous membranes moist  Neck: Supple, no thyromegaly, no lymphadenopathy, trachea midline  Respiratory: Clear to auscultation bilaterally, nonlabored respirations   Cardiovascular: RRR, no murmurs, rubs, or gallops, palpable pedal pulses bilaterally  Gastrointestinal: Positive bowel sounds, soft, nontender, nondistended  Musculoskeletal: No bilateral ankle edema, no clubbing or cyanosis to extremities  Psychiatric: Appropriate affect, cooperative  Neurologic: Oriented x 3, strength symmetric in all extremities, Cranial Nerves grossly intact to confrontation, speech clear  Skin: No rashes      Results Reviewed:  I have personally reviewed current lab and radiology data.    Results from last 7 days   Lab Units 06/03/20  0030   WBC 10*3/mm3 16.41*   HEMOGLOBIN g/dL 13.9   HEMATOCRIT % 42.2   PLATELETS 10*3/mm3 307     Results from last 7 days   Lab Units 06/03/20  0056   SODIUM mmol/L 140   POTASSIUM mmol/L 4.3   CHLORIDE mmol/L 99   CO2 mmol/L 28.0   BUN mg/dL 16   CREATININE mg/dL 0.46*   GLUCOSE mg/dL 159*   CALCIUM mg/dL 8.5*   ALT (SGPT) U/L 15   AST (SGOT) U/L 20   TROPONIN T ng/mL <0.010     Estimated Creatinine Clearance: 170.5 mL/min (A) (by C-G formula based on SCr of 0.46 mg/dL (L)).  Brief Urine Lab Results  (Last result in the past 365 " days)      Color   Clarity   Blood   Leuk Est   Nitrite   Protein   CREAT   Urine HCG        10/18/19 1218               Negative         Imaging Results (Last 24 Hours)     ** No results found for the last 24 hours. **             Assessment/Plan   Assessment / Plan     Active Hospital Problems    Diagnosis POA   • **Hypoxia [R09.02] Yes   • Bronchitis [J40] Unknown   • Pleuritic chest pain [R07.81] Unknown   • Pneumonitis [J18.9] Unknown   • Premature ovarian failure [E28.8] Unknown   • Polyglandular autoimmune syndrome, type 1 (CMS/HCC) [E31.8] Unknown   • Type 2 diabetes mellitus (CMS/HCC) [E11.9] Unknown   • Hypoparathyroidism (CMS/HCC) [E20.9] Unknown   • Adrenal insufficiency (Jersey's disease) (CMS/HCC) [E27.1] Unknown   • Asplenia [Q89.01] Not Applicable       1. Acute hypoxemic respiratory failure  - supplemental O2 to keep sats>92%  - CTA neg for PE    2. CAP vs bronchitis vs autoimmune pneumonitis  - empiric antibiotics includine aztreonam and doxycycline. Low threshold to broaden given immunosuppressed state  - blood cultures x 2, S. pneumo Ag, Legionella Ag, mrsa surveillance  - COVID-19 rule out with precautions (low suspicion but immunosuppressed)  -pulm consulted to help determine treatment    3. Adrenal insufficiency      Polyglandular autoimmune syndrome type 1  - given stress dose steroids prior to transfer  - give additional 50mg dose now  - continue Cortef PO for now, if BP remains low consider converting to IV 50 mg q6h  - hold imuran until infection ruled out. procal pending.  Will need to restart if negative.    --valtrex   --needs med reconciliation    4. Hypothyroidism  - check TSH  - resume synthroid    5. Premature ovarian failure  - hold progesterone/estradiol for now    6. Hypoparathyroidism  - continue calcitriol  - check calcium level  - pt takes NATPARA SQ (parathyroid hormon, recomb) not on formulary here    7. DM2  - hold metformin  - low dose SSI with accuchecks TID  -  consistent carb diet  - check HgA1c    DVT prophylaxis:  lovenox SQ     CODE STATUS:    Code Status and Medical Interventions:   Ordered at: 06/03/20 0111     Code Status:    CPR     Medical Interventions (Level of Support Prior to Arrest):    Full       Admission Status:  I believe this patient meets INPATIENT status due to the need for care which can only be reasonably provided in an hospital setting including IV antibiotics, fluids, oxygen, pain control.  As such, I feel patient’s risk for adverse outcomes and need for care warrant INPATIENT evaluation and predict the patient’s care encounter to likely last beyond 2 midnights.        Electronically signed by MIRELA Saucedo, 06/03/20, 12:49 AM.  As a scribe and assisted with orders.  She did not see the patient in person.  I have reviewed all orders and medications, and notes.      COVID-19 RISK SCREEN     1. Has the patient had close contact without PPE with a lab confirmed COVID-19 (+) person or a person under investigation (PUI) for COVID-19 infection?  -- No     2. Has the patient had respiratory symptoms, worsened/new cough and/or SOA, unexplained fever, or sudden loss of smell and/or taste in the past 7 days? --  Yes    3. Does the patient have baseline higher exposure risk such as working in healthcare field, currently residing in healthcare facility, or ongoing hemodialysis?  --  No, but immunosuppressed

## 2020-06-03 NOTE — NURSING NOTE
ACC REVIEW REPORT: [facility]        PATIENT NAME: Krista Richter    PATIENT ID: [id number]      COVID-19 ACC SCREENING       DOES THE PATIENT HAVE A FEVER GREATER THAN OR EQUAL .4: YES    IS THE PATIENT EXPERIENCING SHORTNESS OF BREATH: YES    DOES THE PATIENT HAVE A COUGH: YES    DOES THE PATIENT HAVE ANY OF THE FOLLOWING RISK FACTORS:    EXPOSURE TO SUSPECTED OR KNOWN COVID-19: NO    RECENT TRAVEL HISTORY TO ENDEMIC AREA (DOMESTIC/LOCAL): NO    IS THE PATIENT A HEALTHCARE WORKER: NO    HAS THE PATIENT BEEN TESTED FOR COVID-19: YES    DATE TESTED: 6/2/20    LAB TESTING SENT TO: [facility]      BED: S502    BED TYPE: TELE    BED GIVEN TO: NIKOLAS WALKER RN    TIME BED GIVEN: 2257    YOB: 1990    AGE: 29    GENDER: FEMALE    PREVIOUS ADMIT TO [facility]: YES    PREVIOUS ADMISSION DATE: 10/18/19    PATIENT CLASS: OUTPATIENT     TODAY'S DATE: 6/2/2020    TRANSFER DATE: 6/2/20    ETA:     TRANSFERRING FACILITY: [facility]    TRANSFERRING FACILITY PHONE # : [phone]    TRANSFERRING MD: JACOBO DIETZ    ACCEPTING PROVIDER: BRANDON GALAN    NEUROLOGY PHYSICIAN:     DATE/TIME REQUEST RECEIVED: 6/2/20 @ 2203    [facility] RN: YULISSA LUEVANO RN    REPORT FROM: NIKOLAS WALKER RN    TIME REPORT TAKEN: 2257    DIAGNOSIS: RESP FAILURE, BRONCHITIS, COVID R/O     REASON FOR TRANSFER TO [facility]: HIGHER LEVEL OF CARE    TRANSPORTATION: AMBULANCE    CLINICAL REASON FOR TRANSFER TO [facility]: HIGHER LEVEL OF CARE      CLINICAL INFORMATION    HEIGHT: 62 INCHES    WEIGHT: 74 KG    ALLERGIES:   Agent Severity Comments   Cefpodoxime     Cephalosporins     Clarithromycin  biaxin   Levofloxacin     Morphine     Nitrofurantoin     Nitrofurantoin Macrocrystal     Nystatin     Ondansetron     Penicillins     Sulfa Antibiotics         PANIAGUA: NO    INFECTIOUS DISEASE: NONE    ISOLATION: NONE        LAST VITAL SIGNS:  TIME:   TEMP: 98.2  PULSE: 84  B/P: 110/62  RESP: 18    LAB INFORMATION:   WBC 13.32  D-DIMER 394  GLUCOSE  158      CULTURE INFORMATION: DRAWN THRU PORT    MEDS/IV FLUIDS:   LEFT CHEST PORT-A-CATH #20G 1 INCH (POWERPORT)  PHENERGAN  MORPHINE  NS 1 LITER  TYLENOL 500 MG        CARDIAC SYSTEM:    CHEST PAIN: NONE CURRENTLY    RATE:     SCALE:     RHYTHM: SINUS TACHYCARDIA WITH ST DEPRESSION    Is patient taking or has patient been given any drugs that could increase bleeding? NO  (Plavix, Brilinta, Effient, Eliquis, Xarelto, Warfarin, Integrilin, Angiomax)    DRUG:      DOSE/FREQUENCY:         RESPIRATORY SYSTEM:    LUNG SOUNDS:    CLEAR:   CRACKLES:   WHEEZES:   RHONCHI: YES  DIMINISHED:               OXYGEN: 2 LITER    O2 SAT: 95%    ADMINISTRATION ROUTE: NASAL CANULA        RADIOLOGY RESULTS:   LOW LUNG VOLUME  MILD EDEMA    RESPIRATORY STATUS: STABLE      CNS/MUSCULOSKELETAL      ADILIA COMA SCALE:    E: 4  M: 6  V: 5          CNS/MUSCULOSKELETAL NOTES:       GI//GY      ABDOMINAL PAIN: NO    VOMITING: NO    DIARRHEA: NO    NAUSEA: YES    BOWEL SOUNDS: ACTIVE    OCCULT STOOL: N/A    VAGINAL BLEEDING: NO    TESTICULAR PAIN: N/A    HEMATURIA: NO          PAST MEDICAL HISTORY:   Diagnosis Date Comment Source   Adrenal insufficiency (CMS/[facility])   Provider   Alopecia   Provider   Anemia   Provider   Anxiety   Provider   APS type 1 (CMS/HCC)   Provider   Asthma   Provider   Autoimmune hepatitis (CMS/HCC)   Provider   Depression   Provider   Dermatomyositis (CMS/HCC)   Provider   Disease of thyroid gland   Provider   Fibromyalgia   Provider   Functional asplenia   Provider   History of kidney stones   Provider   Hypoparathyroidism (CMS/HCC)   Provider   Insulin dependent diabetes mellitus (CMS/HCC)   Provider   Long Q-T syndrome   Provider   Pancreatic insufficiency   Provider   Parathyroid abnormality (CMS/HCC)   Provider   Polyglandular deficiency syndrome (CMS/HCC)   Provider   Premature ovarian failure   Provider   Sleep apnea   Provider   Spleen absent   Provider   Tetany  HAS EPISODES Provider   Transfusion history    Provider     Past Surgical History      Laterality Last Occurrence Comments   Portacath placement [[id number]]      Hernia repair [SHX51]      Muscle biopsy [[id number]]      Cholecystectomy [SHX55]      Esophagogastroduodenoscopy [[id number]] N/A 4/26/2018 Procedure: ESOPHAGOGASTRODUODENOSCOPY;  Surgeon: Gavin Vargas MD;  Location:  [facility];  Service: Gastroenterology   Cardiac surgery [[id number]]   LOOP RECORDER   Esophagogastroduodenoscopy [[id number]] N/A 8/9/2018 Procedure: ESOPHAGOGASTRODUODENOSCOPY-DILATION;  Surgeon: Gavin Vargas MD;  Location:  [facility];  Service: Gastroenterology   Hand surgery [[id number]]   4 TOTAL   Liver biopsy [[id number]]      Peg Tube Insertion [[id number]]      PEG tube removal [[id number]]      Appendectomy [SHX54]      Exploratory laparotomy [[id number]]   MULTIPLE   Muscle biopsy [[id number]]      Esophagogastroduodenoscopy [[id number]]      Esophagogastroduodenoscopy [[id number]] N/A 2/6/2019 Procedure: ESOPHAGOGASTRODUODENOSCOPY;  Surgeon: Gavin Vargas MD;  Location:  [facility];  Service: Gastroenterology   Esophagogastroduodenoscopy [[id number]] N/A 7/24/2019 Procedure: ESOPHAGOGASTRODUODENOSCOPY;  Surgeon: Gavin Vargas MD;  Location:  [facility];  Service: Gastroenterology   Esophagogastroduodenoscopy [[id number]] N/A 10/18/2019 Procedure: ESOPHAGOGASTRODUODENOSCOPY WITH BALLOON DILATION;  Surgeon: Gavin Vargas MD;  Location:  [facility];  Service: Gastroenterology       OTHER SYMPTOM NOTES:     SOA AND PAIN WITH INSPIRATION       ADDITIONAL NOTES:           Alyssia Wong RN  6/2/2020  22:37

## 2020-06-03 NOTE — PROGRESS NOTES
[facility] Medicine Services  ADMISSION FOLLOW-UP NOTE          Patient admitted after midnight, H&P by my partner performed earlier on today's date reviewed.  Interim findings, labs, and charting also reviewed.        The [facility] Hospital Problem List has been managed and updated to include any new diagnoses:  Active Hospital Problems    Diagnosis  POA   • **Hypoxia [R09.02]  Yes   • Bronchitis [J40]  Unknown   • Pleuritic chest pain [R07.81]  Unknown   • Pneumonitis [J18.9]  Unknown   • Premature ovarian failure [E28.8]  Unknown   • Polyglandular autoimmune syndrome, type 1 (CMS/HCC) [E31.8]  Unknown   • Type 2 diabetes mellitus (CMS/HCC) [E11.9]  Unknown   • Hypoparathyroidism (CMS/HCC) [E20.9]  Unknown   • Adrenal insufficiency ([place]field's disease) (CMS/HCC) [E27.1]  Unknown   • Asplenia [Q89.01]  Not Applicable      Resolved Hospital Problems   No resolved problems to display.         ADDITIONAL PLAN:  - detailed assessment and plan form admission reviewed  - Chart reviewed and patient examined via telemedicine, awaiting COVID-19 test results. Continue to treat for atypical pneumonia and await pulmonary evaluation.      Electronically signed by Lui Velez MD, 06/03/20, 9:35 AM.

## 2020-06-03 NOTE — PROGRESS NOTES
Discharge Planning Assessment  [facility]     Patient Name: Krista Richter  MRN: [id number]  Today's Date: 6/3/2020    Admit Date: 6/3/2020    Discharge Needs Assessment     Row Name 06/03/20 1055       Living Environment    Lives With  parent(s)    Current Living Arrangements  home/apartment/condo    Primary Care Provided by  self    Provides Primary Care For  no one    Family Caregiver if Needed  significant other;parent(s)    Quality of Family Relationships  unable to assess    Able to Return to Prior Arrangements  yes       Resource/Environmental Concerns    Resource/Environmental Concerns  none       Transition Planning    Patient/Family Anticipates Transition to  home with family    Patient/Family Anticipated Services at Transition      Transportation Anticipated  family or friend will provide       Discharge Needs Assessment    Readmission Within the Last 30 Days  no previous admission in last 30 days    Concerns to be Addressed  discharge planning    Equipment Currently Used at Home  cane, straight;walker, rolling    Anticipated Changes Related to Illness  none        Discharge Plan     Row Name 06/03/20 1056       Plan    Plan  Home    Patient/Family in Agreement with Plan  yes    Plan Comments  Spoke with patient in room to initiate discharge planning.  She lives with her parents and significant other in [facility].  She is independent with ADL's.  She has a rolling walker and straight cane at home.  She has never had home health, but uses [facility] for home infusion needs.  Ms. Richter has RX coverage and has her scripts filled at [facility].  Her plan is to return home at discharge.  She denies any needs at this time.  CM will continue to follow.  Sarah Greenfield RN [phone]    Final Discharge Disposition Code  01 - home or self-care        Destination      Coordination has not been started for this encounter.      Durable Medical Equipment      Coordination  has not been started for this encounter.      Dialysis/Infusion      Coordination has not been started for this encounter.      Home Medical Care      Coordination has not been started for this encounter.      Therapy      Coordination has not been started for this encounter.      Community Resources      Coordination has not been started for this encounter.        Expected Discharge Date and Time     Expected Discharge Date Expected Discharge Time    Jun 6, 2020         Demographic Summary     Row Name 06/03/20 1051       General Information    Admission Type  inpatient    Arrived From  hospital    Referral Source  admission list    Reason for Consult  discharge planning    Preferred Language  English     Used During This Interaction  no    General Information Comments  PCP- Agustin Turner        Functional Status     Row Name 06/03/20 1055       Functional Status    Usual Activity Tolerance  moderate    Current Activity Tolerance  moderate       Functional Status, IADL    Medications  independent    Meal Preparation  independent    Housekeeping  independent    Laundry  independent    Shopping  independent        Psychosocial    No documentation.       Abuse/Neglect    No documentation.       Legal     Row Name 06/03/20 105       Financial/Legal    Finance Comments  Verified with patient that she has [employer].  No issues obtaining medications.        Substance Abuse    No documentation.       Patient Forms    No documentation.           Sarah Greenfield RN

## 2020-06-04 ENCOUNTER — ANESTHESIA (OUTPATIENT)
Dept: GASTROENTEROLOGY | Facility: HOSPITAL | Age: 30
End: 2020-06-04

## 2020-06-04 ENCOUNTER — APPOINTMENT (OUTPATIENT)
Dept: GENERAL RADIOLOGY | Facility: HOSPITAL | Age: 30
End: 2020-06-04

## 2020-06-04 ENCOUNTER — ANESTHESIA EVENT (OUTPATIENT)
Dept: GASTROENTEROLOGY | Facility: HOSPITAL | Age: 30
End: 2020-06-04

## 2020-06-04 LAB
ANION GAP SERPL CALCULATED.3IONS-SCNC: 10 MMOL/L (ref 5–15)
BUN BLD-MCNC: 17 MG/DL (ref 6–20)
BUN/CREAT SERPL: 32.7 (ref 7–25)
CALCIUM SPEC-SCNC: 9 MG/DL (ref 8.6–10.5)
CHLORIDE SERPL-SCNC: 103 MMOL/L (ref 98–107)
CO2 SERPL-SCNC: 28 MMOL/L (ref 22–29)
CREAT BLD-MCNC: 0.52 MG/DL (ref 0.57–1)
DEPRECATED RDW RBC AUTO: 55.1 FL (ref 37–54)
ERYTHROCYTE [DISTWIDTH] IN BLOOD BY AUTOMATED COUNT: 14.7 % (ref 12.3–15.4)
GFR SERPL CREATININE-BSD FRML MDRD: 138 ML/MIN/1.73
GLUCOSE BLD-MCNC: 104 MG/DL (ref 65–99)
GLUCOSE BLDC GLUCOMTR-MCNC: 110 MG/DL (ref 70–130)
GLUCOSE BLDC GLUCOMTR-MCNC: 75 MG/DL (ref 70–130)
GLUCOSE BLDC GLUCOMTR-MCNC: 90 MG/DL (ref 70–130)
GLUCOSE BLDC GLUCOMTR-MCNC: 92 MG/DL (ref 70–130)
HCT VFR BLD AUTO: 37.4 % (ref 34–46.6)
HGB BLD-MCNC: 12 G/DL (ref 12–15.9)
MCH RBC QN AUTO: 32.4 PG (ref 26.6–33)
MCHC RBC AUTO-ENTMCNC: 32.1 G/DL (ref 31.5–35.7)
MCV RBC AUTO: 101.1 FL (ref 79–97)
PLATELET # BLD AUTO: 276 10*3/MM3 (ref 140–450)
PMV BLD AUTO: 12.2 FL (ref 6–12)
POTASSIUM BLD-SCNC: 5.2 MMOL/L (ref 3.5–5.2)
RBC # BLD AUTO: 3.7 10*6/MM3 (ref 3.77–5.28)
SODIUM BLD-SCNC: 141 MMOL/L (ref 136–145)
WBC NRBC COR # BLD: 9.45 10*3/MM3 (ref 3.4–10.8)

## 2020-06-04 PROCEDURE — 25010000002 HYDROMORPHONE 1 MG/ML SOLUTION: Performed by: NURSE ANESTHETIST, CERTIFIED REGISTERED

## 2020-06-04 PROCEDURE — 25010000002 ENOXAPARIN PER 10 MG: Performed by: PHYSICIAN ASSISTANT

## 2020-06-04 PROCEDURE — 85027 COMPLETE CBC AUTOMATED: CPT | Performed by: HOSPITALIST

## 2020-06-04 PROCEDURE — 80048 BASIC METABOLIC PNL TOTAL CA: CPT | Performed by: HOSPITALIST

## 2020-06-04 PROCEDURE — 25010000002 PROPOFOL 10 MG/ML EMULSION: Performed by: NURSE ANESTHETIST, CERTIFIED REGISTERED

## 2020-06-04 PROCEDURE — 63710000001 DIPHENHYDRAMINE PER 50 MG: Performed by: PHYSICIAN ASSISTANT

## 2020-06-04 PROCEDURE — 94799 UNLISTED PULMONARY SVC/PX: CPT

## 2020-06-04 PROCEDURE — 25010000002 HYDROMORPHONE PER 4 MG: Performed by: INTERNAL MEDICINE

## 2020-06-04 PROCEDURE — 25010000002 HYDROCORTISONE SODIUM SUCCINATE 100 MG RECONSTITUTED SOLUTION: Performed by: NURSE ANESTHETIST, CERTIFIED REGISTERED

## 2020-06-04 PROCEDURE — 99233 SBSQ HOSP IP/OBS HIGH 50: CPT | Performed by: HOSPITALIST

## 2020-06-04 PROCEDURE — 25010000002 MORPHINE PER 10 MG: Performed by: INTERNAL MEDICINE

## 2020-06-04 PROCEDURE — 25010000002 PROMETHAZINE PER 50 MG: Performed by: HOSPITALIST

## 2020-06-04 PROCEDURE — 99255 IP/OBS CONSLTJ NEW/EST HI 80: CPT | Performed by: NURSE PRACTITIONER

## 2020-06-04 PROCEDURE — 0D758ZZ DILATION OF ESOPHAGUS, VIA NATURAL OR ARTIFICIAL OPENING ENDOSCOPIC: ICD-10-PCS | Performed by: INTERNAL MEDICINE

## 2020-06-04 PROCEDURE — 25010000002 PROMETHAZINE PER 50 MG: Performed by: NURSE ANESTHETIST, CERTIFIED REGISTERED

## 2020-06-04 PROCEDURE — 71045 X-RAY EXAM CHEST 1 VIEW: CPT

## 2020-06-04 PROCEDURE — 43248 EGD GUIDE WIRE INSERTION: CPT | Performed by: INTERNAL MEDICINE

## 2020-06-04 PROCEDURE — 63710000001 PROMETHAZINE PER 12.5 MG: Performed by: PHYSICIAN ASSISTANT

## 2020-06-04 PROCEDURE — 82962 GLUCOSE BLOOD TEST: CPT

## 2020-06-04 PROCEDURE — 25010000002 HYDROMORPHONE PER 4 MG: Performed by: HOSPITALIST

## 2020-06-04 PROCEDURE — C1769 GUIDE WIRE: HCPCS | Performed by: INTERNAL MEDICINE

## 2020-06-04 RX ORDER — FENTANYL CITRATE 50 UG/ML
50 INJECTION, SOLUTION INTRAMUSCULAR; INTRAVENOUS
Status: DISCONTINUED | OUTPATIENT
Start: 2020-06-04 | End: 2020-06-04

## 2020-06-04 RX ORDER — PROMETHAZINE HYDROCHLORIDE 12.5 MG/1
12.5 TABLET ORAL EVERY 6 HOURS PRN
Status: DISCONTINUED | OUTPATIENT
Start: 2020-06-04 | End: 2020-06-06 | Stop reason: HOSPADM

## 2020-06-04 RX ORDER — DIPHENHYDRAMINE HCL 25 MG
25 CAPSULE ORAL NIGHTLY PRN
Status: DISCONTINUED | OUTPATIENT
Start: 2020-06-04 | End: 2020-06-06 | Stop reason: HOSPADM

## 2020-06-04 RX ORDER — MELATONIN
1000 2 TIMES DAILY
Status: DISCONTINUED | OUTPATIENT
Start: 2020-06-04 | End: 2020-06-06 | Stop reason: HOSPADM

## 2020-06-04 RX ORDER — PEG-3350, SODIUM SULFATE, SODIUM CHLORIDE, POTASSIUM CHLORIDE, SODIUM ASCORBATE AND ASCORBIC ACID 7.5-2.691G
1000 KIT ORAL
Status: COMPLETED | OUTPATIENT
Start: 2020-06-04 | End: 2020-06-04

## 2020-06-04 RX ORDER — PROPOFOL 10 MG/ML
VIAL (ML) INTRAVENOUS AS NEEDED
Status: DISCONTINUED | OUTPATIENT
Start: 2020-06-04 | End: 2020-06-04 | Stop reason: SURG

## 2020-06-04 RX ORDER — PROMETHAZINE HYDROCHLORIDE 25 MG/ML
12.5 INJECTION, SOLUTION INTRAMUSCULAR; INTRAVENOUS ONCE AS NEEDED
Status: DISCONTINUED | OUTPATIENT
Start: 2020-06-04 | End: 2020-06-04

## 2020-06-04 RX ORDER — PROMETHAZINE HYDROCHLORIDE 25 MG/1
25 SUPPOSITORY RECTAL ONCE AS NEEDED
Status: DISCONTINUED | OUTPATIENT
Start: 2020-06-04 | End: 2020-06-04

## 2020-06-04 RX ORDER — PEG-3350, SODIUM SULFATE, SODIUM CHLORIDE, POTASSIUM CHLORIDE, SODIUM ASCORBATE AND ASCORBIC ACID 7.5-2.691G
1000 KIT ORAL
Status: COMPLETED | OUTPATIENT
Start: 2020-06-05 | End: 2020-06-05

## 2020-06-04 RX ORDER — CALCIUM CHLORIDE 100 MG/ML
INJECTION INTRAVENOUS; INTRAVENTRICULAR AS NEEDED
Status: DISCONTINUED | OUTPATIENT
Start: 2020-06-04 | End: 2020-06-04 | Stop reason: SURG

## 2020-06-04 RX ORDER — DIPHENHYDRAMINE HYDROCHLORIDE, ZINC ACETATE 2; .1 G/100G; G/100G
CREAM TOPICAL 3 TIMES DAILY PRN
Status: DISCONTINUED | OUTPATIENT
Start: 2020-06-04 | End: 2020-06-06 | Stop reason: HOSPADM

## 2020-06-04 RX ORDER — PROMETHAZINE HYDROCHLORIDE 25 MG/ML
INJECTION, SOLUTION INTRAMUSCULAR; INTRAVENOUS AS NEEDED
Status: DISCONTINUED | OUTPATIENT
Start: 2020-06-04 | End: 2020-06-04 | Stop reason: SURG

## 2020-06-04 RX ORDER — LIDOCAINE HYDROCHLORIDE 10 MG/ML
INJECTION, SOLUTION EPIDURAL; INFILTRATION; INTRACAUDAL; PERINEURAL AS NEEDED
Status: DISCONTINUED | OUTPATIENT
Start: 2020-06-04 | End: 2020-06-04 | Stop reason: SURG

## 2020-06-04 RX ORDER — PROMETHAZINE HYDROCHLORIDE 25 MG/1
25 TABLET ORAL ONCE AS NEEDED
Status: DISCONTINUED | OUTPATIENT
Start: 2020-06-04 | End: 2020-06-04

## 2020-06-04 RX ORDER — PROMETHAZINE HYDROCHLORIDE 25 MG/ML
12.5 INJECTION, SOLUTION INTRAMUSCULAR; INTRAVENOUS EVERY 6 HOURS PRN
Status: DISCONTINUED | OUTPATIENT
Start: 2020-06-04 | End: 2020-06-04

## 2020-06-04 RX ORDER — DOXYCYCLINE 100 MG/1
100 CAPSULE ORAL EVERY 12 HOURS SCHEDULED
Status: DISCONTINUED | OUTPATIENT
Start: 2020-06-05 | End: 2020-06-06 | Stop reason: HOSPADM

## 2020-06-04 RX ORDER — SODIUM CHLORIDE, SODIUM LACTATE, POTASSIUM CHLORIDE, AND CALCIUM CHLORIDE .6; .31; .03; .02 G/100ML; G/100ML; G/100ML; G/100ML
20 INJECTION, SOLUTION INTRAVENOUS CONTINUOUS
Status: DISCONTINUED | OUTPATIENT
Start: 2020-06-04 | End: 2020-06-06 | Stop reason: HOSPADM

## 2020-06-04 RX ADMIN — SODIUM CHLORIDE, POTASSIUM CHLORIDE, SODIUM LACTATE AND CALCIUM CHLORIDE 200 ML: 600; 310; 30; 20 INJECTION, SOLUTION INTRAVENOUS at 16:19

## 2020-06-04 RX ADMIN — HYDROMORPHONE HYDROCHLORIDE 1 MG: 1 INJECTION, SOLUTION INTRAMUSCULAR; INTRAVENOUS; SUBCUTANEOUS at 16:15

## 2020-06-04 RX ADMIN — HYDROMORPHONE HYDROCHLORIDE 0.5 MG: 1 INJECTION, SOLUTION INTRAMUSCULAR; INTRAVENOUS; SUBCUTANEOUS at 01:32

## 2020-06-04 RX ADMIN — PROPOFOL 80 MG: 10 INJECTION, EMULSION INTRAVENOUS at 16:24

## 2020-06-04 RX ADMIN — VENLAFAXINE 75 MG: 37.5 TABLET ORAL at 18:30

## 2020-06-04 RX ADMIN — ALBUTEROL SULFATE 2.5 MG: 2.5 SOLUTION RESPIRATORY (INHALATION) at 21:06

## 2020-06-04 RX ADMIN — LIDOCAINE HYDROCHLORIDE 50 MG: 10 INJECTION, SOLUTION EPIDURAL; INFILTRATION; INTRACAUDAL; PERINEURAL at 16:24

## 2020-06-04 RX ADMIN — CYCLOSPORINE 1 DROP: 0.5 EMULSION OPHTHALMIC at 20:16

## 2020-06-04 RX ADMIN — PARATHYROID HORMONE 25 MCG: 25 INJECTION, POWDER, LYOPHILIZED, FOR SOLUTION SUBCUTANEOUS at 08:44

## 2020-06-04 RX ADMIN — HYDROMORPHONE HYDROCHLORIDE 0.5 MG: 1 INJECTION, SOLUTION INTRAMUSCULAR; INTRAVENOUS; SUBCUTANEOUS at 13:22

## 2020-06-04 RX ADMIN — CALCITRIOL CAPSULES 0.25 MCG 0.5 MCG: 0.25 CAPSULE ORAL at 20:14

## 2020-06-04 RX ADMIN — CALCIUM CHLORIDE 0.5 G: 100 INJECTION INTRAVENOUS; INTRAVENTRICULAR at 16:24

## 2020-06-04 RX ADMIN — AZTREONAM 2 G: 2 INJECTION, POWDER, LYOPHILIZED, FOR SOLUTION INTRAMUSCULAR; INTRAVENOUS at 00:57

## 2020-06-04 RX ADMIN — POTASSIUM CHLORIDE 40 MEQ: 10 CAPSULE, COATED, EXTENDED RELEASE ORAL at 20:18

## 2020-06-04 RX ADMIN — GABAPENTIN 600 MG: 300 CAPSULE ORAL at 20:14

## 2020-06-04 RX ADMIN — PROMETHAZINE HYDROCHLORIDE 12.5 MG: 12.5 TABLET ORAL at 20:14

## 2020-06-04 RX ADMIN — Medication 1000 UNITS: at 08:46

## 2020-06-04 RX ADMIN — HYDROCORTISONE 10 MG: 10 TABLET ORAL at 20:14

## 2020-06-04 RX ADMIN — AZTREONAM 2 G: 2 INJECTION, POWDER, LYOPHILIZED, FOR SOLUTION INTRAMUSCULAR; INTRAVENOUS at 10:47

## 2020-06-04 RX ADMIN — VALACYCLOVIR HYDROCHLORIDE 1000 MG: 500 TABLET, FILM COATED ORAL at 08:49

## 2020-06-04 RX ADMIN — ALBUTEROL SULFATE 2 PUFF: 90 AEROSOL, METERED RESPIRATORY (INHALATION) at 08:09

## 2020-06-04 RX ADMIN — PANTOPRAZOLE SODIUM 40 MG: 40 TABLET, DELAYED RELEASE ORAL at 08:49

## 2020-06-04 RX ADMIN — HYDROMORPHONE HYDROCHLORIDE 0.5 MG: 1 INJECTION, SOLUTION INTRAMUSCULAR; INTRAVENOUS; SUBCUTANEOUS at 23:07

## 2020-06-04 RX ADMIN — HYDROMORPHONE HYDROCHLORIDE 1 MG: 1 INJECTION, SOLUTION INTRAMUSCULAR; INTRAVENOUS; SUBCUTANEOUS at 16:40

## 2020-06-04 RX ADMIN — FAMOTIDINE 20 MG: 20 TABLET, FILM COATED ORAL at 18:29

## 2020-06-04 RX ADMIN — ENOXAPARIN SODIUM 40 MG: 40 INJECTION SUBCUTANEOUS at 06:03

## 2020-06-04 RX ADMIN — SODIUM CHLORIDE, PRESERVATIVE FREE 10 ML: 5 INJECTION INTRAVENOUS at 20:14

## 2020-06-04 RX ADMIN — FLUDROCORTISONE ACETATE 100 MCG: 0.1 TABLET ORAL at 08:48

## 2020-06-04 RX ADMIN — HYDROMORPHONE HYDROCHLORIDE 0.5 MG: 1 INJECTION, SOLUTION INTRAMUSCULAR; INTRAVENOUS; SUBCUTANEOUS at 15:20

## 2020-06-04 RX ADMIN — HYDROMORPHONE HYDROCHLORIDE 0.5 MG: 1 INJECTION, SOLUTION INTRAMUSCULAR; INTRAVENOUS; SUBCUTANEOUS at 16:50

## 2020-06-04 RX ADMIN — DIAZEPAM 5 MG: 5 TABLET ORAL at 18:29

## 2020-06-04 RX ADMIN — PROMETHAZINE HYDROCHLORIDE 25 MG: 25 INJECTION INTRAMUSCULAR; INTRAVENOUS at 16:40

## 2020-06-04 RX ADMIN — MELATONIN 1000 UNITS: at 20:13

## 2020-06-04 RX ADMIN — PROMETHAZINE HYDROCHLORIDE 12.5 MG: 25 INJECTION INTRAMUSCULAR; INTRAVENOUS at 01:44

## 2020-06-04 RX ADMIN — DOXYCYCLINE 100 MG: 100 INJECTION, POWDER, LYOPHILIZED, FOR SOLUTION INTRAVENOUS at 06:03

## 2020-06-04 RX ADMIN — POLYETHYLENE GLYCOL 3350, SODIUM SULFATE, SODIUM CHLORIDE, POTASSIUM CHLORIDE, ASCORBIC ACID, SODIUM ASCORBATE 1000 ML: KIT at 19:46

## 2020-06-04 RX ADMIN — PROPOFOL 50 MG: 10 INJECTION, EMULSION INTRAVENOUS at 16:29

## 2020-06-04 RX ADMIN — MORPHINE SULFATE 2 MG: 2 INJECTION, SOLUTION INTRAMUSCULAR; INTRAVENOUS at 20:14

## 2020-06-04 RX ADMIN — SODIUM CHLORIDE, POTASSIUM CHLORIDE, SODIUM LACTATE AND CALCIUM CHLORIDE 20 ML/HR: 600; 310; 30; 20 INJECTION, SOLUTION INTRAVENOUS at 15:53

## 2020-06-04 RX ADMIN — MORPHINE SULFATE 2 MG: 2 INJECTION, SOLUTION INTRAMUSCULAR; INTRAVENOUS at 10:47

## 2020-06-04 RX ADMIN — FAMOTIDINE 20 MG: 20 TABLET, FILM COATED ORAL at 08:48

## 2020-06-04 RX ADMIN — CALCITRIOL CAPSULES 0.25 MCG 0.5 MCG: 0.25 CAPSULE ORAL at 08:49

## 2020-06-04 RX ADMIN — SODIUM CHLORIDE, POTASSIUM CHLORIDE, SODIUM LACTATE AND CALCIUM CHLORIDE 100 ML: 600; 310; 30; 20 INJECTION, SOLUTION INTRAVENOUS at 16:32

## 2020-06-04 RX ADMIN — HYDROCORTISONE SODIUM SUCCINATE 100 MG: 100 INJECTION, POWDER, FOR SOLUTION INTRAMUSCULAR; INTRAVENOUS at 16:15

## 2020-06-04 RX ADMIN — POTASSIUM CHLORIDE 40 MEQ: 10 CAPSULE, COATED, EXTENDED RELEASE ORAL at 08:49

## 2020-06-04 RX ADMIN — CYCLOSPORINE 1 DROP: 0.5 EMULSION OPHTHALMIC at 08:48

## 2020-06-04 RX ADMIN — CALCIUM CHLORIDE 0.5 G: 100 INJECTION INTRAVENOUS; INTRAVENTRICULAR at 16:27

## 2020-06-04 RX ADMIN — HYDROCORTISONE 20 MG: 20 TABLET ORAL at 08:51

## 2020-06-04 RX ADMIN — PROMETHAZINE HYDROCHLORIDE 25 MG: 25 INJECTION INTRAMUSCULAR; INTRAVENOUS at 16:15

## 2020-06-04 RX ADMIN — ALBUTEROL SULFATE 2.5 MG: 2.5 SOLUTION RESPIRATORY (INHALATION) at 01:03

## 2020-06-04 RX ADMIN — DIPHENHYDRAMINE HYDROCHLORIDE 25 MG: 25 CAPSULE ORAL at 20:14

## 2020-06-04 RX ADMIN — HYDROMORPHONE HYDROCHLORIDE 0.5 MG: 1 INJECTION, SOLUTION INTRAMUSCULAR; INTRAVENOUS; SUBCUTANEOUS at 08:39

## 2020-06-04 RX ADMIN — VENLAFAXINE 75 MG: 37.5 TABLET ORAL at 08:49

## 2020-06-04 RX ADMIN — PARATHYROID HORMONE 25 MCG: 25 INJECTION, POWDER, LYOPHILIZED, FOR SOLUTION SUBCUTANEOUS at 19:45

## 2020-06-04 RX ADMIN — VALACYCLOVIR HYDROCHLORIDE 1000 MG: 500 TABLET, FILM COATED ORAL at 20:13

## 2020-06-04 RX ADMIN — ALBUTEROL SULFATE 2 PUFF: 90 AEROSOL, METERED RESPIRATORY (INHALATION) at 12:28

## 2020-06-04 RX ADMIN — LEVOTHYROXINE SODIUM 75 MCG: 75 TABLET ORAL at 06:03

## 2020-06-04 RX ADMIN — SODIUM CHLORIDE, PRESERVATIVE FREE 10 ML: 5 INJECTION INTRAVENOUS at 08:50

## 2020-06-04 NOTE — PROGRESS NOTES
[facility] Medicine Services  PROGRESS NOTE    Patient Name: Krista Richter  : 1990  MRN: [id number]    Date of Admission: 6/3/2020  Primary Care Physician: Agustin Turner MD    Subjective   Subjective     CC:  Dyspnea    HPI:  Dry cough. Occasional wheeze. Tired. Had tetany with low calcium yesterday. Had nausea/emesis, last night.    Review of Systems   Constitutional: Positive for activity change and fatigue.   HENT: Positive for congestion.    Respiratory: Positive for cough, shortness of breath and wheezing. Negative for chest tightness.    Cardiovascular: Negative for chest pain, palpitations and leg swelling.   Gastrointestinal: Positive for nausea.   Genitourinary: Negative.    Psychiatric/Behavioral: The patient is nervous/anxious.        Objective   Objective     Vital Signs:   Temp:  [95.7 °F (35.4 °C)-98.3 °F (36.8 °C)] 97.8 °F (36.6 °C)  Heart Rate:  [] 79  Resp:  [16-18] 18  BP: ()/(64-74) 96/74        Physical Exam:  NAD, alert and oriented, anxious  OP clear, MMM  PERRL  Face symmetric speech clear, but hoarse  Neck without LAD  RRR  CTAB  +BS, ND, NT, soft  No c/c/e  No rashes  THOMPSON  Normal affect    Results Reviewed:  Results from last 7 days   Lab Units 20  0657 20  0321 20  0056 20  0030   WBC 10*3/mm3 9.45 17.47*  --  16.41*   HEMOGLOBIN g/dL 12.0 13.7  --  13.9   HEMATOCRIT % 37.4 41.8  --  42.2   PLATELETS 10*3/mm3 276 297  --  307   PROCALCITONIN ng/mL  --   --  0.05*  --      Results from last 7 days   Lab Units 20  0657 20  0056   SODIUM mmol/L 141 140   POTASSIUM mmol/L 5.2 4.3   CHLORIDE mmol/L 103 99   CO2 mmol/L 28.0 28.0   BUN mg/dL 17 16   CREATININE mg/dL 0.52* 0.46*   GLUCOSE mg/dL 104* 159*   CALCIUM mg/dL 9.0 8.5*   ALT (SGPT) U/L  --  15   AST (SGOT) U/L  --  20   TROPONIN T ng/mL  --  <0.010     Estimated Creatinine Clearance: 150.8 mL/min (A) (by C-G formula based on SCr of 0.52 mg/dL  (L)).    Microbiology Results Abnormal     Procedure Component Value - Date/Time    Blood Culture - Blood, Arm, Left [[id number]] Collected:  06/03/20 0321    Lab Status:  Preliminary result Specimen:  Blood from Arm, Left Updated:  06/04/20 0415     Blood Culture No growth at 24 hours    Blood Culture - Blood, Arm, Right [[id number]] Collected:  06/03/20 0321    Lab Status:  Preliminary result Specimen:  Blood from Arm, Right Updated:  06/04/20 0415     Blood Culture No growth at 24 hours    COVID-19,[facility] IN-HOUSE, NP SWAB IN TRANSPORT MEDIA 8-12 HR TAT - Swab, Nasopharynx [[id number]]  (Normal) Collected:  06/03/20 0257    Lab Status:  Final result Specimen:  Swab from Nasopharynx Updated:  06/03/20 1026     COVID19 Not Detected    S. Pneumo Ag Urine or CSF - Urine, Urine, Clean Catch [[id number]]  (Normal) Collected:  06/03/20 0258    Lab Status:  Final result Specimen:  Urine, Clean Catch Updated:  06/03/20 0918     Strep Pneumo Ag Negative    MRSA Screen, PCR (Inpatient) - Swab, Nares [[id number]]  (Normal) Collected:  06/03/20 0257    Lab Status:  Final result Specimen:  Swab from Nares Updated:  06/03/20 0913     MRSA PCR Negative    Narrative:       MRSA Negative    Respiratory Panel, PCR - Swab, Nasopharynx [[id number]]  (Normal) Collected:  06/03/20 0257    Lab Status:  Final result Specimen:  Swab from Nasopharynx Updated:  06/03/20 0433     ADENOVIRUS, PCR Not Detected     Coronavirus 229E Not Detected     Coronavirus HKU1 Not Detected     Coronavirus NL63 Not Detected     Coronavirus OC43 Not Detected     Human Metapneumovirus Not Detected     Human Rhinovirus/Enterovirus Not Detected     Influenza B PCR Not Detected     Parainfluenza Virus 1 Not Detected     Parainfluenza Virus 2 Not Detected     Parainfluenza Virus 3 Not Detected     Parainfluenza Virus 4 Not Detected     Bordetella pertussis pcr Not Detected     Influenza A H1 2009 PCR Not Detected     Chlamydophila pneumoniae PCR Not Detected      Mycoplasma pneumo by PCR Not Detected     Influenza A PCR Not Detected     Influenza A H3 Not Detected     Influenza A H1 Not Detected     RSV, PCR Not Detected     Bordetella parapertussis PCR Not Detected    Narrative:       The coronavirus on the RVP is NOT COVID-19 and is NOT indicative of infection with COVID-19.           Imaging Results (Last 24 Hours)     Procedure Component Value Units Date/Time    XR Chest 1 View [[id number]] Collected:  06/04/20 0906     Updated:  06/04/20 0918    Narrative:       EXAMINATION: XR CHEST 1 VW-     INDICATION: Dyspnea, on exertion.     COMPARISON: 03/09/2020.     FINDINGS: Left-sided Port-A-Cath is seen with its tip in the distal SVC.  Implanted cardiac monitor device is seen on the left. Heart is normal in  size. Vasculature appears normal. Lungs appear grossly clear.       Impression:       No evidence of active chest disease.      D:  06/04/2020  E:  06/04/2020                  I have reviewed the medications:  Scheduled Meds:  albuterol 2.5 mg Nebulization Once   albuterol sulfate HFA 2 puff Inhalation 4x Daily - RT   aztreonam 2 g Intravenous Q8H   calcitriol 0.5 mcg Oral Q12H   Cholecalciferol 1,000 Units Oral BID   cycloSPORINE 1 drop Both Eyes BID   doxycycline 100 mg Intravenous Q12H   enoxaparin 40 mg Subcutaneous Q24H   famotidine 20 mg Oral BID AC   fludrocortisone 100 mcg Oral Daily   gabapentin 600 mg Oral Nightly   hydrocortisone 10 mg Oral Nightly   hydrocortisone 20 mg Oral QAM   insulin lispro 0-7 Units Subcutaneous TID AC   levothyroxine 75 mcg Oral Q AM   pantoprazole 40 mg Oral Daily   Parathyroid Hormone (Recomb) 25 mcg Subcutaneous Q12H   potassium chloride 40 mEq Oral TID   sodium chloride 10 mL Intravenous Q12H   valACYclovir 1,000 mg Oral Q12H   venlafaxine 75 mg Oral BID With Meals     Continuous Infusions:   PRN Meds:.•  acetaminophen **OR** acetaminophen **OR** acetaminophen  •  albuterol  •  calcium gluconate  •  dextrose  •  dextrose  •   diazePAM  •  glucagon (human recombinant)  •  HYDROmorphone  •  melatonin  •  Morphine  •  promethazine  •  promethazine  •  promethazine  •  sodium chloride    Assessment/Plan   Assessment & Plan     Active Hospital Problems    Diagnosis  POA   • **Hypoxia [R09.02]  Yes   • Bronchitis [J40]  Unknown   • Pleuritic chest pain [R07.81]  Unknown   • Pneumonitis [J18.9]  Unknown   • 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        Brief Hospital Course to date:  Krista Richter is a 29 y.o. 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    Hypoxia/dyspnea  --treating for PNA/pneumonitis  --given extensive allergy history will ask for ID help  --s/p stress steroids    Adrenal insufficiency  --s/p stress steroids    Hypothyroid  --on synthroid    Hx of premature ovarian faliure    Hypoparathyroidism  --on calcitriol  --Calcium gluconate prn for intermittent tetany spells    DM  --SSI    DVT Prophylaxis:  Lovenox      Disposition: I expect the patient to be discharged home.    CODE STATUS:   Code Status and Medical Interventions:   Ordered at: 06/03/20 0111     Code Status:    CPR     Medical Interventions (Level of Support Prior to Arrest):    Full         Electronically signed by Lui Velez MD, 06/04/20, 11:28.

## 2020-06-04 NOTE — CONSULTS
[facility] Gastroenterology Consult    Referring Provider: Betty Yarbrough DO    PCP: Agustin Turner MD    Reason for Consultation: Dysphasia to liquids    Chief complaint: Unable to swallow liquids and solids    History of present illness:    Krista Richter is a 29 y.o. female who is admitted with chief complaint of worsening dyspnea with bronchitis.  Patient well-known to Dr. Houser's; contacted his office this morning with complaints of worsening dysphasia to liquids has occurred over the past few weeks.  Patient notes that her dysphasia is worse with solids but is now began to include liquids; notes that she struggles with meats and breads but over the past couple weeks is even noticed difficulty swallowing water.  Patient recalls that her symptoms have been ongoing for the past 2 months and has been seeing Dr. Vargas for a potential endoscopy.. Patient denies any associated symptoms including nausea, vomiting, diarrhea, hematemesis, or melena; does note weight loss but that is intentional.  Patient does note some shortness of breath and a cough related to her bronchitis that she is currently admitted for. Patient denies any alleviating or exacerbating factors, has attempted no at home treatment, denies pain.     Allergies:  Cefpodoxime; Cephalosporins; Clarithromycin; Levofloxacin; Nitrofurantoin; Nitrofurantoin macrocrystal; Nystatin; Ondansetron; Penicillins; and Sulfa antibiotics    Scheduled Meds:    albuterol 2.5 mg Nebulization Once   albuterol sulfate HFA 2 puff Inhalation 4x Daily - RT   calcitriol 0.5 mcg Oral Q12H   Cholecalciferol 1,000 Units Oral BID   cycloSPORINE 1 drop Both Eyes BID   enoxaparin 40 mg Subcutaneous Q24H   famotidine 20 mg Oral BID AC   fludrocortisone 100 mcg Oral Daily   gabapentin 600 mg Oral Nightly   hydrocortisone 10 mg Oral Nightly   hydrocortisone 20 mg Oral QAM   insulin lispro 0-7 Units Subcutaneous TID AC   levothyroxine 75 mcg Oral Q AM   pantoprazole 40 mg  Oral Daily   Parathyroid Hormone (Recomb) 25 mcg Subcutaneous Q12H   potassium chloride 40 mEq Oral TID   sodium chloride 10 mL Intravenous Q12H   valACYclovir 1,000 mg Oral Q12H   venlafaxine 75 mg Oral BID With Meals      PRN Meds:  •  acetaminophen **OR** acetaminophen **OR** acetaminophen  •  albuterol  •  calcium gluconate  •  dextrose  •  dextrose  •  diazePAM  •  glucagon (human recombinant)  •  HYDROmorphone  •  melatonin  •  Morphine  •  promethazine  •  promethazine  •  promethazine  •  sodium chloride    Home Meds:  Medications Prior to Admission   Medication Sig Dispense Refill Last Dose   • albuterol (PROVENTIL HFA;VENTOLIN HFA) 108 (90 Base) MCG/ACT inhaler Take 2 Puffs by inhalation every 4 hours as needed for wheezing.   6/3/2020 at Unknown time   • albuterol sulfate HFA (PROAIR HFA) 108 (90 Base) MCG/ACT inhaler ProAir HFA 90 mcg/actuation aerosol inhaler   2 PUFFS Q 4 HOURS SHORTNESS OF BREATH   6/2/2020 at Unknown time   • azaTHIOprine (IMURAN) 50 MG tablet Take 3 tablets by mouth Daily. 90 tablet 0 6/2/2020 at 0900   • azelastine (ASTELIN) 0.1 % nasal spray Astelin 137 mcg (0.1 %) nasal spray aerosol   Daily   Past Month at Unknown time   • azithromycin (ZITHROMAX) 250 MG tablet Take 250 mg by mouth Daily.      • calcitriol (ROCALTROL) 0.5 MCG capsule Take 0.5 mcg by mouth 2 (Two) Times a Day.   6/2/2020 at 0900   • calcium gluconate 1 g/100 mL Infuse 2 g into a venous catheter.   6/2/2020 at 0900   • cycloSPORINE (RESTASIS) 0.05 % ophthalmic emulsion 1 drop. PRN   6/2/2020 at 0900   • cyproheptadine (PERIACTIN) 4 MG tablet Take 4 mg by mouth 2 (Two) Times a Day.   6/2/2020 at 0900   • diazePAM (VALIUM) 5 MG tablet 5 mg.   6/2/2020 at 0900   • dronabinol (MARINOL) 2.5 MG capsule 5 mg 4 (Four) Times a Day As Needed.   6/2/2020 at 0900   • fludrocortisone 0.1 MG tablet TAKE ONE TABLET BY MOUTH DAILY..05MG PER PT   6/2/2020 at 0900   • fluticasone (FLONASE) 50 MCG/ACT nasal spray 1 spray.   6/2/2020  at 0900   • folic acid (FOLVITE) 1 MG tablet Take 4 mg by mouth Daily.   6/2/2020 at 0900   • gabapentin (NEURONTIN) 600 MG tablet Take 600 mg by mouth.   6/2/2020 at 0900   • hydrocortisone (CORTEF) 10 MG tablet Cortef 10 mg tablet   6/2/2020 at 0900   • hydrocortisone (CORTEF) 20 MG tablet Take 20 mg by mouth 2 (Two) Times a Day. 20 MG IN AM AND 10 MG AT NIGHT   6/2/2020 at 0900   • hydrocortisone sodium succinate (SOLU-CORTEF) 100 MG injection Give 100 mg ( 2 mL ) intramuscular as needed for vomiting, severe illness  Dispense actovial   6/2/2020 at 0900   • Hypromellose (SYSTANE OVERNIGHT THERAPY) 0.3 % gel Systane Gel 0.3 % eye gel   6/2/2020 at 0900   • levothyroxine (SYNTHROID, LEVOTHROID) 75 MCG tablet levothyroxine 75 mcg tablet   6/2/2020 at 0900   • lidocaine (LIDODERM) 5 % Place 1 patch on the skin as directed by provider Daily. Remove & Discard patch within 12 hours or as directed by MD 14 patch 0 Past Week at Unknown time   • magnesium oxide (MAGOX) 400 (241.3 Mg) MG tablet tablet Take 4,000 mg by mouth Daily.   6/2/2020 at 0900   • metFORMIN (GLUCOPHAGE) 500 MG tablet Take 500 mg by mouth 2 (Two) Times a Day With Meals.   6/2/2020 at 0900   • Multiple Vitamins-Minerals (MULTIVITAMIN ADULT PO) Take 1 tablet by mouth.   6/2/2020 at 0900   • nadolol (CORGARD) 40 MG tablet Take 40 mg by mouth.   6/2/2020 at 0900   • omeprazole (priLOSEC) 20 MG capsule Take 20 mg by mouth Daily.   6/2/2020 at 0900   • Parathyroid Hormone, Recomb, (NATPARA SC) Inject  under the skin into the appropriate area as directed.   6/2/2020 at 0900   • Potassium Chloride (KLOR-CON 10 PO) Take 40 mEq by mouth 3 (Three) Times a Day.   6/2/2020 at 0900   • promethazine (PHENERGAN) 25 MG suppository Insert 1 suppository into the rectum Every 6 (Six) Hours As Needed for Nausea or Vomiting. 10 suppository 0 6/2/2020 at 0900   • promethazine (PHENERGAN) 25 MG tablet Take 25 mg by mouth Every 6 (Six) Hours As Needed for Nausea or Vomiting.       • raNITIdine (ZANTAC) 150 MG tablet ranitidine 150 mg tablet   1 Two times a day   6/2/2020 at 0900   • valACYclovir (VALTREX) 1000 MG tablet Valtrex 1 gram tablet   Two times a day   Past Month at Unknown time   • venlafaxine (EFFEXOR) 25 MG tablet Take 5 mg by mouth 2 (Two) Times a Day.   6/2/2020 at 0900   • Vitamin D, Cholecalciferol, 1000 units capsule Take 1,000 unit marking on U-100 syringe by mouth 2 (Two) Times a Day.   6/2/2020 at 0900   • zinc sulfate (ZINCATE) 220 (50 Zn) MG capsule zinc sulfate 220 mg (50 mg) capsule   6/2/2020 at 0900   • Alcohol Swabs (ALCOHOL PREP) 70 % pads Check blood glucose up to 2 times daily   Patient Taking Differently at Unknown time   • Blood Glucose Calibration (OT ULTRA/FASTTK CNTRL SOLN) solution Use weekly and as needed to calibrate meter   Past Week at Unknown time   • Blood Glucose Monitoring Suppl (ONE TOUCH ULTRA 2) w/Device kit Use to check blood glucose up to 2 times daily   10/18/2019 at Unknown time   • butalbital-acetaminophen-caffeine (FIORICET, ESGIC) -40 MG per tablet Take 1 tablet by mouth Daily.   More than a month at Unknown time   • EPINEPHrine (EPIPEN 2-TEDDY) 0.3 MG/0.3ML solution auto-injector injection EpiPen 0.3 mg/0.3 mL injection, auto-injector   Take 1 auto by injection route.   Unknown at Unknown time   • Insulin Pen Needle 32G X 4 MM misc Use to administer Natpara subcutaneously daily      • methocarbamol (ROBAXIN) 500 MG tablet Take 1 tablet by mouth 3 (Three) Times a Day. 14 tablet 0    • Misc Natural Products (RA XYDRA EF PO) Take  by mouth.   10/17/2019 at 0800   • nystatin (MYCOSTATIN) 313178 UNIT/ML suspension 5 mL Every 6 (Six) Hours.   More than a month at Unknown time   • omeprazole (priLOSEC) 40 MG capsule Take 1 capsule by mouth 2 (Two) Times a Day Before Meals. Take a half hour before breakfast 60 capsule 11    • ONETOUCH DELICA LANCETS 33G misc Use to check blood glucose up to 2 times daily   Patient Taking Differently at  "Unknown time   • Transparent Dressings (TEGADERM FILM 2-3/8\"X2-3/4\") misc Apply over pump insertion site every 2 days..          ROS: Review of Systems   Constitutional: Positive for activity change, appetite change and fatigue. Negative for chills, diaphoresis, fever and unexpected weight change.   HENT: Positive for trouble swallowing. Negative for sore throat and voice change.    Eyes: Negative.    Respiratory: Positive for cough and shortness of breath. Negative for apnea, choking, chest tightness, wheezing and stridor.    Cardiovascular: Negative for chest pain, palpitations and leg swelling.   Gastrointestinal: Negative for abdominal distention, abdominal pain, anal bleeding, blood in stool, constipation, diarrhea, nausea, rectal pain and vomiting.   Endocrine: Negative.    Genitourinary: Negative.    Musculoskeletal: Negative.    Skin: Negative.    Allergic/Immunologic: Negative.    Neurological: Negative.    Hematological: Negative.    Psychiatric/Behavioral: Negative.    All other systems reviewed and are negative.      PAST MED HX:  Past Medical History:   Diagnosis Date   • Adrenal insufficiency (CMS/HCC)    • Alopecia    • Anemia    • Anxiety    • APS type 1 (CMS/HCC)    • Asthma    • Autoimmune hepatitis (CMS/HCC)    • Depression    • Dermatomyositis (CMS/HCC)    • Disease of thyroid gland    • Fibromyalgia    • Functional asplenia    • History of kidney stones    • Hypoparathyroidism (CMS/HCC)    • Insulin dependent diabetes mellitus (CMS/HCC)    • Long Q-T syndrome    • Pancreatic insufficiency    • Parathyroid abnormality (CMS/HCC)    • Polyglandular deficiency syndrome (CMS/HCC)    • Premature ovarian failure    • Sleep apnea    • Spleen absent    • Tetany     HAS EPISODES   • Transfusion history        PAST SURG HX:  Past Surgical History:   Procedure Laterality Date   • APPENDECTOMY     • CARDIAC SURGERY      LOOP RECORDER   • CHOLECYSTECTOMY     • ENDOSCOPY N/A 4/26/2018    Procedure: " "ESOPHAGOGASTRODUODENOSCOPY;  Surgeon: Gavin Vargas MD;  Location:  [facility];  Service: Gastroenterology   • ENDOSCOPY N/A 8/9/2018    Procedure: ESOPHAGOGASTRODUODENOSCOPY-DILATION;  Surgeon: Gavin Vargas MD;  Location:  [facility];  Service: Gastroenterology   • ENDOSCOPY     • ENDOSCOPY N/A 2/6/2019    Procedure: ESOPHAGOGASTRODUODENOSCOPY;  Surgeon: Gavin Vargas MD;  Location:  [facility];  Service: Gastroenterology   • ENDOSCOPY N/A 7/24/2019    Procedure: ESOPHAGOGASTRODUODENOSCOPY;  Surgeon: Gavin Vargas MD;  Location:  [facility];  Service: Gastroenterology   • ENDOSCOPY N/A 10/18/2019    Procedure: ESOPHAGOGASTRODUODENOSCOPY WITH BALLOON DILATION;  Surgeon: Gavin Vargas MD;  Location:  [facility];  Service: Gastroenterology   • EXPLORATORY LAPAROTOMY      MULTIPLE   • HAND SURGERY      4 TOTAL   • HERNIA REPAIR     • LIVER BIOPSY     • MUSCLE BIOPSY     • MUSCLE BIOPSY     • PEG TUBE INSERTION     • PEG TUBE REMOVAL     • PORTACATH PLACEMENT         FAM HX:  Family History   Problem Relation Age of Onset   • Kidney disease Father        SOC HX:  Social History     Socioeconomic History   • Marital status: Single     Spouse name: Not on file   • Number of children: Not on file   • Years of education: Not on file   • Highest education level: Not on file   Tobacco Use   • Smoking status: Never Smoker   • Smokeless tobacco: Never Used   Substance and Sexual Activity   • Alcohol use: Yes     Comment: SOCIAL   • Drug use: No   • Sexual activity: Defer       PHYSICAL EXAM  BP 96/74 (BP Location: Right arm, Patient Position: Lying)   Pulse 76   Temp 97.8 °F (36.6 °C) (Oral)   Resp 18   Ht 157.5 cm (62\")   Wt 75.9 kg (167 lb 6.4 oz)   SpO2 94%   BMI 30.62 kg/m²   Wt Readings from Last 3 Encounters:   06/03/20 75.9 kg (167 lb 6.4 oz)   03/09/20 78.5 kg (173 lb)   01/15/20 77.1 kg (170 lb)   ,body mass " index is 30.62 kg/m².  Physical Exam   Constitutional: She is oriented to person, place, and time. She appears well-developed and well-nourished. No distress.   Pleasantly conversant, appears chronically ill   HENT:   Head: Normocephalic and atraumatic.   Mouth/Throat: Oropharynx is clear and moist. No oropharyngeal exudate.   Eyes: Pupils are equal, round, and reactive to light. Conjunctivae and EOM are normal. No scleral icterus.   Neck: Normal range of motion. Neck supple. No tracheal deviation present. No thyromegaly present.   Cardiovascular: Normal rate, regular rhythm, normal heart sounds and intact distal pulses. Exam reveals no gallop and no friction rub.   No murmur heard.  Pulmonary/Chest: Effort normal. No stridor. No respiratory distress. She has wheezes. She has no rales. She exhibits no tenderness.   Abdominal: Soft. Bowel sounds are normal. She exhibits no distension and no mass. There is no tenderness. There is no rebound and no guarding. No hernia.   Genitourinary:   Genitourinary Comments: defer   Musculoskeletal: Normal range of motion.   Lymphadenopathy:     She has no cervical adenopathy.   Neurological: She is alert and oriented to person, place, and time.   Skin: Skin is warm and dry. Capillary refill takes less than 2 seconds. She is not diaphoretic.   Psychiatric: She has a normal mood and affect. Her behavior is normal. Judgment and thought content normal.   Nursing note and vitals reviewed.      Results Review:   I reviewed the patient's new clinical results.  I reviewed the patient's new imaging results and agree with the interpretation.  I personally viewed and interpreted the patient's EKG/Telemetry data    Lab Results   Component Value Date    WBC 9.45 06/04/2020    HGB 12.0 06/04/2020    HGB 13.7 06/03/2020    HGB 13.9 06/03/2020    HCT 37.4 06/04/2020    .1 (H) 06/04/2020     06/04/2020       No results found for: INR    Lab Results   Component Value Date    GLUCOSE  104 (H) 06/04/2020    BUN 17 06/04/2020    CREATININE 0.52 (L) 06/04/2020    EGFRIFNONA 138 06/04/2020    BCR 32.7 (H) 06/04/2020     06/04/2020    K 5.2 06/04/2020    CO2 28.0 06/04/2020    CALCIUM 9.0 06/04/2020    ALBUMIN 4.30 06/03/2020    ALKPHOS 85 06/03/2020    BILITOT 0.7 06/03/2020    ALT 15 06/03/2020    AST 20 06/03/2020   Xr Chest 1 View  Result Date: 6/4/2020  EXAMINATION: XR CHEST 1 VW-  INDICATION: Dyspnea, on exertion.  COMPARISON: 03/09/2020.  FINDINGS: Left-sided Port-A-Cath is seen with its tip in the distal SVC. Implanted cardiac monitor device is seen on the left. Heart is normal in size. Vasculature appears normal. Lungs appear grossly clear.      No evidence of active chest disease.   D:  06/04/2020 E:  06/04/2020      Xr Outside Films  Result Date: 6/3/2020  This procedure was auto-finalized with no dictation required.    06/03/2020  Pre-OP COVID-19  >>> Negative.     ASSESSMENTS/PLANS  1. Hypoxia/dyspnea  2. Dysphagia to solids and liquids  3. Adrenal Insufficiency  4. Hypoparathyroidism   5. DM, Type 2  6. Hx of premature ovarian failure    >>> Patient seen and evaluated for dysphasia to liquids and solids.  >>> Patient's history and clinical exam findings, recommend EGD today.  >>> Patient notes she did have a sip of water and a part of an orange this morning around 7 AM; given anesthesia protocol, procedure can go around 4 PM.  >>> Continue n.p.o.  >>> Consent for an EGD with Dr. Houser's for today.    I discussed the patient's findings and my recommendations with patient, consulting provider and Dr. Brunner, M.D.     Danilo Mc, APRN  06/04/20  13:28

## 2020-06-04 NOTE — SIGNIFICANT NOTE
"Patient called out saying she was \"cramping up.\"  Upon entering the room, patient seen contracted in bed with legs and arms drawn up and teeth clenched.  She states that she is going into tetany and that this happens when her calcium levels are not normal.  She has had this frequently in the past requiring calcium gluconate infusions.  Dr. Velez paged and notified.  Orders received to give IV calcium gluconate.  Pharmacy notified and RN awaiting dose to be infused.  Once dose received, RN administered dose and symptoms began to resolve within 1-2 minutes of initiating infusion.  Full resolution of muscle contractures after approximately 15 to 20 minutes.  Tetany episode lasted approximately 20 minutes.    "

## 2020-06-04 NOTE — CONSULTS
Krista Richter  1990  [id number]    Date of Consult: 6/4/2020    Date of Admission: 6/3/2020      Requesting Provider: Betty Yarbrough DO  Evaluating Physician: Rigo Espinal MD    CC: cough    Reason for Consultation: pneumonitis    History of present illness:    Patient is a 29 y.o.  Yr old female with history of extremely complex history sees [facility] for adrenal insufficiency, dermatomyositis, autoimmune hepatitis, pancreatic insufficiency, with admissions with severe dehydration hypocalcemia with tetany requiring infusions she has had multiple infections in the past with line infections pneumonia UTIs and C. difficile colitis.  She is admitted this time with dehydration some chronic cough with concern for bronchitis with CT scan with faint groundglass opacities chest x-ray normal.  She has no fever her acute leukocytosis has improved with fluids fluid resuscitation.  She has normal lactic acid and hemodynamically stable today she was started on aztreonam and doxycycline.  Infectious diseases consultation obtained for further evaluation.      Past Medical History:   Diagnosis Date   • Adrenal insufficiency (CMS/HCC)    • Alopecia    • Anemia    • Anxiety    • APS type 1 (CMS/HCC)    • Asthma    • Autoimmune hepatitis (CMS/HCC)    • Depression    • Dermatomyositis (CMS/HCC)    • Disease of thyroid gland    • Fibromyalgia    • Functional asplenia    • History of kidney stones    • Hypoparathyroidism (CMS/HCC)    • Insulin dependent diabetes mellitus (CMS/HCC)    • Long Q-T syndrome    • Pancreatic insufficiency    • Parathyroid abnormality (CMS/HCC)    • Polyglandular deficiency syndrome (CMS/HCC)    • Premature ovarian failure    • Sleep apnea    • Spleen absent    • Tetany     HAS EPISODES   • Transfusion history        Past Surgical History:   Procedure Laterality Date   • APPENDECTOMY     • CARDIAC SURGERY      LOOP RECORDER   • CHOLECYSTECTOMY     • ENDOSCOPY N/A 4/26/2018    Procedure:  "ESOPHAGOGASTRODUODENOSCOPY;  Surgeon: Gavin Vargas MD;  Location:  [facility];  Service: Gastroenterology   • ENDOSCOPY N/A 8/9/2018    Procedure: ESOPHAGOGASTRODUODENOSCOPY-DILATION;  Surgeon: Gavin Vargas MD;  Location:  [facility];  Service: Gastroenterology   • ENDOSCOPY     • ENDOSCOPY N/A 2/6/2019    Procedure: ESOPHAGOGASTRODUODENOSCOPY;  Surgeon: Gavin Vargas MD;  Location:  [facility];  Service: Gastroenterology   • ENDOSCOPY N/A 7/24/2019    Procedure: ESOPHAGOGASTRODUODENOSCOPY;  Surgeon: Gavin Vargas MD;  Location:  [facility];  Service: Gastroenterology   • ENDOSCOPY N/A 10/18/2019    Procedure: ESOPHAGOGASTRODUODENOSCOPY WITH BALLOON DILATION;  Surgeon: Gavin Vargas MD;  Location:  [facility];  Service: Gastroenterology   • EXPLORATORY LAPAROTOMY      MULTIPLE   • HAND SURGERY      4 TOTAL   • HERNIA REPAIR     • LIVER BIOPSY     • MUSCLE BIOPSY     • MUSCLE BIOPSY     • PEG TUBE INSERTION     • PEG TUBE REMOVAL     • PORTACATH PLACEMENT         Pediatric History   Patient Guardian Status   • Mother:  Maritza Richter   • Father:  Tim Richter     Other Topics Concern   • Not on file   Social History Narrative   • Not on file     Tox screen with THC, opioids and benzos.    family history includes Kidney disease in her father.    Allergies   Allergen Reactions   • Azithromycin Hives   • Cephalosporins Other (See Comments) and Hives   • Clarithromycin Hives     biaxin  biaxin  biaxin   • Levofloxacin Other (See Comments) and Hives   • Nitrofurantoin Hives   • Nystatin Hives   • Penicillins Hives   • Sulfa Antibiotics Hives   • Cefpodoxime Hives   • Midazolam Unknown - Low Severity   • Morphine Other (See Comments)     \"it doesn't work. [facility] told me to list it as an allergy\"   • Nitrofurantoin Macrocrystal Other (See Comments) and Hives   • Ondansetron Other (See Comments)     \"it doesn't work. [facility] told " "me to list it as an allergy\"       Medication:  Current Facility-Administered Medications   Medication Dose Route Frequency Provider Last Rate Last Dose   • acetaminophen (TYLENOL) tablet 650 mg  650 mg Oral Q4H PRN Gavin Vargas MD        Or   • acetaminophen (TYLENOL) 160 MG/5ML solution 650 mg  650 mg Oral Q4H PRN Gavin Vargas MD        Or   • acetaminophen (TYLENOL) suppository 650 mg  650 mg Rectal Q4H PRN Gavin Vargas MD       • albuterol (PROVENTIL) nebulizer solution 0.083% 2.5 mg/3mL  2.5 mg Nebulization Once Gavin Vargas MD       • albuterol (PROVENTIL) nebulizer solution 0.083% 2.5 mg/3mL  2.5 mg Nebulization Q6H PRN Gavin Vargas MD   2.5 mg at 06/04/20 0103   • albuterol sulfate HFA (PROVENTIL HFA;VENTOLIN HFA;PROAIR HFA) inhaler 2 puff  2 puff Inhalation 4x Daily - RT Gavin Vargas MD   2 puff at 06/04/20 1228   • calcitriol (ROCALTROL) capsule 0.5 mcg  0.5 mcg Oral Q12H Gavin Vargas MD   0.5 mcg at 06/04/20 0849   • calcium gluconate 2 g in sodium chloride 0.9 % 100 mL IVPB  2 g Intravenous PRN Gavin Vargas MD   2 g at 06/03/20 1635   • cholecalciferol (VITAMIN D3) tablet 1,000 Units  1,000 Units Oral BID Gavin Vargas MD       • cycloSPORINE (RESTASIS) 0.05 % ophthalmic emulsion 1 drop  1 drop Both Eyes BID Gavin Vargas MD   1 drop at 06/04/20 0848   • dextrose (D50W) 25 g/ 50mL Intravenous Solution 25 g  25 g Intravenous Q15 Min PRN Gavin Vargas MD       • dextrose (GLUTOSE) oral gel 15 g  15 g Oral Q15 Min PRN Gavin Vargas MD       • diazePAM (VALIUM) tablet 5 mg  5 mg Oral Q12H PRN Gavin Vargas MD   5 mg at 06/04/20 1829   • enoxaparin (LOVENOX) syringe 40 mg  40 mg Subcutaneous Q24H Gavin Vargas MD   40 mg at 06/04/20 0603   • famotidine (PEPCID) tablet 20 mg  20 mg " Oral BID AC Gavin Vargas MD   20 mg at 06/04/20 1829   • fludrocortisone tablet 100 mcg  100 mcg Oral Daily Gavin Vargas MD   100 mcg at 06/04/20 0848   • gabapentin (NEURONTIN) capsule 600 mg  600 mg Oral Nightly Gavin Vargas MD   600 mg at 06/03/20 2212   • glucagon (human recombinant) (GLUCAGEN DIAGNOSTIC) injection 1 mg  1 mg Subcutaneous Q15 Min PRN Gavin Vargas MD       • hydrocortisone (CORTEF) tablet 10 mg  10 mg Oral Nightly Gavin Vargas MD   10 mg at 06/03/20 2213   • hydrocortisone (CORTEF) tablet 20 mg  20 mg Oral QAM Gavin Vargas MD   20 mg at 06/04/20 0851   • HYDROmorphone (DILAUDID) injection 0.5 mg  0.5 mg Intravenous Q2H PRN Gavin Vargas MD   0.5 mg at 06/04/20 1520   • insulin lispro (humaLOG) injection 0-7 Units  0-7 Units Subcutaneous TID AC Gavin Vargas MD       • lactated ringers solution  20 mL/hr Intravenous Continuous Gavin Vargas MD 20 mL/hr at 06/04/20 1553 100 mL at 06/04/20 1632   • levothyroxine (SYNTHROID, LEVOTHROID) tablet 75 mcg  75 mcg Oral Q AM Gavin Vargas MD   75 mcg at 06/04/20 0603   • melatonin tablet 5 mg  5 mg Oral Nightly PRN Gavin Vargas MD       • morphine injection 2 mg  2 mg Intravenous Q4H PRN Dany Dexter DO   2 mg at 06/04/20 1047   • pantoprazole (PROTONIX) EC tablet 40 mg  40 mg Oral Daily Gavin Vargas MD   40 mg at 06/04/20 0849   • Parathyroid Hormone (Recomb) cartridge 25 mcg - PATIENT SUPPLIED  25 mcg Subcutaneous Q12H Gavin Vargas MD   25 mcg at 06/04/20 0844   • PEG-KCl-NaCl-NaSulf-Na Asc-C (MOVIPREP) powder 1,000 mL  1,000 mL Oral Once When Specified Danilo Mc APRN        Followed by   • [START ON 6/5/2020] PEG-KCl-NaCl-NaSulf-Na Asc-C (MOVIPREP) powder 1,000 mL  1,000 mL Oral Once When Specified Danilo Mc, APRN      "  • potassium chloride (MICRO-K) CR capsule 40 mEq  40 mEq Oral TID Gavin Vargas MD   40 mEq at 20 0849   • sodium chloride 0.9 % flush 10 mL  10 mL Intravenous Q12H Gavin Vargas MD   10 mL at 20 0850   • sodium chloride 0.9 % flush 10 mL  10 mL Intravenous PRN Gavin Vargas MD       • valACYclovir (VALTREX) tablet 1,000 mg  1,000 mg Oral Q12H Gavin Vargas MD   1,000 mg at 20 0849   • venlafaxine (EFFEXOR) tablet 75 mg  75 mg Oral BID With Meals Gavin Vargas MD   75 mg at 20 1830       Antibiotics:  Aztreonam, doxycycline      Review of Systems  Full 12 point review of systems reviewed and negative except for cough shortness of air fatigue malaise dysphasia cramping decreased urine output generalized fatigue malaise, alopecia.    Physical Exam:   Vital Signs   /78 (BP Location: Right arm, Patient Position: Lying)   Pulse 93   Temp 97.8 °F (36.6 °C) (Temporal)   Resp 18   Ht 157.5 cm (62\")   Wt 75.9 kg (167 lb 6.4 oz)   SpO2 94%   BMI 30.62 kg/m²   Temp (24hrs), Av.4 °F (36.9 °C), Min:97.8 °F (36.6 °C), Max:99.1 °F (37.3 °C)      GENERAL: Awake and alert, in no acute distress.  Chronically ill-appearing  HEENT: Normocephalic, atraumatic.  PERRL. EOMI. No conjunctival injection. No icterus. Oropharynx clear without evidence of thrush or exudate. No evidence of peridontal disease.  Alopecia.  NECK: Supple without nuchal rigidity  LYMPH: No cervical, axillary or inguinal lymphadenopathy. No neck masses  HEART: RRR; No murmur, rubs, gallops.   LUNGS: Clear to auscultation bilaterally without wheezing, rales, rhonchi. Normal respiratory effort.  ABDOMEN: Soft, nontender, nondistended. Positive bowel sounds. No rebound or guarding.   EXT:  No cyanosis, clubbing trace lower extremity edema  : Normal appearing genitalia without Rayo catheter.  MSK: FROM without joint effusions noted    SKIN: Warm " and dry without cutaneous eruptions.    NEURO: Oriented to PPT. No focal deficits.   PSYCHIATRIC: Normal insight and judgement. Cooperative with PE    Laboratory Data    Results from last 7 days   Lab Units 06/04/20  0657 06/03/20  0321 06/03/20  0030   WBC 10*3/mm3 9.45 17.47* 16.41*   HEMOGLOBIN g/dL 12.0 13.7 13.9   HEMATOCRIT % 37.4 41.8 42.2   PLATELETS 10*3/mm3 276 297 307     Results from last 7 days   Lab Units 06/04/20  0657   SODIUM mmol/L 141   POTASSIUM mmol/L 5.2   CHLORIDE mmol/L 103   CO2 mmol/L 28.0   BUN mg/dL 17   CREATININE mg/dL 0.52*   GLUCOSE mg/dL 104*   CALCIUM mg/dL 9.0     Results from last 7 days   Lab Units 06/03/20  0056   ALK PHOS U/L 85   BILIRUBIN mg/dL 0.7   ALT (SGPT) U/L 15   AST (SGOT) U/L 20         Results from last 7 days   Lab Units 06/03/20  0056   CRP mg/dL 0.10       Estimated Creatinine Clearance: 150.8 mL/min (A) (by C-G formula based on SCr of 0.52 mg/dL (L)).      Microbiology:  Pending    Radiology:  Imaging Results (Last 24 Hours)     Procedure Component Value Units Date/Time    XR Chest 1 View [[id number]] Collected:  06/04/20 0906     Updated:  06/04/20 0918    Narrative:       EXAMINATION: XR CHEST 1 VW-     INDICATION: Dyspnea, on exertion.     COMPARISON: 03/09/2020.     FINDINGS: Left-sided Port-A-Cath is seen with its tip in the distal SVC.  Implanted cardiac monitor device is seen on the left. Heart is normal in  size. Vasculature appears normal. Lungs appear grossly clear.       Impression:       No evidence of active chest disease.      D:  06/04/2020  E:  06/04/2020               PROBLEM LIST:   Bronchitis  Suspected pneumonitis possibly autoimmune related  Prior history of C. difficile colitis  Adrenal insufficiency/[place]'s disease  Autoimmune hepatitis  Hypocalcemia with tetany  Dysphagia  Reported hematochezia  Type 2 diabetes mellitus  Hypoparathyroidism  Asplenia    ASSESSMENT:  29-year-old admitted with extremely complex history with multiple  comorbidities of admitted with some leukocytosis shortness of air concern for bronchitis and pneumonitis in the past.  After review of her case with no fevers, now with normal white count and normal CRP normal lactic acid level do not feel any evidence of sepsis or bacterial pneumonia.  Her faint changes on CT of the chest possibly autoimmune related and patient has received Rituxan in the past.    Going for esophageal dilatation today and possible scope tomorrow.  Recommend de-escalating antibiotics as patient has had C. difficile colitis in the past will DC aztreonam and doxycycline intravenously now to oral doxycycline to finish treatment for bronchitis.    PLAN:  DC aztreonam and doxycycline IV  Recommend doxycycline 100 mg p.o. twice daily x5 days  Recommend follow-up with [facility] to discuss possible autoimmune related treatment with Rituxan    I will sign off case today please call reevaluation necessary    UM:  tx to po abx    Patient seen for infectious consultation at request of Dr. Lui Velez for possible pneumonia and my recommendation to be communicated to primary team.    Rigo Espinal MD  6/4/2020

## 2020-06-04 NOTE — ANESTHESIA POSTPROCEDURE EVALUATION
Patient: Krista Richter    Procedure Summary     Date:  06/04/20 Room / Location:   [facility]    Anesthesia Start:  1619 Anesthesia Stop:      Procedure:  ESOPHAGOGASTRODUODENOSCOPY  WITH ESOPHAGEAL BALLOON DILATATION (N/A ) Diagnosis:      Surgeon:  Gavin Vargas MD Provider:  Jorje Robin MD    Anesthesia Type:  general ASA Status:  3          Anesthesia Type: general    Vitals  Vitals Value Taken Time   /84 6/4/2020  4:36 PM   Temp 99.1 °F (37.3 °C) 6/4/2020  4:35 PM   Pulse 75 6/4/2020  4:44 PM   Resp 16 6/4/2020  4:35 PM   SpO2 94 % 6/4/2020  4:44 PM   Vitals shown include unvalidated device data.        Post Anesthesia Care and Evaluation    Patient location during evaluation: PACU  Patient participation: complete - patient participated  Level of consciousness: awake and alert  Pain score: 0  Pain management: adequate  Airway patency: patent  Anesthetic complications: No anesthetic complications  PONV Status: none  Cardiovascular status: hemodynamically stable and acceptable  Respiratory status: nonlabored ventilation, acceptable and nasal cannula  Hydration status: acceptable

## 2020-06-04 NOTE — OP NOTE
EGD summary  See EGD report for details    Empirical esophageal dilation performed that in the past has resolved patient's intermittent dysphagia to solids.  Dilation was performed with a 60 [place] savory dilator without difficulty.    Patient is requesting colonoscopy for evaluation of hematochezia which is been put off in the past due to the coronavirus pandemic.  Wishes to have this done tomorrow and therefore we will schedule and initiate bowel prep.    Impression   status post empirical esophageal dilation for intermittent dysphagia to solids    Hematochezia: Plan for colonoscopy tomorrow.

## 2020-06-04 NOTE — ANESTHESIA PREPROCEDURE EVALUATION
Anesthesia Evaluation                  Airway   Mallampati: I  TM distance: >3 FB  Neck ROM: full  No difficulty expected  Dental      Pulmonary    (+) asthma,sleep apnea,   Cardiovascular     ECG reviewed        Neuro/Psych  GI/Hepatic/Renal/Endo    (+)   hepatitis, liver disease, diabetes mellitus,     Musculoskeletal     Abdominal    Substance History      OB/GYN          Other                        Anesthesia Plan    ASA 3     general     intravenous induction     Anesthetic plan, all risks, benefits, and alternatives have been provided, discussed and informed consent has been obtained with: patient.    Plan discussed with CRNA.

## 2020-06-04 NOTE — SIGNIFICANT NOTE
"Patient called out saying she is having another \"tetany attack.\"  RN entered room to find patient in full body contracture in bed.  Calcium gluconate infusion given at this time.  Episode lasting approximately 3 to 4 minutes with full relief of contractures within 15 minutes.  Dr. Velez notified of episode.  No new orders at this time.   "

## 2020-06-04 NOTE — PLAN OF CARE
Problem: Patient Care Overview  Goal: Plan of Care Review  Outcome: Ongoing (interventions implemented as appropriate)  Flowsheets  Taken 6/4/2020 0618  Progress: no change  Taken 6/3/2020 2207  Plan of Care Reviewed With: patient  Note:   SR on monitor, 2.5 L NC, tried to wean down to 1L but pt 02 went to the high 80's. Pt c/o chest pain when coughing - PRN morphine and dilaudid given. IV antibiotics continued.

## 2020-06-05 ENCOUNTER — ANESTHESIA EVENT (OUTPATIENT)
Dept: GASTROENTEROLOGY | Facility: HOSPITAL | Age: 30
End: 2020-06-05

## 2020-06-05 ENCOUNTER — ANESTHESIA (OUTPATIENT)
Dept: GASTROENTEROLOGY | Facility: HOSPITAL | Age: 30
End: 2020-06-05

## 2020-06-05 LAB
ALBUMIN SERPL-MCNC: 4.1 G/DL (ref 3.5–5.2)
ALBUMIN/GLOB SERPL: 1.4 G/DL
ALP SERPL-CCNC: 88 U/L (ref 39–117)
ALT SERPL W P-5'-P-CCNC: 18 U/L (ref 1–33)
ANION GAP SERPL CALCULATED.3IONS-SCNC: 12 MMOL/L (ref 5–15)
AST SERPL-CCNC: 28 U/L (ref 1–32)
BILIRUB SERPL-MCNC: 0.5 MG/DL (ref 0.2–1.2)
BUN BLD-MCNC: 16 MG/DL (ref 6–20)
BUN/CREAT SERPL: 32.7 (ref 7–25)
CALCIUM SPEC-SCNC: 10 MG/DL (ref 8.6–10.5)
CHLORIDE SERPL-SCNC: 101 MMOL/L (ref 98–107)
CO2 SERPL-SCNC: 29 MMOL/L (ref 22–29)
CREAT BLD-MCNC: 0.49 MG/DL (ref 0.57–1)
GFR SERPL CREATININE-BSD FRML MDRD: 148 ML/MIN/1.73
GLOBULIN UR ELPH-MCNC: 2.9 GM/DL
GLUCOSE BLD-MCNC: 102 MG/DL (ref 65–99)
GLUCOSE BLDC GLUCOMTR-MCNC: 103 MG/DL (ref 70–130)
GLUCOSE BLDC GLUCOMTR-MCNC: 117 MG/DL (ref 70–130)
GLUCOSE BLDC GLUCOMTR-MCNC: 86 MG/DL (ref 70–130)
POTASSIUM BLD-SCNC: 4.7 MMOL/L (ref 3.5–5.2)
PROT SERPL-MCNC: 7 G/DL (ref 6–8.5)
SODIUM BLD-SCNC: 142 MMOL/L (ref 136–145)

## 2020-06-05 PROCEDURE — 25010000002 HYDROMORPHONE PER 4 MG: Performed by: NURSE ANESTHETIST, CERTIFIED REGISTERED

## 2020-06-05 PROCEDURE — 63710000001 PROMETHAZINE PER 12.5 MG: Performed by: PHYSICIAN ASSISTANT

## 2020-06-05 PROCEDURE — 25010000002 PROPOFOL 10 MG/ML EMULSION: Performed by: NURSE ANESTHETIST, CERTIFIED REGISTERED

## 2020-06-05 PROCEDURE — 63710000001 PROMETHAZINE PER 12.5 MG: Performed by: INTERNAL MEDICINE

## 2020-06-05 PROCEDURE — 99232 SBSQ HOSP IP/OBS MODERATE 35: CPT | Performed by: INTERNAL MEDICINE

## 2020-06-05 PROCEDURE — 25010000002 ENOXAPARIN PER 10 MG: Performed by: INTERNAL MEDICINE

## 2020-06-05 PROCEDURE — 25010000002 MORPHINE PER 10 MG: Performed by: INTERNAL MEDICINE

## 2020-06-05 PROCEDURE — 25010000002 CALCIUM GLUCONATE PER 10 ML: Performed by: INTERNAL MEDICINE

## 2020-06-05 PROCEDURE — 25010000002 PROMETHAZINE PER 50 MG: Performed by: ANESTHESIOLOGY

## 2020-06-05 PROCEDURE — 0DJD8ZZ INSPECTION OF LOWER INTESTINAL TRACT, VIA NATURAL OR ARTIFICIAL OPENING ENDOSCOPIC: ICD-10-PCS | Performed by: INTERNAL MEDICINE

## 2020-06-05 PROCEDURE — 25010000002 HYDROMORPHONE PER 4 MG: Performed by: INTERNAL MEDICINE

## 2020-06-05 PROCEDURE — 93005 ELECTROCARDIOGRAM TRACING: CPT | Performed by: INTERNAL MEDICINE

## 2020-06-05 PROCEDURE — 94799 UNLISTED PULMONARY SVC/PX: CPT

## 2020-06-05 PROCEDURE — 80053 COMPREHEN METABOLIC PANEL: CPT | Performed by: INTERNAL MEDICINE

## 2020-06-05 PROCEDURE — 25010000002 PROMETHAZINE PER 50 MG: Performed by: NURSE ANESTHETIST, CERTIFIED REGISTERED

## 2020-06-05 PROCEDURE — 25010000002 PROCHLORPERAZINE 10 MG/2ML SOLUTION: Performed by: PHYSICIAN ASSISTANT

## 2020-06-05 PROCEDURE — 45378 DIAGNOSTIC COLONOSCOPY: CPT | Performed by: INTERNAL MEDICINE

## 2020-06-05 PROCEDURE — 25010000002 HYDROCORTISONE SODIUM SUCCINATE 100 MG RECONSTITUTED SOLUTION: Performed by: NURSE ANESTHETIST, CERTIFIED REGISTERED

## 2020-06-05 PROCEDURE — 82962 GLUCOSE BLOOD TEST: CPT

## 2020-06-05 RX ORDER — CALCIUM CHLORIDE 100 MG/ML
INJECTION INTRAVENOUS; INTRAVENTRICULAR AS NEEDED
Status: DISCONTINUED | OUTPATIENT
Start: 2020-06-05 | End: 2020-06-05 | Stop reason: SURG

## 2020-06-05 RX ORDER — HYDRALAZINE HYDROCHLORIDE 20 MG/ML
5 INJECTION INTRAMUSCULAR; INTRAVENOUS
Status: DISCONTINUED | OUTPATIENT
Start: 2020-06-05 | End: 2020-06-05 | Stop reason: HOSPADM

## 2020-06-05 RX ORDER — FENTANYL CITRATE 50 UG/ML
50 INJECTION, SOLUTION INTRAMUSCULAR; INTRAVENOUS
Status: DISCONTINUED | OUTPATIENT
Start: 2020-06-05 | End: 2020-06-05 | Stop reason: HOSPADM

## 2020-06-05 RX ORDER — PROCHLORPERAZINE EDISYLATE 5 MG/ML
5 INJECTION INTRAMUSCULAR; INTRAVENOUS EVERY 6 HOURS PRN
Status: DISCONTINUED | OUTPATIENT
Start: 2020-06-05 | End: 2020-06-06 | Stop reason: HOSPADM

## 2020-06-05 RX ORDER — HYDROMORPHONE HYDROCHLORIDE 1 MG/ML
0.5 INJECTION, SOLUTION INTRAMUSCULAR; INTRAVENOUS; SUBCUTANEOUS
Status: DISCONTINUED | OUTPATIENT
Start: 2020-06-05 | End: 2020-06-05 | Stop reason: HOSPADM

## 2020-06-05 RX ORDER — PROMETHAZINE HYDROCHLORIDE 25 MG/ML
25 INJECTION, SOLUTION INTRAMUSCULAR; INTRAVENOUS EVERY 6 HOURS PRN
Status: COMPLETED | OUTPATIENT
Start: 2020-06-05 | End: 2020-06-05

## 2020-06-05 RX ORDER — LIDOCAINE HYDROCHLORIDE 10 MG/ML
INJECTION, SOLUTION EPIDURAL; INFILTRATION; INTRACAUDAL; PERINEURAL AS NEEDED
Status: DISCONTINUED | OUTPATIENT
Start: 2020-06-05 | End: 2020-06-05 | Stop reason: SURG

## 2020-06-05 RX ORDER — LABETALOL HYDROCHLORIDE 5 MG/ML
5 INJECTION, SOLUTION INTRAVENOUS
Status: DISCONTINUED | OUTPATIENT
Start: 2020-06-05 | End: 2020-06-05 | Stop reason: HOSPADM

## 2020-06-05 RX ORDER — SODIUM CHLORIDE 9 MG/ML
30 INJECTION, SOLUTION INTRAVENOUS CONTINUOUS PRN
Status: DISCONTINUED | OUTPATIENT
Start: 2020-06-05 | End: 2020-06-06 | Stop reason: HOSPADM

## 2020-06-05 RX ORDER — PROMETHAZINE HYDROCHLORIDE 25 MG/ML
6.25 INJECTION, SOLUTION INTRAMUSCULAR; INTRAVENOUS ONCE AS NEEDED
Status: DISCONTINUED | OUTPATIENT
Start: 2020-06-05 | End: 2020-06-05 | Stop reason: HOSPADM

## 2020-06-05 RX ORDER — PROMETHAZINE HYDROCHLORIDE 25 MG/1
25 SUPPOSITORY RECTAL ONCE AS NEEDED
Status: DISCONTINUED | OUTPATIENT
Start: 2020-06-05 | End: 2020-06-05 | Stop reason: HOSPADM

## 2020-06-05 RX ORDER — PROCHLORPERAZINE 25 MG
25 SUPPOSITORY, RECTAL RECTAL EVERY 12 HOURS PRN
Status: DISCONTINUED | OUTPATIENT
Start: 2020-06-05 | End: 2020-06-06 | Stop reason: HOSPADM

## 2020-06-05 RX ORDER — PROPOFOL 10 MG/ML
VIAL (ML) INTRAVENOUS AS NEEDED
Status: DISCONTINUED | OUTPATIENT
Start: 2020-06-05 | End: 2020-06-05 | Stop reason: SURG

## 2020-06-05 RX ORDER — HYDROMORPHONE HCL 110MG/55ML
PATIENT CONTROLLED ANALGESIA SYRINGE INTRAVENOUS AS NEEDED
Status: DISCONTINUED | OUTPATIENT
Start: 2020-06-05 | End: 2020-06-05 | Stop reason: SURG

## 2020-06-05 RX ORDER — PROCHLORPERAZINE MALEATE 5 MG/1
5 TABLET ORAL EVERY 6 HOURS PRN
Status: DISCONTINUED | OUTPATIENT
Start: 2020-06-05 | End: 2020-06-06 | Stop reason: HOSPADM

## 2020-06-05 RX ORDER — PROMETHAZINE HYDROCHLORIDE 25 MG/1
25 TABLET ORAL ONCE AS NEEDED
Status: DISCONTINUED | OUTPATIENT
Start: 2020-06-05 | End: 2020-06-05 | Stop reason: HOSPADM

## 2020-06-05 RX ORDER — SODIUM CHLORIDE, SODIUM LACTATE, POTASSIUM CHLORIDE, CALCIUM CHLORIDE 600; 310; 30; 20 MG/100ML; MG/100ML; MG/100ML; MG/100ML
20 INJECTION, SOLUTION INTRAVENOUS CONTINUOUS
Status: DISCONTINUED | OUTPATIENT
Start: 2020-06-05 | End: 2020-06-06 | Stop reason: HOSPADM

## 2020-06-05 RX ORDER — IPRATROPIUM BROMIDE AND ALBUTEROL SULFATE 2.5; .5 MG/3ML; MG/3ML
3 SOLUTION RESPIRATORY (INHALATION) ONCE AS NEEDED
Status: DISCONTINUED | OUTPATIENT
Start: 2020-06-05 | End: 2020-06-05 | Stop reason: HOSPADM

## 2020-06-05 RX ORDER — ONDANSETRON 2 MG/ML
4 INJECTION INTRAMUSCULAR; INTRAVENOUS ONCE AS NEEDED
Status: DISCONTINUED | OUTPATIENT
Start: 2020-06-05 | End: 2020-06-05 | Stop reason: HOSPADM

## 2020-06-05 RX ORDER — PROMETHAZINE HYDROCHLORIDE 25 MG/ML
INJECTION, SOLUTION INTRAMUSCULAR; INTRAVENOUS AS NEEDED
Status: DISCONTINUED | OUTPATIENT
Start: 2020-06-05 | End: 2020-06-05 | Stop reason: SURG

## 2020-06-05 RX ADMIN — DOXYCYCLINE 100 MG: 100 CAPSULE ORAL at 08:14

## 2020-06-05 RX ADMIN — PARATHYROID HORMONE 25 MCG: 25 INJECTION, POWDER, LYOPHILIZED, FOR SOLUTION SUBCUTANEOUS at 10:04

## 2020-06-05 RX ADMIN — CALCIUM GLUCONATE 2 G: 98 INJECTION, SOLUTION INTRAVENOUS at 22:06

## 2020-06-05 RX ADMIN — PROPOFOL 50 MG: 10 INJECTION, EMULSION INTRAVENOUS at 13:52

## 2020-06-05 RX ADMIN — PROMETHAZINE HYDROCHLORIDE 12.5 MG: 12.5 TABLET ORAL at 01:41

## 2020-06-05 RX ADMIN — HYDROCORTISONE 10 MG: 10 TABLET ORAL at 20:25

## 2020-06-05 RX ADMIN — MORPHINE SULFATE 2 MG: 2 INJECTION, SOLUTION INTRAMUSCULAR; INTRAVENOUS at 01:41

## 2020-06-05 RX ADMIN — PROPOFOL 50 MG: 10 INJECTION, EMULSION INTRAVENOUS at 13:58

## 2020-06-05 RX ADMIN — PARATHYROID HORMONE 25 MCG: 25 INJECTION, POWDER, LYOPHILIZED, FOR SOLUTION SUBCUTANEOUS at 20:30

## 2020-06-05 RX ADMIN — LEVOTHYROXINE SODIUM 75 MCG: 75 TABLET ORAL at 04:42

## 2020-06-05 RX ADMIN — GABAPENTIN 600 MG: 300 CAPSULE ORAL at 20:24

## 2020-06-05 RX ADMIN — HYDROMORPHONE HYDROCHLORIDE 1 MG: 2 INJECTION, SOLUTION INTRAMUSCULAR; INTRAVENOUS; SUBCUTANEOUS at 14:05

## 2020-06-05 RX ADMIN — PROPOFOL 50 MG: 10 INJECTION, EMULSION INTRAVENOUS at 13:48

## 2020-06-05 RX ADMIN — HYDROCORTISONE 20 MG: 20 TABLET ORAL at 08:16

## 2020-06-05 RX ADMIN — POTASSIUM CHLORIDE 40 MEQ: 10 CAPSULE, COATED, EXTENDED RELEASE ORAL at 17:04

## 2020-06-05 RX ADMIN — POLYETHYLENE GLYCOL 3350, SODIUM SULFATE, SODIUM CHLORIDE, POTASSIUM CHLORIDE, ASCORBIC ACID, SODIUM ASCORBATE 1000 ML: KIT at 04:41

## 2020-06-05 RX ADMIN — CALCITRIOL CAPSULES 0.25 MCG 0.5 MCG: 0.25 CAPSULE ORAL at 08:13

## 2020-06-05 RX ADMIN — PROMETHAZINE HYDROCHLORIDE 25 MG: 25 INJECTION INTRAMUSCULAR; INTRAVENOUS at 14:55

## 2020-06-05 RX ADMIN — VENLAFAXINE 75 MG: 37.5 TABLET ORAL at 08:14

## 2020-06-05 RX ADMIN — CALCIUM CHLORIDE 1 G: 100 INJECTION INTRAVENOUS; INTRAVENTRICULAR at 13:34

## 2020-06-05 RX ADMIN — HYDROCORTISONE SODIUM SUCCINATE 100 MG: 100 INJECTION, POWDER, FOR SOLUTION INTRAMUSCULAR; INTRAVENOUS at 13:37

## 2020-06-05 RX ADMIN — HYDROMORPHONE HYDROCHLORIDE 1 MG: 2 INJECTION, SOLUTION INTRAMUSCULAR; INTRAVENOUS; SUBCUTANEOUS at 13:36

## 2020-06-05 RX ADMIN — HYDROMORPHONE HYDROCHLORIDE 0.5 MG: 1 INJECTION, SOLUTION INTRAMUSCULAR; INTRAVENOUS; SUBCUTANEOUS at 04:41

## 2020-06-05 RX ADMIN — ALBUTEROL SULFATE 2.5 MG: 2.5 SOLUTION RESPIRATORY (INHALATION) at 16:05

## 2020-06-05 RX ADMIN — HYDROMORPHONE HYDROCHLORIDE 0.5 MG: 1 INJECTION, SOLUTION INTRAMUSCULAR; INTRAVENOUS; SUBCUTANEOUS at 08:14

## 2020-06-05 RX ADMIN — HYDROMORPHONE HYDROCHLORIDE 0.5 MG: 1 INJECTION, SOLUTION INTRAMUSCULAR; INTRAVENOUS; SUBCUTANEOUS at 22:05

## 2020-06-05 RX ADMIN — PANTOPRAZOLE SODIUM 40 MG: 40 TABLET, DELAYED RELEASE ORAL at 08:14

## 2020-06-05 RX ADMIN — DIAZEPAM 5 MG: 5 TABLET ORAL at 22:06

## 2020-06-05 RX ADMIN — CALCIUM GLUCONATE 2 G: 98 INJECTION, SOLUTION INTRAVENOUS at 09:42

## 2020-06-05 RX ADMIN — DIAZEPAM 5 MG: 5 TABLET ORAL at 08:14

## 2020-06-05 RX ADMIN — FLUDROCORTISONE ACETATE 100 MCG: 0.1 TABLET ORAL at 08:15

## 2020-06-05 RX ADMIN — PROPOFOL 100 MG: 10 INJECTION, EMULSION INTRAVENOUS at 13:42

## 2020-06-05 RX ADMIN — SODIUM CHLORIDE, PRESERVATIVE FREE 10 ML: 5 INJECTION INTRAVENOUS at 08:17

## 2020-06-05 RX ADMIN — SODIUM CHLORIDE, PRESERVATIVE FREE 10 ML: 5 INJECTION INTRAVENOUS at 20:27

## 2020-06-05 RX ADMIN — PROMETHAZINE HYDROCHLORIDE 12.5 MG: 12.5 TABLET ORAL at 08:14

## 2020-06-05 RX ADMIN — MELATONIN 1000 UNITS: at 08:13

## 2020-06-05 RX ADMIN — VALACYCLOVIR HYDROCHLORIDE 1000 MG: 500 TABLET, FILM COATED ORAL at 08:16

## 2020-06-05 RX ADMIN — SODIUM CHLORIDE, POTASSIUM CHLORIDE, SODIUM LACTATE AND CALCIUM CHLORIDE 20 ML/HR: 600; 310; 30; 20 INJECTION, SOLUTION INTRAVENOUS at 13:01

## 2020-06-05 RX ADMIN — CYCLOSPORINE 1 DROP: 0.5 EMULSION OPHTHALMIC at 08:13

## 2020-06-05 RX ADMIN — HYDROMORPHONE HYDROCHLORIDE 0.5 MG: 1 INJECTION, SOLUTION INTRAMUSCULAR; INTRAVENOUS; SUBCUTANEOUS at 12:39

## 2020-06-05 RX ADMIN — CALCITRIOL CAPSULES 0.25 MCG 0.5 MCG: 0.25 CAPSULE ORAL at 20:24

## 2020-06-05 RX ADMIN — DOXYCYCLINE 100 MG: 100 CAPSULE ORAL at 20:24

## 2020-06-05 RX ADMIN — FAMOTIDINE 20 MG: 20 TABLET, FILM COATED ORAL at 17:05

## 2020-06-05 RX ADMIN — ENOXAPARIN SODIUM 40 MG: 40 INJECTION SUBCUTANEOUS at 04:42

## 2020-06-05 RX ADMIN — VALACYCLOVIR HYDROCHLORIDE 1000 MG: 500 TABLET, FILM COATED ORAL at 20:25

## 2020-06-05 RX ADMIN — PROMETHAZINE HYDROCHLORIDE 12.5 MG: 12.5 TABLET ORAL at 20:24

## 2020-06-05 RX ADMIN — LIDOCAINE HYDROCHLORIDE 50 MG: 10 INJECTION, SOLUTION EPIDURAL; INFILTRATION; INTRACAUDAL; PERINEURAL at 13:42

## 2020-06-05 RX ADMIN — HYDROMORPHONE HYDROCHLORIDE 0.5 MG: 1 INJECTION, SOLUTION INTRAMUSCULAR; INTRAVENOUS; SUBCUTANEOUS at 09:42

## 2020-06-05 RX ADMIN — VENLAFAXINE 75 MG: 37.5 TABLET ORAL at 17:05

## 2020-06-05 RX ADMIN — POTASSIUM CHLORIDE 40 MEQ: 10 CAPSULE, COATED, EXTENDED RELEASE ORAL at 08:13

## 2020-06-05 RX ADMIN — PROCHLORPERAZINE EDISYLATE 5 MG: 5 INJECTION INTRAMUSCULAR; INTRAVENOUS at 22:18

## 2020-06-05 RX ADMIN — FAMOTIDINE 20 MG: 20 TABLET, FILM COATED ORAL at 08:14

## 2020-06-05 RX ADMIN — HYDROMORPHONE HYDROCHLORIDE 0.5 MG: 1 INJECTION, SOLUTION INTRAMUSCULAR; INTRAVENOUS; SUBCUTANEOUS at 14:45

## 2020-06-05 RX ADMIN — CYCLOSPORINE 1 DROP: 0.5 EMULSION OPHTHALMIC at 22:07

## 2020-06-05 RX ADMIN — HYDROMORPHONE HYDROCHLORIDE 0.5 MG: 1 INJECTION, SOLUTION INTRAMUSCULAR; INTRAVENOUS; SUBCUTANEOUS at 20:24

## 2020-06-05 RX ADMIN — POTASSIUM CHLORIDE 40 MEQ: 10 CAPSULE, COATED, EXTENDED RELEASE ORAL at 22:06

## 2020-06-05 RX ADMIN — MELATONIN 1000 UNITS: at 20:24

## 2020-06-05 RX ADMIN — HYDROMORPHONE HYDROCHLORIDE 0.5 MG: 1 INJECTION, SOLUTION INTRAMUSCULAR; INTRAVENOUS; SUBCUTANEOUS at 18:25

## 2020-06-05 RX ADMIN — PROMETHAZINE HYDROCHLORIDE 25 MG: 25 INJECTION INTRAMUSCULAR; INTRAVENOUS at 13:34

## 2020-06-05 RX ADMIN — ALBUTEROL SULFATE 2.5 MG: 2.5 SOLUTION RESPIRATORY (INHALATION) at 20:39

## 2020-06-05 NOTE — PROGRESS NOTES
Continued Stay Note  [facility]     Patient Name: Krista Richter  MRN: [id number]  Today's Date: 6/5/2020    Admit Date: 6/3/2020    Discharge Plan     Row Name 06/05/20 1434       Plan    Plan  Home    Patient/Family in Agreement with Plan  yes    Plan Comments  Attempted to meet with patient to discuss the discharge plan, but she was off the floor for a procedure. Per previous CM note, patient's plan is home at discharge with the assistance of family. Per review of chart, patient has been on 2L NC today. Please advise if patient will need home oxygen. CM will continue to follow.     Final Discharge Disposition Code  01 - home or self-care        Discharge Codes    No documentation.       Expected Discharge Date and Time     Expected Discharge Date Expected Discharge Time    Jun 7, 2020             Babita Bryant RN

## 2020-06-05 NOTE — PROGRESS NOTES
[facility] Medicine Services  PROGRESS NOTE    Patient Name: Krista Richter  : 1990  MRN: [id number]    Date of Admission: 6/3/2020  Primary Care Physician: Agustin Turner MD    Subjective   Subjective     CC:  Follow-up for shortness of air, intermittent dysphasia    HPI:  Had EGD status post esophageal dilation yesterday.  Currently this morning she is feeling okay, when I saw her she was having some nausea due to the prep for the colonoscopy.  Still having some pain with deep breaths. an RRT was called for an episode of tetany and calcium gluconate was ordered.  Tetany resolved after calcium gluconate was given.    Review of Systems  Gen- No fevers, chills  CV- No chest pain, palpitations    Objective   Objective     Vital Signs:   Temp:  [97.8 °F (36.6 °C)-99.1 °F (37.3 °C)] 98 °F (36.7 °C)  Heart Rate:  [] 103  Resp:  [16-20] 18  BP: (111-130)/(75-98) 130/90        Physical Exam:  Constitutional: No acute distress, awake, alert  HENT: NCAT, mucous membranes moist  Respiratory: Clear to auscultation bilaterally, respiratory effort normal on room air   Cardiovascular: RRR, no murmurs  Gastrointestinal: Positive bowel sounds, soft, nontender, nondistended  Psychiatric: Appropriate affect, cooperative  Neurologic: Oriented x 3, Cranial Nerves grossly intact to confrontation, speech clear  Skin: No rashes    Results Reviewed:  Results from last 7 days   Lab Units 20  0657 20  0321 20  0056 20  0030   WBC 10*3/mm3 9.45 17.47*  --  16.41*   HEMOGLOBIN g/dL 12.0 13.7  --  13.9   HEMATOCRIT % 37.4 41.8  --  42.2   PLATELETS 10*3/mm3 276 297  --  307   PROCALCITONIN ng/mL  --   --  0.05*  --      Results from last 7 days   Lab Units 20  0936 20  0657 20  0056   SODIUM mmol/L 142 141 140   POTASSIUM mmol/L 4.7 5.2 4.3   CHLORIDE mmol/L 101 103 99   CO2 mmol/L 29.0 28.0 28.0   BUN mg/dL 16 17 16   CREATININE mg/dL 0.49* 0.52* 0.46*      GLUCOSE mg/dL 102* 104* 159*   CALCIUM mg/dL 10.0 9.0 8.5*   ALT (SGPT) U/L 18  --  15   AST (SGOT) U/L 28  --  20   TROPONIN T ng/mL  --   --  <0.010     Estimated Creatinine Clearance: 160.1 mL/min (A) (by C-G formula based on SCr of 0.49 mg/dL (L)).    Microbiology Results Abnormal     Procedure Component Value - Date/Time    Blood Culture - Blood, Arm, Right [[id number]] Collected:  06/03/20 0321    Lab Status:  Preliminary result Specimen:  Blood from Arm, Right Updated:  06/05/20 0415     Blood Culture No growth at 2 days    Blood Culture - Blood, Arm, Left [[id number]] Collected:  06/03/20 0321    Lab Status:  Preliminary result Specimen:  Blood from Arm, Left Updated:  06/05/20 0415     Blood Culture No growth at 2 days    COVID-19,[facility]-HOUSE, NP SWAB IN TRANSPORT MEDIA 8-12 HR TAT - Swab, Nasopharynx [[id number]]  (Normal) Collected:  06/03/20 0257    Lab Status:  Final result Specimen:  Swab from Nasopharynx Updated:  06/03/20 1026     COVID19 Not Detected    S. Pneumo Ag Urine or CSF - Urine, Urine, Clean Catch [[id number]]  (Normal) Collected:  06/03/20 0258    Lab Status:  Final result Specimen:  Urine, Clean Catch Updated:  06/03/20 0918     Strep Pneumo Ag Negative    MRSA Screen, PCR (Inpatient) - Swab, Nares [[id number]]  (Normal) Collected:  06/03/20 0257    Lab Status:  Final result Specimen:  Swab from Nares Updated:  06/03/20 0913     MRSA PCR Negative    Narrative:       MRSA Negative    Respiratory Panel, PCR - Swab, Nasopharynx [[id number]]  (Normal) Collected:  06/03/20 0257    Lab Status:  Final result Specimen:  Swab from Nasopharynx Updated:  06/03/20 0433     ADENOVIRUS, PCR Not Detected     Coronavirus 229E Not Detected     Coronavirus HKU1 Not Detected     Coronavirus NL63 Not Detected     Coronavirus OC43 Not Detected     Human Metapneumovirus Not Detected     Human Rhinovirus/Enterovirus Not Detected     Influenza B PCR Not Detected     Parainfluenza Virus 1 Not Detected      Parainfluenza Virus 2 Not Detected     Parainfluenza Virus 3 Not Detected     Parainfluenza Virus 4 Not Detected     Bordetella pertussis pcr Not Detected     Influenza A H1 2009 PCR Not Detected     Chlamydophila pneumoniae PCR Not Detected     Mycoplasma pneumo by PCR Not Detected     Influenza A PCR Not Detected     Influenza A H3 Not Detected     Influenza A H1 Not Detected     RSV, PCR Not Detected     Bordetella parapertussis PCR Not Detected    Narrative:       The coronavirus on the RVP is NOT COVID-19 and is NOT indicative of infection with COVID-19.           Imaging Results (Last 24 Hours)     Procedure Component Value Units Date/Time    XR Chest 1 View [[id number]] Collected:  06/04/20 0906     Updated:  06/04/20 2217    Narrative:       EXAMINATION: XR CHEST 1 VW-     INDICATION: Dyspnea, on exertion.     COMPARISON: 03/09/2020.     FINDINGS: Left-sided Port-A-Cath is seen with its tip in the distal SVC.  Implanted cardiac monitoring device is seen on the left. Heart is normal  in size. Vasculature appears normal. Lungs appear grossly clear.       Impression:       No evidence of active chest disease.      D:  06/04/2020  E:  06/04/2020     This report was finalized on 6/4/2020 10:14 PM by Dr. Lui Okeefe MD.                  I have reviewed the medications:  Scheduled Meds:  albuterol 2.5 mg Nebulization Once   albuterol sulfate HFA 2 puff Inhalation 4x Daily - RT   calcitriol 0.5 mcg Oral Q12H   cholecalciferol 1,000 Units Oral BID   cycloSPORINE 1 drop Both Eyes BID   doxycycline 100 mg Oral Q12H   enoxaparin 40 mg Subcutaneous Q24H   famotidine 20 mg Oral BID AC   fludrocortisone 100 mcg Oral Daily   gabapentin 600 mg Oral Nightly   hydrocortisone 10 mg Oral Nightly   hydrocortisone 20 mg Oral QAM   insulin lispro 0-7 Units Subcutaneous TID AC   levothyroxine 75 mcg Oral Q AM   pantoprazole 40 mg Oral Daily   Parathyroid Hormone (Recomb) 25 mcg Subcutaneous Q12H   potassium chloride 40 mEq Oral TID      sodium chloride 10 mL Intravenous Q12H   valACYclovir 1,000 mg Oral Q12H   venlafaxine 75 mg Oral BID With Meals     Continuous Infusions:  lactated ringers 20 mL/hr Last Rate: 20 mL/hr (06/05/20 1301)   lactated ringers 20 mL/hr Last Rate: 20 mL/hr (06/04/20 1553)   sodium chloride 30 mL/hr      PRN Meds:.•  acetaminophen **OR** acetaminophen **OR** acetaminophen  •  albuterol  •  calcium gluconate  •  dextrose  •  dextrose  •  diazePAM  •  diphenhydrAMINE  •  diphenhydrAMINE-zinc acetate  •  glucagon (human recombinant)  •  hydrALAZINE  •  HYDROmorphone  •  ipratropium-albuterol  •  labetalol  •  melatonin  •  ondansetron  •  promethazine **OR** promethazine **OR** promethazine **OR** promethazine  •  promethazine  •  sodium chloride  •  sodium chloride    Assessment/Plan   Assessment & Plan     Active Hospital Problems    Diagnosis  POA   • **Hypoxia [R09.02]  Yes   • Bronchitis [J40]  Unknown   • Pleuritic chest pain [R07.81]  Unknown   • Pneumonitis [J18.9]  Unknown   • Premature ovarian failure [E28.8]  Unknown   • Polyglandular autoimmune syndrome, type 1 (CMS/HCC) [E31.8]  Unknown   • Type 2 diabetes mellitus (CMS/HCC) [E11.9]  Unknown   • Hypoparathyroidism (CMS/HCC) [E20.9]  Unknown   • Adrenal insufficiency (Epsom's disease) (CMS/HCC) [E27.1]  Unknown   • Asplenia [Q89.01]  Not Applicable      Resolved Hospital Problems   No resolved problems to display.        Brief Hospital Course to date:  Krista Aiken [place]jave is a 29 y.o. female with a past medical history of polyglandular autoimmune syndrome type I, type 2 diabetes mellitus, hypoparathyroidism, adrenal insufficiency, and asplenia who was admitted on Tr[facility] 3 for shortness of breath, currently being treated for pneumonitis with doxycycline.    Pneumonitis  -Continue doxycycline    Episode of tetany related to hypoparathyroidism  -last calcium level 9, check calcium   -Resolved after given calcium gluconate    Adrenal insufficiency  -Continue  fludrocortisone 100 mcg daily and hydrocortisone 10 mg nightly    Intermittent dysphasia  Hemtaochezia  -Status post esophageal dilation on June 4  -Plan for colonoscopy today per patients request     DVT Prophylaxis: Lovenox    Disposition: I [facility] the patient [facility].    CODE STATUS:   Code Status and Medical Interventions:   Ordered at: 06/03/20 0111     Code Status:    CPR     Medical Interventions (Level of Support Prior to Arrest):    Full         Electronically signed by Sugey Snowden MD, 06/05/20, 14:02.

## 2020-06-05 NOTE — PLAN OF CARE
Pt had an episode of tetany this morning and a RRT was called to the bedside, calcium gluconate was ordered and symptoms resolved, pt went for colonoscopy no findings, frequent pain meds given, VSS will contiune to monitor

## 2020-06-05 NOTE — OP NOTE
Colonoscopy summary  See colonosocopy report for details    Normal colon and terminal ileum with adequate bowel prep. Presumed anal fissure likely source of bleeding. No new rec's. High fiber diet.     Reconsult prn.

## 2020-06-05 NOTE — PLAN OF CARE
VSS on RA overnight.  PRN meds given frequently throughout night for pt reports of pain, nausea.  Colonoscopy to occur this afternoon.  Pt NPO since midnight, bowel prep regimen initiated/completed.  Pt reports BM's starting to clear this a.m.  No acute distress or complaints to note at this time.  WCTM.

## 2020-06-06 VITALS
RESPIRATION RATE: 18 BRPM | TEMPERATURE: 97.9 F | HEART RATE: 115 BPM | DIASTOLIC BLOOD PRESSURE: 83 MMHG | OXYGEN SATURATION: 93 % | SYSTOLIC BLOOD PRESSURE: 118 MMHG | BODY MASS INDEX: 30.8 KG/M2 | HEIGHT: 62 IN | WEIGHT: 167.4 LBS

## 2020-06-06 PROBLEM — R09.02 HYPOXIA: Status: RESOLVED | Noted: 2020-06-03 | Resolved: 2020-06-06

## 2020-06-06 LAB — GLUCOSE BLDC GLUCOMTR-MCNC: 89 MG/DL (ref 70–130)

## 2020-06-06 PROCEDURE — 25010000002 HYDROMORPHONE PER 4 MG: Performed by: INTERNAL MEDICINE

## 2020-06-06 PROCEDURE — 94799 UNLISTED PULMONARY SVC/PX: CPT

## 2020-06-06 PROCEDURE — 25010000002 PROCHLORPERAZINE 10 MG/2ML SOLUTION: Performed by: PHYSICIAN ASSISTANT

## 2020-06-06 PROCEDURE — 99239 HOSP IP/OBS DSCHRG MGMT >30: CPT | Performed by: INTERNAL MEDICINE

## 2020-06-06 PROCEDURE — 25010000003 HEPARIN LOCK FLUSH PER 10 UNITS: Performed by: INTERNAL MEDICINE

## 2020-06-06 PROCEDURE — 82962 GLUCOSE BLOOD TEST: CPT

## 2020-06-06 PROCEDURE — 25010000002 ENOXAPARIN PER 10 MG: Performed by: INTERNAL MEDICINE

## 2020-06-06 RX ORDER — DOXYCYCLINE 100 MG/1
100 CAPSULE ORAL EVERY 12 HOURS SCHEDULED
Qty: 7 CAPSULE | Refills: 0 | Status: SHIPPED | OUTPATIENT
Start: 2020-06-06 | End: 2020-06-10

## 2020-06-06 RX ORDER — PROCHLORPERAZINE MALEATE 5 MG/1
5 TABLET ORAL EVERY 6 HOURS PRN
Qty: 18 TABLET | Refills: 0 | Status: SHIPPED | OUTPATIENT
Start: 2020-06-06 | End: 2020-06-09

## 2020-06-06 RX ORDER — ALBUTEROL SULFATE 2.5 MG/3ML
2.5 SOLUTION RESPIRATORY (INHALATION) EVERY 6 HOURS PRN
Qty: 3 ML | Refills: 12 | Status: SHIPPED | OUTPATIENT
Start: 2020-06-06 | End: 2020-07-06

## 2020-06-06 RX ORDER — HEPARIN SODIUM (PORCINE) LOCK FLUSH IV SOLN 100 UNIT/ML 100 UNIT/ML
500 SOLUTION INTRAVENOUS ONCE
Status: COMPLETED | OUTPATIENT
Start: 2020-06-06 | End: 2020-06-06

## 2020-06-06 RX ORDER — HYDROCODONE BITARTRATE AND ACETAMINOPHEN 5; 325 MG/1; MG/1
1 TABLET ORAL EVERY 8 HOURS PRN
Qty: 9 TABLET | Refills: 0 | Status: SHIPPED | OUTPATIENT
Start: 2020-06-06 | End: 2020-06-09

## 2020-06-06 RX ORDER — HYDROMORPHONE HYDROCHLORIDE 1 MG/ML
0.5 INJECTION, SOLUTION INTRAMUSCULAR; INTRAVENOUS; SUBCUTANEOUS EVERY 4 HOURS PRN
Status: DISCONTINUED | OUTPATIENT
Start: 2020-06-06 | End: 2020-06-06 | Stop reason: HOSPADM

## 2020-06-06 RX ADMIN — FAMOTIDINE 20 MG: 20 TABLET, FILM COATED ORAL at 08:15

## 2020-06-06 RX ADMIN — PANTOPRAZOLE SODIUM 40 MG: 40 TABLET, DELAYED RELEASE ORAL at 08:15

## 2020-06-06 RX ADMIN — ENOXAPARIN SODIUM 40 MG: 40 INJECTION SUBCUTANEOUS at 06:04

## 2020-06-06 RX ADMIN — HEPARIN 500 UNITS: 100 SYRINGE at 11:45

## 2020-06-06 RX ADMIN — CYCLOSPORINE 1 DROP: 0.5 EMULSION OPHTHALMIC at 08:18

## 2020-06-06 RX ADMIN — POTASSIUM CHLORIDE 40 MEQ: 10 CAPSULE, COATED, EXTENDED RELEASE ORAL at 08:15

## 2020-06-06 RX ADMIN — SODIUM CHLORIDE, PRESERVATIVE FREE 10 ML: 5 INJECTION INTRAVENOUS at 08:18

## 2020-06-06 RX ADMIN — CALCITRIOL CAPSULES 0.25 MCG 0.5 MCG: 0.25 CAPSULE ORAL at 08:15

## 2020-06-06 RX ADMIN — DIAZEPAM 5 MG: 5 TABLET ORAL at 08:15

## 2020-06-06 RX ADMIN — HYDROMORPHONE HYDROCHLORIDE 0.5 MG: 1 INJECTION, SOLUTION INTRAMUSCULAR; INTRAVENOUS; SUBCUTANEOUS at 08:16

## 2020-06-06 RX ADMIN — LEVOTHYROXINE SODIUM 75 MCG: 75 TABLET ORAL at 06:04

## 2020-06-06 RX ADMIN — MELATONIN 1000 UNITS: at 08:15

## 2020-06-06 RX ADMIN — PARATHYROID HORMONE 25 MCG: 25 INJECTION, POWDER, LYOPHILIZED, FOR SOLUTION SUBCUTANEOUS at 08:13

## 2020-06-06 RX ADMIN — HYDROCORTISONE 20 MG: 20 TABLET ORAL at 08:17

## 2020-06-06 RX ADMIN — VALACYCLOVIR HYDROCHLORIDE 1000 MG: 500 TABLET, FILM COATED ORAL at 08:17

## 2020-06-06 RX ADMIN — ALBUTEROL SULFATE 2.5 MG: 2.5 SOLUTION RESPIRATORY (INHALATION) at 08:35

## 2020-06-06 RX ADMIN — PROCHLORPERAZINE EDISYLATE 5 MG: 5 INJECTION INTRAMUSCULAR; INTRAVENOUS at 08:16

## 2020-06-06 RX ADMIN — FLUDROCORTISONE ACETATE 100 MCG: 0.1 TABLET ORAL at 08:16

## 2020-06-06 RX ADMIN — VENLAFAXINE 75 MG: 37.5 TABLET ORAL at 08:15

## 2020-06-06 RX ADMIN — DOXYCYCLINE 100 MG: 100 CAPSULE ORAL at 08:15

## 2020-06-06 NOTE — DISCHARGE SUMMARY
[facility] Medicine Services  DISCHARGE SUMMARY    Patient Name: Krista Richter  : 1990  MRN: [id number]    Date of Admission: 6/3/2020 12:29 AM  Date of Discharge:  2020  Primary Care Physician: Agustin Turner MD    Consults     Date and Time Order Name Status Description    2020 1128 Inpatient Infectious Diseases Consult Completed     6/3/2020 0207 Inpatient Pulmonology Consult Completed           Hospital Course     Presenting Problem:   Respiratory failure (CMS/[facility]) [J96.90]  Bronchitis [J40]  Hypoxia [R09.02]    Active Hospital Problems    Diagnosis  POA   • Bronchitis [J40]  Unknown   • Pleuritic chest pain [R07.81]  Unknown   • Pneumonitis [J18.9]  Unknown   • Premature ovarian failure [E28.8]  Unknown   • Polyglandular autoimmune syndrome, type 1 (CMS/[facility]) [E31.8]  Unknown   • Type 2 diabetes mellitus (CMS/[facility]) [E11.9]  Unknown   • Hypoparathyroidism (CMS/[facility]) [E20.9]  Unknown   • Adrenal insufficiency (Denver's disease) (CMS/[facility]) [E27.1]  Unknown   • Asplenia [Q89.01]  Not Applicable      Resolved Hospital Problems    Diagnosis Date Resolved POA   • **Hypoxia [R09.02] 2020 Yes          Hospital Course:  Krista Richter is a 29 y.o. female with a past medical history of polyglandular autoimmune syndrome type I, type 2 diabetes mellitus, hypoparathyroidism, adrenal insufficiency, and asplenia who was admitted on Traci 3 for shortness of breath, treated for pneumonitis with antibiotics, de-escalated to doxycycline which she will need to complete as an outpatient.  She was also complaining of some dysphasia, she is a known patient of Dr. Vargas, he performed an EGD on  with esophageal dilation, she noted significant improvement in her dysphagia afterwards.  She also underwent colonoscopy for hematochezia, there was no significant abnormality found and hematochezia was presumed to be secondary to anal fissures.     Discharge Follow Up  Recommendations for outpatient labs/diagnostics:  Follow-up with [facility] for possible autoimmune related treatment with Rituxan    Day of Discharge     HPI:   No acute events overnight.  Patient states her dysphasia is better, still having some pleuritic chest pain, but pain medication is helping.  Denies any fever or chills.    Review of Systems  CV- No palpitations  Resp- No cough, dyspnea  GI- No N/V/D, abd pain    Vital Signs:   Temp:  [97.2 °F (36.2 °C)-98.6 °F (37 °C)] 97.9 °F (36.6 °C)  Heart Rate:  [] 115  Resp:  [14-20] 18  BP: (113-127)/(71-83) 118/83     Physical Exam:  Constitutional: No acute distress, awake, alert  HENT: NCAT, mucous membranes moist  Respiratory: Clear to auscultation bilaterally, respiratory effort normal on room air   Cardiovascular: RRR, no murmurs  Gastrointestinal: Positive bowel sounds, soft, nontender, nondistended  Psychiatric: Appropriate affect, cooperative  Neurologic: Oriented x 3,Cranial Nerves grossly intact to confrontation, speech clear  Skin: No rashes    Pertinent  and/or Most Recent Results     Results from last 7 days   Lab Units 06/05/20  0936 06/04/20  0657 06/03/20  0321 06/03/20  0056 06/03/20  0030   WBC 10*3/mm3  --  9.45 17.47*  --  16.41*   HEMOGLOBIN g/dL  --  12.0 13.7  --  13.9   HEMATOCRIT %  --  37.4 41.8  --  42.2   PLATELETS 10*3/mm3  --  276 297  --  307   SODIUM mmol/L 142 141  --  140  --    POTASSIUM mmol/L 4.7 5.2  --  4.3  --    CHLORIDE mmol/L 101 103  --  99  --    CO2 mmol/L 29.0 28.0  --  28.0  --    BUN mg/dL 16 17  --  16  --    CREATININE mg/dL 0.49* 0.52*  --  0.46*  --    GLUCOSE mg/dL 102* 104*  --  159*  --    CALCIUM mg/dL 10.0 9.0  --  8.5*  --      Results from last 7 days   Lab Units 06/05/20  0936 06/03/20  0056   BILIRUBIN mg/dL 0.5 0.7   ALK PHOS U/L 88 85   ALT (SGPT) U/L 18 15   AST (SGOT) U/L 28 20           Invalid input(s): TG, LDLCALC, LDLREALC  Results from last 7 days   Lab Units 06/03/20  0321 06/03/20  0056  06/03/20  0030   TSH uIU/mL  --  0.375  --    CORTISOL mcg/dL 120.90  --   --    HEMOGLOBIN A1C %  --   --  6.10*   TROPONIN T ng/mL  --  <0.010  --    PROCALCITONIN ng/mL  --  0.05*  --    LACTATE mmol/L 1.1  --   --        Brief Urine Lab Results  (Last result in the past 365 days)      Color   Clarity   Blood   Leuk Est   Nitrite   Protein   CREAT   Urine HCG        06/03/20 0258               Negative           Microbiology Results Abnormal     Procedure Component Value - Date/Time    Blood Culture - Blood, Arm, Left [[id number]] Collected:  06/03/20 0321    Lab Status:  Preliminary result Specimen:  Blood from Arm, Left Updated:  06/06/20 0415     Blood Culture No growth at 3 days    Blood Culture - Blood, Arm, Right [[id number]] Collected:  06/03/20 0321    Lab Status:  Preliminary result Specimen:  Blood from Arm, Right Updated:  06/06/20 0415     Blood Culture No growth at 3 days    COVID-19,[facility] IN-HOUSE, NP SWAB IN TRANSPORT MEDIA 8-12 HR TAT - Swab, Nasopharynx [[id number]]  (Normal) Collected:  06/03/20 0257    Lab Status:  Final result Specimen:  Swab from Nasopharynx Updated:  06/03/20 1026     COVID19 Not Detected    S. Pneumo Ag Urine or CSF - Urine, Urine, Clean Catch [[id number]]  (Normal) Collected:  06/03/20 0258    Lab Status:  Final result Specimen:  Urine, Clean Catch Updated:  06/03/20 0918     Strep Pneumo Ag Negative    MRSA Screen, PCR (Inpatient) - Swab, Nares [[id number]]  (Normal) Collected:  06/03/20 0257    Lab Status:  Final result Specimen:  Swab from Nares Updated:  06/03/20 0913     MRSA PCR Negative    Narrative:       MRSA Negative    Respiratory Panel, PCR - Swab, Nasopharynx [[id number]]  (Normal) Collected:  06/03/20 0257    Lab Status:  Final result Specimen:  Swab from Nasopharynx Updated:  06/03/20 0433     ADENOVIRUS, PCR Not Detected     Coronavirus 229E Not Detected     Coronavirus HKU1 Not Detected     Coronavirus NL63 Not Detected     Coronavirus OC43 Not Detected      Human Metapneumovirus Not Detected     Human Rhinovirus/Enterovirus Not Detected     Influenza B PCR Not Detected     Parainfluenza Virus 1 Not Detected     Parainfluenza Virus 2 Not Detected     Parainfluenza Virus 3 Not Detected     Parainfluenza Virus 4 Not Detected     Bordetella pertussis pcr Not Detected     Influenza A H1 2009 PCR Not Detected     Chlamydophila pneumoniae PCR Not Detected     Mycoplasma pneumo by PCR Not Detected     Influenza A PCR Not Detected     Influenza A H3 Not Detected     Influenza A H1 Not Detected     RSV, PCR Not Detected     Bordetella parapertussis PCR Not Detected    Narrative:       The coronavirus on the RVP is NOT COVID-19 and is NOT indicative of infection with COVID-19.           Imaging Results (All)     Procedure Component Value Units Date/Time    XR Chest 1 View [[id number]] Collected:  06/04/20 0906     Updated:  06/04/20 2217    Narrative:       EXAMINATION: XR CHEST 1 VW-     INDICATION: Dyspnea, on exertion.     COMPARISON: 03/09/2020.     FINDINGS: Left-sided Port-A-Cath is seen with its tip in the distal SVC.  Implanted cardiac monitoring device is seen on the left. Heart is normal  in size. Vasculature appears normal. Lungs appear grossly clear.       Impression:       No evidence of active chest disease.      D:  06/04/2020  E:  06/04/2020     This report was finalized on 6/4/2020 10:14 PM by Dr. Lui Okeefe MD.       XR Outside Films [[id number]] Resulted:  06/03/20 1115     Updated:  06/03/20 1115    Narrative:       This procedure was auto-finalized with no dictation required.    CT Outside Films [[id number]] Resulted:  06/03/20 1114     Updated:  06/03/20 1114    Narrative:       This procedure was auto-finalized with no dictation required.                         Plan for Follow-up of Pending Labs/Results:    Order Current Status    Blood Culture - Blood, Arm, Left Preliminary result    Blood Culture - Blood, Arm, Right Preliminary result        Discharge  Details        Discharge Medications      New Medications      Instructions Start Date   doxycycline 100 MG capsule  Commonly known as:  MONODOX   100 mg, Oral, Every 12 Hours Scheduled      HYDROcodone-acetaminophen 5-325 MG per tablet  Commonly known as:  Norco   1 tablet, Oral, Every 8 Hours PRN      prochlorperazine 5 MG tablet  Commonly known as:  COMPAZINE   5 mg, Oral, Every 6 Hours PRN         Changes to Medications      Instructions Start Date   albuterol sulfate  (90 Base) MCG/ACT inhaler  Commonly known as:  PROVENTIL HFA;VENTOLIN HFA;PROAIR HFA  What changed:    · Another medication with the same name was added. Make sure you understand how and when to take each.  · Another medication with the same name was removed. Continue taking this medication, and follow the directions you see here.   Take 2 Puffs by inhalation every 4 hours as needed for wheezing.      albuterol (2.5 MG/3ML) 0.083% nebulizer solution  Commonly known as:  PROVENTIL  What changed:  You were already taking a medication with the same name, and this prescription was added. Make sure you understand how and when to take each.  Replaces:  ProAir  (90 Base) MCG/ACT inhaler   2.5 mg, Nebulization, Every 6 Hours PRN      omeprazole 40 MG capsule  Commonly known as:  priLOSEC  What changed:  Another medication with the same name was removed. Continue taking this medication, and follow the directions you see here.   40 mg, Oral, 2 Times Daily Before Meals, Take a half hour before breakfast         Continue These Medications      Instructions Start Date   Alcohol Prep 70 % pads   Check blood glucose up to 2 times daily      azaTHIOprine 50 MG tablet  Commonly known as:  IMURAN   150 mg, Oral, Daily      azelastine 0.1 % nasal spray  Commonly known as:  ASTELIN   Astelin 137 mcg (0.1 %) nasal spray aerosol   Daily      butalbital-acetaminophen-caffeine -40 MG per tablet  Commonly known as:  FIORICET, ESGIC   1 tablet, Oral,  Every 24 Hours      calcitriol 0.5 MCG capsule  Commonly known as:  ROCALTROL   0.5 mcg, Oral, 2 Times Daily      calcium gluconate 1 g/100 mL    2 g, Intravenous      cycloSPORINE 0.05 % ophthalmic emulsion  Commonly known as:  RESTASIS   1 drop. PRN      cyproheptadine 4 MG tablet  Commonly known as:  PERIACTIN   4 mg, Oral, 2 Times Daily      diazePAM 5 MG tablet  Commonly known as:  VALIUM   5 mg      dronabinol 2.5 MG capsule  Commonly known as:  MARINOL   5 mg, 4 Times Daily PRN      EpiPen 2-Ck 0.3 MG/0.3ML solution auto-injector injection  Generic drug:  EPINEPHrine   EpiPen 0.3 mg/0.3 mL injection, auto-injector   Take 1 auto by injection route.      fludrocortisone 0.1 MG tablet   TAKE ONE TABLET BY MOUTH DAILY..05MG PER PT      fluticasone 50 MCG/ACT nasal spray  Commonly known as:  FLONASE   1 spray      folic acid 1 MG tablet  Commonly known as:  FOLVITE   4 mg, Oral, Daily      gabapentin 600 MG tablet  Commonly known as:  NEURONTIN   600 mg, Oral      hydrocortisone 20 MG tablet  Commonly known as:  CORTEF   20 mg, Oral, 2 Times Daily, 20 MG IN AM AND 10 MG AT NIGHT      Cortef 10 MG tablet  Generic drug:  hydrocortisone   Cortef 10 mg tablet      Insulin Pen Needle 32G X 4 MM misc   Use to administer Natpara subcutaneously daily      KLOR-CON 10 PO   40 mEq, Oral, 3 Times Daily      levothyroxine 75 MCG tablet  Commonly known as:  SYNTHROID, LEVOTHROID   levothyroxine 75 mcg tablet      lidocaine 5 %  Commonly known as:  LIDODERM   1 patch, Transdermal, Every 24 Hours, Remove & Discard patch within 12 hours or as directed by MD      magnesium oxide 400 (241.3 Mg) MG tablet tablet  Commonly known as:  MAGOX   4,000 mg, Oral, Daily      metFORMIN 500 MG tablet  Commonly known as:  GLUCOPHAGE   500 mg, Oral, 2 Times Daily With Meals      methocarbamol 500 MG tablet  Commonly known as:  ROBAXIN   500 mg, Oral, 3 Times Daily      MULTIVITAMIN ADULT PO   1 tablet, Oral      nadolol 40 MG tablet  Commonly  "known as:  CORGARD   40 mg, Oral      NATPARA SC   Subcutaneous      nystatin 396022 UNIT/ML suspension  Commonly known as:  MYCOSTATIN   5 mL, Every 6 Hours Scheduled      ONE TOUCH ULTRA 2 w/Device kit   Use to check blood glucose up to 2 times daily      OneTouch Delica Lancets 33G misc   Use to check blood glucose up to 2 times daily      OT ULTRA/FASTTK CNTRL SOLN solution   Use weekly and as needed to calibrate meter      RA XYDRA EF PO   Oral      raNITIdine 150 MG tablet  Commonly known as:  ZANTAC   ranitidine 150 mg tablet   1 Two times a day      Solu-CORTEF 100 MG injection  Generic drug:  hydrocortisone sodium succinate   Give 100 mg ( 2 mL ) intramuscular as needed for vomiting, severe illness  Dispense actovial      Systane Overnight Therapy 0.3 % gel  Generic drug:  Hypromellose   Systane Gel 0.3 % eye gel      Tegaderm Film 2-3/8\"x2-3/4\" misc   Apply over pump insertion site every 2 days..      Valtrex 1000 MG tablet  Generic drug:  valACYclovir   Valtrex 1 gram tablet   Two times a day      venlafaxine 25 MG tablet  Commonly known as:  EFFEXOR   5 mg, Oral, 2 Times Daily      Vitamin D (Cholecalciferol) 25 MCG (1000 UT) capsule   1,000 unit marking on U-100 syringe, Oral, 2 Times Daily      zinc sulfate 220 (50 Zn) MG capsule  Commonly known as:  ZINCATE   zinc sulfate 220 mg (50 mg) capsule         Stop These Medications    azithromycin 250 MG tablet  Commonly known as:  ZITHROMAX     ProAir  (90 Base) MCG/ACT inhaler  Generic drug:  albuterol sulfate HFA  Replaced by:  albuterol (2.5 MG/3ML) 0.083% nebulizer solution  You also have another medication with the same name that you need to continue taking as instructed.     promethazine 25 MG suppository  Commonly known as:  PHENERGAN     promethazine 25 MG tablet  Commonly known as:  PHENERGAN            Allergies   Allergen Reactions   • Azithromycin Hives   • Cephalosporins Other (See Comments) and Hives   • Clarithromycin Hives     " "biaxin  biaxin  biaxin   • Levofloxacin Other (See Comments) and Hives   • Nitrofurantoin Hives   • Nystatin Hives   • Penicillins Hives   • Sulfa Antibiotics Hives   • Cefpodoxime Hives   • Midazolam Unknown - Low Severity   • Morphine Other (See Comments)     \"it doesn't work. [facility] told me to list it as an allergy\"   • Nitrofurantoin Macrocrystal Other (See Comments) and Hives   • Ondansetron Other (See Comments)     \"it doesn't work. [facility] told me to list it as an allergy\"         Discharge Disposition:  Home or Self Care    Diet:  Hospital:No active diet order         CODE STATUS:    Code Status and Medical Interventions:   Ordered at: 06/03/20 0111     Code Status:    CPR     Medical Interventions (Level of Support Prior to Arrest):    Full       No future appointments.    Additional Instructions for the Follow-ups that You Need to Schedule     Discharge Follow-up with PCP   As directed       Currently Documented PCP:    Agustin Turner MD    PCP Phone Number:    [phone]     Follow Up Details:  in 1 week, hospital follow-up for pneumonitis               Time Spent on Discharge:  32 minutes    Electronically signed by Sugey Snowden MD, 06/06/20, 2:53 PM.      "

## 2020-06-06 NOTE — PLAN OF CARE
VSS, remains on 2l NC, NSR on telemetry 1 episode tetany resolved with calcium gluconate iv , dilaudid iv given few times, possible dc today

## 2020-06-06 NOTE — PROGRESS NOTES
Case Management Discharge Note      Final Note: Patient discharging home today.  currently on room air and no needs for 02.  Patient declines DME and home health services.  Follow up appointments have been scheduled and added to AVS.  Family to transport at discharge.           Destination      No service has been selected for the patient.      Durable Medical Equipment      No service has been selected for the patient.      Dialysis/Infusion      No service has been selected for the patient.      Home Medical Care      No service has been selected for the patient.      Therapy      No service has been selected for the patient.      Community Resources      No service has been selected for the patient.             Final Discharge Disposition Code: 01 - home or self-care

## 2020-06-07 ENCOUNTER — READMISSION MANAGEMENT (OUTPATIENT)
Dept: CALL CENTER | Facility: HOSPITAL | Age: 30
End: 2020-06-07

## 2020-06-07 NOTE — OUTREACH NOTE
Prep Survey      Responses   [facility] patient discharged from?  [place]   Is LACE score < 7 ?  No   Eligibility  Readm Mgmt   Discharge diagnosis  Bronchitis, pleuritic chest pain, pneumonitis, polyglandular autoimmune syndrome, DM II, Hypoparathyroidism, adrenal insufficiency, asplenia, s/pEGD with esophageal balloon dilatation, colonoscopy    COVID-19 Test Status  Negative   Does the patient have one of the following disease processes/diagnoses(primary or secondary)?  Other   Does the patient have Home health ordered?  No   Is there a DME ordered?  No   Comments regarding appointments  Pt to schedule F/U with PCP   Medication alerts for this patient  see AVS   Prep survey completed?  Yes          Claritza Hutchins RN

## 2020-06-08 LAB
BACTERIA SPEC AEROBE CULT: NORMAL
BACTERIA SPEC AEROBE CULT: NORMAL

## 2020-06-09 ENCOUNTER — READMISSION MANAGEMENT (OUTPATIENT)
Dept: CALL CENTER | Facility: HOSPITAL | Age: 30
End: 2020-06-09

## 2020-06-09 NOTE — OUTREACH NOTE
Medical Week 1 Survey      Responses   [facility] patient discharged from?  [place]   COVID-19 Test Status  Negative   Does the patient have one of the following disease processes/diagnoses(primary or secondary)?  Other   Is there a successful TCM telephone encounter documented?  No   Week 1 attempt successful?  No   Unsuccessful attempts  Attempt 1          Krista De La Cruz RN

## 2020-06-10 ENCOUNTER — READMISSION MANAGEMENT (OUTPATIENT)
Dept: CALL CENTER | Facility: HOSPITAL | Age: 30
End: 2020-06-10

## 2020-06-10 NOTE — OUTREACH NOTE
Medical Week 1 Survey      Responses   [facility] patient discharged from?  [place]   COVID-19 Test Status  Negative   Does the patient have one of the following disease processes/diagnoses(primary or secondary)?  Other   Is there a successful TCM telephone encounter documented?  No   Week 1 attempt successful?  Yes   Call start time  1141   Call end time  1147   Discharge diagnosis  Bronchitis, pleuritic chest pain, pneumonitis, polyglandular autoimmune syndrome, DM II, Hypoparathyroidism, adrenal insufficiency, asplenia, s/pEGD with esophageal balloon dilatation, colonoscopy    Meds reviewed with patient/caregiver?  Yes   Is the patient having any side effects they believe may be caused by any medication additions or changes?  No   Does the patient have all medications ordered at discharge?  Yes   Is the patient taking all medications as directed (includes completed medication regime)?  Yes   Does the patient have a primary care provider?   Yes   Does the patient have an appointment with their PCP within 7 days of discharge?  Yes   Has the patient kept scheduled appointments due by today?  N/A   Has home health visited the patient within 72 hours of discharge?  N/A   Pulse Ox monitoring  None   Psychosocial issues?  No   Did the patient receive a copy of their discharge instructions?  Yes   Nursing interventions  Reviewed instructions with patient   What is the patient's perception of their health status since discharge?  Improving   Is the patient/caregiver able to teach back signs and symptoms related to disease process for when to call PCP?  Yes   Is the patient/caregiver able to teach back signs and symptoms related to disease process for when to call 911?  Yes   Is the patient/caregiver able to teach back the hierarchy of who to call/visit for symptoms/problems? PCP, Specialist, Home health nurse, Urgent Care, ED, 911  Yes   Additional teach back comments  Sats going down to 80's, She is not on O2,  she will do cardio at the gym to improve lung function.   Week 1 call completed?  Yes   Wrap up additional comments  She is well educated on her disease. Mom is a nurse and has trained her well.          Renetta Otoole RN

## 2020-06-15 ENCOUNTER — TELEPHONE (OUTPATIENT)
Dept: GASTROENTEROLOGY | Facility: CLINIC | Age: 30
End: 2020-06-15

## 2020-06-15 RX ORDER — AZATHIOPRINE 50 MG/1
150 TABLET ORAL DAILY
Qty: 90 TABLET | Refills: 2 | Status: SHIPPED | OUTPATIENT
Start: 2020-06-15 | End: 2020-10-07 | Stop reason: SDUPTHER

## 2020-06-15 NOTE — TELEPHONE ENCOUNTER
----- Message from Gavin Vargas MD sent at 6/15/2020 10:43 AM EDT -----  Reviewed  Okay to refill  ----- Message -----  From: Rachel Shepard MA  Sent: 6/15/2020  10:31 AM EDT  To: Gavin Vargas MD    Will you please review patients last CMP and CBC in [facility] to confirm that it is okay to refill her prescription for Azathioprine?

## 2020-06-17 ENCOUNTER — READMISSION MANAGEMENT (OUTPATIENT)
Dept: CALL CENTER | Facility: HOSPITAL | Age: 30
End: 2020-06-17

## 2020-06-17 NOTE — OUTREACH NOTE
Medical Week 2 Survey      Responses   [facility] patient discharged from?  [place]   COVID-19 Test Status  Negative   Does the patient have one of the following disease processes/diagnoses(primary or secondary)?  Other   Week 2 attempt successful?  No   Unsuccessful attempts  Attempt 1          Krista De La Cruz RN

## 2020-06-20 ENCOUNTER — READMISSION MANAGEMENT (OUTPATIENT)
Dept: CALL CENTER | Facility: HOSPITAL | Age: 30
End: 2020-06-20

## 2020-06-20 NOTE — OUTREACH NOTE
Medical Week 2 Survey      Responses   [facility] patient discharged from?  [place]   Does the patient have one of the following disease processes/diagnoses(primary or secondary)?  Other   Week 2 attempt successful?  No   Unsuccessful attempts  Attempt 2          Charity Bsihop RN

## 2020-06-26 ENCOUNTER — READMISSION MANAGEMENT (OUTPATIENT)
Dept: CALL CENTER | Facility: HOSPITAL | Age: 30
End: 2020-06-26

## 2020-06-26 NOTE — OUTREACH NOTE
Medical Week 3 Survey      Responses   [facility] patient discharged from?  [place]   COVID-19 Test Status  Negative   Does the patient have one of the following disease processes/diagnoses(primary or secondary)?  Other   Week 3 attempt successful?  Yes   Call start time  1420   Call end time  1423   Discharge diagnosis  Bronchitis, pleuritic chest pain, pneumonitis, polyglandular autoimmune syndrome, DM II, Hypoparathyroidism, adrenal insufficiency, asplenia, s/pEGD with esophageal balloon dilatation, colonoscopy    Meds reviewed with patient/caregiver?  Yes   Is the patient taking all medications as directed (includes completed medication regime)?  Yes   Medication comments  Antibiotic for ear infection   Has the patient kept scheduled appointments due by today?  Yes   Pulse Ox monitoring  None   What is the patient's perception of their health status since discharge?  New symptoms unrelated to diagnosis   Additional teach back comments  Beth lau to go on a bike ride   Week 3 Call Completed?  Yes          Alyse Low RN

## 2020-07-06 ENCOUNTER — READMISSION MANAGEMENT (OUTPATIENT)
Dept: CALL CENTER | Facility: HOSPITAL | Age: 30
End: 2020-07-06

## 2020-07-06 NOTE — OUTREACH NOTE
Medical Week 4 Survey      Responses   [facility] patient discharged from?  [place]   COVID-19 Test Status  Negative   Does the patient have one of the following disease processes/diagnoses(primary or secondary)?  Other   Week 4 attempt successful?  No          Claritza Shepherd RN

## 2020-08-31 ENCOUNTER — HOSPITAL ENCOUNTER (EMERGENCY)
Facility: HOSPITAL | Age: 30
Discharge: HOME | End: 2020-08-31
Attending: EMERGENCY MEDICINE | Admitting: EMERGENCY MEDICINE

## 2020-08-31 ENCOUNTER — APPOINTMENT (OUTPATIENT)
Dept: CT IMAGING | Facility: HOSPITAL | Age: 30
End: 2020-08-31

## 2020-08-31 ENCOUNTER — APPOINTMENT (OUTPATIENT)
Dept: GENERAL RADIOLOGY | Facility: HOSPITAL | Age: 30
End: 2020-08-31

## 2020-08-31 VITALS
HEART RATE: 84 BPM | SYSTOLIC BLOOD PRESSURE: 90 MMHG | BODY MASS INDEX: 28.34 KG/M2 | DIASTOLIC BLOOD PRESSURE: 65 MMHG | WEIGHT: 154 LBS | OXYGEN SATURATION: 96 % | RESPIRATION RATE: 12 BRPM | HEIGHT: 62 IN | TEMPERATURE: 97.8 F

## 2020-08-31 DIAGNOSIS — R29.0 TETANY: Primary | ICD-10-CM

## 2020-08-31 DIAGNOSIS — M62.838 MUSCLE SPASM: ICD-10-CM

## 2020-08-31 LAB
ALBUMIN SERPL-MCNC: 4.5 G/DL (ref 3.5–5.2)
ALBUMIN/GLOB SERPL: 1.4 G/DL
ALP SERPL-CCNC: 88 U/L (ref 39–117)
ALT SERPL W P-5'-P-CCNC: 17 U/L (ref 1–33)
ANION GAP SERPL CALCULATED.3IONS-SCNC: 11 MMOL/L (ref 5–15)
AST SERPL-CCNC: 29 U/L (ref 1–32)
B-HCG UR QL: NEGATIVE
BASOPHILS # BLD AUTO: 0.09 10*3/MM3 (ref 0–0.2)
BASOPHILS NFR BLD AUTO: 0.6 % (ref 0–1.5)
BILIRUB SERPL-MCNC: 1 MG/DL (ref 0–1.2)
BUN SERPL-MCNC: 12 MG/DL (ref 6–20)
BUN/CREAT SERPL: 19 (ref 7–25)
CA-I SERPL ISE-MCNC: 1.48 MMOL/L (ref 1.12–1.32)
CALCIUM SPEC-SCNC: 11.3 MG/DL (ref 8.6–10.5)
CHLORIDE SERPL-SCNC: 98 MMOL/L (ref 98–107)
CK SERPL-CCNC: 75 U/L (ref 20–180)
CO2 SERPL-SCNC: 27 MMOL/L (ref 22–29)
CREAT SERPL-MCNC: 0.63 MG/DL (ref 0.57–1)
DEPRECATED RDW RBC AUTO: 47.4 FL (ref 37–54)
EOSINOPHIL # BLD AUTO: 0.38 10*3/MM3 (ref 0–0.4)
EOSINOPHIL NFR BLD AUTO: 2.7 % (ref 0.3–6.2)
ERYTHROCYTE [DISTWIDTH] IN BLOOD BY AUTOMATED COUNT: 13.2 % (ref 12.3–15.4)
GFR SERPL CREATININE-BSD FRML MDRD: 111 ML/MIN/1.73
GLOBULIN UR ELPH-MCNC: 3.2 GM/DL
GLUCOSE SERPL-MCNC: 82 MG/DL (ref 65–99)
HCT VFR BLD AUTO: 44.4 % (ref 34–46.6)
HGB BLD-MCNC: 14.7 G/DL (ref 12–15.9)
IMM GRANULOCYTES # BLD AUTO: 0.1 10*3/MM3 (ref 0–0.05)
IMM GRANULOCYTES NFR BLD AUTO: 0.7 % (ref 0–0.5)
INTERNAL NEGATIVE CONTROL: NORMAL
INTERNAL POSITIVE CONTROL: NORMAL
LYMPHOCYTES # BLD AUTO: 3.34 10*3/MM3 (ref 0.7–3.1)
LYMPHOCYTES NFR BLD AUTO: 23.9 % (ref 19.6–45.3)
Lab: NORMAL
MCH RBC QN AUTO: 32 PG (ref 26.6–33)
MCHC RBC AUTO-ENTMCNC: 33.1 G/DL (ref 31.5–35.7)
MCV RBC AUTO: 96.7 FL (ref 79–97)
MONOCYTES # BLD AUTO: 1.41 10*3/MM3 (ref 0.1–0.9)
MONOCYTES NFR BLD AUTO: 10.1 % (ref 5–12)
NEUTROPHILS NFR BLD AUTO: 62 % (ref 42.7–76)
NEUTROPHILS NFR BLD AUTO: 8.65 10*3/MM3 (ref 1.7–7)
NRBC BLD AUTO-RTO: 0.1 /100 WBC (ref 0–0.2)
PLAT MORPH BLD: NORMAL
PLATELET # BLD AUTO: 100 10*3/MM3 (ref 140–450)
PMV BLD AUTO: 12.4 FL (ref 6–12)
POTASSIUM SERPL-SCNC: 4.7 MMOL/L (ref 3.5–5.2)
PROT SERPL-MCNC: 7.7 G/DL (ref 6–8.5)
RBC # BLD AUTO: 4.59 10*6/MM3 (ref 3.77–5.28)
RBC MORPH BLD: NORMAL
SODIUM SERPL-SCNC: 136 MMOL/L (ref 136–145)
TROPONIN T SERPL-MCNC: <0.01 NG/ML (ref 0–0.03)
WBC # BLD AUTO: 13.97 10*3/MM3 (ref 3.4–10.8)
WBC MORPH BLD: NORMAL

## 2020-08-31 PROCEDURE — 85007 BL SMEAR W/DIFF WBC COUNT: CPT | Performed by: EMERGENCY MEDICINE

## 2020-08-31 PROCEDURE — 81025 URINE PREGNANCY TEST: CPT | Performed by: NURSE PRACTITIONER

## 2020-08-31 PROCEDURE — 82550 ASSAY OF CK (CPK): CPT | Performed by: EMERGENCY MEDICINE

## 2020-08-31 PROCEDURE — 96375 TX/PRO/DX INJ NEW DRUG ADDON: CPT

## 2020-08-31 PROCEDURE — 96374 THER/PROPH/DIAG INJ IV PUSH: CPT

## 2020-08-31 PROCEDURE — 25010000002 PROCHLORPERAZINE 10 MG/2ML SOLUTION: Performed by: NURSE PRACTITIONER

## 2020-08-31 PROCEDURE — 85025 COMPLETE CBC W/AUTO DIFF WBC: CPT | Performed by: EMERGENCY MEDICINE

## 2020-08-31 PROCEDURE — 82330 ASSAY OF CALCIUM: CPT | Performed by: EMERGENCY MEDICINE

## 2020-08-31 PROCEDURE — 25010000002 DIAZEPAM PER 5 MG: Performed by: EMERGENCY MEDICINE

## 2020-08-31 PROCEDURE — 71045 X-RAY EXAM CHEST 1 VIEW: CPT

## 2020-08-31 PROCEDURE — 25010000002 DIPHENHYDRAMINE PER 50 MG: Performed by: NURSE PRACTITIONER

## 2020-08-31 PROCEDURE — 25010000002 HYDROMORPHONE PER 4 MG: Performed by: EMERGENCY MEDICINE

## 2020-08-31 PROCEDURE — 93005 ELECTROCARDIOGRAM TRACING: CPT | Performed by: EMERGENCY MEDICINE

## 2020-08-31 PROCEDURE — 80053 COMPREHEN METABOLIC PANEL: CPT | Performed by: EMERGENCY MEDICINE

## 2020-08-31 PROCEDURE — 99284 EMERGENCY DEPT VISIT MOD MDM: CPT

## 2020-08-31 PROCEDURE — 25010000003 HEPARIN LOCK FLUSH PER 10 UNITS: Performed by: EMERGENCY MEDICINE

## 2020-08-31 PROCEDURE — 84484 ASSAY OF TROPONIN QUANT: CPT | Performed by: EMERGENCY MEDICINE

## 2020-08-31 RX ORDER — PROCHLORPERAZINE EDISYLATE 5 MG/ML
5 INJECTION INTRAMUSCULAR; INTRAVENOUS EVERY 6 HOURS PRN
Status: DISCONTINUED | OUTPATIENT
Start: 2020-08-31 | End: 2020-08-31 | Stop reason: HOSPADM

## 2020-08-31 RX ORDER — HEPARIN SODIUM (PORCINE) LOCK FLUSH IV SOLN 100 UNIT/ML 100 UNIT/ML
300 SOLUTION INTRAVENOUS ONCE
Status: COMPLETED | OUTPATIENT
Start: 2020-08-31 | End: 2020-08-31

## 2020-08-31 RX ORDER — DIAZEPAM 5 MG/ML
5 INJECTION, SOLUTION INTRAMUSCULAR; INTRAVENOUS ONCE
Status: COMPLETED | OUTPATIENT
Start: 2020-08-31 | End: 2020-08-31

## 2020-08-31 RX ORDER — DIPHENHYDRAMINE HYDROCHLORIDE 50 MG/ML
12.5 INJECTION INTRAMUSCULAR; INTRAVENOUS ONCE
Status: COMPLETED | OUTPATIENT
Start: 2020-08-31 | End: 2020-08-31

## 2020-08-31 RX ORDER — HYDROMORPHONE HYDROCHLORIDE 1 MG/ML
0.5 INJECTION, SOLUTION INTRAMUSCULAR; INTRAVENOUS; SUBCUTANEOUS ONCE
Status: COMPLETED | OUTPATIENT
Start: 2020-08-31 | End: 2020-08-31

## 2020-08-31 RX ADMIN — DIPHENHYDRAMINE HYDROCHLORIDE 12.5 MG: 50 INJECTION INTRAMUSCULAR; INTRAVENOUS at 18:57

## 2020-08-31 RX ADMIN — HEPARIN 300 UNITS: 100 SYRINGE at 20:27

## 2020-08-31 RX ADMIN — HYDROMORPHONE HYDROCHLORIDE 0.5 MG: 1 INJECTION, SOLUTION INTRAMUSCULAR; INTRAVENOUS; SUBCUTANEOUS at 18:04

## 2020-08-31 RX ADMIN — PROCHLORPERAZINE EDISYLATE 5 MG: 5 INJECTION INTRAMUSCULAR; INTRAVENOUS at 19:01

## 2020-08-31 RX ADMIN — DIAZEPAM 5 MG: 5 INJECTION, SOLUTION INTRAMUSCULAR; INTRAVENOUS at 20:18

## 2020-09-17 ENCOUNTER — HOSPITAL ENCOUNTER (EMERGENCY)
Facility: HOSPITAL | Age: 30
Discharge: HOME | End: 2020-09-17
Attending: EMERGENCY MEDICINE | Admitting: EMERGENCY MEDICINE

## 2020-09-17 ENCOUNTER — APPOINTMENT (OUTPATIENT)
Dept: GENERAL RADIOLOGY | Facility: HOSPITAL | Age: 30
End: 2020-09-17

## 2020-09-17 VITALS
SYSTOLIC BLOOD PRESSURE: 114 MMHG | DIASTOLIC BLOOD PRESSURE: 74 MMHG | WEIGHT: 160 LBS | HEART RATE: 79 BPM | BODY MASS INDEX: 29.44 KG/M2 | TEMPERATURE: 99.6 F | OXYGEN SATURATION: 97 % | RESPIRATION RATE: 18 BRPM | HEIGHT: 62 IN

## 2020-09-17 DIAGNOSIS — R29.0 TETANY: ICD-10-CM

## 2020-09-17 DIAGNOSIS — D72.829 LEUKOCYTOSIS, UNSPECIFIED TYPE: ICD-10-CM

## 2020-09-17 DIAGNOSIS — M62.838 MUSCLE SPASM: ICD-10-CM

## 2020-09-17 DIAGNOSIS — E83.51 HYPOCALCEMIA: Primary | ICD-10-CM

## 2020-09-17 LAB
ALBUMIN SERPL-MCNC: 4.6 G/DL (ref 3.5–5.2)
ALBUMIN/GLOB SERPL: 1.4 G/DL
ALP SERPL-CCNC: 97 U/L (ref 39–117)
ALT SERPL W P-5'-P-CCNC: 15 U/L (ref 1–33)
ANION GAP SERPL CALCULATED.3IONS-SCNC: 13 MMOL/L (ref 5–15)
AST SERPL-CCNC: 26 U/L (ref 1–32)
B-HCG UR QL: NEGATIVE
BACTERIA UR QL AUTO: ABNORMAL /HPF
BASOPHILS # BLD AUTO: 0.07 10*3/MM3 (ref 0–0.2)
BASOPHILS NFR BLD AUTO: 0.4 % (ref 0–1.5)
BILIRUB SERPL-MCNC: 1.2 MG/DL (ref 0–1.2)
BILIRUB UR QL STRIP: NEGATIVE
BUN SERPL-MCNC: 17 MG/DL (ref 6–20)
BUN/CREAT SERPL: 28.3 (ref 7–25)
CALCIUM SPEC-SCNC: 8.4 MG/DL (ref 8.6–10.5)
CHLORIDE SERPL-SCNC: 99 MMOL/L (ref 98–107)
CK SERPL-CCNC: 74 U/L (ref 20–180)
CLARITY UR: ABNORMAL
CO2 SERPL-SCNC: 26 MMOL/L (ref 22–29)
COLOR UR: YELLOW
CREAT SERPL-MCNC: 0.6 MG/DL (ref 0.57–1)
D-LACTATE SERPL-SCNC: 2.4 MMOL/L (ref 0.5–2)
DEPRECATED RDW RBC AUTO: 51.2 FL (ref 37–54)
EOSINOPHIL # BLD AUTO: 0 10*3/MM3 (ref 0–0.4)
EOSINOPHIL NFR BLD AUTO: 0 % (ref 0.3–6.2)
ERYTHROCYTE [DISTWIDTH] IN BLOOD BY AUTOMATED COUNT: 14.2 % (ref 12.3–15.4)
GFR SERPL CREATININE-BSD FRML MDRD: 117 ML/MIN/1.73
GLOBULIN UR ELPH-MCNC: 3.2 GM/DL
GLUCOSE SERPL-MCNC: 184 MG/DL (ref 65–99)
GLUCOSE UR STRIP-MCNC: NEGATIVE MG/DL
HCT VFR BLD AUTO: 42.9 % (ref 34–46.6)
HGB BLD-MCNC: 14.1 G/DL (ref 12–15.9)
HGB UR QL STRIP.AUTO: NEGATIVE
HYALINE CASTS UR QL AUTO: ABNORMAL /LPF
IMM GRANULOCYTES # BLD AUTO: 0.13 10*3/MM3 (ref 0–0.05)
IMM GRANULOCYTES NFR BLD AUTO: 0.8 % (ref 0–0.5)
INTERNAL NEGATIVE CONTROL: NEGATIVE
INTERNAL POSITIVE CONTROL: POSITIVE
KETONES UR QL STRIP: NEGATIVE
LACTATE HOLD SPECIMEN: NORMAL
LEUKOCYTE ESTERASE UR QL STRIP.AUTO: NEGATIVE
LYMPHOCYTES # BLD AUTO: 0.33 10*3/MM3 (ref 0.7–3.1)
LYMPHOCYTES NFR BLD AUTO: 2 % (ref 19.6–45.3)
Lab: NORMAL
MCH RBC QN AUTO: 32.3 PG (ref 26.6–33)
MCHC RBC AUTO-ENTMCNC: 32.9 G/DL (ref 31.5–35.7)
MCV RBC AUTO: 98.2 FL (ref 79–97)
MONOCYTES # BLD AUTO: 0.1 10*3/MM3 (ref 0.1–0.9)
MONOCYTES NFR BLD AUTO: 0.6 % (ref 5–12)
NEUTROPHILS NFR BLD AUTO: 16.08 10*3/MM3 (ref 1.7–7)
NEUTROPHILS NFR BLD AUTO: 96.2 % (ref 42.7–76)
NITRITE UR QL STRIP: NEGATIVE
NRBC BLD AUTO-RTO: 0 /100 WBC (ref 0–0.2)
NT-PROBNP SERPL-MCNC: 30.2 PG/ML (ref 0–450)
PH UR STRIP.AUTO: 7 [PH] (ref 5–8)
PLATELET # BLD AUTO: 315 10*3/MM3 (ref 140–450)
PMV BLD AUTO: 11.5 FL (ref 6–12)
POTASSIUM SERPL-SCNC: 4.3 MMOL/L (ref 3.5–5.2)
PROT SERPL-MCNC: 7.8 G/DL (ref 6–8.5)
PROT UR QL STRIP: NEGATIVE
RBC # BLD AUTO: 4.37 10*6/MM3 (ref 3.77–5.28)
RBC # UR: ABNORMAL /HPF
REF LAB TEST METHOD: ABNORMAL
SODIUM SERPL-SCNC: 138 MMOL/L (ref 136–145)
SP GR UR STRIP: 1.01 (ref 1–1.03)
SQUAMOUS #/AREA URNS HPF: ABNORMAL /HPF
TROPONIN T SERPL-MCNC: <0.01 NG/ML (ref 0–0.03)
UROBILINOGEN UR QL STRIP: ABNORMAL
WBC # BLD AUTO: 16.71 10*3/MM3 (ref 3.4–10.8)
WBC UR QL AUTO: ABNORMAL /HPF

## 2020-09-17 PROCEDURE — 25010000003 HEPARIN LOCK FLUSH PER 10 UNITS: Performed by: EMERGENCY MEDICINE

## 2020-09-17 PROCEDURE — 25010000002 PROCHLORPERAZINE 10 MG/2ML SOLUTION: Performed by: EMERGENCY MEDICINE

## 2020-09-17 PROCEDURE — 81025 URINE PREGNANCY TEST: CPT | Performed by: EMERGENCY MEDICINE

## 2020-09-17 PROCEDURE — 25010000002 CALCIUM GLUCONATE PER 10 ML: Performed by: NURSE PRACTITIONER

## 2020-09-17 PROCEDURE — 80053 COMPREHEN METABOLIC PANEL: CPT | Performed by: EMERGENCY MEDICINE

## 2020-09-17 PROCEDURE — 25010000002 DIAZEPAM PER 5 MG: Performed by: EMERGENCY MEDICINE

## 2020-09-17 PROCEDURE — 96375 TX/PRO/DX INJ NEW DRUG ADDON: CPT

## 2020-09-17 PROCEDURE — 83880 ASSAY OF NATRIURETIC PEPTIDE: CPT | Performed by: EMERGENCY MEDICINE

## 2020-09-17 PROCEDURE — 25010000002 HYDROMORPHONE PER 4 MG: Performed by: EMERGENCY MEDICINE

## 2020-09-17 PROCEDURE — 96365 THER/PROPH/DIAG IV INF INIT: CPT

## 2020-09-17 PROCEDURE — 99284 EMERGENCY DEPT VISIT MOD MDM: CPT

## 2020-09-17 PROCEDURE — 96376 TX/PRO/DX INJ SAME DRUG ADON: CPT

## 2020-09-17 PROCEDURE — 85025 COMPLETE CBC W/AUTO DIFF WBC: CPT | Performed by: EMERGENCY MEDICINE

## 2020-09-17 PROCEDURE — 82550 ASSAY OF CK (CPK): CPT | Performed by: EMERGENCY MEDICINE

## 2020-09-17 PROCEDURE — 81001 URINALYSIS AUTO W/SCOPE: CPT | Performed by: EMERGENCY MEDICINE

## 2020-09-17 PROCEDURE — 84484 ASSAY OF TROPONIN QUANT: CPT | Performed by: EMERGENCY MEDICINE

## 2020-09-17 PROCEDURE — 71045 X-RAY EXAM CHEST 1 VIEW: CPT

## 2020-09-17 PROCEDURE — 93005 ELECTROCARDIOGRAM TRACING: CPT | Performed by: EMERGENCY MEDICINE

## 2020-09-17 PROCEDURE — 83605 ASSAY OF LACTIC ACID: CPT | Performed by: EMERGENCY MEDICINE

## 2020-09-17 RX ORDER — PROCHLORPERAZINE EDISYLATE 5 MG/ML
10 INJECTION INTRAMUSCULAR; INTRAVENOUS EVERY 6 HOURS PRN
Status: DISCONTINUED | OUTPATIENT
Start: 2020-09-17 | End: 2020-09-17 | Stop reason: HOSPADM

## 2020-09-17 RX ORDER — DIAZEPAM 5 MG/ML
5 INJECTION, SOLUTION INTRAMUSCULAR; INTRAVENOUS ONCE
Status: DISCONTINUED | OUTPATIENT
Start: 2020-09-17 | End: 2020-09-17

## 2020-09-17 RX ORDER — SODIUM CHLORIDE 0.9 % (FLUSH) 0.9 %
10 SYRINGE (ML) INJECTION AS NEEDED
Status: DISCONTINUED | OUTPATIENT
Start: 2020-09-17 | End: 2020-09-17 | Stop reason: HOSPADM

## 2020-09-17 RX ORDER — DIAZEPAM 5 MG/ML
5 INJECTION, SOLUTION INTRAMUSCULAR; INTRAVENOUS ONCE
Status: COMPLETED | OUTPATIENT
Start: 2020-09-17 | End: 2020-09-17

## 2020-09-17 RX ORDER — HEPARIN SODIUM (PORCINE) LOCK FLUSH IV SOLN 100 UNIT/ML 100 UNIT/ML
300 SOLUTION INTRAVENOUS ONCE
Status: COMPLETED | OUTPATIENT
Start: 2020-09-17 | End: 2020-09-17

## 2020-09-17 RX ORDER — HYDROMORPHONE HYDROCHLORIDE 1 MG/ML
0.5 INJECTION, SOLUTION INTRAMUSCULAR; INTRAVENOUS; SUBCUTANEOUS ONCE
Status: COMPLETED | OUTPATIENT
Start: 2020-09-17 | End: 2020-09-17

## 2020-09-17 RX ADMIN — PROCHLORPERAZINE EDISYLATE 10 MG: 5 INJECTION INTRAMUSCULAR; INTRAVENOUS at 16:47

## 2020-09-17 RX ADMIN — SODIUM CHLORIDE 2000 ML: 9 INJECTION, SOLUTION INTRAVENOUS at 15:09

## 2020-09-17 RX ADMIN — PROCHLORPERAZINE EDISYLATE 10 MG: 5 INJECTION INTRAMUSCULAR; INTRAVENOUS at 15:08

## 2020-09-17 RX ADMIN — CALCIUM GLUCONATE 2 G: 98 INJECTION, SOLUTION INTRAVENOUS at 16:38

## 2020-09-17 RX ADMIN — HYDROMORPHONE HYDROCHLORIDE 0.5 MG: 1 INJECTION, SOLUTION INTRAMUSCULAR; INTRAVENOUS; SUBCUTANEOUS at 15:11

## 2020-09-17 RX ADMIN — DIAZEPAM 5 MG: 10 INJECTION, SOLUTION INTRAMUSCULAR; INTRAVENOUS at 17:44

## 2020-09-17 RX ADMIN — HEPARIN 300 UNITS: 100 SYRINGE at 19:06

## 2020-09-17 NOTE — ED PROVIDER NOTES
Grace EDGAR Neelam is a 29 y.o. female who presents to the ED with c/o spasms. The patient had a chemo infusion earlier today at [facility] but shortly afterwards she had onset of muscle pain and spasms, with her arms stiffening and folding behind her. She was admitted to [facility] in 1/2020 for bronchitis, pneumonitis, PAS type 1, hypoparathyroidism, and adrenal insufficiency. The patient reports having episodes of nausea and vomiting, feeling abnormally bad overall. While at [facility] she had blood drawn before her chemo infusion and she brought the results with her, having a calcium level of 8.1 and a white blood cell count of 16.3. The patient denies fever, chills, shortness of breath, and cough. She has a past medical history of diabetes mellitus. There are no other acute complaints at this time.      History provided by:  Patient  Muscle Pain  Location:  Diffuse  Quality:  Tightening. spasms. stiffness.  Severity:  Severe  Onset quality:  Sudden  Duration:  20 minutes  Timing:  Constant  Progression:  Unchanged  Chronicity:  Recurrent  Context:  Muscle pain, tightness, and spasms after chemo infusion earlier today  Relieved by:  None tried  Worsened by:  Nothing  Ineffective treatments:  None tried  Associated symptoms: myalgias, nausea and vomiting    Associated symptoms: no cough, no fever and no shortness of breath        Review of Systems   Constitutional: Negative for chills and fever.        She complains of feeling abnormally bad.   Respiratory: Negative for cough and shortness of breath.    Gastrointestinal: Positive for nausea and vomiting.   Musculoskeletal: Positive for myalgias.        She complains of muscle pain, stiffness, tightness, and spasms.   All other systems reviewed and are negative.      Past Medical History:   Diagnosis Date   • Adrenal insufficiency (CMS/HCC)    • Alopecia    • Anemia    • Anxiety    • APS type 1 (CMS/HCC)    • Asthma    •  "Autoimmune hepatitis (CMS/HCC)    • Depression    • Dermatomyositis (CMS/HCC)    • Disease of thyroid gland    • Fibromyalgia    • Functional asplenia    • History of kidney stones    • Hypoparathyroidism (CMS/HCC)    • Insulin dependent diabetes mellitus    • Long Q-T syndrome    • Pancreatic insufficiency    • Parathyroid abnormality (CMS/HCC)    • Polyglandular deficiency syndrome (CMS/HCC)    • Premature ovarian failure    • Sleep apnea    • Spleen absent    • Tetany     HAS EPISODES   • Transfusion history        Allergies   Allergen Reactions   • Azithromycin Hives   • Cephalosporins Other (See Comments) and Hives   • Clarithromycin Hives     biaxin  biaxin  biaxin   • Levofloxacin Other (See Comments) and Hives   • Nitrofurantoin Hives   • Nystatin Hives   • Penicillins Hives   • Sulfa Antibiotics Hives   • Cefpodoxime Hives   • Midazolam Unknown - Low Severity   • Morphine Other (See Comments)     \"it doesn't work. [facility] told me to list it as an allergy\"   • Nitrofurantoin Macrocrystal Other (See Comments) and Hives   • Ondansetron Other (See Comments)     \"it doesn't work. [facility] told me to list it as an allergy\"       Past Surgical History:   Procedure Laterality Date   • APPENDECTOMY     • CARDIAC SURGERY      LOOP RECORDER   • CHOLECYSTECTOMY     • COLONOSCOPY N/A 6/5/2020    Procedure: COLONOSCOPY;  Surgeon: Gavin Vargas MD;  Location: [facility] ENDOSCOPY;  Service: Gastroenterology;  Laterality: N/A;   • ENDOSCOPY N/A 4/26/2018    Procedure: ESOPHAGOGASTRODUODENOSCOPY;  Surgeon: Gavin Vargas MD;  Location:  [facility] ENDOSCOPY;  Service: Gastroenterology   • ENDOSCOPY N/A 8/9/2018    Procedure: ESOPHAGOGASTRODUODENOSCOPY-DILATION;  Surgeon: Gavin Vargas MD;  Location:  [facility] ENDOSCOPY;  Service: Gastroenterology   • ENDOSCOPY     • ENDOSCOPY N/A 2/6/2019    Procedure: ESOPHAGOGASTRODUODENOSCOPY;  Surgeon: Gavin Vargas MD;  Location: [facility] " ENDOSCOPY;  Service: Gastroenterology   • ENDOSCOPY N/A 7/24/2019    Procedure: ESOPHAGOGASTRODUODENOSCOPY;  Surgeon: Gavin Vargas MD;  Location:  [facility] ENDOSCOPY;  Service: Gastroenterology   • ENDOSCOPY N/A 10/18/2019    Procedure: ESOPHAGOGASTRODUODENOSCOPY WITH BALLOON DILATION;  Surgeon: Gavin Vargas MD;  Location:  [facility] ENDOSCOPY;  Service: Gastroenterology   • ENDOSCOPY N/A 6/4/2020    Procedure: ESOPHAGOGASTRODUODENOSCOPY  WITH ESOPHAGEAL BALLOON DILATATION;  Surgeon: Gavin Vargas MD;  Location:  [facility] ENDOSCOPY;  Service: Gastroenterology;  Laterality: N/A;  dilation done with 60F dilator    • EXPLORATORY LAPAROTOMY      MULTIPLE   • HAND SURGERY      4 TOTAL   • HERNIA REPAIR     • LIVER BIOPSY     • MUSCLE BIOPSY     • MUSCLE BIOPSY     • PEG TUBE INSERTION     • PEG TUBE REMOVAL     • PORTACATH PLACEMENT         Family History   Problem Relation Age of Onset   • Kidney disease Father        Social History     Socioeconomic History   • Marital status: Single     Spouse name: Not on file   • Number of children: Not on file   • Years of education: Not on file   • Highest education level: Not on file   Tobacco Use   • Smoking status: Never Smoker   • Smokeless tobacco: Never Used   Substance and Sexual Activity   • Alcohol use: Yes     Comment: SOCIAL   • Drug use: No   • Sexual activity: Defer         Objective   Physical Exam  Vitals signs and nursing note reviewed.   Constitutional:       General: She is not in acute distress.     Appearance: She is well-developed. She is ill-appearing.      Comments: The patient is frail and ill-appearing.   HENT:      Head: Normocephalic and atraumatic.   Eyes:      General: No scleral icterus.     Conjunctiva/sclera: Conjunctivae normal.   Neck:      Musculoskeletal: Normal range of motion and neck supple.   Cardiovascular:      Rate and Rhythm: Regular rhythm. Tachycardia present.      Heart sounds: Normal heart  sounds. No murmur.   Pulmonary:      Effort: Pulmonary effort is normal. No respiratory distress.      Breath sounds: Normal breath sounds.   Abdominal:      Palpations: Abdomen is soft.      Tenderness: There is no abdominal tenderness.   Musculoskeletal: Normal range of motion.      Comments: Bilateral upper and lower extremities are folded behind her lying on the stretcher. She reports feeling stiff.   Skin:     General: Skin is warm and dry.   Neurological:      Mental Status: She is alert and oriented to person, place, and time.   Psychiatric:         Behavior: Behavior normal.         Procedures         ED Course  ED Course as of Sep 17 1819   Thu Sep 17, 2020   1411 Adam Johnston NP has evaluated the patient multiple times in the past and her current symptoms are consistent and standard with his previous evaluations.    [JM]   1412 Broad differential.  Hopefully this is similar to her previous experiences and we are able to resolve her spasms perhaps give her some calcium and make her feel better.    [JM]   1412 She denies being given any calcium today.    [JM]   1816 Patient feels much better.  Her tetany has resolved.  Her white blood cell count is the same as it was earlier today before her chemotherapy.  She feels much better and she is ready to leave.  Case reviewed with Dr. Villalba.    [JM]      ED Course User Index  [JM] Adam Johnston, BEAR     Recent Results (from the past 24 hour(s))   Comprehensive Metabolic Panel    Collection Time: 09/17/20  3:07 PM    Specimen: Blood   Result Value Ref Range    Glucose 184 (H) 65 - 99 mg/dL    BUN 17 6 - 20 mg/dL    Creatinine 0.60 0.57 - 1.00 mg/dL    Sodium 138 136 - 145 mmol/L    Potassium 4.3 3.5 - 5.2 mmol/L    Chloride 99 98 - 107 mmol/L    CO2 26.0 22.0 - 29.0 mmol/L    Calcium 8.4 (L) 8.6 - 10.5 mg/dL    Total Protein 7.8 6.0 - 8.5 g/dL    Albumin 4.60 3.50 - 5.20 g/dL    ALT (SGPT) 15 1 - 33 U/L    AST (SGOT) 26 1 - 32 U/L    Alkaline Phosphatase 97 39 -  117 U/L    Total Bilirubin 1.2 0.0 - 1.2 mg/dL    eGFR Non African Amer 117 >60 mL/min/1.73    Globulin 3.2 gm/dL    A/G Ratio 1.4 g/dL    BUN/Creatinine Ratio 28.3 (H) 7.0 - 25.0    Anion Gap 13.0 5.0 - 15.0 mmol/L   Troponin    Collection Time: 09/17/20  3:07 PM    Specimen: Blood   Result Value Ref Range    Troponin T <0.010 0.000 - 0.030 ng/mL   BNP    Collection Time: 09/17/20  3:07 PM    Specimen: Blood   Result Value Ref Range    proBNP 30.2 0.0 - 450.0 pg/mL   Lactic Acid, Plasma    Collection Time: 09/17/20  3:07 PM    Specimen: Blood   Result Value Ref Range    Lactate 2.4 (C) 0.5 - 2.0 mmol/L   CK    Collection Time: 09/17/20  3:07 PM    Specimen: Blood   Result Value Ref Range    Creatine Kinase 74 20 - 180 U/L   CBC Auto Differential    Collection Time: 09/17/20  3:07 PM    Specimen: Blood   Result Value Ref Range    WBC 16.71 (H) 3.40 - 10.80 10*3/mm3    RBC 4.37 3.77 - 5.28 10*6/mm3    Hemoglobin 14.1 12.0 - 15.9 g/dL    Hematocrit 42.9 34.0 - 46.6 %    MCV 98.2 (H) 79.0 - 97.0 fL    MCH 32.3 26.6 - 33.0 pg    MCHC 32.9 31.5 - 35.7 g/dL    RDW 14.2 12.3 - 15.4 %    RDW-SD 51.2 37.0 - 54.0 fl    MPV 11.5 6.0 - 12.0 fL    Platelets 315 140 - 450 10*3/mm3    Neutrophil % 96.2 (H) 42.7 - 76.0 %    Lymphocyte % 2.0 (L) 19.6 - 45.3 %    Monocyte % 0.6 (L) 5.0 - 12.0 %    Eosinophil % 0.0 (L) 0.3 - 6.2 %    Basophil % 0.4 0.0 - 1.5 %    Immature Grans % 0.8 (H) 0.0 - 0.5 %    Neutrophils, Absolute 16.08 (H) 1.70 - 7.00 10*3/mm3    Lymphocytes, Absolute 0.33 (L) 0.70 - 3.10 10*3/mm3    Monocytes, Absolute 0.10 0.10 - 0.90 10*3/mm3    Eosinophils, Absolute 0.00 0.00 - 0.40 10*3/mm3    Basophils, Absolute 0.07 0.00 - 0.20 10*3/mm3    Immature Grans, Absolute 0.13 (H) 0.00 - 0.05 10*3/mm3    nRBC 0.0 0.0 - 0.2 /100 WBC   Urinalysis With Microscopic If Indicated (No Culture) - Urine, Clean Catch    Collection Time: 09/17/20  5:46 PM    Specimen: Urine, Clean Catch   Result Value Ref Range    Color, UA Yellow  Yellow, Straw    Appearance, UA Cloudy (A) Clear    pH, UA 7.0 5.0 - 8.0    Specific Gravity, UA 1.015 1.001 - 1.030    Glucose, UA Negative Negative    Ketones, UA Negative Negative    Bilirubin, UA Negative Negative    Blood, UA Negative Negative    Protein, UA Negative Negative    Leuk Esterase, UA Negative Negative    Nitrite, UA Negative Negative    Urobilinogen, UA 0.2 E.U./dL 0.2 - 1.0 E.U./dL   Urinalysis, Microscopic Only - Urine, Clean Catch    Collection Time: 09/17/20  5:46 PM    Specimen: Urine, Clean Catch   Result Value Ref Range    RBC, UA 3-6 (A) None Seen, 0-2 /HPF    WBC, UA None Seen None Seen, 0-2 /HPF    Bacteria, UA None Seen None Seen, Trace /HPF    Squamous Epithelial Cells, UA 0-2 None Seen, 0-2 /HPF    Hyaline Casts, UA 0-6 0 - 6 /LPF    Methodology Automated Microscopy    POCT Pregnancy, Urine    Collection Time: 09/17/20  5:54 PM    Specimen: Urine   Result Value Ref Range    HCG, Urine, QL Negative Negative    Lot Number [id number]     Internal Positive Control Positive     Internal Negative Control Negative      Note: In addition to lab results from this visit, the labs listed above may include labs taken at another facility or during a different encounter within the last 24 hours. Please correlate lab times with ED admission and discharge times for further clarification of the services performed during this visit.    XR Chest 1 View   Preliminary Result   No focal consolidation of acute infiltrates with mild   central vascular congestion.       D:  09/17/2020   E:  09/17/2020                Vitals:    09/17/20 1405 09/17/20 1523 09/17/20 1641 09/17/20 1751   BP:    119/80   Pulse:  113 100 96   Resp:  18     Temp: 99.6 °F (37.6 °C)      TempSrc: Axillary      SpO2:  96% 95% 98%   Weight:       Height:         Medications   sodium chloride 0.9 % flush 10 mL (has no administration in time range)   prochlorperazine (COMPAZINE) injection 10 mg (10 mg Intravenous Given 9/17/20 1647)    sodium chloride 0.9 % bolus 2,000 mL ( Intravenous Currently Infusing 9/17/20 1643)   HYDROmorphone (DILAUDID) injection 0.5 mg (0.5 mg Intravenous Given 9/17/20 1511)   calcium gluconate 2 g in sodium chloride 0.9 % 100 mL (0 g Intravenous Stopped 9/17/20 1745)   diazePAM (VALIUM) injection 5 mg (5 mg Intravenous Given 9/17/20 1744)     ECG/EMG Results (last 24 hours)     ** No results found for the last 24 hours. **        ECG 12 Lead                                                      MDM    Final diagnoses:   Hypocalcemia   Tetany   Muscle spasm   Leukocytosis, unspecified type       Documentation assistance provided by artemio Caballero.  Information recorded by the scribe was done at my direction and has been verified and validated by me.     Jacobo Caballero  09/17/20 1423       Adam Johnston APRN  09/17/20 2315

## 2020-10-07 RX ORDER — AZATHIOPRINE 50 MG/1
150 TABLET ORAL DAILY
Qty: 90 TABLET | Refills: 2 | Status: SHIPPED | OUTPATIENT
Start: 2020-10-07 | End: 2021-01-08 | Stop reason: SDUPTHER

## 2020-10-07 NOTE — TELEPHONE ENCOUNTER
Patients mom called requesting a refill on the patients imuran 50mg, 3po daily sent to  pharmacy at [phone].

## 2020-12-29 ENCOUNTER — PREP FOR SURGERY (OUTPATIENT)
Dept: OTHER | Facility: HOSPITAL | Age: 30
End: 2020-12-29

## 2020-12-29 DIAGNOSIS — R13.10 DYSPHAGIA, UNSPECIFIED TYPE: Primary | ICD-10-CM

## 2020-12-30 DIAGNOSIS — Z79.899 HIGH RISK MEDICATION USE: Primary | ICD-10-CM

## 2020-12-31 PROBLEM — R13.10 DYSPHAGIA: Status: ACTIVE | Noted: 2020-12-31

## 2021-01-06 ENCOUNTER — APPOINTMENT (OUTPATIENT)
Dept: PREADMISSION TESTING | Facility: HOSPITAL | Age: 31
End: 2021-01-06

## 2021-01-08 RX ORDER — DRONABINOL 5 MG/1
5 CAPSULE ORAL DAILY PRN
Qty: 30 CAPSULE | Refills: 0 | Status: SHIPPED | OUTPATIENT
Start: 2021-01-08 | End: 2021-04-14 | Stop reason: HOSPADM

## 2021-01-08 RX ORDER — PROMETHAZINE HYDROCHLORIDE 25 MG/1
25 SUPPOSITORY RECTAL DAILY PRN
Qty: 30 SUPPOSITORY | Refills: 0 | Status: SHIPPED | OUTPATIENT
Start: 2021-01-08 | End: 2021-11-12 | Stop reason: SDUPTHER

## 2021-01-08 RX ORDER — AZATHIOPRINE 50 MG/1
150 TABLET ORAL DAILY
Qty: 90 TABLET | Refills: 2 | Status: SHIPPED | OUTPATIENT
Start: 2021-01-08 | End: 2021-04-14 | Stop reason: HOSPADM

## 2021-01-09 ENCOUNTER — HOSPITAL ENCOUNTER (EMERGENCY)
Facility: HOSPITAL | Age: 31
Discharge: HOME | End: 2021-01-09
Attending: EMERGENCY MEDICINE | Admitting: EMERGENCY MEDICINE

## 2021-01-09 VITALS
OXYGEN SATURATION: 99 % | DIASTOLIC BLOOD PRESSURE: 52 MMHG | SYSTOLIC BLOOD PRESSURE: 94 MMHG | HEIGHT: 62 IN | BODY MASS INDEX: 29.44 KG/M2 | WEIGHT: 160 LBS | TEMPERATURE: 98 F | HEART RATE: 88 BPM | RESPIRATION RATE: 18 BRPM

## 2021-01-09 DIAGNOSIS — R29.0 TETANY: ICD-10-CM

## 2021-01-09 DIAGNOSIS — R11.2 NON-INTRACTABLE VOMITING WITH NAUSEA, UNSPECIFIED VOMITING TYPE: ICD-10-CM

## 2021-01-09 DIAGNOSIS — E20.9 HYPOPARATHYROIDISM, UNSPECIFIED HYPOPARATHYROIDISM TYPE (HCC): Primary | ICD-10-CM

## 2021-01-09 LAB
ALBUMIN SERPL-MCNC: 4.4 G/DL (ref 3.5–5.2)
ALBUMIN/GLOB SERPL: 1.3 G/DL
ALP SERPL-CCNC: 77 U/L (ref 39–117)
ALT SERPL W P-5'-P-CCNC: 21 U/L (ref 1–33)
ANION GAP SERPL CALCULATED.3IONS-SCNC: 15 MMOL/L (ref 5–15)
AST SERPL-CCNC: 32 U/L (ref 1–32)
BASOPHILS # BLD AUTO: 0.12 10*3/MM3 (ref 0–0.2)
BASOPHILS NFR BLD AUTO: 1 % (ref 0–1.5)
BILIRUB SERPL-MCNC: 0.7 MG/DL (ref 0–1.2)
BUN SERPL-MCNC: 13 MG/DL (ref 6–20)
BUN/CREAT SERPL: 19.4 (ref 7–25)
CA-I SERPL ISE-MCNC: 1.17 MMOL/L (ref 1.12–1.32)
CALCIUM SPEC-SCNC: 8.7 MG/DL (ref 8.6–10.5)
CHLORIDE SERPL-SCNC: 94 MMOL/L (ref 98–107)
CO2 SERPL-SCNC: 27 MMOL/L (ref 22–29)
CREAT SERPL-MCNC: 0.67 MG/DL (ref 0.57–1)
DEPRECATED RDW RBC AUTO: 47.2 FL (ref 37–54)
EOSINOPHIL # BLD AUTO: 1.42 10*3/MM3 (ref 0–0.4)
EOSINOPHIL NFR BLD AUTO: 11.6 % (ref 0.3–6.2)
ERYTHROCYTE [DISTWIDTH] IN BLOOD BY AUTOMATED COUNT: 13.3 % (ref 12.3–15.4)
GFR SERPL CREATININE-BSD FRML MDRD: 103 ML/MIN/1.73
GLOBULIN UR ELPH-MCNC: 3.4 GM/DL
GLUCOSE SERPL-MCNC: 103 MG/DL (ref 65–99)
HCT VFR BLD AUTO: 45.2 % (ref 34–46.6)
HGB BLD-MCNC: 14.8 G/DL (ref 12–15.9)
IMM GRANULOCYTES # BLD AUTO: 0.08 10*3/MM3 (ref 0–0.05)
IMM GRANULOCYTES NFR BLD AUTO: 0.7 % (ref 0–0.5)
LYMPHOCYTES # BLD AUTO: 2.7 10*3/MM3 (ref 0.7–3.1)
LYMPHOCYTES NFR BLD AUTO: 22 % (ref 19.6–45.3)
MCH RBC QN AUTO: 31.6 PG (ref 26.6–33)
MCHC RBC AUTO-ENTMCNC: 32.7 G/DL (ref 31.5–35.7)
MCV RBC AUTO: 96.6 FL (ref 79–97)
MONOCYTES # BLD AUTO: 2.05 10*3/MM3 (ref 0.1–0.9)
MONOCYTES NFR BLD AUTO: 16.7 % (ref 5–12)
NEUTROPHILS NFR BLD AUTO: 48 % (ref 42.7–76)
NEUTROPHILS NFR BLD AUTO: 5.89 10*3/MM3 (ref 1.7–7)
NRBC BLD AUTO-RTO: 0 /100 WBC (ref 0–0.2)
PLATELET # BLD AUTO: 295 10*3/MM3 (ref 140–450)
PMV BLD AUTO: 11.6 FL (ref 6–12)
POTASSIUM SERPL-SCNC: 4.2 MMOL/L (ref 3.5–5.2)
PROT SERPL-MCNC: 7.8 G/DL (ref 6–8.5)
RBC # BLD AUTO: 4.68 10*6/MM3 (ref 3.77–5.28)
SODIUM SERPL-SCNC: 136 MMOL/L (ref 136–145)
WBC # BLD AUTO: 12.26 10*3/MM3 (ref 3.4–10.8)

## 2021-01-09 PROCEDURE — 25010000002 HYDROMORPHONE PER 4 MG: Performed by: EMERGENCY MEDICINE

## 2021-01-09 PROCEDURE — 25010000002 PROCHLORPERAZINE 10 MG/2ML SOLUTION: Performed by: EMERGENCY MEDICINE

## 2021-01-09 PROCEDURE — 99283 EMERGENCY DEPT VISIT LOW MDM: CPT

## 2021-01-09 PROCEDURE — 25010000002 CALCIUM GLUCONATE PER 10 ML: Performed by: EMERGENCY MEDICINE

## 2021-01-09 PROCEDURE — 82330 ASSAY OF CALCIUM: CPT | Performed by: EMERGENCY MEDICINE

## 2021-01-09 PROCEDURE — 80053 COMPREHEN METABOLIC PANEL: CPT | Performed by: EMERGENCY MEDICINE

## 2021-01-09 PROCEDURE — 85025 COMPLETE CBC W/AUTO DIFF WBC: CPT | Performed by: EMERGENCY MEDICINE

## 2021-01-09 PROCEDURE — 96365 THER/PROPH/DIAG IV INF INIT: CPT

## 2021-01-09 PROCEDURE — 96375 TX/PRO/DX INJ NEW DRUG ADDON: CPT

## 2021-01-09 PROCEDURE — 25010000003 HEPARIN LOCK FLUSH PER 10 UNITS

## 2021-01-09 PROCEDURE — 96376 TX/PRO/DX INJ SAME DRUG ADON: CPT

## 2021-01-09 RX ORDER — PROMETHAZINE HYDROCHLORIDE 25 MG/1
25 TABLET ORAL EVERY 6 HOURS PRN
Qty: 12 TABLET | Refills: 0 | Status: SHIPPED | OUTPATIENT
Start: 2021-01-09

## 2021-01-09 RX ORDER — HYDROMORPHONE HYDROCHLORIDE 1 MG/ML
0.5 INJECTION, SOLUTION INTRAMUSCULAR; INTRAVENOUS; SUBCUTANEOUS ONCE
Status: COMPLETED | OUTPATIENT
Start: 2021-01-09 | End: 2021-01-09

## 2021-01-09 RX ORDER — SODIUM CHLORIDE 9 MG/ML
125 INJECTION, SOLUTION INTRAVENOUS CONTINUOUS
Status: DISCONTINUED | OUTPATIENT
Start: 2021-01-09 | End: 2021-01-09 | Stop reason: HOSPADM

## 2021-01-09 RX ORDER — HYDROMORPHONE HYDROCHLORIDE 1 MG/ML
0.25 INJECTION, SOLUTION INTRAMUSCULAR; INTRAVENOUS; SUBCUTANEOUS ONCE
Status: COMPLETED | OUTPATIENT
Start: 2021-01-09 | End: 2021-01-09

## 2021-01-09 RX ORDER — HEPARIN SODIUM (PORCINE) LOCK FLUSH IV SOLN 100 UNIT/ML 100 UNIT/ML
SOLUTION INTRAVENOUS
Status: COMPLETED
Start: 2021-01-09 | End: 2021-01-09

## 2021-01-09 RX ORDER — SODIUM CHLORIDE 0.9 % (FLUSH) 0.9 %
10 SYRINGE (ML) INJECTION AS NEEDED
Status: DISCONTINUED | OUTPATIENT
Start: 2021-01-09 | End: 2021-01-09 | Stop reason: HOSPADM

## 2021-01-09 RX ORDER — PROCHLORPERAZINE EDISYLATE 5 MG/ML
10 INJECTION INTRAMUSCULAR; INTRAVENOUS EVERY 6 HOURS PRN
Status: DISCONTINUED | OUTPATIENT
Start: 2021-01-09 | End: 2021-01-09 | Stop reason: HOSPADM

## 2021-01-09 RX ADMIN — HYDROMORPHONE HYDROCHLORIDE 0.5 MG: 1 INJECTION, SOLUTION INTRAMUSCULAR; INTRAVENOUS; SUBCUTANEOUS at 16:37

## 2021-01-09 RX ADMIN — HYDROMORPHONE HYDROCHLORIDE 0.25 MG: 1 INJECTION, SOLUTION INTRAMUSCULAR; INTRAVENOUS; SUBCUTANEOUS at 17:40

## 2021-01-09 RX ADMIN — HEPARIN: 100 SYRINGE at 18:41

## 2021-01-09 RX ADMIN — PROCHLORPERAZINE EDISYLATE 10 MG: 5 INJECTION INTRAMUSCULAR; INTRAVENOUS at 17:41

## 2021-01-09 RX ADMIN — SODIUM CHLORIDE 1000 ML: 9 INJECTION, SOLUTION INTRAVENOUS at 16:37

## 2021-01-09 RX ADMIN — CALCIUM GLUCONATE: 98 INJECTION, SOLUTION INTRAVENOUS at 17:00

## 2021-01-09 NOTE — DISCHARGE INSTRUCTIONS
Push plenty of fluids    Continue your usual medications including your calcium supplementation and your medications for your hypoparathyroidism.  Your ionized calcium was in the low range of normal today    Take the Phenergan as needed for nausea    Call your primary care physician in [place] Monday to arrange follow-up and for coordination of your outpatient care    Call Dr. Gaston your endocrinologist in [place] for further instructions in view of your recurrent symptoms today    Return to the ED if you have any acute urgent emergent or significant symptoms as discussed    Stay very well-hydrated

## 2021-01-09 NOTE — ED PROVIDER NOTES
Subjective   The patient is a 30 y.o. year old female presenting to the ED complaining of generalized muscle pain that began 20 minutes prior to arrival. She has history of hypocalcemia and follows up with endocrinology in [place], [place]. She complains that her muscles have stiffened to the point where she cannot open her jaw. She takes calcium supplement injections. She complains of vomiting last night and nausea when the tetany began. She denies fever, chills, congestion, loss of taste or smell, abdominal pain, diarrhea, chest pain, shortness of breath, dysuria, hematuria, and headache. She is not currently pregnant. The patient currently lists no other acute symptoms at this time.         History provided by:  Patient  Muscle Pain  Location:  Generalized  Quality:  Stiffness  Severity:  Moderate  Onset quality:  Sudden  Duration:  20 minutes  Timing:  Constant  Progression:  Unchanged  Chronicity:  Recurrent  Associated symptoms: myalgias, nausea and vomiting    Associated symptoms: no abdominal pain, no chest pain, no congestion, no diarrhea, no fever and no shortness of breath    Myalgias:     Location:  Generalized    Quality:  Cramping    Severity:  Moderate    Onset quality:  Sudden    Duration: 20 minutes.    Timing:  Constant    Progression:  Unchanged  Vomiting:     Quality:  Stomach contents    Number of occurrences:  1    Duration:  12 hours      Review of Systems   Constitutional: Negative for chills and fever.   HENT: Negative for congestion.         No loss of taste or smell   Respiratory: Negative for shortness of breath.    Cardiovascular: Negative for chest pain.   Gastrointestinal: Positive for nausea and vomiting. Negative for abdominal pain and diarrhea.   Genitourinary: Negative for dysuria and hematuria.   Musculoskeletal: Positive for myalgias.        Muscle stiffness   All other systems reviewed and are negative.      Past Medical History:   Diagnosis Date   • Adrenal insufficiency  "(CMS/HCC)    • Alopecia    • Anemia    • Anxiety    • APS type 1 (CMS/HCC)    • Asthma    • Autoimmune hepatitis (CMS/HCC)    • Depression    • Dermatomyositis (CMS/HCC)    • Disease of thyroid gland    • Fibromyalgia    • Functional asplenia    • History of kidney stones    • Hypoparathyroidism (CMS/HCC)    • Insulin dependent diabetes mellitus    • Long Q-T syndrome    • Pancreatic insufficiency    • Parathyroid abnormality (CMS/HCC)    • Polyglandular deficiency syndrome (CMS/HCC)    • Premature ovarian failure    • Sleep apnea    • Spleen absent    • Tetany     HAS EPISODES   • Transfusion history        Allergies   Allergen Reactions   • Azithromycin Hives   • Cephalosporins Other (See Comments) and Hives   • Clarithromycin Hives     biaxin  biaxin  biaxin   • Levofloxacin Other (See Comments) and Hives   • Nitrofurantoin Hives   • Nystatin Hives   • Penicillins Hives   • Sulfa Antibiotics Hives   • Cefpodoxime Hives   • Midazolam Unknown - Low Severity   • Morphine Other (See Comments)     \"it doesn't work. [facility] told me to list it as an allergy\"   • Nitrofurantoin Macrocrystal Other (See Comments) and Hives   • Ondansetron Other (See Comments)     \"it doesn't work. [facility] told me to list it as an allergy\"       Past Surgical History:   Procedure Laterality Date   • APPENDECTOMY     • CARDIAC SURGERY      LOOP RECORDER   • CHOLECYSTECTOMY     • COLONOSCOPY N/A 6/5/2020    Procedure: COLONOSCOPY;  Surgeon: Gavin Vargas MD;  Location:  [facility];  Service: Gastroenterology;  Laterality: N/A;   • ENDOSCOPY N/A 4/26/2018    Procedure: ESOPHAGOGASTRODUODENOSCOPY;  Surgeon: Gavin Vargas MD;  Location:  [facility];  Service: Gastroenterology   • ENDOSCOPY N/A 8/9/2018    Procedure: ESOPHAGOGASTRODUODENOSCOPY-DILATION;  Surgeon: Gavin Vargas MD;  Location:  [facility];  Service: Gastroenterology   • ENDOSCOPY     • ENDOSCOPY N/A 2/6/2019    Procedure: " ESOPHAGOGASTRODUODENOSCOPY;  Surgeon: Gavin Vargas MD;  Location:  [facility];  Service: Gastroenterology   • ENDOSCOPY N/A 7/24/2019    Procedure: ESOPHAGOGASTRODUODENOSCOPY;  Surgeon: Gavin Vargas MD;  Location:  [facility];  Service: Gastroenterology   • ENDOSCOPY N/A 10/18/2019    Procedure: ESOPHAGOGASTRODUODENOSCOPY WITH BALLOON DILATION;  Surgeon: Gavin Vargas MD;  Location:  [facility];  Service: Gastroenterology   • ENDOSCOPY N/A 6/4/2020    Procedure: ESOPHAGOGASTRODUODENOSCOPY  WITH ESOPHAGEAL BALLOON DILATATION;  Surgeon: Gavin Vargas MD;  Location:  [facility];  Service: Gastroenterology;  Laterality: N/A;  dilation done with 60F dilator    • EXPLORATORY LAPAROTOMY      MULTIPLE   • HAND SURGERY      4 TOTAL   • HERNIA REPAIR     • LIVER BIOPSY     • MUSCLE BIOPSY     • MUSCLE BIOPSY     • PEG TUBE INSERTION     • PEG TUBE REMOVAL     • PORTACATH PLACEMENT         Family History   Problem Relation Age of Onset   • Kidney disease Father        Social History     Socioeconomic History   • Marital status: Single     Spouse name: Not on file   • Number of children: Not on file   • Years of education: Not on file   • Highest education level: Not on file   Tobacco Use   • Smoking status: Never Smoker   • Smokeless tobacco: Never Used   Substance and Sexual Activity   • Alcohol use: Yes     Comment: SOCIAL   • Drug use: No   • Sexual activity: Defer         Objective   Physical Exam  Vitals signs and nursing note reviewed.   Constitutional:       General: She is not in acute distress.     Appearance: She is well-developed.   HENT:      Head: Normocephalic and atraumatic.      Nose: Nose normal.   Eyes:      Conjunctiva/sclera: Conjunctivae normal.      Comments: Conjunctiva unremarkable   Neck:      Musculoskeletal: Normal range of motion and neck supple.      Comments: No masses, no adenopathy; no jugular vein  distension  Cardiovascular:      Rate and Rhythm: Normal rate and regular rhythm.      Heart sounds: Normal heart sounds. No murmur. No friction rub. No gallop.    Pulmonary:      Effort: Pulmonary effort is normal. No respiratory distress.      Breath sounds: Normal breath sounds. No wheezing, rhonchi or rales.      Comments: Lungs clear to auscultation bilaterally  Abdominal:      Palpations: Abdomen is soft.      Tenderness: There is no abdominal tenderness.   Musculoskeletal: Normal range of motion.      Comments: Tonic posturing of upper and lower extremities and clenched jaw; can speak without difficulty otherwise   Lymphadenopathy:      Cervical: No cervical adenopathy.   Skin:     General: Skin is warm and dry.   Neurological:      Mental Status: She is alert and oriented to person, place, and time.   Psychiatric:         Behavior: Behavior normal.         Procedures         ED Course  ED Course as of Jan 09 1729   Sat Jan 09, 2021 1717 Patient no longer as tetany.  She is relaxed on last reevaluation now.  Her ionized calcium is at the lower range of normal and she is given a gram of calcium gluconate.I discussed continuing with her usual medications from Dr. Gaston in [place] her treating endocrinologist.  I have asked her to call her PCP on Monday as well to assure that she has prompt medical follow-up in addition to endocrinology input.All treated with Phenergan as needed for nausea as she had the nausea and vomiting that may have contributed to to the event today.  She is treated with hydromorphone here in the ED and is asked for another dose prior to going home.We discussed indications for returnVery pleasant responsible patient verbalizes understanding agreement with plan of care, need for follow-up, and indications for return    [HH]      ED Course User Index  [HH] Daniel Freed MD                Recent Results (from the past 24 hour(s))   Comprehensive Metabolic Panel    Collection Time:  01/09/21  4:00 PM    Specimen: Blood   Result Value Ref Range    Glucose 103 (H) 65 - 99 mg/dL    BUN 13 6 - 20 mg/dL    Creatinine 0.67 0.57 - 1.00 mg/dL    Sodium 136 136 - 145 mmol/L    Potassium 4.2 3.5 - 5.2 mmol/L    Chloride 94 (L) 98 - 107 mmol/L    CO2 27.0 22.0 - 29.0 mmol/L    Calcium 8.7 8.6 - 10.5 mg/dL    Total Protein 7.8 6.0 - 8.5 g/dL    Albumin 4.40 3.50 - 5.20 g/dL    ALT (SGPT) 21 1 - 33 U/L    AST (SGOT) 32 1 - 32 U/L    Alkaline Phosphatase 77 39 - 117 U/L    Total Bilirubin 0.7 0.0 - 1.2 mg/dL    eGFR Non African Amer 103 >60 mL/min/1.73    Globulin 3.4 gm/dL    A/G Ratio 1.3 g/dL    BUN/Creatinine Ratio 19.4 7.0 - 25.0    Anion Gap 15.0 5.0 - 15.0 mmol/L   Calcium, Ionized    Collection Time: 01/09/21  4:00 PM    Specimen: Blood   Result Value Ref Range    Ionized Calcium 1.17 1.12 - 1.32 mmol/L   CBC Auto Differential    Collection Time: 01/09/21  4:00 PM    Specimen: Blood   Result Value Ref Range    WBC 12.26 (H) 3.40 - 10.80 10*3/mm3    RBC 4.68 3.77 - 5.28 10*6/mm3    Hemoglobin 14.8 12.0 - 15.9 g/dL    Hematocrit 45.2 34.0 - 46.6 %    MCV 96.6 79.0 - 97.0 fL    MCH 31.6 26.6 - 33.0 pg    MCHC 32.7 31.5 - 35.7 g/dL    RDW 13.3 12.3 - 15.4 %    RDW-SD 47.2 37.0 - 54.0 fl    MPV 11.6 6.0 - 12.0 fL    Platelets 295 140 - 450 10*3/mm3    Neutrophil % 48.0 42.7 - 76.0 %    Lymphocyte % 22.0 19.6 - 45.3 %    Monocyte % 16.7 (H) 5.0 - 12.0 %    Eosinophil % 11.6 (H) 0.3 - 6.2 %    Basophil % 1.0 0.0 - 1.5 %    Immature Grans % 0.7 (H) 0.0 - 0.5 %    Neutrophils, Absolute 5.89 1.70 - 7.00 10*3/mm3    Lymphocytes, Absolute 2.70 0.70 - 3.10 10*3/mm3    Monocytes, Absolute 2.05 (H) 0.10 - 0.90 10*3/mm3    Eosinophils, Absolute 1.42 (H) 0.00 - 0.40 10*3/mm3    Basophils, Absolute 0.12 0.00 - 0.20 10*3/mm3    Immature Grans, Absolute 0.08 (H) 0.00 - 0.05 10*3/mm3    nRBC 0.0 0.0 - 0.2 /100 WBC     Note: In addition to lab results from this visit, the labs listed above may include labs taken at  "another facility or during a different encounter within the last 24 hours. Please correlate lab times with ED admission and discharge times for further clarification of the services performed during this visit.    No orders to display     Vitals:    01/09/21 1538 01/09/21 1626 01/09/21 1644   BP:  102/81    BP Location:  Right arm    Patient Position:  Sitting    Pulse:  107    Resp:  20    Temp:   98.6 °F (37 °C)   TempSrc:   Oral   SpO2:  99%    Weight: 72.6 kg (160 lb)     Height: 157.5 cm (62\")       Medications   sodium chloride 0.9 % flush 10 mL (has no administration in time range)   calcium gluconate 1 g in sodium chloride 0.9 % 100 mL (has no administration in time range)   prochlorperazine (COMPAZINE) injection 10 mg (has no administration in time range)   sodium chloride 0.9 % infusion (has no administration in time range)   HYDROmorphone (DILAUDID) injection 0.25 mg (has no administration in time range)   sodium chloride 0.9 % bolus 1,000 mL (1,000 mL Intravenous New Bag 1/9/21 1637)   HYDROmorphone (DILAUDID) injection 0.5 mg (0.5 mg Intravenous Given 1/9/21 1637)     ECG/EMG Results (last 24 hours)     ** No results found for the last 24 hours. **        No orders to display                                      MDM    Final diagnoses:   Hypoparathyroidism, unspecified hypoparathyroidism type (CMS/HCC)   Tetany   Non-intractable vomiting with nausea, unspecified vomiting type       Documentation assistance provided by artemio Bedoya.  Information recorded by the artemio was done at my direction and has been verified and validated by me.     Michael Bedoya  01/09/21 7730       Daniel Freed MD  01/09/21 6209    "

## 2021-01-12 RX ORDER — OMEPRAZOLE 40 MG/1
40 CAPSULE, DELAYED RELEASE ORAL 2 TIMES DAILY
Qty: 60 CAPSULE | Refills: 2 | Status: SHIPPED | OUTPATIENT
Start: 2021-01-12 | End: 2021-04-15

## 2021-01-26 ENCOUNTER — APPOINTMENT (OUTPATIENT)
Dept: PREADMISSION TESTING | Facility: HOSPITAL | Age: 31
End: 2021-01-26

## 2021-01-26 PROCEDURE — C9803 HOPD COVID-19 SPEC COLLECT: HCPCS

## 2021-01-26 PROCEDURE — U0004 COV-19 TEST NON-CDC HGH THRU: HCPCS

## 2021-01-27 LAB — SARS-COV-2 RNA RESP QL NAA+PROBE: NOT DETECTED

## 2021-01-28 ENCOUNTER — ANESTHESIA EVENT (OUTPATIENT)
Dept: GASTROENTEROLOGY | Facility: HOSPITAL | Age: 31
End: 2021-01-28

## 2021-01-29 ENCOUNTER — HOSPITAL ENCOUNTER (OUTPATIENT)
Facility: HOSPITAL | Age: 31
Setting detail: HOSPITAL OUTPATIENT SURGERY
Discharge: HOME | End: 2021-01-29
Attending: INTERNAL MEDICINE | Admitting: INTERNAL MEDICINE

## 2021-01-29 ENCOUNTER — ANESTHESIA (OUTPATIENT)
Dept: GASTROENTEROLOGY | Facility: HOSPITAL | Age: 31
End: 2021-01-29

## 2021-01-29 VITALS
HEART RATE: 88 BPM | RESPIRATION RATE: 12 BRPM | SYSTOLIC BLOOD PRESSURE: 98 MMHG | DIASTOLIC BLOOD PRESSURE: 55 MMHG | OXYGEN SATURATION: 92 % | TEMPERATURE: 97.6 F

## 2021-01-29 PROCEDURE — 25010000002 HYDROMORPHONE PER 4 MG: Performed by: INTERNAL MEDICINE

## 2021-01-29 PROCEDURE — 25010000002 PROPOFOL 10 MG/ML EMULSION: Performed by: NURSE ANESTHETIST, CERTIFIED REGISTERED

## 2021-01-29 PROCEDURE — 25010000002 FENTANYL CITRATE (PF) 100 MCG/2ML SOLUTION: Performed by: NURSE ANESTHETIST, CERTIFIED REGISTERED

## 2021-01-29 PROCEDURE — 25010000002 HYDROCORTISONE SODIUM SUCCINATE 100 MG RECONSTITUTED SOLUTION: Performed by: ANESTHESIOLOGY

## 2021-01-29 PROCEDURE — 43249 ESOPH EGD DILATION <30 MM: CPT | Performed by: INTERNAL MEDICINE

## 2021-01-29 PROCEDURE — 25010000002 PROCHLORPERAZINE 10 MG/2ML SOLUTION: Performed by: NURSE ANESTHETIST, CERTIFIED REGISTERED

## 2021-01-29 PROCEDURE — C1725 CATH, TRANSLUMIN NON-LASER: HCPCS | Performed by: INTERNAL MEDICINE

## 2021-01-29 RX ORDER — LIDOCAINE HYDROCHLORIDE 10 MG/ML
INJECTION, SOLUTION EPIDURAL; INFILTRATION; INTRACAUDAL; PERINEURAL AS NEEDED
Status: DISCONTINUED | OUTPATIENT
Start: 2021-01-29 | End: 2021-01-29 | Stop reason: SURG

## 2021-01-29 RX ORDER — MIDAZOLAM HYDROCHLORIDE 1 MG/ML
2 INJECTION INTRAMUSCULAR; INTRAVENOUS
Status: DISCONTINUED | OUTPATIENT
Start: 2021-01-29 | End: 2021-01-29 | Stop reason: HOSPADM

## 2021-01-29 RX ORDER — SODIUM CHLORIDE 0.9 % (FLUSH) 0.9 %
10 SYRINGE (ML) INJECTION AS NEEDED
Status: DISCONTINUED | OUTPATIENT
Start: 2021-01-29 | End: 2021-01-29 | Stop reason: HOSPADM

## 2021-01-29 RX ORDER — SODIUM CHLORIDE, SODIUM LACTATE, POTASSIUM CHLORIDE, CALCIUM CHLORIDE 600; 310; 30; 20 MG/100ML; MG/100ML; MG/100ML; MG/100ML
9 INJECTION, SOLUTION INTRAVENOUS CONTINUOUS
Status: DISCONTINUED | OUTPATIENT
Start: 2021-01-29 | End: 2021-01-29 | Stop reason: HOSPADM

## 2021-01-29 RX ORDER — PROPOFOL 10 MG/ML
VIAL (ML) INTRAVENOUS AS NEEDED
Status: DISCONTINUED | OUTPATIENT
Start: 2021-01-29 | End: 2021-01-29 | Stop reason: SURG

## 2021-01-29 RX ORDER — FAMOTIDINE 20 MG/1
20 TABLET, FILM COATED ORAL ONCE
Status: DISCONTINUED | OUTPATIENT
Start: 2021-01-29 | End: 2021-01-29 | Stop reason: HOSPADM

## 2021-01-29 RX ORDER — SODIUM CHLORIDE 9 MG/ML
10 INJECTION, SOLUTION INTRAVENOUS ONCE
Status: COMPLETED | OUTPATIENT
Start: 2021-01-29 | End: 2021-01-29

## 2021-01-29 RX ORDER — FENTANYL CITRATE 50 UG/ML
50 INJECTION, SOLUTION INTRAMUSCULAR; INTRAVENOUS
Status: DISCONTINUED | OUTPATIENT
Start: 2021-01-29 | End: 2021-01-29 | Stop reason: HOSPADM

## 2021-01-29 RX ORDER — LIDOCAINE HYDROCHLORIDE 10 MG/ML
0.5 INJECTION, SOLUTION EPIDURAL; INFILTRATION; INTRACAUDAL; PERINEURAL ONCE AS NEEDED
Status: DISCONTINUED | OUTPATIENT
Start: 2021-01-29 | End: 2021-01-29 | Stop reason: HOSPADM

## 2021-01-29 RX ORDER — MIDAZOLAM HYDROCHLORIDE 1 MG/ML
1 INJECTION INTRAMUSCULAR; INTRAVENOUS
Status: DISCONTINUED | OUTPATIENT
Start: 2021-01-29 | End: 2021-01-29 | Stop reason: HOSPADM

## 2021-01-29 RX ORDER — PROCHLORPERAZINE EDISYLATE 5 MG/ML
5 INJECTION INTRAMUSCULAR; INTRAVENOUS EVERY 6 HOURS PRN
Status: DISCONTINUED | OUTPATIENT
Start: 2021-01-29 | End: 2021-01-29 | Stop reason: HOSPADM

## 2021-01-29 RX ORDER — CALCIUM CHLORIDE 100 MG/ML
1 INJECTION INTRAVENOUS; INTRAVENTRICULAR ONCE
Status: COMPLETED | OUTPATIENT
Start: 2021-01-29 | End: 2021-01-29

## 2021-01-29 RX ORDER — HYDROMORPHONE HCL 110MG/55ML
0.5 PATIENT CONTROLLED ANALGESIA SYRINGE INTRAVENOUS
Status: DISCONTINUED | OUTPATIENT
Start: 2021-01-29 | End: 2021-01-29 | Stop reason: HOSPADM

## 2021-01-29 RX ORDER — SODIUM CHLORIDE 0.9 % (FLUSH) 0.9 %
10 SYRINGE (ML) INJECTION EVERY 12 HOURS SCHEDULED
Status: DISCONTINUED | OUTPATIENT
Start: 2021-01-29 | End: 2021-01-29 | Stop reason: HOSPADM

## 2021-01-29 RX ORDER — FAMOTIDINE 10 MG/ML
20 INJECTION, SOLUTION INTRAVENOUS ONCE
Status: DISCONTINUED | OUTPATIENT
Start: 2021-01-29 | End: 2021-01-29 | Stop reason: HOSPADM

## 2021-01-29 RX ADMIN — PROCHLORPERAZINE EDISYLATE 5 MG: 5 INJECTION INTRAMUSCULAR; INTRAVENOUS at 16:12

## 2021-01-29 RX ADMIN — FENTANYL CITRATE 50 MCG: 50 INJECTION, SOLUTION INTRAMUSCULAR; INTRAVENOUS at 15:48

## 2021-01-29 RX ADMIN — LIDOCAINE HYDROCHLORIDE 50 MG: 10 INJECTION, SOLUTION EPIDURAL; INFILTRATION; INTRACAUDAL; PERINEURAL at 15:26

## 2021-01-29 RX ADMIN — PROPOFOL 400 MG: 10 INJECTION, EMULSION INTRAVENOUS at 15:26

## 2021-01-29 RX ADMIN — HYDROCORTISONE SODIUM SUCCINATE 100 MG: 100 INJECTION, POWDER, FOR SOLUTION INTRAMUSCULAR; INTRAVENOUS at 15:06

## 2021-01-29 RX ADMIN — PROPOFOL 100 MG: 10 INJECTION, EMULSION INTRAVENOUS at 15:31

## 2021-01-29 RX ADMIN — HYDROMORPHONE HYDROCHLORIDE 0.5 MG: 2 INJECTION, SOLUTION INTRAMUSCULAR; INTRAVENOUS; SUBCUTANEOUS at 16:21

## 2021-01-29 RX ADMIN — PROPOFOL 100 MG: 10 INJECTION, EMULSION INTRAVENOUS at 15:28

## 2021-01-29 RX ADMIN — FENTANYL CITRATE 50 MCG: 50 INJECTION, SOLUTION INTRAMUSCULAR; INTRAVENOUS at 16:03

## 2021-01-29 RX ADMIN — SODIUM CHLORIDE, POTASSIUM CHLORIDE, SODIUM LACTATE AND CALCIUM CHLORIDE: 600; 310; 30; 20 INJECTION, SOLUTION INTRAVENOUS at 15:21

## 2021-01-29 RX ADMIN — CALCIUM CHLORIDE 1 G: 100 INJECTION INTRAVENOUS; INTRAVENTRICULAR at 16:26

## 2021-01-29 RX ADMIN — SODIUM CHLORIDE 10 ML/HR: 9 INJECTION, SOLUTION INTRAVENOUS at 16:25

## 2021-01-29 NOTE — ANESTHESIA POSTPROCEDURE EVALUATION
Patient: Krista Richter    Procedure Summary     Date: 01/29/21 Room / Location:  [facility]    Anesthesia Start: 1521 Anesthesia Stop: 1534    Procedure: ESOPHAGOGASTRODUODENOSCOPY (N/A ) Diagnosis:       Dysphagia, unspecified type      (Dysphagia, unspecified type [R13.10])    Surgeon: Gavin Vargas MD Provider: Du Lucero MD    Anesthesia Type: general ASA Status: 3          Anesthesia Type: general    Vitals  Vitals Value Taken Time   BP     Temp     Pulse     Resp     SpO2 100 % 01/29/21 1534           Post Anesthesia Care and Evaluation    Patient location during evaluation: PACU  Patient participation: waiting for patient participation  Level of consciousness: responsive to physical stimuli  Pain management: adequate  Airway patency: patent  Anesthetic complications: No anesthetic complications  PONV Status: none  Cardiovascular status: hemodynamically stable and acceptable  Respiratory status: nonlabored ventilation, acceptable and nasal cannula  Hydration status: acceptable

## 2021-01-29 NOTE — ANESTHESIA PREPROCEDURE EVALUATION
Anesthesia Evaluation     Patient summary reviewed and Nursing notes reviewed   NPO Solid Status: > 8 hours  NPO Liquid Status: > 8 hours           Airway   Mallampati: I  TM distance: >3 FB  Neck ROM: full  No difficulty expected  Dental      Pulmonary     breath sounds clear to auscultation  (+) asthma,home oxygen, shortness of breath, sleep apnea,   Cardiovascular     Rate: normal        Neuro/Psych  (+) psychiatric history Anxiety,     GI/Hepatic/Renal/Endo    (+)   hepatitis, liver disease, diabetes mellitus,     Musculoskeletal     Abdominal    Substance History      OB/GYN          Other        ROS/Med Hx Other: History of Lonoke's disease    Polyglandular autoimmune syndrome type 1                Anesthesia Plan    ASA 3     general   total IV anesthesia  intravenous induction     Anesthetic plan, all risks, benefits, and alternatives have been provided, discussed and informed consent has been obtained with: patient.    Plan discussed with CRNA.

## 2021-02-25 ENCOUNTER — HOSPITAL ENCOUNTER (EMERGENCY)
Facility: HOSPITAL | Age: 31
Discharge: HOME | End: 2021-02-25
Attending: EMERGENCY MEDICINE | Admitting: EMERGENCY MEDICINE

## 2021-02-25 ENCOUNTER — APPOINTMENT (OUTPATIENT)
Dept: CT IMAGING | Facility: HOSPITAL | Age: 31
End: 2021-02-25

## 2021-02-25 VITALS
RESPIRATION RATE: 20 BRPM | DIASTOLIC BLOOD PRESSURE: 68 MMHG | HEART RATE: 80 BPM | OXYGEN SATURATION: 93 % | BODY MASS INDEX: 30.55 KG/M2 | WEIGHT: 166 LBS | SYSTOLIC BLOOD PRESSURE: 95 MMHG | TEMPERATURE: 98.3 F | HEIGHT: 62 IN

## 2021-02-25 DIAGNOSIS — E83.51 HYPOCALCEMIA: ICD-10-CM

## 2021-02-25 DIAGNOSIS — R29.0 TETANY: Primary | ICD-10-CM

## 2021-02-25 DIAGNOSIS — R06.02 SHORTNESS OF BREATH: ICD-10-CM

## 2021-02-25 LAB
ALBUMIN SERPL-MCNC: 4.4 G/DL (ref 3.5–5.2)
ALBUMIN/GLOB SERPL: 1.4 G/DL
ALP SERPL-CCNC: 62 U/L (ref 39–117)
ALT SERPL W P-5'-P-CCNC: 15 U/L (ref 1–33)
ANION GAP SERPL CALCULATED.3IONS-SCNC: 9 MMOL/L (ref 5–15)
AST SERPL-CCNC: 29 U/L (ref 1–32)
BASOPHILS # BLD AUTO: 0.08 10*3/MM3 (ref 0–0.2)
BASOPHILS NFR BLD AUTO: 0.6 % (ref 0–1.5)
BILIRUB SERPL-MCNC: 0.8 MG/DL (ref 0–1.2)
BUN SERPL-MCNC: 15 MG/DL (ref 6–20)
BUN/CREAT SERPL: 26.3 (ref 7–25)
CA-I SERPL ISE-MCNC: 1.09 MMOL/L (ref 1.12–1.32)
CALCIUM SPEC-SCNC: 8.4 MG/DL (ref 8.6–10.5)
CHLORIDE SERPL-SCNC: 99 MMOL/L (ref 98–107)
CO2 SERPL-SCNC: 30 MMOL/L (ref 22–29)
CREAT SERPL-MCNC: 0.57 MG/DL (ref 0.57–1)
DEPRECATED RDW RBC AUTO: 47.8 FL (ref 37–54)
EOSINOPHIL # BLD AUTO: 0.48 10*3/MM3 (ref 0–0.4)
EOSINOPHIL NFR BLD AUTO: 3.4 % (ref 0.3–6.2)
ERYTHROCYTE [DISTWIDTH] IN BLOOD BY AUTOMATED COUNT: 12.9 % (ref 12.3–15.4)
GFR SERPL CREATININE-BSD FRML MDRD: 124 ML/MIN/1.73
GLOBULIN UR ELPH-MCNC: 3.2 GM/DL
GLUCOSE SERPL-MCNC: 94 MG/DL (ref 65–99)
HCT VFR BLD AUTO: 44.1 % (ref 34–46.6)
HGB BLD-MCNC: 14.3 G/DL (ref 12–15.9)
IMM GRANULOCYTES # BLD AUTO: 0.05 10*3/MM3 (ref 0–0.05)
IMM GRANULOCYTES NFR BLD AUTO: 0.3 % (ref 0–0.5)
LYMPHOCYTES # BLD AUTO: 1.79 10*3/MM3 (ref 0.7–3.1)
LYMPHOCYTES NFR BLD AUTO: 12.5 % (ref 19.6–45.3)
MCH RBC QN AUTO: 32.4 PG (ref 26.6–33)
MCHC RBC AUTO-ENTMCNC: 32.4 G/DL (ref 31.5–35.7)
MCV RBC AUTO: 99.8 FL (ref 79–97)
MONOCYTES # BLD AUTO: 1.74 10*3/MM3 (ref 0.1–0.9)
MONOCYTES NFR BLD AUTO: 12.2 % (ref 5–12)
NEUTROPHILS NFR BLD AUTO: 10.17 10*3/MM3 (ref 1.7–7)
NEUTROPHILS NFR BLD AUTO: 71 % (ref 42.7–76)
NRBC BLD AUTO-RTO: 0 /100 WBC (ref 0–0.2)
PLATELET # BLD AUTO: 285 10*3/MM3 (ref 140–450)
PMV BLD AUTO: 12.6 FL (ref 6–12)
POTASSIUM SERPL-SCNC: 4 MMOL/L (ref 3.5–5.2)
PROT SERPL-MCNC: 7.6 G/DL (ref 6–8.5)
RBC # BLD AUTO: 4.42 10*6/MM3 (ref 3.77–5.28)
SODIUM SERPL-SCNC: 138 MMOL/L (ref 136–145)
WBC # BLD AUTO: 14.31 10*3/MM3 (ref 3.4–10.8)

## 2021-02-25 PROCEDURE — 80053 COMPREHEN METABOLIC PANEL: CPT | Performed by: EMERGENCY MEDICINE

## 2021-02-25 PROCEDURE — 71275 CT ANGIOGRAPHY CHEST: CPT

## 2021-02-25 PROCEDURE — 99284 EMERGENCY DEPT VISIT MOD MDM: CPT

## 2021-02-25 PROCEDURE — 25010000002 CALCIUM GLUCONATE PER 10 ML: Performed by: EMERGENCY MEDICINE

## 2021-02-25 PROCEDURE — 0 IOPAMIDOL PER 1 ML: Performed by: EMERGENCY MEDICINE

## 2021-02-25 PROCEDURE — 25010000002 PROCHLORPERAZINE 10 MG/2ML SOLUTION: Performed by: EMERGENCY MEDICINE

## 2021-02-25 PROCEDURE — 96376 TX/PRO/DX INJ SAME DRUG ADON: CPT

## 2021-02-25 PROCEDURE — 96365 THER/PROPH/DIAG IV INF INIT: CPT

## 2021-02-25 PROCEDURE — 96375 TX/PRO/DX INJ NEW DRUG ADDON: CPT

## 2021-02-25 PROCEDURE — 96366 THER/PROPH/DIAG IV INF ADDON: CPT

## 2021-02-25 PROCEDURE — 25010000002 HYDROCORTISONE SODIUM SUCCINATE 100 MG RECONSTITUTED SOLUTION: Performed by: EMERGENCY MEDICINE

## 2021-02-25 PROCEDURE — 25010000002 HYDROMORPHONE PER 4 MG: Performed by: EMERGENCY MEDICINE

## 2021-02-25 PROCEDURE — 82330 ASSAY OF CALCIUM: CPT | Performed by: EMERGENCY MEDICINE

## 2021-02-25 PROCEDURE — 85025 COMPLETE CBC W/AUTO DIFF WBC: CPT | Performed by: EMERGENCY MEDICINE

## 2021-02-25 RX ORDER — CALCIUM GLUCONATE 94 MG/ML
1 INJECTION, SOLUTION INTRAVENOUS ONCE
Status: DISCONTINUED | OUTPATIENT
Start: 2021-02-25 | End: 2021-02-25 | Stop reason: ALTCHOICE

## 2021-02-25 RX ORDER — PROCHLORPERAZINE EDISYLATE 5 MG/ML
10 INJECTION INTRAMUSCULAR; INTRAVENOUS ONCE
Status: COMPLETED | OUTPATIENT
Start: 2021-02-25 | End: 2021-02-25

## 2021-02-25 RX ORDER — HYDROMORPHONE HYDROCHLORIDE 1 MG/ML
0.5 INJECTION, SOLUTION INTRAMUSCULAR; INTRAVENOUS; SUBCUTANEOUS ONCE
Status: COMPLETED | OUTPATIENT
Start: 2021-02-25 | End: 2021-02-25

## 2021-02-25 RX ADMIN — HYDROMORPHONE HYDROCHLORIDE 0.5 MG: 1 INJECTION, SOLUTION INTRAMUSCULAR; INTRAVENOUS; SUBCUTANEOUS at 12:00

## 2021-02-25 RX ADMIN — PROCHLORPERAZINE EDISYLATE 10 MG: 5 INJECTION INTRAMUSCULAR; INTRAVENOUS at 12:00

## 2021-02-25 RX ADMIN — CALCIUM GLUCONATE 1 G: 98 INJECTION, SOLUTION INTRAVENOUS at 11:15

## 2021-02-25 RX ADMIN — HYDROCORTISONE SODIUM SUCCINATE 100 MG: 100 INJECTION, POWDER, FOR SOLUTION INTRAMUSCULAR; INTRAVENOUS at 11:12

## 2021-02-25 RX ADMIN — IOPAMIDOL 59 ML: 755 INJECTION, SOLUTION INTRAVENOUS at 14:23

## 2021-02-25 RX ADMIN — HYDROMORPHONE HYDROCHLORIDE 0.5 MG: 1 INJECTION, SOLUTION INTRAMUSCULAR; INTRAVENOUS; SUBCUTANEOUS at 14:24

## 2021-04-01 RX ORDER — AZATHIOPRINE 100 MG/1
150 TABLET ORAL DAILY
COMMUNITY
End: 2021-05-07 | Stop reason: SDUPTHER

## 2021-04-01 RX ORDER — RANITIDINE 150 MG/1
150 TABLET ORAL 2 TIMES DAILY
COMMUNITY
End: 2021-04-02 | Stop reason: ALTCHOICE

## 2021-04-01 RX ORDER — PSEUDOEPHEDRINE HCL 30 MG
500 TABLET ORAL DAILY
COMMUNITY

## 2021-04-01 RX ORDER — AZITHROMYCIN 250 MG/1
250 TABLET, FILM COATED ORAL DAILY
COMMUNITY
End: 2021-04-14 | Stop reason: HOSPADM

## 2021-04-01 RX ORDER — ZOLPIDEM TARTRATE 12.5 MG/1
12.5 TABLET, FILM COATED, EXTENDED RELEASE ORAL NIGHTLY PRN
COMMUNITY
End: 2021-04-02 | Stop reason: ALTCHOICE

## 2021-04-01 NOTE — PROGRESS NOTES
Electrophysiology Clinic Consult     Krista Richter  1990  [unfilled]  [unfilled]    04/02/21    DATE OF ADMISSION: (Not on file)  [facility] CARDIOLOGY    Agustin Turner MD  [address] [facility] / [place] [id number]  Referring Provider: Ilya Rock MD     Chief Complaint   Patient presents with   • Long QT Syndrome   • Chest Pain   • Rapid Heart Rate     Referring Provider: Dr. Rock     Problem List:    1. 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 Dx by Dr. Chapman at age 4 after abnormal EKG   b. Trouble regulating potassium   c. Black out periods and seizure like activity jayden with swimming and does get anxiolytic without a LifeVest.   d. Initiated on Nadolol for tachycardia with HR up into low high 190s.  e.  twelve-lead EKG 11/20 1/2015 QTc 490 ms  2. Polyglandular autoimmune syndrome  a. Rituxan in the past   3. Chronic Lung Disease  a. Follows pulmonologist at [facility]  4. Esophageal stenosis   a. Status post dilatation 1-   5. T2DM  6. Hypoparathyroidism  7. Dermatomyositis  8. Autoimmune hepatitis  9. Obstructive sleep apnea  10. Adrenal Insufficiency  11. Gastroparesis  12. Depression  13. Anxiety   14. Surgeries  a. Cholecystectomy  b. Appendectomy  c. G-tube insertion and removal  d. Umbilical hernia repair  e. Implantable loop recorder insertion      History of Present Illness: 30-year-old unfortunate female referred for the treatment and management of long QT syndrome.  The patient normal QT interval.  Has had a long QT syndrome diagnosed at the age of 4.  It is consistent with long QT 1 although she has never had genetic testing.  She has been on now Zoloft for quite some time and has done very well.  She denies any family history of sudden cardiac death or unexplained syncope.  She did have an EKG documented in 2015 that did demonstrate a QT interval approximately 490 ms.  Her most recent EKGs however have demonstrated a normal QT interval.  She has had no  "side effects to the enalapril.  Her biggest issue is worsening hypoxia, severe dyspnea on exertion, and severe shortness of breath at baseline.  She is presently on 2 L of oxygen and is extremely frustrated and upset by her limited exercise tolerance.  She recently was seen in the ER at [facility] and a CT chest angiogram demonstrated no pulmonary embolism and no acute intrathoracic process noted.  She states that she has had pulmonary function test in the past and was told that they were normal as well.  In regards to her overall general health, unfortunately she has had most of the clinical manifestations associated with polyglandular autoimmune deficiency.    Allergies   Allergen Reactions   • Azithromycin Hives   • Cefpodoxime Hives   • Cephalosporins Hives   • Clarithromycin Hives     biaxin  biaxin  biaxin   • Levofloxacin Hives   • Nitrofurantoin Hives   • Nystatin Hives   • Penicillins Hives   • Sulfa Antibiotics Hives   • Midazolam Unknown - Low Severity   • Morphine Other (See Comments)     \"it doesn't work. [facility] told me to list it as an allergy\"   • Nitrofurantoin Macrocrystal Other (See Comments) and Hives   • Ondansetron Other (See Comments)     \"it doesn't work. [facility] told me to list it as an allergy\"        Cannot display prior to admission medications because the patient has not been admitted in this contact.            Current Outpatient Medications:   •  albuterol (PROVENTIL HFA;VENTOLIN HFA) 108 (90 Base) MCG/ACT inhaler, Take 2 Puffs by inhalation every 4 hours as needed for wheezing., Disp: , Rfl:   •  Alcohol Swabs (ALCOHOL PREP) 70 % pads, Check blood glucose up to 2 times daily, Disp: , Rfl:   •  ascorbic acid (VITAMIN C) 1000 MG tablet, Take 1,000 mg by mouth Daily., Disp: , Rfl:   •  azaTHIOprine (IMURAN) 100 MG tablet, Take 150 mg by mouth Daily., Disp: , Rfl:   •  azaTHIOprine (IMURAN) 50 MG tablet, Take 3 tablets by mouth Daily., Disp: 90 tablet, Rfl: 2  •  azithromycin (ZITHROMAX) 250 MG " tablet, Take 250 mg by mouth Daily., Disp: , Rfl:   •  Blood Glucose Calibration (OT ULTRA/FASTTK CNTRL SOLN) solution, Use weekly and as needed to calibrate meter, Disp: , Rfl:   •  Blood Glucose Monitoring Suppl (ONE TOUCH ULTRA 2) w/Device kit, Use to check blood glucose up to 2 times daily, Disp: , Rfl:   •  calcitriol (ROCALTROL) 0.5 MCG capsule, Take 0.5 mcg by mouth 2 (Two) Times a Day., Disp: , Rfl:   •  Calcium Citrate 250 MG tablet, Take 500 mg by mouth Daily., Disp: , Rfl:   •  cycloSPORINE (RESTASIS) 0.05 % ophthalmic emulsion, 1 drop. PRN, Disp: , Rfl:   •  cyproheptadine (PERIACTIN) 4 MG tablet, Take 4 mg by mouth 2 (Two) Times a Day., Disp: , Rfl:   •  dronabinol (MARINOL) 5 MG capsule, 5 mg 4 (Four) Times a Day As Needed., Disp: , Rfl:   •  dronabinol (Marinol) 5 MG capsule, Take 1 capsule by mouth Daily As Needed (anorexia)., Disp: 30 capsule, Rfl: 0  •  EPINEPHrine (EPIPEN 2-TEDDY) 0.3 MG/0.3ML solution auto-injector injection, EpiPen 0.3 mg/0.3 mL injection, auto-injector  Take 1 auto by injection route., Disp: , Rfl:   •  ESTRADIOL PO, Take  by mouth., Disp: , Rfl:   •  eszopiclone (Lunesta) 2 MG tablet, Take 2 mg by mouth Every Night. Take immediately before bedtime, Disp: , Rfl:   •  fludrocortisone 0.1 MG tablet, TAKE ONE TABLET BY MOUTH DAILY..05MG PER PT, Disp: , Rfl:   •  fluticasone (FLONASE) 50 MCG/ACT nasal spray, 1 spray., Disp: , Rfl:   •  folic acid (FOLVITE) 1 MG tablet, Take 4 mg by mouth Daily., Disp: , Rfl:   •  gabapentin (NEURONTIN) 600 MG tablet, Take 900 mg by mouth 3 (Three) Times a Day., Disp: , Rfl:   •  hydrocortisone (CORTEF) 10 MG tablet, Take 10 mg by mouth Every Night., Disp: , Rfl:   •  hydrocortisone (CORTEF) 20 MG tablet, Take 20 mg by mouth Every Morning. 20 MG IN AM, Disp: , Rfl:   •  hydrocortisone sodium succinate (SOLU-CORTEF) 100 MG injection, Give 100 mg ( 2 mL ) intramuscular as needed for vomiting, severe illness Dispense actovial, Disp: , Rfl:   •   Hypromellose (SYSTANE OVERNIGHT THERAPY) 0.3 % gel, Systane Gel 0.3 % eye gel, Disp: , Rfl:   •  Insulin Pen Needle 32G X 4 MM misc, Use to administer Natpara subcutaneously daily, Disp: , Rfl:   •  levothyroxine (SYNTHROID, LEVOTHROID) 75 MCG tablet, levothyroxine 75 mcg tablet, Disp: , Rfl:   •  lidocaine (LIDODERM) 5 %, Place 1 patch on the skin as directed by provider Daily. Remove & Discard patch within 12 hours or as directed by MD, Disp: 14 patch, Rfl: 0  •  magnesium oxide (MAGOX) 400 (241.3 Mg) MG tablet tablet, Take 1,000 mg by mouth 3 (Three) Times a Day., Disp: , Rfl:   •  metFORMIN (GLUCOPHAGE) 500 MG tablet, Take 500 mg by mouth 2 (Two) Times a Day With Meals., Disp: , Rfl:   •  methocarbamol (ROBAXIN) 500 MG tablet, Take 1 tablet by mouth 3 (Three) Times a Day., Disp: 14 tablet, Rfl: 0  •  Misc Natural Products (RA XYDRA EF PO), Take  by mouth., Disp: , Rfl:   •  Multiple Vitamins-Minerals (MULTIVITAMIN ADULT PO), Take 1 tablet by mouth., Disp: , Rfl:   •  nadolol (CORGARD) 20 MG tablet, Take 20 mg by mouth 2 (two) times a day., Disp: , Rfl:   •  omeprazole (priLOSEC) 40 MG capsule, Take 1 capsule by mouth 2 (two) times a day. 30 minutes before a meal. (Patient taking differently: Take 40 mg by mouth 2 (two) times a day. 30 minutes before a meal. PRN), Disp: 60 capsule, Rfl: 2  •  ONETOUCH DELICA LANCETS 33G misc, Use to check blood glucose up to 2 times daily, Disp: , Rfl:   •  Parathyroid Hormone, Recomb, (NATPARA SC), Inject 1 Cartridge under the skin into the appropriate area as directed 2 (two) times a day., Disp: , Rfl:   •  Potassium Chloride (KLOR-CON 10 PO), Take 40 mEq by mouth 3 (Three) Times a Day., Disp: , Rfl:   •  progesterone (PROMETRIUM) 200 MG capsule, Take 200 mg by mouth Daily., Disp: , Rfl:   •  promethazine (PHENERGAN) 25 MG suppository, Insert 1 suppository into the rectum Daily As Needed for Nausea or Vomiting., Disp: 30 suppository, Rfl: 0  •  promethazine (PHENERGAN) 25 MG  "tablet, Take 1 tablet by mouth Every 6 (Six) Hours As Needed for Nausea or Vomiting., Disp: 12 tablet, Rfl: 0  •  Transparent Dressings (TEGADERM FILM 2-3/8\"X2-3/4\") misc, Apply over pump insertion site every 2 days.., Disp: , Rfl:   •  venlafaxine (EFFEXOR) 25 MG tablet, Take 75 mg by mouth 3 (Three) Times a Day., Disp: , Rfl:   •  Vitamin D, Cholecalciferol, 1000 units capsule, Take 1,000 unit marking on U-100 syringe by mouth 2 (Two) Times a Day., Disp: , Rfl:   •  zinc sulfate (ZINCATE) 220 (50 Zn) MG capsule, zinc sulfate 220 mg (50 mg) capsule, Disp: , Rfl:     Social History     Socioeconomic History   • Marital status: Single     Spouse name: Not on file   • Number of children: Not on file   • Years of education: Not on file   • Highest education level: Not on file   Tobacco Use   • Smoking status: Never Smoker   • Smokeless tobacco: Never Used   Substance and Sexual Activity   • Alcohol use: Yes     Comment: SOCIAL   • Drug use: No   • Sexual activity: Defer       Family History   Problem Relation Age of Onset   • Kidney disease Father        REVIEW OF SYSTEMS:   CONST:  + weight loss, No fever, chills, weakness + fatigue.   HEENT:  No visual loss, blurred vision, double vision, yellow sclerae.                   No hearing loss, congestion, sore throat.   SKIN:      No rashes, urticaria, ulcers, sores.     RESP:    +  shortness of breath, No hemoptysis, + cough, sputum.   GI:           +  anorexia, nausea, vomiting, diarrhea. + abdominal pain,   :         No burning on urination, hematuria or increased frequency.  ENDO:    No diaphoresis, cold or heat intolerance. No polyuria or polydipsia.   NEURO:  No headache, dizziness, syncope, paralysis, ataxia, + parasthesias.                HEME:   . No history of DVT/PE.  PSYCH:  + history of depression, anxiety    Vitals:    04/02/21 1447   BP: 110/64   BP Location: Right arm   Patient Position: Sitting   Pulse: 74   Weight: 75.3 kg (166 lb)   Height: 157.5 cm " "(62\")                 Physical Exam:  GEN: Well nourished, well-developed, no acute distress  HEENT: Normocephalic, atraumatic, PERRLA, moist mucous membranes, alopecia is present.  NECK: Supple, NO JVD, no thyromegaly, no lymphadenopathy  CARD: Regular rate rhythm normal S1 and S2.  No S3 or S4 is present.  A TR murmur is noted.  LUNGS: Decreased breath sounds bilaterally but clear to auscultation.  Respiratory effort is fair.  ABDOMEN: Soft, nontender, normal bowel sounds  EXTREMITIES: No gross deformities, no clubbing, cyanosis, or edema  SKIN: Cold to touch.  NEURO: No focal deficits, alert and oriented x 3  PSYCHIATRIC: Normal affect and mood      I personally viewed and interpreted the patient's EKG/Telemetry/lab data    Lab Results   Component Value Date    GLUCOSE 94 02/25/2021    CALCIUM 8.4 (L) 02/25/2021     02/25/2021    K 4.0 02/25/2021    CO2 30.0 (H) 02/25/2021    CL 99 02/25/2021    BUN 15 02/25/2021    CREATININE 0.57 02/25/2021    EGFRIFNONA 124 02/25/2021    BCR 26.3 (H) 02/25/2021    ANIONGAP 9.0 02/25/2021     Lab Results   Component Value Date    WBC 14.31 (H) 02/25/2021    HGB 14.3 02/25/2021    HCT 44.1 02/25/2021    MCV 99.8 (H) 02/25/2021     02/25/2021     No results found for: INR, PROTIME  Lab Results   Component Value Date    TSH 0.375 06/03/2020             ECG 12 Lead    Date/Time: 4/2/2021 3:21 PM  Performed by: Jeramy Meyer MD  Authorized by: Jeramy Meyer MD   Comparison: not compared with previous ECG   Previous ECG: no previous ECG available  Rhythm: sinus rhythm  BPM: 74                ICD-10-CM ICD-9-CM   1. Long Q-T syndrome  I45.81 426.82   2. Hypoxia  R09.02 799.02   3. Polyglandular autoimmune deficiency (CMS/HCC)  E31.0 258.8       Assessment and Plan:     1.  Long QT syndrome; asymptomatic on nadolol.  QTc today normal.    2.  Hypoxia most likely secondary to pulmonary versus cardiac shunting.  Patient needs to see structural heart specialist to " assess with BAO, right and left heart cath with shunt run, and possible split fxn pulmonary ventilation scan.  Patient would like to do this at [facility] in [place] since most of her physicians are now present there.    3.  Polyglandular autoimmune deficiency.

## 2021-04-02 ENCOUNTER — CONSULT (OUTPATIENT)
Dept: CARDIOLOGY | Facility: CLINIC | Age: 31
End: 2021-04-02

## 2021-04-02 VITALS
WEIGHT: 166 LBS | DIASTOLIC BLOOD PRESSURE: 64 MMHG | BODY MASS INDEX: 30.55 KG/M2 | HEIGHT: 62 IN | HEART RATE: 74 BPM | SYSTOLIC BLOOD PRESSURE: 110 MMHG

## 2021-04-02 DIAGNOSIS — E31.0 POLYGLANDULAR AUTOIMMUNE DEFICIENCY (HCC): ICD-10-CM

## 2021-04-02 DIAGNOSIS — I45.81 LONG Q-T SYNDROME: Primary | ICD-10-CM

## 2021-04-02 DIAGNOSIS — R09.02 HYPOXIA: ICD-10-CM

## 2021-04-02 PROCEDURE — 93000 ELECTROCARDIOGRAM COMPLETE: CPT | Performed by: INTERNAL MEDICINE

## 2021-04-02 PROCEDURE — 99244 OFF/OP CNSLTJ NEW/EST MOD 40: CPT | Performed by: INTERNAL MEDICINE

## 2021-04-02 RX ORDER — ESZOPICLONE 2 MG/1
2 TABLET, FILM COATED ORAL NIGHTLY
COMMUNITY

## 2021-04-06 ENCOUNTER — APPOINTMENT (OUTPATIENT)
Dept: CT IMAGING | Facility: HOSPITAL | Age: 31
End: 2021-04-06

## 2021-04-06 ENCOUNTER — HOSPITAL ENCOUNTER (INPATIENT)
Facility: HOSPITAL | Age: 31
LOS: 7 days | Discharge: HOME | End: 2021-04-14
Attending: EMERGENCY MEDICINE | Admitting: INTERNAL MEDICINE

## 2021-04-06 DIAGNOSIS — R55 SYNCOPE, UNSPECIFIED SYNCOPE TYPE: Primary | ICD-10-CM

## 2021-04-06 DIAGNOSIS — J39.3 UPPER RESPIRATORY TRACT HYPERSENSITIVITY REACTION, SITE UNSPECIFIED: ICD-10-CM

## 2021-04-06 DIAGNOSIS — R07.9 CHEST PAIN, UNSPECIFIED TYPE: ICD-10-CM

## 2021-04-06 LAB
ALBUMIN SERPL-MCNC: 4.8 G/DL (ref 3.5–5.2)
ALBUMIN/GLOB SERPL: 1.4 G/DL
ALP SERPL-CCNC: 69 U/L (ref 39–117)
ALT SERPL W P-5'-P-CCNC: 18 U/L (ref 1–33)
ANION GAP SERPL CALCULATED.3IONS-SCNC: 14 MMOL/L (ref 5–15)
AST SERPL-CCNC: 38 U/L (ref 1–32)
BASOPHILS # BLD AUTO: 0.08 10*3/MM3 (ref 0–0.2)
BASOPHILS NFR BLD AUTO: 0.6 % (ref 0–1.5)
BILIRUB SERPL-MCNC: 1.1 MG/DL (ref 0–1.2)
BUN SERPL-MCNC: 13 MG/DL (ref 6–20)
BUN/CREAT SERPL: 18.3 (ref 7–25)
CA-I SERPL ISE-MCNC: 1.31 MMOL/L (ref 1.12–1.32)
CALCIUM SPEC-SCNC: 9.7 MG/DL (ref 8.6–10.5)
CHLORIDE SERPL-SCNC: 95 MMOL/L (ref 98–107)
CO2 SERPL-SCNC: 28 MMOL/L (ref 22–29)
CREAT SERPL-MCNC: 0.71 MG/DL (ref 0.57–1)
DEPRECATED RDW RBC AUTO: 47.6 FL (ref 37–54)
EOSINOPHIL # BLD AUTO: 0.68 10*3/MM3 (ref 0–0.4)
EOSINOPHIL NFR BLD AUTO: 5 % (ref 0.3–6.2)
ERYTHROCYTE [DISTWIDTH] IN BLOOD BY AUTOMATED COUNT: 13.3 % (ref 12.3–15.4)
GFR SERPL CREATININE-BSD FRML MDRD: 96 ML/MIN/1.73
GLOBULIN UR ELPH-MCNC: 3.4 GM/DL
GLUCOSE SERPL-MCNC: 69 MG/DL (ref 65–99)
HCT VFR BLD AUTO: 45.1 % (ref 34–46.6)
HGB BLD-MCNC: 14.8 G/DL (ref 12–15.9)
HOLD SPECIMEN: NORMAL
IMM GRANULOCYTES # BLD AUTO: 0.07 10*3/MM3 (ref 0–0.05)
IMM GRANULOCYTES NFR BLD AUTO: 0.5 % (ref 0–0.5)
LYMPHOCYTES # BLD AUTO: 2.66 10*3/MM3 (ref 0.7–3.1)
LYMPHOCYTES NFR BLD AUTO: 19.6 % (ref 19.6–45.3)
MCH RBC QN AUTO: 31.8 PG (ref 26.6–33)
MCHC RBC AUTO-ENTMCNC: 32.8 G/DL (ref 31.5–35.7)
MCV RBC AUTO: 96.8 FL (ref 79–97)
MONOCYTES # BLD AUTO: 1.86 10*3/MM3 (ref 0.1–0.9)
MONOCYTES NFR BLD AUTO: 13.7 % (ref 5–12)
NEUTROPHILS NFR BLD AUTO: 60.6 % (ref 42.7–76)
NEUTROPHILS NFR BLD AUTO: 8.21 10*3/MM3 (ref 1.7–7)
NRBC BLD AUTO-RTO: 0 /100 WBC (ref 0–0.2)
PLATELET # BLD AUTO: 287 10*3/MM3 (ref 140–450)
PMV BLD AUTO: 12.7 FL (ref 6–12)
POTASSIUM SERPL-SCNC: 5 MMOL/L (ref 3.5–5.2)
PROT SERPL-MCNC: 8.2 G/DL (ref 6–8.5)
RBC # BLD AUTO: 4.66 10*6/MM3 (ref 3.77–5.28)
SODIUM SERPL-SCNC: 137 MMOL/L (ref 136–145)
TROPONIN T SERPL-MCNC: <0.01 NG/ML (ref 0–0.03)
WBC # BLD AUTO: 13.56 10*3/MM3 (ref 3.4–10.8)
WHOLE BLOOD HOLD SPECIMEN: NORMAL
WHOLE BLOOD HOLD SPECIMEN: NORMAL

## 2021-04-06 PROCEDURE — 85025 COMPLETE CBC W/AUTO DIFF WBC: CPT | Performed by: NURSE PRACTITIONER

## 2021-04-06 PROCEDURE — 81025 URINE PREGNANCY TEST: CPT | Performed by: NURSE PRACTITIONER

## 2021-04-06 PROCEDURE — 99285 EMERGENCY DEPT VISIT HI MDM: CPT

## 2021-04-06 PROCEDURE — 84484 ASSAY OF TROPONIN QUANT: CPT | Performed by: NURSE PRACTITIONER

## 2021-04-06 PROCEDURE — 82330 ASSAY OF CALCIUM: CPT | Performed by: EMERGENCY MEDICINE

## 2021-04-06 PROCEDURE — 80053 COMPREHEN METABOLIC PANEL: CPT | Performed by: NURSE PRACTITIONER

## 2021-04-06 PROCEDURE — 83735 ASSAY OF MAGNESIUM: CPT | Performed by: NURSE PRACTITIONER

## 2021-04-06 PROCEDURE — 25010000002 HYDROMORPHONE 1 MG/ML SOLUTION: Performed by: EMERGENCY MEDICINE

## 2021-04-06 PROCEDURE — 93005 ELECTROCARDIOGRAM TRACING: CPT

## 2021-04-06 PROCEDURE — 93005 ELECTROCARDIOGRAM TRACING: CPT | Performed by: EMERGENCY MEDICINE

## 2021-04-06 RX ORDER — LORAZEPAM 2 MG/ML
1 INJECTION INTRAMUSCULAR ONCE
Status: COMPLETED | OUTPATIENT
Start: 2021-04-06 | End: 2021-04-07

## 2021-04-06 RX ORDER — SODIUM CHLORIDE 0.9 % (FLUSH) 0.9 %
10 SYRINGE (ML) INJECTION AS NEEDED
Status: DISCONTINUED | OUTPATIENT
Start: 2021-04-06 | End: 2021-04-14 | Stop reason: HOSPADM

## 2021-04-06 RX ADMIN — SODIUM CHLORIDE 1000 ML: 9 INJECTION, SOLUTION INTRAVENOUS at 22:59

## 2021-04-06 RX ADMIN — HYDROMORPHONE HYDROCHLORIDE 1 MG: 1 INJECTION, SOLUTION INTRAMUSCULAR; INTRAVENOUS; SUBCUTANEOUS at 22:15

## 2021-04-07 ENCOUNTER — APPOINTMENT (OUTPATIENT)
Dept: CARDIOLOGY | Facility: HOSPITAL | Age: 31
End: 2021-04-07

## 2021-04-07 ENCOUNTER — APPOINTMENT (OUTPATIENT)
Dept: CT IMAGING | Facility: HOSPITAL | Age: 31
End: 2021-04-07

## 2021-04-07 PROBLEM — R55 SYNCOPE: Status: ACTIVE | Noted: 2021-04-07

## 2021-04-07 PROBLEM — D72.829 LEUKOCYTOSIS: Status: ACTIVE | Noted: 2021-04-07

## 2021-04-07 LAB
ANION GAP SERPL CALCULATED.3IONS-SCNC: 15 MMOL/L (ref 5–15)
ASCENDING AORTA: 2.2 CM
B-HCG UR QL: NEGATIVE
BH CV ECHO MEAS - AO MAX PG (FULL): 2.7 MMHG
BH CV ECHO MEAS - AO MAX PG: 6.1 MMHG
BH CV ECHO MEAS - AO MEAN PG (FULL): 1.5 MMHG
BH CV ECHO MEAS - AO MEAN PG: 3.4 MMHG
BH CV ECHO MEAS - AO ROOT AREA (BSA CORRECTED): 1.6
BH CV ECHO MEAS - AO ROOT AREA: 5.6 CM^2
BH CV ECHO MEAS - AO ROOT DIAM: 2.7 CM
BH CV ECHO MEAS - AO V2 MAX: 123.8 CM/SEC
BH CV ECHO MEAS - AO V2 MEAN: 86.4 CM/SEC
BH CV ECHO MEAS - AO V2 VTI: 24 CM
BH CV ECHO MEAS - ASC AORTA: 2.2 CM
BH CV ECHO MEAS - AVA(I,A): 2.4 CM^2
BH CV ECHO MEAS - AVA(I,D): 2.4 CM^2
BH CV ECHO MEAS - AVA(V,A): 2.3 CM^2
BH CV ECHO MEAS - AVA(V,D): 2.3 CM^2
BH CV ECHO MEAS - BSA(HAYCOCK): 1.7 M^2
BH CV ECHO MEAS - BSA(HAYCOCK): 1.7 M^2
BH CV ECHO MEAS - BSA: 1.7 M^2
BH CV ECHO MEAS - BSA: 1.7 M^2
BH CV ECHO MEAS - BZI_BMI: 27.4 KILOGRAMS/M^2
BH CV ECHO MEAS - BZI_BMI: 27.4 KILOGRAMS/M^2
BH CV ECHO MEAS - BZI_METRIC_HEIGHT: 157.5 CM
BH CV ECHO MEAS - BZI_METRIC_HEIGHT: 157.5 CM
BH CV ECHO MEAS - BZI_METRIC_WEIGHT: 68 KG
BH CV ECHO MEAS - BZI_METRIC_WEIGHT: 68 KG
BH CV ECHO MEAS - EDV(CUBED): 48.8 ML
BH CV ECHO MEAS - EDV(MOD-SP2): 78 ML
BH CV ECHO MEAS - EDV(MOD-SP4): 88 ML
BH CV ECHO MEAS - EDV(TEICH): 56.4 ML
BH CV ECHO MEAS - EF(CUBED): 64 %
BH CV ECHO MEAS - EF(MOD-BP): 55 %
BH CV ECHO MEAS - EF(MOD-SP2): 56.4 %
BH CV ECHO MEAS - EF(MOD-SP4): 55.7 %
BH CV ECHO MEAS - EF(TEICH): 56.4 %
BH CV ECHO MEAS - ESV(CUBED): 17.6 ML
BH CV ECHO MEAS - ESV(MOD-SP2): 34 ML
BH CV ECHO MEAS - ESV(MOD-SP4): 39 ML
BH CV ECHO MEAS - ESV(TEICH): 24.6 ML
BH CV ECHO MEAS - FS: 28.9 %
BH CV ECHO MEAS - IVS/LVPW: 0.96
BH CV ECHO MEAS - IVSD: 0.75 CM
BH CV ECHO MEAS - LA DIMENSION: 2 CM
BH CV ECHO MEAS - LA/AO: 0.76
BH CV ECHO MEAS - LAD MAJOR: 4 CM
BH CV ECHO MEAS - LAT PEAK E' VEL: 10.2 CM/SEC
BH CV ECHO MEAS - LATERAL E/E' RATIO: 7.8
BH CV ECHO MEAS - LV DIASTOLIC VOL/BSA (35-75): 52 ML/M^2
BH CV ECHO MEAS - LV IVRT: 0.08 SEC
BH CV ECHO MEAS - LV MASS(C)D: 76.4 GRAMS
BH CV ECHO MEAS - LV MASS(C)DI: 45.2 GRAMS/M^2
BH CV ECHO MEAS - LV MAX PG: 3.5 MMHG
BH CV ECHO MEAS - LV MEAN PG: 1.9 MMHG
BH CV ECHO MEAS - LV SYSTOLIC VOL/BSA (12-30): 23.1 ML/M^2
BH CV ECHO MEAS - LV V1 MAX: 93.1 CM/SEC
BH CV ECHO MEAS - LV V1 MEAN: 63.5 CM/SEC
BH CV ECHO MEAS - LV V1 VTI: 18.8 CM
BH CV ECHO MEAS - LVIDD: 3.7 CM
BH CV ECHO MEAS - LVIDS: 2.6 CM
BH CV ECHO MEAS - LVLD AP2: 7.8 CM
BH CV ECHO MEAS - LVLD AP4: 8.4 CM
BH CV ECHO MEAS - LVLS AP2: 6.2 CM
BH CV ECHO MEAS - LVLS AP4: 7.1 CM
BH CV ECHO MEAS - LVOT AREA (M): 3.1 CM^2
BH CV ECHO MEAS - LVOT AREA: 3 CM^2
BH CV ECHO MEAS - LVOT DIAM: 2 CM
BH CV ECHO MEAS - LVPWD: 0.78 CM
BH CV ECHO MEAS - MED PEAK E' VEL: 10 CM/SEC
BH CV ECHO MEAS - MEDIAL E/E' RATIO: 8
BH CV ECHO MEAS - MV A MAX VEL: 53.7 CM/SEC
BH CV ECHO MEAS - MV DEC SLOPE: 350.6 CM/SEC^2
BH CV ECHO MEAS - MV DEC TIME: 0.19 SEC
BH CV ECHO MEAS - MV E MAX VEL: 81 CM/SEC
BH CV ECHO MEAS - MV E/A: 1.5
BH CV ECHO MEAS - MV P1/2T MAX VEL: 89.6 CM/SEC
BH CV ECHO MEAS - MV P1/2T: 74.8 MSEC
BH CV ECHO MEAS - MVA P1/2T LCG: 2.5 CM^2
BH CV ECHO MEAS - MVA(P1/2T): 2.9 CM^2
BH CV ECHO MEAS - PA ACC SLOPE: 536.8 CM/SEC^2
BH CV ECHO MEAS - PA ACC TIME: 0.14 SEC
BH CV ECHO MEAS - PA MAX PG: 5.2 MMHG
BH CV ECHO MEAS - PA PR(ACCEL): 14 MMHG
BH CV ECHO MEAS - PA V2 MAX: 114 CM/SEC
BH CV ECHO MEAS - RAP SYSTOLE: 3 MMHG
BH CV ECHO MEAS - RVSP: 13 MMHG
BH CV ECHO MEAS - SI(AO): 78.9 ML/M^2
BH CV ECHO MEAS - SI(CUBED): 18.5 ML/M^2
BH CV ECHO MEAS - SI(LVOT): 33.7 ML/M^2
BH CV ECHO MEAS - SI(MOD-SP2): 26 ML/M^2
BH CV ECHO MEAS - SI(MOD-SP4): 29 ML/M^2
BH CV ECHO MEAS - SI(TEICH): 18.8 ML/M^2
BH CV ECHO MEAS - SV(AO): 133.5 ML
BH CV ECHO MEAS - SV(CUBED): 31.3 ML
BH CV ECHO MEAS - SV(LVOT): 56.9 ML
BH CV ECHO MEAS - SV(MOD-SP2): 44 ML
BH CV ECHO MEAS - SV(MOD-SP4): 49 ML
BH CV ECHO MEAS - SV(TEICH): 31.8 ML
BH CV ECHO MEAS - TAPSE (>1.6): 2 CM
BH CV ECHO MEAS - TR MAX PG: 10 MMHG
BH CV ECHO MEAS - TR MAX VEL: 155.5 CM/SEC
BH CV ECHO MEASUREMENTS AVERAGE E/E' RATIO: 8.02
BH CV VAS BP LEFT ARM: NORMAL MMHG
BH CV XLRA - RV BASE: 3.2 CM
BH CV XLRA - RV LENGTH: 6.7 CM
BH CV XLRA - RV MID: 2.3 CM
BH CV XLRA - TDI S': 11.3 CM/SEC
BH CV XLRA MEAS LEFT DIST CCA EDV: 39.3 CM/SEC
BH CV XLRA MEAS LEFT DIST CCA PSV: 134 CM/SEC
BH CV XLRA MEAS LEFT DIST ICA EDV: 56 CM/SEC
BH CV XLRA MEAS LEFT DIST ICA PSV: 133 CM/SEC
BH CV XLRA MEAS LEFT ICA/CCA RATIO: 0.96
BH CV XLRA MEAS LEFT MID CCA EDV: 25.5 CM/SEC
BH CV XLRA MEAS LEFT MID CCA PSV: 139 CM/SEC
BH CV XLRA MEAS LEFT MID ICA EDV: 57 CM/SEC
BH CV XLRA MEAS LEFT MID ICA PSV: 133 CM/SEC
BH CV XLRA MEAS LEFT PROX CCA EDV: 26.5 CM/SEC
BH CV XLRA MEAS LEFT PROX CCA PSV: 156 CM/SEC
BH CV XLRA MEAS LEFT PROX ECA EDV: 0.98 CM/SEC
BH CV XLRA MEAS LEFT PROX ECA PSV: 139 CM/SEC
BH CV XLRA MEAS LEFT PROX ICA EDV: 36.3 CM/SEC
BH CV XLRA MEAS LEFT PROX ICA PSV: 95.3 CM/SEC
BH CV XLRA MEAS LEFT PROX SCLA EDV: 0 CM/SEC
BH CV XLRA MEAS LEFT PROX SCLA PSV: 105 CM/SEC
BH CV XLRA MEAS LEFT VERTEBRAL A EDV: 20.6 CM/SEC
BH CV XLRA MEAS LEFT VERTEBRAL A PSV: 141 CM/SEC
BH CV XLRA MEAS RIGHT DIST CCA EDV: 29.9 CM/SEC
BH CV XLRA MEAS RIGHT DIST CCA PSV: 130 CM/SEC
BH CV XLRA MEAS RIGHT DIST ICA EDV: 62.9 CM/SEC
BH CV XLRA MEAS RIGHT DIST ICA PSV: 156 CM/SEC
BH CV XLRA MEAS RIGHT ICA/CCA RATIO: 1.17
BH CV XLRA MEAS RIGHT MID CCA EDV: 25.9 CM/SEC
BH CV XLRA MEAS RIGHT MID CCA PSV: 133 CM/SEC
BH CV XLRA MEAS RIGHT MID ICA EDV: 55.8 CM/SEC
BH CV XLRA MEAS RIGHT MID ICA PSV: 133 CM/SEC
BH CV XLRA MEAS RIGHT PROX CCA EDV: 24.4 CM/SEC
BH CV XLRA MEAS RIGHT PROX CCA PSV: 119 CM/SEC
BH CV XLRA MEAS RIGHT PROX ECA EDV: 0 CM/SEC
BH CV XLRA MEAS RIGHT PROX ECA PSV: 130 CM/SEC
BH CV XLRA MEAS RIGHT PROX ICA EDV: 49.5 CM/SEC
BH CV XLRA MEAS RIGHT PROX ICA PSV: 143 CM/SEC
BH CV XLRA MEAS RIGHT PROX SCLA EDV: 12.6 CM/SEC
BH CV XLRA MEAS RIGHT PROX SCLA PSV: 138 CM/SEC
BH CV XLRA MEAS RIGHT VERTEBRAL A EDV: 30.6 CM/SEC
BH CV XLRA MEAS RIGHT VERTEBRAL A PSV: 104 CM/SEC
BILIRUB UR QL STRIP: NEGATIVE
BUN SERPL-MCNC: 16 MG/DL (ref 6–20)
BUN/CREAT SERPL: 21.3 (ref 7–25)
CALCIUM SPEC-SCNC: 9.2 MG/DL (ref 8.6–10.5)
CHLORIDE SERPL-SCNC: 97 MMOL/L (ref 98–107)
CLARITY UR: CLEAR
CO2 SERPL-SCNC: 23 MMOL/L (ref 22–29)
COLOR UR: YELLOW
CREAT SERPL-MCNC: 0.75 MG/DL (ref 0.57–1)
DEPRECATED RDW RBC AUTO: 49.5 FL (ref 37–54)
ERYTHROCYTE [DISTWIDTH] IN BLOOD BY AUTOMATED COUNT: 13.3 % (ref 12.3–15.4)
FLUAV RNA RESP QL NAA+PROBE: NOT DETECTED
FLUBV RNA RESP QL NAA+PROBE: NOT DETECTED
GFR SERPL CREATININE-BSD FRML MDRD: 90 ML/MIN/1.73
GLUCOSE BLDC GLUCOMTR-MCNC: 110 MG/DL (ref 70–130)
GLUCOSE BLDC GLUCOMTR-MCNC: 138 MG/DL (ref 70–130)
GLUCOSE BLDC GLUCOMTR-MCNC: 64 MG/DL (ref 70–130)
GLUCOSE BLDC GLUCOMTR-MCNC: 71 MG/DL (ref 70–130)
GLUCOSE BLDC GLUCOMTR-MCNC: 74 MG/DL (ref 70–130)
GLUCOSE BLDC GLUCOMTR-MCNC: 78 MG/DL (ref 70–130)
GLUCOSE BLDC GLUCOMTR-MCNC: 81 MG/DL (ref 70–130)
GLUCOSE BLDC GLUCOMTR-MCNC: 90 MG/DL (ref 70–130)
GLUCOSE BLDC GLUCOMTR-MCNC: 94 MG/DL (ref 70–130)
GLUCOSE BLDC GLUCOMTR-MCNC: 96 MG/DL (ref 70–130)
GLUCOSE SERPL-MCNC: 61 MG/DL (ref 65–99)
GLUCOSE UR STRIP-MCNC: NEGATIVE MG/DL
HCT VFR BLD AUTO: 44.9 % (ref 34–46.6)
HGB BLD-MCNC: 14.2 G/DL (ref 12–15.9)
HGB UR QL STRIP.AUTO: NEGATIVE
INTERNAL NEGATIVE CONTROL: NEGATIVE
INTERNAL POSITIVE CONTROL: POSITIVE
KETONES UR QL STRIP: ABNORMAL
LEFT ARM BP: NORMAL MMHG
LEFT ATRIUM VOLUME INDEX: 11.2 ML/M^2
LEFT ATRIUM VOLUME: 19 ML
LEUKOCYTE ESTERASE UR QL STRIP.AUTO: NEGATIVE
Lab: NORMAL
MAGNESIUM SERPL-MCNC: 1.8 MG/DL (ref 1.6–2.6)
MAGNESIUM SERPL-MCNC: 2.5 MG/DL (ref 1.6–2.6)
MAXIMAL PREDICTED HEART RATE: 189 BPM
MCH RBC QN AUTO: 31.8 PG (ref 26.6–33)
MCHC RBC AUTO-ENTMCNC: 31.6 G/DL (ref 31.5–35.7)
MCV RBC AUTO: 100.4 FL (ref 79–97)
NITRITE UR QL STRIP: NEGATIVE
PH UR STRIP.AUTO: <=5 [PH] (ref 5–8)
PHOSPHATE SERPL-MCNC: 5.2 MG/DL (ref 2.5–4.5)
PLATELET # BLD AUTO: 255 10*3/MM3 (ref 140–450)
PMV BLD AUTO: 11.9 FL (ref 6–12)
POTASSIUM SERPL-SCNC: 4.6 MMOL/L (ref 3.5–5.2)
PROT UR QL STRIP: NEGATIVE
RBC # BLD AUTO: 4.47 10*6/MM3 (ref 3.77–5.28)
SARS-COV-2 RNA RESP QL NAA+PROBE: NOT DETECTED
SODIUM SERPL-SCNC: 135 MMOL/L (ref 136–145)
SP GR UR STRIP: 1.05 (ref 1–1.03)
STRESS TARGET HR: 161 BPM
TSH SERPL DL<=0.05 MIU/L-ACNC: 1.7 UIU/ML (ref 0.27–4.2)
UROBILINOGEN UR QL STRIP: ABNORMAL
WBC # BLD AUTO: 12.64 10*3/MM3 (ref 3.4–10.8)

## 2021-04-07 PROCEDURE — 99223 1ST HOSP IP/OBS HIGH 75: CPT | Performed by: INTERNAL MEDICINE

## 2021-04-07 PROCEDURE — 63710000001 AZATHIOPRINE PER 50 MG: Performed by: NURSE PRACTITIONER

## 2021-04-07 PROCEDURE — 82962 GLUCOSE BLOOD TEST: CPT

## 2021-04-07 PROCEDURE — 99254 IP/OBS CNSLTJ NEW/EST MOD 60: CPT | Performed by: INTERNAL MEDICINE

## 2021-04-07 PROCEDURE — 87636 SARSCOV2 & INF A&B AMP PRB: CPT | Performed by: INTERNAL MEDICINE

## 2021-04-07 PROCEDURE — 93306 TTE W/DOPPLER COMPLETE: CPT | Performed by: INTERNAL MEDICINE

## 2021-04-07 PROCEDURE — 85027 COMPLETE CBC AUTOMATED: CPT | Performed by: NURSE PRACTITIONER

## 2021-04-07 PROCEDURE — 84100 ASSAY OF PHOSPHORUS: CPT | Performed by: NURSE PRACTITIONER

## 2021-04-07 PROCEDURE — 80048 BASIC METABOLIC PNL TOTAL CA: CPT | Performed by: NURSE PRACTITIONER

## 2021-04-07 PROCEDURE — 93306 TTE W/DOPPLER COMPLETE: CPT

## 2021-04-07 PROCEDURE — G0378 HOSPITAL OBSERVATION PER HR: HCPCS

## 2021-04-07 PROCEDURE — 25010000002 MAGNESIUM SULFATE IN D5W 1G/100ML (PREMIX) 1-5 GM/100ML-% SOLUTION: Performed by: NURSE PRACTITIONER

## 2021-04-07 PROCEDURE — 84443 ASSAY THYROID STIM HORMONE: CPT | Performed by: NURSE PRACTITIONER

## 2021-04-07 PROCEDURE — 25010000002 CALCIUM GLUCONATE PER 10 ML: Performed by: INTERNAL MEDICINE

## 2021-04-07 PROCEDURE — 25010000002 CALCIUM GLUCONATE PER 10 ML: Performed by: NURSE PRACTITIONER

## 2021-04-07 PROCEDURE — 0 IOPAMIDOL PER 1 ML: Performed by: EMERGENCY MEDICINE

## 2021-04-07 PROCEDURE — 93880 EXTRACRANIAL BILAT STUDY: CPT | Performed by: INTERNAL MEDICINE

## 2021-04-07 PROCEDURE — 25010000002 HYDROMORPHONE PER 4 MG: Performed by: NURSE PRACTITIONER

## 2021-04-07 PROCEDURE — 83735 ASSAY OF MAGNESIUM: CPT | Performed by: NURSE PRACTITIONER

## 2021-04-07 PROCEDURE — 63710000001 PROMETHAZINE PER 25 MG: Performed by: NURSE PRACTITIONER

## 2021-04-07 PROCEDURE — 25010000002 LORAZEPAM PER 2 MG: Performed by: EMERGENCY MEDICINE

## 2021-04-07 PROCEDURE — 93880 EXTRACRANIAL BILAT STUDY: CPT

## 2021-04-07 PROCEDURE — 81003 URINALYSIS AUTO W/O SCOPE: CPT | Performed by: NURSE PRACTITIONER

## 2021-04-07 PROCEDURE — 25010000002 DEXAMETHASONE SODIUM PHOSPHATE 10 MG/ML SOLUTION: Performed by: INTERNAL MEDICINE

## 2021-04-07 PROCEDURE — 93005 ELECTROCARDIOGRAM TRACING: CPT | Performed by: INTERNAL MEDICINE

## 2021-04-07 PROCEDURE — 71275 CT ANGIOGRAPHY CHEST: CPT

## 2021-04-07 PROCEDURE — 25010000002 HYDROMORPHONE PER 4 MG: Performed by: INTERNAL MEDICINE

## 2021-04-07 PROCEDURE — 63710000001 PROMETHAZINE PER 25 MG: Performed by: INTERNAL MEDICINE

## 2021-04-07 RX ORDER — DEXTROSE MONOHYDRATE 25 G/50ML
25 INJECTION, SOLUTION INTRAVENOUS ONCE
Status: DISCONTINUED | OUTPATIENT
Start: 2021-04-07 | End: 2021-04-14 | Stop reason: HOSPADM

## 2021-04-07 RX ORDER — SODIUM CHLORIDE 0.9 % (FLUSH) 0.9 %
10 SYRINGE (ML) INJECTION AS NEEDED
Status: DISCONTINUED | OUTPATIENT
Start: 2021-04-07 | End: 2021-04-08 | Stop reason: HOSPADM

## 2021-04-07 RX ORDER — SODIUM CHLORIDE 9 MG/ML
75 INJECTION, SOLUTION INTRAVENOUS CONTINUOUS
Status: ACTIVE | OUTPATIENT
Start: 2021-04-07 | End: 2021-04-07

## 2021-04-07 RX ORDER — POTASSIUM CHLORIDE 1.5 G/1.77G
40 POWDER, FOR SOLUTION ORAL 3 TIMES DAILY
Status: DISCONTINUED | OUTPATIENT
Start: 2021-04-07 | End: 2021-04-13

## 2021-04-07 RX ORDER — CALCIUM CARBONATE 500(1250)
500 TABLET ORAL DAILY
Status: DISCONTINUED | OUTPATIENT
Start: 2021-04-07 | End: 2021-04-13

## 2021-04-07 RX ORDER — PANTOPRAZOLE SODIUM 40 MG/1
40 TABLET, DELAYED RELEASE ORAL DAILY
Status: DISCONTINUED | OUTPATIENT
Start: 2021-04-07 | End: 2021-04-14 | Stop reason: HOSPADM

## 2021-04-07 RX ORDER — LEVOTHYROXINE SODIUM 0.07 MG/1
75 TABLET ORAL
Status: DISCONTINUED | OUTPATIENT
Start: 2021-04-07 | End: 2021-04-14 | Stop reason: HOSPADM

## 2021-04-07 RX ORDER — ACETAMINOPHEN 325 MG/1
650 TABLET ORAL EVERY 4 HOURS PRN
Status: DISCONTINUED | OUTPATIENT
Start: 2021-04-07 | End: 2021-04-14 | Stop reason: HOSPADM

## 2021-04-07 RX ORDER — PROMETHAZINE HYDROCHLORIDE 25 MG/1
25 TABLET ORAL ONCE
Status: COMPLETED | OUTPATIENT
Start: 2021-04-07 | End: 2021-04-07

## 2021-04-07 RX ORDER — PROMETHAZINE HYDROCHLORIDE 25 MG/1
25 TABLET ORAL EVERY 6 HOURS PRN
Status: DISCONTINUED | OUTPATIENT
Start: 2021-04-07 | End: 2021-04-14 | Stop reason: HOSPADM

## 2021-04-07 RX ORDER — AZATHIOPRINE 50 MG/1
150 TABLET ORAL DAILY
Status: DISCONTINUED | OUTPATIENT
Start: 2021-04-07 | End: 2021-04-14 | Stop reason: HOSPADM

## 2021-04-07 RX ORDER — BUTALBITAL, ACETAMINOPHEN AND CAFFEINE 50; 325; 40 MG/1; MG/1; MG/1
1 TABLET ORAL EVERY 4 HOURS PRN
Status: DISCONTINUED | OUTPATIENT
Start: 2021-04-07 | End: 2021-04-14 | Stop reason: HOSPADM

## 2021-04-07 RX ORDER — FOLIC ACID 1 MG/1
4 TABLET ORAL DAILY
Status: DISCONTINUED | OUTPATIENT
Start: 2021-04-07 | End: 2021-04-14 | Stop reason: HOSPADM

## 2021-04-07 RX ORDER — ACETAMINOPHEN 160 MG/5ML
650 SOLUTION ORAL EVERY 4 HOURS PRN
Status: DISCONTINUED | OUTPATIENT
Start: 2021-04-07 | End: 2021-04-14 | Stop reason: HOSPADM

## 2021-04-07 RX ORDER — DEXTROSE MONOHYDRATE 25 G/50ML
50 INJECTION, SOLUTION INTRAVENOUS ONCE
Status: DISCONTINUED | OUTPATIENT
Start: 2021-04-07 | End: 2021-04-14 | Stop reason: HOSPADM

## 2021-04-07 RX ORDER — CALCITRIOL 0.25 UG/1
0.5 CAPSULE, LIQUID FILLED ORAL EVERY 12 HOURS SCHEDULED
Status: DISCONTINUED | OUTPATIENT
Start: 2021-04-07 | End: 2021-04-13

## 2021-04-07 RX ORDER — HYDROMORPHONE HYDROCHLORIDE 1 MG/ML
0.5 INJECTION, SOLUTION INTRAMUSCULAR; INTRAVENOUS; SUBCUTANEOUS ONCE
Status: COMPLETED | OUTPATIENT
Start: 2021-04-07 | End: 2021-04-07

## 2021-04-07 RX ORDER — ASCORBIC ACID 500 MG
1000 TABLET ORAL DAILY
Status: DISCONTINUED | OUTPATIENT
Start: 2021-04-07 | End: 2021-04-14 | Stop reason: HOSPADM

## 2021-04-07 RX ORDER — FLUTICASONE PROPIONATE 50 MCG
1 SPRAY, SUSPENSION (ML) NASAL DAILY
Status: DISCONTINUED | OUTPATIENT
Start: 2021-04-07 | End: 2021-04-14 | Stop reason: HOSPADM

## 2021-04-07 RX ORDER — GABAPENTIN 300 MG/1
600 CAPSULE ORAL EVERY 8 HOURS SCHEDULED
Status: DISCONTINUED | OUTPATIENT
Start: 2021-04-07 | End: 2021-04-14 | Stop reason: HOSPADM

## 2021-04-07 RX ORDER — HYDROCODONE BITARTRATE AND ACETAMINOPHEN 5; 325 MG/1; MG/1
1 TABLET ORAL ONCE AS NEEDED
Status: COMPLETED | OUTPATIENT
Start: 2021-04-07 | End: 2021-04-07

## 2021-04-07 RX ORDER — SODIUM CHLORIDE 0.9 % (FLUSH) 0.9 %
3 SYRINGE (ML) INJECTION EVERY 12 HOURS SCHEDULED
Status: DISCONTINUED | OUTPATIENT
Start: 2021-04-07 | End: 2021-04-08 | Stop reason: HOSPADM

## 2021-04-07 RX ORDER — ZINC SULFATE 50(220)MG
220 CAPSULE ORAL DAILY
Status: DISCONTINUED | OUTPATIENT
Start: 2021-04-07 | End: 2021-04-14 | Stop reason: HOSPADM

## 2021-04-07 RX ORDER — NALOXONE HCL 0.4 MG/ML
0.4 VIAL (ML) INJECTION
Status: DISCONTINUED | OUTPATIENT
Start: 2021-04-07 | End: 2021-04-08

## 2021-04-07 RX ORDER — FLUDROCORTISONE ACETATE 0.1 MG/1
50 TABLET ORAL DAILY
Status: DISCONTINUED | OUTPATIENT
Start: 2021-04-07 | End: 2021-04-08

## 2021-04-07 RX ORDER — VENLAFAXINE 37.5 MG/1
75 TABLET ORAL 3 TIMES DAILY
Status: DISCONTINUED | OUTPATIENT
Start: 2021-04-07 | End: 2021-04-14 | Stop reason: HOSPADM

## 2021-04-07 RX ORDER — DEXAMETHASONE SODIUM PHOSPHATE 10 MG/ML
10 INJECTION, SOLUTION INTRAMUSCULAR; INTRAVENOUS ONCE
Status: COMPLETED | OUTPATIENT
Start: 2021-04-07 | End: 2021-04-07

## 2021-04-07 RX ORDER — HYDROCORTISONE 20 MG/1
20 TABLET ORAL
Status: DISCONTINUED | OUTPATIENT
Start: 2021-04-07 | End: 2021-04-13

## 2021-04-07 RX ORDER — HYDROMORPHONE HYDROCHLORIDE 1 MG/ML
0.5 INJECTION, SOLUTION INTRAMUSCULAR; INTRAVENOUS; SUBCUTANEOUS EVERY 4 HOURS PRN
Status: DISCONTINUED | OUTPATIENT
Start: 2021-04-07 | End: 2021-04-08

## 2021-04-07 RX ORDER — DEXTROSE MONOHYDRATE 25 G/50ML
25 INJECTION, SOLUTION INTRAVENOUS
Status: DISCONTINUED | OUTPATIENT
Start: 2021-04-07 | End: 2021-04-14 | Stop reason: HOSPADM

## 2021-04-07 RX ORDER — SODIUM CHLORIDE 9 MG/ML
3 INJECTION, SOLUTION INTRAVENOUS CONTINUOUS
Status: ACTIVE | OUTPATIENT
Start: 2021-04-07 | End: 2021-04-07

## 2021-04-07 RX ORDER — CYCLOSPORINE 0.5 MG/ML
1 EMULSION OPHTHALMIC 2 TIMES DAILY
Status: DISCONTINUED | OUTPATIENT
Start: 2021-04-07 | End: 2021-04-14 | Stop reason: HOSPADM

## 2021-04-07 RX ORDER — NADOLOL 20 MG/1
20 TABLET ORAL EVERY 12 HOURS
Status: DISCONTINUED | OUTPATIENT
Start: 2021-04-07 | End: 2021-04-14 | Stop reason: HOSPADM

## 2021-04-07 RX ORDER — MAGNESIUM SULFATE 1 G/100ML
1 INJECTION INTRAVENOUS ONCE
Status: COMPLETED | OUTPATIENT
Start: 2021-04-07 | End: 2021-04-07

## 2021-04-07 RX ORDER — MULTIPLE VITAMINS W/ MINERALS TAB 9MG-400MCG
1 TAB ORAL DAILY
Status: DISCONTINUED | OUTPATIENT
Start: 2021-04-07 | End: 2021-04-14 | Stop reason: HOSPADM

## 2021-04-07 RX ORDER — SODIUM CHLORIDE 0.9 % (FLUSH) 0.9 %
10 SYRINGE (ML) INJECTION AS NEEDED
Status: DISCONTINUED | OUTPATIENT
Start: 2021-04-07 | End: 2021-04-14 | Stop reason: HOSPADM

## 2021-04-07 RX ORDER — METHOCARBAMOL 500 MG/1
500 TABLET, FILM COATED ORAL 3 TIMES DAILY
Status: DISCONTINUED | OUTPATIENT
Start: 2021-04-07 | End: 2021-04-14 | Stop reason: HOSPADM

## 2021-04-07 RX ORDER — ACETAMINOPHEN 650 MG/1
650 SUPPOSITORY RECTAL EVERY 4 HOURS PRN
Status: DISCONTINUED | OUTPATIENT
Start: 2021-04-07 | End: 2021-04-14 | Stop reason: HOSPADM

## 2021-04-07 RX ORDER — HYDROCORTISONE 10 MG/1
10 TABLET ORAL NIGHTLY
Status: DISCONTINUED | OUTPATIENT
Start: 2021-04-07 | End: 2021-04-13

## 2021-04-07 RX ORDER — SODIUM CHLORIDE 0.9 % (FLUSH) 0.9 %
10 SYRINGE (ML) INJECTION EVERY 12 HOURS SCHEDULED
Status: DISCONTINUED | OUTPATIENT
Start: 2021-04-07 | End: 2021-04-14 | Stop reason: HOSPADM

## 2021-04-07 RX ORDER — NICOTINE POLACRILEX 4 MG
15 LOZENGE BUCCAL
Status: DISCONTINUED | OUTPATIENT
Start: 2021-04-07 | End: 2021-04-14 | Stop reason: HOSPADM

## 2021-04-07 RX ADMIN — ZINC SULFATE 220 MG (50 MG) CAPSULE 220 MG: CAPSULE at 10:12

## 2021-04-07 RX ADMIN — GABAPENTIN 600 MG: 300 CAPSULE ORAL at 21:03

## 2021-04-07 RX ADMIN — HYDROCODONE BITARTRATE AND ACETAMINOPHEN 1 TABLET: 5; 325 TABLET ORAL at 22:43

## 2021-04-07 RX ADMIN — HYDROCORTISONE 20 MG: 20 TABLET ORAL at 10:15

## 2021-04-07 RX ADMIN — METHOCARBAMOL 500 MG: 500 TABLET, FILM COATED ORAL at 21:03

## 2021-04-07 RX ADMIN — FOLIC ACID 4 MG: 1 TABLET ORAL at 10:37

## 2021-04-07 RX ADMIN — LORAZEPAM 1 MG: 2 INJECTION INTRAMUSCULAR; INTRAVENOUS at 00:04

## 2021-04-07 RX ADMIN — PANTOPRAZOLE SODIUM 40 MG: 40 TABLET, DELAYED RELEASE ORAL at 10:11

## 2021-04-07 RX ADMIN — FLUTICASONE PROPIONATE 1 SPRAY: 50 SPRAY, METERED NASAL at 10:14

## 2021-04-07 RX ADMIN — LEVOTHYROXINE SODIUM 75 MCG: 75 TABLET ORAL at 06:30

## 2021-04-07 RX ADMIN — POTASSIUM CHLORIDE 40 MEQ: 1.5 FOR SOLUTION ORAL at 10:14

## 2021-04-07 RX ADMIN — VENLAFAXINE 75 MG: 75 TABLET ORAL at 21:03

## 2021-04-07 RX ADMIN — DEXTROSE 15 G: 15 GEL ORAL at 07:10

## 2021-04-07 RX ADMIN — Medication 1000 MG: at 10:07

## 2021-04-07 RX ADMIN — Medication 1000 MG: at 16:42

## 2021-04-07 RX ADMIN — PROMETHAZINE HYDROCHLORIDE 25 MG: 25 TABLET ORAL at 10:50

## 2021-04-07 RX ADMIN — METHOCARBAMOL 500 MG: 500 TABLET, FILM COATED ORAL at 16:43

## 2021-04-07 RX ADMIN — HYDROMORPHONE HYDROCHLORIDE 0.5 MG: 1 INJECTION, SOLUTION INTRAMUSCULAR; INTRAVENOUS; SUBCUTANEOUS at 10:38

## 2021-04-07 RX ADMIN — PROMETHAZINE HYDROCHLORIDE 25 MG: 25 TABLET ORAL at 02:13

## 2021-04-07 RX ADMIN — PARATHYROID HORMONE 25 MCG: 25 INJECTION, POWDER, LYOPHILIZED, FOR SOLUTION SUBCUTANEOUS at 18:24

## 2021-04-07 RX ADMIN — CALCIUM GLUCONATE 1 G: 98 INJECTION, SOLUTION INTRAVENOUS at 18:11

## 2021-04-07 RX ADMIN — METHOCARBAMOL 500 MG: 500 TABLET, FILM COATED ORAL at 10:09

## 2021-04-07 RX ADMIN — HYDROMORPHONE HYDROCHLORIDE 0.5 MG: 1 INJECTION, SOLUTION INTRAMUSCULAR; INTRAVENOUS; SUBCUTANEOUS at 18:16

## 2021-04-07 RX ADMIN — VENLAFAXINE 75 MG: 75 TABLET ORAL at 10:08

## 2021-04-07 RX ADMIN — Medication 1 TABLET: at 10:08

## 2021-04-07 RX ADMIN — VENLAFAXINE 75 MG: 75 TABLET ORAL at 16:43

## 2021-04-07 RX ADMIN — HYDROCORTISONE 10 MG: 10 TABLET ORAL at 21:02

## 2021-04-07 RX ADMIN — HYDROMORPHONE HYDROCHLORIDE 0.5 MG: 1 INJECTION, SOLUTION INTRAMUSCULAR; INTRAVENOUS; SUBCUTANEOUS at 21:03

## 2021-04-07 RX ADMIN — DEXAMETHASONE SODIUM PHOSPHATE 10 MG: 10 INJECTION, SOLUTION INTRAMUSCULAR; INTRAVENOUS at 18:16

## 2021-04-07 RX ADMIN — FLUDROCORTISONE ACETATE 50 MCG: 0.1 TABLET ORAL at 10:37

## 2021-04-07 RX ADMIN — CYCLOSPORINE 1 DROP: 0.5 EMULSION OPHTHALMIC at 10:23

## 2021-04-07 RX ADMIN — CALCITRIOL CAPSULES 0.25 MCG 0.5 MCG: 0.25 CAPSULE ORAL at 10:13

## 2021-04-07 RX ADMIN — NADOLOL 20 MG: 20 TABLET ORAL at 21:03

## 2021-04-07 RX ADMIN — GABAPENTIN 600 MG: 300 CAPSULE ORAL at 06:30

## 2021-04-07 RX ADMIN — BUTALBITAL, ACETAMINOPHEN AND CAFFEINE 1 TABLET: 50; 325; 40 TABLET ORAL at 17:14

## 2021-04-07 RX ADMIN — HYDROMORPHONE HYDROCHLORIDE 0.5 MG: 1 INJECTION, SOLUTION INTRAMUSCULAR; INTRAVENOUS; SUBCUTANEOUS at 06:37

## 2021-04-07 RX ADMIN — AZATHIOPRINE 150 MG: 50 TABLET ORAL at 10:20

## 2021-04-07 RX ADMIN — IOPAMIDOL 100 ML: 755 INJECTION, SOLUTION INTRAVENOUS at 01:57

## 2021-04-07 RX ADMIN — GABAPENTIN 600 MG: 300 CAPSULE ORAL at 13:09

## 2021-04-07 RX ADMIN — MAGNESIUM SULFATE HEPTAHYDRATE 1 G: 1 INJECTION, SOLUTION INTRAVENOUS at 02:16

## 2021-04-07 RX ADMIN — CALCIUM 500 MG: 500 TABLET ORAL at 10:13

## 2021-04-07 RX ADMIN — PROMETHAZINE HYDROCHLORIDE 25 MG: 25 TABLET ORAL at 16:43

## 2021-04-07 RX ADMIN — CALCIUM GLUCONATE 1 G: 98 INJECTION, SOLUTION INTRAVENOUS at 00:09

## 2021-04-07 RX ADMIN — POTASSIUM CHLORIDE 40 MEQ: 1.5 FOR SOLUTION ORAL at 16:43

## 2021-04-07 RX ADMIN — Medication 1000 MG: at 22:43

## 2021-04-07 RX ADMIN — DEXTROSE 15 G: 15 GEL ORAL at 07:38

## 2021-04-07 RX ADMIN — OXYCODONE HYDROCHLORIDE AND ACETAMINOPHEN 1000 MG: 500 TABLET ORAL at 10:12

## 2021-04-07 RX ADMIN — HYDROMORPHONE HYDROCHLORIDE 0.5 MG: 1 INJECTION, SOLUTION INTRAMUSCULAR; INTRAVENOUS; SUBCUTANEOUS at 16:44

## 2021-04-07 RX ADMIN — SODIUM CHLORIDE 75 ML/HR: 9 INJECTION, SOLUTION INTRAVENOUS at 06:30

## 2021-04-07 RX ADMIN — PROGESTERONE 200 MG: 100 CAPSULE ORAL at 10:18

## 2021-04-07 RX ADMIN — CALCITRIOL CAPSULES 0.25 MCG 0.5 MCG: 0.25 CAPSULE ORAL at 21:03

## 2021-04-07 RX ADMIN — CYCLOSPORINE 1 DROP: 0.5 EMULSION OPHTHALMIC at 21:08

## 2021-04-07 NOTE — PROGRESS NOTES
Discharge Planning Assessment  [facility]     Patient Name: Krista Richter  MRN: [id number]  Today's Date: 4/7/2021    Admit Date: 4/6/2021    Discharge Needs Assessment     Row Name 04/07/21 1254       Living Environment    Lives With  parent(s)    Name(s) of Who Lives With Patient  Maritza iRchter    Current Living Arrangements  home/apartment/condo    Primary Care Provided by  self    Provides Primary Care For  no one    Family Caregiver if Needed  significant other;parent(s)    Quality of Family Relationships  involved;helpful    Able to Return to Prior Arrangements  yes       Transition Planning    Patient/Family Anticipates Transition to  home with family    Patient/Family Anticipated Services at Transition  none    Transportation Anticipated  family or friend will provide       Discharge Needs Assessment    Readmission Within the Last 30 Days  no previous admission in last 30 days    Equipment Currently Used at Home  walker, rolling;cane, quad;oxygen;commode    Concerns to be Addressed  discharge planning    Anticipated Changes Related to Illness  none    Equipment Needed After Discharge  none        Discharge Plan     Row Name 04/07/21 9934       Plan    Plan  home with family    Patient/Family in Agreement with Plan  yes    Plan Comments  Pt lives with her parents in [facility] [facility]. She reports she had been independent with ADLs prior to admit. She has home O2, multiple walkers, a cane, and bedside commode at home. She is followed by her PCP and has drug coverage. At this time her plan for discharge is to return home. CM to follow.    Final Discharge Disposition Code  01 - home or self-care        Continued Care and Services - Admitted Since 4/6/2021    Coordination has not been started for this encounter.         Demographic Summary     Row Name 04/07/21 1254       General Information    Admission Type  inpatient    Referral Source  physician    Reason for Consult  discharge planning    General  Information Comments  PCP Agustin Turner       Contact Information    Permission Granted to Share Info With  family/designee    Contact Information Comments  Maritza Richter [phone]        Functional Status     Row Name 04/07/21 1253       Functional Status, IADL    Medications  independent    Meal Preparation  independent    Housekeeping  independent    Laundry  independent    Shopping  independent       Mental Status    General Appearance WDL  WDL       Mental Status Summary    Recent Changes in Mental Status/Cognitive Functioning  no changes        Psychosocial    No documentation.       Abuse/Neglect    No documentation.       Legal    No documentation.       Substance Abuse    No documentation.       Patient Forms    No documentation.           Bryanna Norman RN

## 2021-04-07 NOTE — PLAN OF CARE
Goal Outcome Evaluation:  Plan of Care Reviewed With: patient  Progress: no change  Outcome Summary: Pt admitted to unit from ED.

## 2021-04-07 NOTE — ED PROVIDER NOTES
Subjective   Krista Richter is a 30 y.o. female who presents to the ED following an episode of syncope. She complains of chest pain and shortness of breath that began 2 months ago as well as increasing weakness and fatigue while she denies fever, chills, nausea, vomiting, headache, blurred vision and double vision as well as urinary frequency, burning, and urgency. The patient reports that her oxygen saturation drops to 70%, and she faints for 10 seconds. She saw Dr. Jeramy Meyer, cardiology, who thinks she is suffering congenital shunting. He wants her to see Dr. Adam Mcdaniels, adult congenital heart disease, at [facility], but an appointment is yet to be made. The patient saw a pulmonologist who did a complete pulmonary workup, and her pulmonary functions and all other tests were unremarkable. Her lungs were unremarkable despite her oxygen saturation dropping to 70%. The patient currently uses oxygen with activity and at night. Her last Covid-19 test was 2 weeks ago and negative. There are no other acute symptoms present at this time.      History provided by:  Patient  Syncope  Episode history:  Single  Most recent episode:  Today  Duration:  10 seconds  Timing:  Sporadic  Progression:  Unchanged  Chronicity:  Recurrent  Context comment:  Oxygen saturation drops to 70%.  Relieved by:  Nothing  Worsened by:  Nothing  Ineffective treatments:  Certain positions, sitting up, lying down, drinking, eating and activity (Oxygen with activity and at night.)  Associated symptoms: chest pain, shortness of breath and weakness    Associated symptoms: no fever, no headaches, no nausea and no vomiting        Review of Systems   Constitutional: Positive for fatigue. Negative for chills and fever.   Eyes: Negative for visual disturbance.        Denies blurred vision and double vision.   Respiratory: Positive for shortness of breath.         Oxygen saturation drops to 70%.   Cardiovascular: Positive for chest  "pain and syncope.   Gastrointestinal: Negative for nausea and vomiting.   Genitourinary: Negative for dysuria, frequency and urgency.   Neurological: Positive for syncope and weakness. Negative for headaches.        Oxygen saturation drops to 70% and patient faints.   All other systems reviewed and are negative.      Past Medical History:   Diagnosis Date   • Adrenal insufficiency (CMS/HCC)    • Alopecia    • Anemia    • Anxiety    • APS type 1 (CMS/HCC)    • Asthma    • Autoimmune hepatitis (CMS/HCC)    • Depression    • Dermatomyositis (CMS/HCC)    • Disease of thyroid gland    • Fibromyalgia    • Functional asplenia    • History of kidney stones    • Hypoparathyroidism (CMS/HCC)    • Insulin dependent diabetes mellitus    • Long Q-T syndrome    • Oxygen desaturation    • Pancreatic insufficiency    • Parathyroid abnormality (CMS/HCC)    • Polyglandular deficiency syndrome (CMS/HCC)    • Premature ovarian failure    • Sleep apnea    • Spleen absent    • Tetany     HAS EPISODES   • Transfusion history        Allergies   Allergen Reactions   • Azithromycin Hives   • Cefpodoxime Hives   • Cephalosporins Hives   • Clarithromycin Hives     biaxin  biaxin  biaxin   • Levofloxacin Hives   • Nitrofurantoin Hives   • Nystatin Hives   • Penicillins Hives   • Sulfa Antibiotics Hives   • Midazolam Unknown - Low Severity   • Morphine Other (See Comments)     \"it doesn't work. [facility] told me to list it as an allergy\"   • Nitrofurantoin Macrocrystal Other (See Comments) and Hives   • Ondansetron Other (See Comments)     \"it doesn't work. [facility] told me to list it as an allergy\"       Past Surgical History:   Procedure Laterality Date   • APPENDECTOMY     • CARDIAC SURGERY      LOOP RECORDER   • CHOLECYSTECTOMY     • COLONOSCOPY N/A 6/5/2020    Procedure: COLONOSCOPY;  Surgeon: Gavin Vargas MD;  [facility];  Service: Gastroenterology;  Laterality: N/A;   • ENDOSCOPY N/A 4/26/2018    Procedure: " ESOPHAGOGASTRODUODENOSCOPY;  Surgeon: Gavin Vargas MD;  Location:  [facility] ENDOSCOPY;  Service: Gastroenterology   • ENDOSCOPY N/A 8/9/2018    Procedure: ESOPHAGOGASTRODUODENOSCOPY-DILATION;  Surgeon: Gavin Vargas MD;  Location:  [facility] ENDOSCOPY;  Service: Gastroenterology   • ENDOSCOPY     • ENDOSCOPY N/A 2/6/2019    Procedure: ESOPHAGOGASTRODUODENOSCOPY;  Surgeon: Gavin Vargas MD;  Location:  [facility] ENDOSCOPY;  Service: Gastroenterology   • ENDOSCOPY N/A 7/24/2019    Procedure: ESOPHAGOGASTRODUODENOSCOPY;  Surgeon: Gavin Vargas MD;  Location:  [facility]OSCOPY;  Service: Gastroenterology   • ENDOSCOPY N/A 10/18/2019    Procedure: ESOPHAGOGASTRODUODENOSCOPY WITH BALLOON DILATION;  Surgeon: Gavin Vargas MD;  Location:  [facility] ENDOSCOPY;  Service: Gastroenterology   • ENDOSCOPY N/A 6/4/2020    Procedure: ESOPHAGOGASTRODUODENOSCOPY  WITH ESOPHAGEAL BALLOON DILATATION;  Surgeon: Gavin Vargas MD;  Location:  [facility] ENDOSCOPY;  Service: Gastroenterology;  Laterality: N/A;  dilation done with 60F dilator    • ENDOSCOPY N/A 1/29/2021    Procedure: ESOPHAGOGASTRODUODENOSCOPY;  Surgeon: Gavin Vargas MD;  Location:  [facility] ENDOSCOPY;  Service: Gastroenterology;  Laterality: N/A;   • EXPLORATORY LAPAROTOMY      MULTIPLE   • HAND SURGERY      4 TOTAL   • HERNIA REPAIR     • LIVER BIOPSY     • MUSCLE BIOPSY     • MUSCLE BIOPSY     • PEG TUBE INSERTION     • PEG TUBE REMOVAL     • PORTACATH PLACEMENT         Family History   Problem Relation Age of Onset   • Kidney disease Father        Social History     Socioeconomic History   • Marital status: Significant Other     Spouse name: Not on file   • Number of children: Not on file   • Years of education: Not on file   • Highest education level: Not on file   Tobacco Use   • Smoking status: Never Smoker   • Smokeless tobacco: Never Used   Substance and Sexual Activity   •  Alcohol use: Yes     Comment: SOCIAL   • Drug use: No   • Sexual activity: Defer         Objective   Physical Exam  Vitals and nursing note reviewed.   Constitutional:       Appearance: Normal appearance. She is well-developed. She is ill-appearing. She is not toxic-appearing.   HENT:      Head: Normocephalic and atraumatic.   Eyes:      General: Lids are normal.      Conjunctiva/sclera: Conjunctivae normal.      Pupils: Pupils are equal, round, and reactive to light.   Neck:      Trachea: Trachea normal.   Cardiovascular:      Rate and Rhythm: Normal rate and regular rhythm.      Pulses: Normal pulses.      Heart sounds: Normal heart sounds.   Pulmonary:      Effort: Pulmonary effort is normal. No respiratory distress.      Breath sounds: Normal breath sounds. No decreased breath sounds, wheezing, rhonchi or rales.   Abdominal:      General: Bowel sounds are normal.      Palpations: Abdomen is soft.      Tenderness: There is no abdominal tenderness.   Musculoskeletal:         General: Normal range of motion.      Cervical back: Full passive range of motion without pain and normal range of motion.   Skin:     General: Skin is warm and dry.      Findings: No rash.   Neurological:      Mental Status: She is alert and oriented to person, place, and time.      Cranial Nerves: No cranial nerve deficit.   Psychiatric:         Speech: Speech normal.         Behavior: Behavior normal. Behavior is cooperative.         Procedures         ED Course  ED Course as of Apr 07 0344   Tue Apr 06, 2021   2230 Pt is cramping all over her extremities.  Pt legs are drawing up, she is experiencing spasms.  Patient believes her symptoms are related to low calcium.  Patient's friend remains at bedside.    [KG]   2240 Pt is requesting pain meds at this time.      [KG]   Wed Apr 07, 2021   0300 Patient will be admitted to the hospitalist.  Dr. Dexter contacted.  He agrees to admit the patient.  Cardiology will be consulted.    [KG]      ED  Course User Index  [KG] Emma Cervantes XIOMARA, APRN      Recent Results (from the past 24 hour(s))   Light Blue Top    Collection Time: 04/06/21  9:31 PM   Result Value Ref Range    Extra Tube hold for add-on    Green Top (Gel)    Collection Time: 04/06/21  9:31 PM   Result Value Ref Range    Extra Tube Hold for add-ons.    Lavender Top    Collection Time: 04/06/21  9:31 PM   Result Value Ref Range    Extra Tube hold for add-on    Gold Top - SST    Collection Time: 04/06/21  9:31 PM   Result Value Ref Range    Extra Tube Hold for add-ons.    Gray Top - Ice    Collection Time: 04/06/21  9:31 PM   Result Value Ref Range    Extra Tube Hold for add-ons.    Comprehensive Metabolic Panel    Collection Time: 04/06/21  9:31 PM    Specimen: Blood   Result Value Ref Range    Glucose 69 65 - 99 mg/dL    BUN 13 6 - 20 mg/dL    Creatinine 0.71 0.57 - 1.00 mg/dL    Sodium 137 136 - 145 mmol/L    Potassium 5.0 3.5 - 5.2 mmol/L    Chloride 95 (L) 98 - 107 mmol/L    CO2 28.0 22.0 - 29.0 mmol/L    Calcium 9.7 8.6 - 10.5 mg/dL    Total Protein 8.2 6.0 - 8.5 g/dL    Albumin 4.80 3.50 - 5.20 g/dL    ALT (SGPT) 18 1 - 33 U/L    AST (SGOT) 38 (H) 1 - 32 U/L    Alkaline Phosphatase 69 39 - 117 U/L    Total Bilirubin 1.1 0.0 - 1.2 mg/dL    eGFR Non African Amer 96 >60 mL/min/1.73    Globulin 3.4 gm/dL    A/G Ratio 1.4 g/dL    BUN/Creatinine Ratio 18.3 7.0 - 25.0    Anion Gap 14.0 5.0 - 15.0 mmol/L   Troponin    Collection Time: 04/06/21  9:31 PM    Specimen: Blood   Result Value Ref Range    Troponin T <0.010 0.000 - 0.030 ng/mL   CBC Auto Differential    Collection Time: 04/06/21  9:31 PM    Specimen: Blood   Result Value Ref Range    WBC 13.56 (H) 3.40 - 10.80 10*3/mm3    RBC 4.66 3.77 - 5.28 10*6/mm3    Hemoglobin 14.8 12.0 - 15.9 g/dL    Hematocrit 45.1 34.0 - 46.6 %    MCV 96.8 79.0 - 97.0 fL    MCH 31.8 26.6 - 33.0 pg    MCHC 32.8 31.5 - 35.7 g/dL    RDW 13.3 12.3 - 15.4 %    RDW-SD 47.6 37.0 - 54.0 fl    MPV 12.7 (H) 6.0 - 12.0 fL     Platelets 287 140 - 450 10*3/mm3    Neutrophil % 60.6 42.7 - 76.0 %    Lymphocyte % 19.6 19.6 - 45.3 %    Monocyte % 13.7 (H) 5.0 - 12.0 %    Eosinophil % 5.0 0.3 - 6.2 %    Basophil % 0.6 0.0 - 1.5 %    Immature Grans % 0.5 0.0 - 0.5 %    Neutrophils, Absolute 8.21 (H) 1.70 - 7.00 10*3/mm3    Lymphocytes, Absolute 2.66 0.70 - 3.10 10*3/mm3    Monocytes, Absolute 1.86 (H) 0.10 - 0.90 10*3/mm3    Eosinophils, Absolute 0.68 (H) 0.00 - 0.40 10*3/mm3    Basophils, Absolute 0.08 0.00 - 0.20 10*3/mm3    Immature Grans, Absolute 0.07 (H) 0.00 - 0.05 10*3/mm3    nRBC 0.0 0.0 - 0.2 /100 WBC   Calcium, Ionized    Collection Time: 04/06/21  9:31 PM    Specimen: Blood   Result Value Ref Range    Ionized Calcium 1.31 1.12 - 1.32 mmol/L   Magnesium    Collection Time: 04/06/21  9:31 PM    Specimen: Blood   Result Value Ref Range    Magnesium 1.8 1.6 - 2.6 mg/dL   POC Glucose Once    Collection Time: 04/07/21  2:09 AM    Specimen: Blood   Result Value Ref Range    Glucose 71 70 - 130 mg/dL     Note: In addition to lab results from this visit, the labs listed above may include labs taken at another facility or during a different encounter within the last 24 hours. Please correlate lab times with ED admission and discharge times for further clarification of the services performed during this visit.    CT Angiogram Chest   Final Result      1. No PE or aortic dissection.   2. Negative for pneumonia.      Signer Name: Elmer Mims MD    Signed: 4/7/2021 2:16 AM    Workstation Name: [email or web address]     [facility]        Vitals:    04/07/21 0000 04/07/21 0030 04/07/21 0100 04/07/21 0130   BP: 107/82 111/71 105/71 116/77   Pulse: 105 91 81 85   Resp:       Temp:       TempSrc:       SpO2: 100% 97%  97%   Weight:       Height:         Medications   sodium chloride 0.9 % flush 10 mL (has no administration in time range)   sodium chloride 0.9 % flush 10 mL (has no administration in time range)   dextrose (D50W) 25 g/  50mL Intravenous Solution 25 g (0 g Intravenous Hold 4/7/21 0216)   dextrose (D50W) 25 g/ 50mL Intravenous Solution 50 mL (has no administration in time range)   sodium chloride 0.9 % bolus 1,000 mL ( Intravenous Currently Infusing 4/7/21 0213)   HYDROmorphone (DILAUDID) injection 1 mg (1 mg Intravenous Given 4/6/21 2215)   iopamidol (ISOVUE-370) 76 % injection 100 mL (100 mL Intravenous Given 4/7/21 0157)   LORazepam (ATIVAN) injection 1 mg (1 mg Intravenous Given 4/7/21 0004)   calcium gluconate 1 g in sodium chloride 0.9 % 100 mL IVPB (0 g Intravenous Stopped 4/7/21 0132)   promethazine (PHENERGAN) tablet 25 mg (25 mg Oral Given 4/7/21 0213)   magnesium sulfate in D5W 1g/100mL (PREMIX) (1 g Intravenous New Bag 4/7/21 0216)     ECG/EMG Results (last 24 hours)     Procedure Component Value Units Date/Time    ECG 12 Lead [[id number]] Collected: 04/06/21 1834     Updated: 04/06/21 1835        ECG 12 Lead                                                      MDM    Final diagnoses:   Syncope, unspecified syncope type   Chest pain, unspecified type       Documentation assistance provided by scribe Lauren K Collett.  Information recorded by the scribe was done at my direction and has been verified and validated by me.     Collett, Lauren K  04/06/21 3104       Emma Cervantes APRN  04/07/21 5413

## 2021-04-07 NOTE — CONSULTS
[facility] - Cardiology Consult    Krista Richter  1990  S207/1      Admission Date:  4/6/2021    Consultation Date:  04/07/21        PCP:  Agustin Turner MD  Referring MD:  Dr. Marisol Odonnell - hospitalist  Consulting MD:  Dr. Elmer Fitzpatrick        CC:  Syncope, Dyspnea, Hypoxia    Reason for Consult: Patient known to Dr. Meyer, followed previously for syncope.    Problem List:     1. Recurrent syncope  2. 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 Dx by Dr. Chapman at age 4 after abnormal EKG   b. Trouble regulating potassium   c. Black out periods and seizure like activity jayden with swimming and does get anxiolytic without a LifeVest.   d. Initiated on Nadolol for tachycardia with HR up into low high 190s.  e.  twelve-lead EKG 11/20 1/2015 QTc 490 ms  3. Polyglandular autoimmune syndrome  a. Rituxan in the past   4. Chronic Lung Disease/Hpoxia  a. Follows pulmonologist at [facility]  b. Echo 4/7/2021:  EF 55%, no significant valvular abnormalities, appears negative for interatrial shunting.  5. Esophageal stenosis   a. Status post dilatation 1-   6. T2DM  7. Hypoparathyroidism  8. Dermatomyositis  9. Autoimmune hepatitis  10. Obstructive sleep apnea  10.  Adrenal Insufficiency  11.  Gastroparesis  12.  Depression  13.  Anxiety   14.  Surgeries:  a. Cholecystectomy  b. Appendectomy  c. G-tube insertion and removal  d. Umbilical hernia repair  e. Implantable loop recorder insertion             Allergies:  is allergic to azithromycin, cefpodoxime, cephalosporins, clarithromycin, levofloxacin, nitrofurantoin, nystatin, penicillins, sulfa antibiotics, midazolam, morphine, nitrofurantoin macrocrystal, and ondansetron.    Medications Prior to Admission   Medication Sig Dispense Refill Last Dose   • albuterol (PROVENTIL HFA;VENTOLIN HFA) 108 (90 Base) MCG/ACT inhaler Take 2 Puffs by inhalation every 4 hours as needed for wheezing.      • Alcohol Swabs (ALCOHOL  PREP) 70 % pads Check blood glucose up to 2 times daily      • ascorbic acid (VITAMIN C) 1000 MG tablet Take 1,000 mg by mouth Daily.      • azaTHIOprine (IMURAN) 100 MG tablet Take 150 mg by mouth Daily.      • azaTHIOprine (IMURAN) 50 MG tablet Take 3 tablets by mouth Daily. 90 tablet 2    • azithromycin (ZITHROMAX) 250 MG tablet Take 250 mg by mouth Daily.      • Blood Glucose Calibration (OT ULTRA/FASTTK CNTRL SOLN) solution Use weekly and as needed to calibrate meter      • Blood Glucose Monitoring Suppl (ONE TOUCH ULTRA 2) w/Device kit Use to check blood glucose up to 2 times daily      • calcitriol (ROCALTROL) 0.5 MCG capsule Take 0.5 mcg by mouth 2 (Two) Times a Day.      • Calcium Citrate 250 MG tablet Take 500 mg by mouth Daily.      • cycloSPORINE (RESTASIS) 0.05 % ophthalmic emulsion 1 drop. PRN      • cyproheptadine (PERIACTIN) 4 MG tablet Take 4 mg by mouth 2 (Two) Times a Day.      • dronabinol (MARINOL) 5 MG capsule 5 mg 4 (Four) Times a Day As Needed.      • dronabinol (Marinol) 5 MG capsule Take 1 capsule by mouth Daily As Needed (anorexia). 30 capsule 0    • EPINEPHrine (EPIPEN 2-TEDDY) 0.3 MG/0.3ML solution auto-injector injection EpiPen 0.3 mg/0.3 mL injection, auto-injector   Take 1 auto by injection route.      • ESTRADIOL PO Take  by mouth.      • eszopiclone (Lunesta) 2 MG tablet Take 2 mg by mouth Every Night. Take immediately before bedtime      • fludrocortisone 0.1 MG tablet TAKE ONE TABLET BY MOUTH DAILY..05MG PER PT      • fluticasone (FLONASE) 50 MCG/ACT nasal spray 1 spray.      • folic acid (FOLVITE) 1 MG tablet Take 4 mg by mouth Daily.      • gabapentin (NEURONTIN) 600 MG tablet Take 900 mg by mouth 3 (Three) Times a Day.      • hydrocortisone (CORTEF) 10 MG tablet Take 10 mg by mouth Every Night.      • hydrocortisone (CORTEF) 20 MG tablet Take 20 mg by mouth Every Morning. 20 MG IN AM      • hydrocortisone sodium succinate (SOLU-CORTEF) 100 MG injection Give 100 mg ( 2 mL )  "intramuscular as needed for vomiting, severe illness  Dispense actovial      • Hypromellose (SYSTANE OVERNIGHT THERAPY) 0.3 % gel Systane Gel 0.3 % eye gel      • Insulin Pen Needle 32G X 4 MM misc Use to administer Natpara subcutaneously daily      • levothyroxine (SYNTHROID, LEVOTHROID) 75 MCG tablet levothyroxine 75 mcg tablet      • lidocaine (LIDODERM) 5 % Place 1 patch on the skin as directed by provider Daily. Remove & Discard patch within 12 hours or as directed by MD 14 patch 0    • magnesium oxide (MAGOX) 400 (241.3 Mg) MG tablet tablet Take 1,000 mg by mouth 3 (Three) Times a Day.      • metFORMIN (GLUCOPHAGE) 500 MG tablet Take 500 mg by mouth 2 (Two) Times a Day With Meals.      • methocarbamol (ROBAXIN) 500 MG tablet Take 1 tablet by mouth 3 (Three) Times a Day. 14 tablet 0    • Misc Natural Products (RA XYDRA EF PO) Take  by mouth.      • Multiple Vitamins-Minerals (MULTIVITAMIN ADULT PO) Take 1 tablet by mouth.      • nadolol (CORGARD) 20 MG tablet Take 20 mg by mouth 2 (two) times a day.      • omeprazole (priLOSEC) 40 MG capsule Take 1 capsule by mouth 2 (two) times a day. 30 minutes before a meal. (Patient taking differently: Take 40 mg by mouth 2 (two) times a day. 30 minutes before a meal. PRN) 60 capsule 2    • Parathyroid Hormone, Recomb, (NATPARA SC) Inject 1 Cartridge under the skin into the appropriate area as directed 2 (two) times a day.      • Potassium Chloride (KLOR-CON 10 PO) Take 40 mEq by mouth 3 (Three) Times a Day.      • progesterone (PROMETRIUM) 200 MG capsule Take 200 mg by mouth Daily.      • promethazine (PHENERGAN) 25 MG suppository Insert 1 suppository into the rectum Daily As Needed for Nausea or Vomiting. 30 suppository 0    • promethazine (PHENERGAN) 25 MG tablet Take 1 tablet by mouth Every 6 (Six) Hours As Needed for Nausea or Vomiting. 12 tablet 0    • Transparent Dressings (TEGADERM FILM 2-3/8\"X2-3/4\") misc Apply over pump insertion site every 2 days..      • " venlafaxine (EFFEXOR) 25 MG tablet Take 75 mg by mouth 3 (Three) Times a Day.      • Vitamin D, Cholecalciferol, 1000 units capsule Take 1,000 unit marking on U-100 syringe by mouth 2 (Two) Times a Day.      • zinc sulfate (ZINCATE) 220 (50 Zn) MG capsule zinc sulfate 220 mg (50 mg) capsule      • ONETOUCH DELICA LANCETS 33G misc Use to check blood glucose up to 2 times daily          sodium chloride, 75 mL/hr, Last Rate: 75 mL/hr (04/07/21 0630)          HPI:  Krista Richter is a 31 yo CF with the unfortunate noted above history who was seen in consultation by Dr. Meyer on April 2 (last week).  She was referred to him by Dr. Rock for the treatment and management of long QT syndrome.  The patient has a normal QT interval.  Has had a long QT syndrome diagnosed at the age of 4.  It is consistent with long QT 1 although she has never had genetic testing.  She has been on now Zoloft for quite some time and has done very well.  She denies any family history of sudden cardiac death or unexplained syncope.  She did have an EKG documented in 2015 that did demonstrate a QT interval approximately 490 ms.  Her most recent EKGs however have demonstrated a normal QT interval.  She has had no side effects to the enalapril.  Her biggest issue is worsening hypoxia, severe dyspnea on exertion, and severe shortness of breath at baseline.  She is presently using 2 L of oxygen and is extremely frustrated and upset by her limited exercise tolerance.  She recently was seen in the ER at [facility] and a CT chest angiogram demonstrated no pulmonary embolism and no acute intrathoracic process noted.  She states that she has had pulmonary function test in the past and was told that they were normal as well.  In regards to her overall general health, unfortunately she has had most of the clinical manifestations associated with polyglandular autoimmune deficiency.     At time of office visit with Dr. Meyer, he felt her hypoxia was  likely secondary to Pulmonary (normal work up to date) vs cardiac shunting.  He feels that she needs referral to a structural heart specialist to assess BAO, R/LHC with shunt run and possibly a split fxn pulmonary ventilation scan.  He had suggested referral to /Dr. Mcdaniels but patient requested treatment at [facility] for continuity of care.  No changes in medications were made.    She presents to [facility] with persistent syncopal episodes and continued shortness of breath.  Upon arrival, O2 sats % on 2L, CTA Chest is negative for acute findings.  EKG is negative with normal QT.   She received 1 g calcium gluconate due to her tetany as well as Ativan and magnesium while in the ED. She was admitted to hospital medicine for further evaluation.  Cardiology has been asked to see Ms. Richter in consultation for further care and management.  An echo and carotid duplex has been ordered by the hospitalist.  UA is negative.  Home medications have been resumed.    Worsening hypoxia recently now on 24-hour oxygen, reports left-sided chest pain, constant, radiates to her back and midsternal, no alleviating or aggravating factors.  No previous ischemic work-up.  Extremely fatigued, can not exercise like she used to 2 months ago.      Social History     Socioeconomic History   • Marital status: Significant Other     Spouse name: Not on file   • Number of children: Not on file   • Years of education: Not on file   • Highest education level: Not on file   Tobacco Use   • Smoking status: Never Smoker   • Smokeless tobacco: Never Used   Substance and Sexual Activity   • Alcohol use: Yes     Comment: SOCIAL   • Drug use: No   • Sexual activity: Defer     Family History   Problem Relation Age of Onset   • Kidney disease Father      Past Surgical History:   Procedure Laterality Date   • APPENDECTOMY     • CARDIAC SURGERY      LOOP RECORDER   • CHOLECYSTECTOMY     • COLONOSCOPY N/A 6/5/2020    Procedure: COLONOSCOPY;  Surgeon: Alicia  "Gavin Sue MD;  Location:  [facility];  Service: Gastroenterology;  Laterality: N/A;   • ENDOSCOPY N/A 4/26/2018    Procedure: ESOPHAGOGASTRODUODENOSCOPY;  Surgeon: Gavin Vargas MD;  Location:  [facility];  Service: Gastroenterology   • ENDOSCOPY N/A 8/9/2018    Procedure: ESOPHAGOGASTRODUODENOSCOPY-DILATION;  Surgeon: Gavin Vargas MD;  Location:  [facility];  Service: Gastroenterology   • ENDOSCOPY     • ENDOSCOPY N/A 2/6/2019    Procedure: ESOPHAGOGASTRODUODENOSCOPY;  Surgeon: Gavin Vargas MD;  Location:  [facility];  Service: Gastroenterology   • ENDOSCOPY N/A 7/24/2019    Procedure: ESOPHAGOGASTRODUODENOSCOPY;  Surgeon: Gavin Vargas MD;  Location:  [facility];  Service: Gastroenterology   • ENDOSCOPY N/A 10/18/2019    Procedure: ESOPHAGOGASTRODUODENOSCOPY WITH BALLOON DILATION;  Surgeon: Gavin Vargas MD;  Location:  [facility];  Service: Gastroenterology   • ENDOSCOPY N/A 6/4/2020    Procedure: ESOPHAGOGASTRODUODENOSCOPY  WITH ESOPHAGEAL BALLOON DILATATION;  Surgeon: Gavin Vargas MD;  Location:  [facility];  Service: Gastroenterology;  Laterality: N/A;  dilation done with 60F dilator    • ENDOSCOPY N/A 1/29/2021    Procedure: ESOPHAGOGASTRODUODENOSCOPY;  Surgeon: Gavin Vargas MD;  Location:  [facility];  Service: Gastroenterology;  Laterality: N/A;   • EXPLORATORY LAPAROTOMY      MULTIPLE   • HAND SURGERY      4 TOTAL   • HERNIA REPAIR     • LIVER BIOPSY     • MUSCLE BIOPSY     • MUSCLE BIOPSY     • PEG TUBE INSERTION     • PEG TUBE REMOVAL     • PORTACATH PLACEMENT       ROS: Pertinent items are noted in HPI, all other systems reviewed and negative     Objective     height is 157.5 cm (62.01\") and weight is 68 kg (149 lb 14.6 oz). Her oral temperature is 97.4 °F (36.3 °C). Her blood pressure is 88/56 (abnormal) and her pulse is 82. Her respiration is " 16 and oxygen saturation is 94%.      Intake/Output Summary (Last 24 hours) at 4/7/2021 1107  Last data filed at 4/7/2021 0500  Gross per 24 hour   Intake 1000 ml   Output --   Net 1000 ml       Physical examination:  General Appearance:   · well developed  · well nourished  Alopecia  HENT:   · oropharynx moist  · lips not cyanotic  Neck:  · thyroid not enlarged  · supple  Respiratory:  · no respiratory distress  · normal breath sounds  · no rales  Cardiovascular:  · no jugular venous distention  · regular rhythm  · apical impulse normal  · S1 normal, S2 normal  · no S3, no S4   · no murmur  · no rub, no thrill  · carotid pulses normal; no bruit  · pedal pulses normal  · lower extremity edema: none    Gastrointestinal:   · bowel sounds normal  · non-tender  · no hepatomegaly, no splenomegaly  Musculoskeletal:  · no clubbing of fingers.   · normocephalic, head atraumatic  Skin:   · warm  · dry  Psychiatric:  · judgement and insight appropriate  · normal mood and affect      Results Review:  I personally reviewed the patient's clinical results.  Results from last 7 days   Lab Units 04/07/21  0602   WBC 10*3/mm3 12.64*   HEMOGLOBIN g/dL 14.2   HEMATOCRIT % 44.9   PLATELETS 10*3/mm3 255     Results from last 7 days   Lab Units 04/07/21  0602 04/06/21  2131   SODIUM mmol/L 135* 137   POTASSIUM mmol/L 4.6 5.0   CHLORIDE mmol/L 97* 95*   CO2 mmol/L 23.0 28.0   BUN mg/dL 16 13   CREATININE mg/dL 0.75 0.71   CALCIUM mg/dL 9.2 9.7   BILIRUBIN mg/dL  --  1.1   ALK PHOS U/L  --  69   ALT (SGPT) U/L  --  18   AST (SGOT) U/L  --  38*   GLUCOSE mg/dL 61* 69     Results from last 7 days   Lab Units 04/07/21  0602   SODIUM mmol/L 135*   POTASSIUM mmol/L 4.6   CHLORIDE mmol/L 97*   CO2 mmol/L 23.0   BUN mg/dL 16   CREATININE mg/dL 0.75   GLUCOSE mg/dL 61*   CALCIUM mg/dL 9.2         Results from last 7 days   Lab Units 04/07/21  0602   TSH uIU/mL 1.700               Radiology:  Imaging Results (Last 72 Hours)     Procedure Component  Value Units Date/Time    CT Angiogram Chest [[id number]] Collected: 04/07/21 0216     Updated: 04/07/21 0219    Narrative:      CTA Chest    INDICATION:   Chest pain and shortness of air. Syncope.    TECHNIQUE:   CT angiogram of the chest with IV contrast. 3-D reconstructions were obtained and reviewed.   Radiation dose reduction techniques included automated exposure control or exposure modulation based on body size. Count of known CT and cardiac nuc med studies  performed in previous 12 months: 1.     COMPARISON:   2/25/2021    FINDINGS:   No pulmonary embolism or aortic dissection is identified. The left vertebral artery has a separate origin from the aortic arch as an anatomic variant. Great vessels are otherwise unremarkable. No pleural or pericardial effusion is seen. There is a mildly  enlarged right paratracheal lymph node measuring 1.1 cm. No other adenopathy is seen. Upper abdominal images show cholecystectomy. A loop recorder is present in the left chest wall. Central venous catheter tip is at the cavoatrial junction.    Other than some mild atelectasis in the lower lobes, the lungs are clear. No CT findings present to indicate pneumonia. Note: CT may be negative in the earliest stages of COVID-19.      Impression:        1. No PE or aortic dissection.  2. Negative for pneumonia.    Signer Name: Elmer Mims MD   Signed: 4/7/2021 2:16 AM   Workstation Name: [email or web address]    [facility]            Tele:  NSR, personally reviewed    Assessment/Plan       1.  Chest pain:  Left and right heart catheterization with shunt run, tomorrow.    2.  Hypoxia:  Rule out intracardiac shunt with BAO with bubble study.  TTE saline test was technically difficult therefore we will repeat with BAO  On continuous oxygen.  Likely needs split function ventilation perfusion scan.          I discussed the patient's findings and my recommendations with the patient, any present family members, and the nursing  staff.  Elmer Fitzpatrick MD saw and examined patient, verified hx and PE, read all radiographic studies, reviewed labs and micro data, and formulated dx, plan for treatment and all medical decision making.      Arlene Marquez PA-C, working with Elmer Fitzpatrick MD  04/07/21 11:07 EDT    I have seen and examined the patient, performing a face-to-face diagnostic evaluation with plan of care reviewed and developed with the Advanced Practice Clinician and nursing staff. I have addended and modified the above history of present illness, physical examination, and assessment and plan to reflect my findings and impressions    Elmer Fitzpatrick MD, FACC  04/07/21

## 2021-04-07 NOTE — H&P
[facility] Medicine Services  HISTORY AND PHYSICAL    Patient Name: Krista Richter  : 1990  MRN: [id number]  Primary Care Physician: Agustin Turner MD  Date of admission: 2021    Subjective   Subjective     Chief Complaint:  Syncope, shortness of breath     HPI:  Krista Richter is a 30 y.o. female with PMH significant for polyglandular deficiency syndrome, sleep apnea, prolonged QT syndrome, DM 2, hyperparathyroid, anxiety, asthma, adrenal insufficiency who presents to the ED with complaint of progressive shortness of breath and syncope.  She notes that she has had increased shortness of breath and chest pain and now requires supplemental oxygen on 2 L at all times due to oxygen saturation dropping to the 70s and episodes.  She has been evaluated per pulmonology (Dr [facility]) with reported negative work-up.  Tonight, she reports 5 episodes of syncope prior to arrival at the ED. Since arrival to the ED, she reports at least 40 episodes of syncope lasting a few seconds at a time as well as an episode of tetany. She now reports extreme diffuse muscle pain secondary to the tetany event.   She recently was evaluated per Dr. Meyer with cardiology who felt her hypoxia was most likely secondary to cardiac shunting and felt that she would be best served to see a structural heart specialist and assessed with BAO, right and left heart cath with shunt run, and possible split function pulmonary ventilation scan.  She states that she has had previous ECHO but it has been several years since her last one.   Upon arrival to the ED, she remains on 2 LNC with oxygen saturation %.  CTA is negative for acute findings.  She did have mild hypoglycemia.  She is also noted to have mild leukocytosis.  EKG was negative for acute findings.   She received 1 g calcium gluconate due to her tetany as well as Ativan and magnesium while in the ED.   She will be admitted to  [facility] medicine for further evaluation.      COVID Details: [] No Symptoms OR  Date of Symptom Onset:  ____ Date of first positive COVID test:        Symptoms: [] Fever []  Cough [x] Shortness of breath [] Change in taste or smell       Risks: [] Direct Exposure [] High risk facility [] None known       COVID VACCINE status     Review of Systems   Constitutional: Positive for activity change and fatigue. Negative for appetite change, chills, diaphoresis and fever.   HENT: Negative.    Eyes: Negative for visual disturbance.   Respiratory: Positive for shortness of breath. Negative for cough, chest tightness and wheezing.    Cardiovascular: Positive for chest pain. Negative for palpitations and leg swelling.   Gastrointestinal: Negative for abdominal distention, abdominal pain, constipation, diarrhea, nausea and vomiting.   Genitourinary: Negative for difficulty urinating, dysuria, frequency and urgency.   Musculoskeletal: Negative for arthralgias and myalgias.   Skin: Negative for color change, pallor and rash.   Neurological: Positive for syncope and weakness. Negative for dizziness, light-headedness and headaches.   Psychiatric/Behavioral: Negative for confusion. The patient is not nervous/anxious.         All other systems reviewed and are negative.     Personal History     Past Medical History:   Diagnosis Date   • Adrenal insufficiency (CMS/HCC)    • Alopecia    • Anemia    • Anxiety    • APS type 1 (CMS/HCC)    • Asthma    • Autoimmune hepatitis (CMS/HCC)    • Depression    • Dermatomyositis (CMS/HCC)    • Disease of thyroid gland    • Fibromyalgia    • Functional asplenia    • History of kidney stones    • Hypoparathyroidism (CMS/HCC)    • Insulin dependent diabetes mellitus    • Long Q-T syndrome    • Oxygen desaturation    • Pancreatic insufficiency    • Parathyroid abnormality (CMS/HCC)    • Polyglandular deficiency syndrome (CMS/HCC)    • Premature ovarian failure    • Sleep apnea    • Spleen absent    •  Jim     HAS EPISODES   • Transfusion history        Past Surgical History:   Procedure Laterality Date   • APPENDECTOMY     • CARDIAC SURGERY      LOOP RECORDER   • CHOLECYSTECTOMY     • COLONOSCOPY N/A 6/5/2020    Procedure: COLONOSCOPY;  Surgeon: Gavin Vargas MD;  Location:  [facility];  Service: Gastroenterology;  Laterality: N/A;   • ENDOSCOPY N/A 4/26/2018    Procedure: ESOPHAGOGASTRODUODENOSCOPY;  Surgeon: Gavin aVrgas MD;  Location:  [facility];  Service: Gastroenterology   • ENDOSCOPY N/A 8/9/2018    Procedure: ESOPHAGOGASTRODUODENOSCOPY-DILATION;  Surgeon: Gavin Vargas MD;  Location:  [facility];  Service: Gastroenterology   • ENDOSCOPY     • ENDOSCOPY N/A 2/6/2019    Procedure: ESOPHAGOGASTRODUODENOSCOPY;  Surgeon: Gavin Vargas MD;  Location:  [facility];  Service: Gastroenterology   • ENDOSCOPY N/A 7/24/2019    Procedure: ESOPHAGOGASTRODUODENOSCOPY;  Surgeon: Gavin Vargas MD;  Location:  [facility];  Service: Gastroenterology   • ENDOSCOPY N/A 10/18/2019    Procedure: ESOPHAGOGASTRODUODENOSCOPY WITH BALLOON DILATION;  Surgeon: Gavin Vargas MD;  Location:  [facility];  Service: Gastroenterology   • ENDOSCOPY N/A 6/4/2020    Procedure: ESOPHAGOGASTRODUODENOSCOPY  WITH ESOPHAGEAL BALLOON DILATATION;  Surgeon: Gavin Vargas MD;  Location:  [facility];  Service: Gastroenterology;  Laterality: N/A;  dilation done with 60F dilator    • ENDOSCOPY N/A 1/29/2021    Procedure: ESOPHAGOGASTRODUODENOSCOPY;  Surgeon: Gavin Vargas MD;  Location:  [facility];  Service: Gastroenterology;  Laterality: N/A;   • EXPLORATORY LAPAROTOMY      MULTIPLE   • HAND SURGERY      4 TOTAL   • HERNIA REPAIR     • LIVER BIOPSY     • MUSCLE BIOPSY     • MUSCLE BIOPSY     • PEG TUBE INSERTION     • PEG TUBE REMOVAL     • PORTACATH PLACEMENT         Family History:  "family history includes Kidney disease in her father. Otherwise pertinent FHx was reviewed and unremarkable.     Social History:  reports that she has never smoked. She has never used smokeless tobacco. She reports current alcohol use. She reports that she does not use drugs.  Social History     Social History Narrative   • Not on file       Medications:  Alcohol Prep, Calcium Citrate, EPINEPHrine, Estradiol, Hypromellose, Insulin Pen Needle, Misc Natural Products, ONE TOUCH ULTRA 2, OT ULTRA/FASTTK CNTRL SOLN, OneTouch Delica Lancets 33G, Parathyroid Hormone (Recomb), Potassium Chloride, Tegaderm Film 2-3/8\"x2-3/4\", Vitamin D (Cholecalciferol), albuterol sulfate HFA, ascorbic acid, azaTHIOprine, azithromycin, calcitriol, cycloSPORINE, cyproheptadine, dronabinol, eszopiclone, fludrocortisone, fluticasone, folic acid, gabapentin, hydrocortisone, hydrocortisone sodium succinate, levothyroxine, lidocaine, magnesium oxide, metFORMIN, methocarbamol, multivitamin with minerals, nadolol, omeprazole, progesterone, promethazine, venlafaxine, and zinc sulfate    Allergies   Allergen Reactions   • Azithromycin Hives   • Cefpodoxime Hives   • Cephalosporins Hives   • Clarithromycin Hives     biaxin  biaxin  biaxin   • Levofloxacin Hives   • Nitrofurantoin Hives   • Nystatin Hives   • Penicillins Hives   • Sulfa Antibiotics Hives   • Midazolam Unknown - Low Severity   • Morphine Other (See Comments)     \"it doesn't work. [facility] told me to list it as an allergy\"   • Nitrofurantoin Macrocrystal Other (See Comments) and Hives   • Ondansetron Other (See Comments)     \"it doesn't work. [facility] told me to list it as an allergy\"       Objective   Objective     Vital Signs:   Temp:  [98.1 °F (36.7 °C)] 98.1 °F (36.7 °C)  Heart Rate:  [] 81  Resp:  [18] 18  BP: ()/(61-86) 93/64  Flow (L/min):  [2] 2    Physical Exam   Constitutional: Awake, alert  Eyes: PERRLA, sclerae anicteric, no conjunctival injection  HENT: NCAT, mucous " membranes moist  Neck: Supple, no thyromegaly, no lymphadenopathy, trachea midline  Respiratory: decreased to auscultation bilaterally, nonlabored respirations   Cardiovascular: RRR, no murmurs, rubs, or gallops, palpable pedal pulses bilaterally  Gastrointestinal: Positive bowel sounds, soft, nontender, nondistended  Musculoskeletal: No bilateral ankle edema, no clubbing or cyanosis to extremities  Psychiatric: Appropriate affect, cooperative  Neurologic: Oriented x 3, strength symmetric in all extremities, Cranial Nerves grossly intact to confrontation, speech clear  Skin: No rashes      Result Review:  I have personally reviewed the results from the time of this admission to 04/07/21 3:24 AM EDT and agree with these findings:  [x]  Laboratory  []  Microbiology  [x]  Radiology  []  EKG/Telemetry   []  Cardiology/Vascular   []  Pathology  [x]  Old records  []  Other:  Most notable findings include:       LAB RESULTS:      Lab 04/06/21 2131   WBC 13.56*   HEMOGLOBIN 14.8   HEMATOCRIT 45.1   PLATELETS 287   NEUTROS ABS 8.21*   IMMATURE GRANS (ABS) 0.07*   LYMPHS ABS 2.66   MONOS ABS 1.86*   EOS ABS 0.68*   MCV 96.8         Lab 04/06/21  2131   SODIUM 137   POTASSIUM 5.0   CHLORIDE 95*   CO2 28.0   ANION GAP 14.0   BUN 13   CREATININE 0.71   GLUCOSE 69   CALCIUM 9.7   IONIZED CALCIUM 1.31   MAGNESIUM 1.8         Lab 04/06/21  2131   TOTAL PROTEIN 8.2   ALBUMIN 4.80   GLOBULIN 3.4   ALT (SGPT) 18   AST (SGOT) 38*   BILIRUBIN 1.1   ALK PHOS 69         Lab 04/06/21  2131   TROPONIN T <0.010                 Brief Urine Lab Results  (Last result in the past 365 days)      Color   Clarity   Blood   Leuk Est   Nitrite   Protein   CREAT   Urine HCG        09/17/20 1754               Negative         Microbiology Results (last 10 days)     ** No results found for the last 240 hours. **          CT Angiogram Chest    Result Date: 4/7/2021  CTA Chest INDICATION: Chest pain and shortness of air. Syncope. TECHNIQUE: CT angiogram  of the chest with IV contrast. 3-D reconstructions were obtained and reviewed.   Radiation dose reduction techniques included automated exposure control or exposure modulation based on body size. Count of known CT and cardiac nuc med studies performed in previous 12 months: 1. COMPARISON: 2/25/2021 FINDINGS: No pulmonary embolism or aortic dissection is identified. The left vertebral artery has a separate origin from the aortic arch as an anatomic variant. Great vessels are otherwise unremarkable. No pleural or pericardial effusion is seen. There is a mildly enlarged right paratracheal lymph node measuring 1.1 cm. No other adenopathy is seen. Upper abdominal images show cholecystectomy. A loop recorder is present in the left chest wall. Central venous catheter tip is at the cavoatrial junction. Other than some mild atelectasis in the lower lobes, the lungs are clear. No CT findings present to indicate pneumonia. Note: CT may be negative in the earliest stages of COVID-19.     Impression: 1. No PE or aortic dissection. 2. Negative for pneumonia. Signer Name: Elmer Mims MD  Signed: 4/7/2021 2:16 AM  Workstation Name: [email or web address]  [facility]          Assessment/Plan   Assessment & Plan       Syncope    Hypoxia    Polyglandular autoimmune syndrome, type 1 (CMS/HCC)    Type 2 diabetes mellitus (CMS/HCC)    Hypoparathyroidism (CMS/HCC)    Leukocytosis     30 y.o. female with PMH significant for polyglandular deficiency syndrome, sleep apnea, prolonged QT syndrome, DM 2, hyperparathyroid, anxiety, asthma, adrenal insufficiency who presents to the ED with complaint of progressive shortness of breath and syncope.    Syncope  -Cardiology consult in the a.m.  -Echo in the a.m.  -Carotid duplex in the a.m.  -Fall precautions    Hypoxia  -Currently on 2 LNC supplemental oxygen which has been her normal requirement over the last several months  -Reported negative pulmonary work-up, follows pulmonology  at [facility]  -May need to consider pulmonology consult    Leukocytosis  -UA pending  -CTA negative for acute findings  -CBC in the a.m.    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 Alek Gillis  -Continue fludrocortisone  -Continue hydrocortisone, she notes that she normally requires solucortef while in the hospital due to stress   -Continue levothyroxine        DVT prophylaxis: Mechanical      CODE STATUS:   Code Status and Medical Interventions:   Ordered at: 04/07/21 0443     Level Of Support Discussed With:    Patient     Code Status:    CPR     Medical Interventions (Level of Support Prior to Arrest):    Full         This note has been completed as part of a split-shared workflow.     Electronically signed by BEAR Minor, 04/07/21, 4:44 AM EDT.            Attending   Admission Attestation       I have seen and examined the patient, performing an independent face-to-face diagnostic evaluation with plan of care reviewed and developed with the advanced practice clinician (APC).      Brief Summary Statement:   Krista Richter is a 30 y.o. female with complex medical history as above who presents to the [facility] ED today for complaint of syncope, chest discomfort and desaturations. Mother present at bedside who assists with much of the history, patient is very drowsy during my visit this morning.     Mother notes that patient has been w/u by various specialists for episodes of desaturations while exercising, which has now progressed to rest time desats. Patient has had negative pulmonary evaluation with normal PFTs and CT imaging, but has had ongoing issues. She has also had cardiac evaluation  "and was recommended to get BAO with bubble to evaluate for possible shunt pathology as it was felt this may be contributing to her hypoxia, \"spells\" etc. She reports they were actually in process of getting in touch with Adam Mcdaniels at  for further evaluation and possible procedural needs, however this hadn't happened yet due to covid. She has been evaluated by Dr Meyer and as she is known to some specialists at [facility], elected to present here.  Remainder of detailed HPI is as noted by APC and has been reviewed and/or edited by me for completeness.    Attending Physical Exam:  GEN- drowsy, appears much older than stated age  HEENT- atraumatic, normocephalic, eomi  NECK- supple, trachea midline, no masses  RESP: ctab, slightly diminished effort, on 2L  CV: no murmurs, on tele  MSK: no edema noted, spontaneous movement of all extremities  NEURO: alert, oriented, no focal deficits  SKIN: no rashes  PSYCH: appropriate mood and affect     Brief Assessment/Plan :  See detailed assessment and plan developed with APC which I have reviewed and/or edited for completeness.        Admission Status: I believe that this patient meets OBSERVATION status, however if further evaluation or treatment plans warrant, status may change.  Based upon current information, I predict patient's care encounter to be less than or equal to 2 midnights.      Marisol Odonnell MD  04/07/21                    "

## 2021-04-08 ENCOUNTER — APPOINTMENT (OUTPATIENT)
Dept: CARDIOLOGY | Facility: HOSPITAL | Age: 31
End: 2021-04-08

## 2021-04-08 LAB
ARTERIAL PATENCY WRIST A: ABNORMAL
ATMOSPHERIC PRESS: ABNORMAL MM[HG]
BASE EXCESS BLDA CALC-SCNC: 2.3 MMOL/L (ref 0–2)
BASE EXCESS BLDV CALC-SCNC: 2.2 MMOL/L (ref -2–2)
BASE EXCESS BLDV CALC-SCNC: 2.3 MMOL/L (ref -2–2)
BASE EXCESS BLDV CALC-SCNC: 2.4 MMOL/L (ref -2–2)
BASE EXCESS BLDV CALC-SCNC: 2.4 MMOL/L (ref -2–2)
BASE EXCESS BLDV CALC-SCNC: 2.5 MMOL/L (ref -2–2)
BASE EXCESS BLDV CALC-SCNC: 2.6 MMOL/L (ref -2–2)
BASE EXCESS BLDV CALC-SCNC: 2.6 MMOL/L (ref -2–2)
BDY SITE: ABNORMAL
BH CV ECHO MEAS - BSA(HAYCOCK): 1.7 M^2
BH CV ECHO MEAS - BSA: 1.7 M^2
BH CV ECHO MEAS - BZI_BMI: 27.3 KILOGRAMS/M^2
BH CV ECHO MEAS - BZI_METRIC_HEIGHT: 157.5 CM
BH CV ECHO MEAS - BZI_METRIC_WEIGHT: 67.6 KG
BH CV ECHO MEAS - LVOT AREA (M): 2.3 CM^2
BH CV ECHO MEAS - LVOT AREA: 2.2 CM^2
BH CV ECHO MEAS - LVOT DIAM: 1.9 CM
BH CV VAS BP LEFT ARM: NORMAL MMHG
BILIRUB UR QL STRIP: NEGATIVE
BODY TEMPERATURE: 37 C
CATH EF ESTIMATED: 55 %
CLARITY UR: CLEAR
CO2 BLDA-SCNC: 32.3 MMOL/L (ref 22–33)
CO2 BLDA-SCNC: 32.7 MMOL/L (ref 22–33)
CO2 BLDA-SCNC: 32.8 MMOL/L (ref 22–33)
CO2 BLDA-SCNC: 32.9 MMOL/L (ref 22–33)
CO2 BLDA-SCNC: 33 MMOL/L (ref 22–33)
CO2 BLDA-SCNC: 33.1 MMOL/L (ref 22–33)
CO2 BLDA-SCNC: 33.1 MMOL/L (ref 22–33)
COHGB MFR BLD: 0.2 % (ref 0–2)
COHGB MFR BLD: 0.3 %
COHGB MFR BLD: 0.4 %
COHGB MFR BLD: 0.4 %
COLOR UR: YELLOW
EPAP: 0
GLUCOSE BLDC GLUCOMTR-MCNC: 113 MG/DL (ref 70–130)
GLUCOSE BLDC GLUCOMTR-MCNC: 165 MG/DL (ref 70–130)
GLUCOSE BLDC GLUCOMTR-MCNC: 78 MG/DL (ref 70–130)
GLUCOSE UR STRIP-MCNC: NEGATIVE MG/DL
HCO3 BLDA-SCNC: 30.4 MMOL/L (ref 20–26)
HCO3 BLDV-SCNC: 30.6 MMOL/L (ref 22–28)
HCO3 BLDV-SCNC: 30.8 MMOL/L (ref 22–28)
HCO3 BLDV-SCNC: 30.9 MMOL/L (ref 22–28)
HCO3 BLDV-SCNC: 31 MMOL/L (ref 22–28)
HCO3 BLDV-SCNC: 31.1 MMOL/L (ref 22–28)
HCT VFR BLD CALC: 37.9 %
HGB BLDA-MCNC: 12.1 G/DL (ref 14–18)
HGB BLDA-MCNC: 12.2 G/DL (ref 14–18)
HGB BLDA-MCNC: 12.3 G/DL (ref 14–18)
HGB BLDA-MCNC: 12.4 G/DL (ref 14–18)
HGB BLDA-MCNC: 12.6 G/DL (ref 14–18)
HGB UR QL STRIP.AUTO: NEGATIVE
INHALED O2 CONCENTRATION: 21 %
IPAP: 0
KETONES UR QL STRIP: ABNORMAL
LEUKOCYTE ESTERASE UR QL STRIP.AUTO: NEGATIVE
LV EF 2D ECHO EST: 55 %
Lab: ABNORMAL
METHGB BLD QL: 0.3 %
METHGB BLD QL: 0.4 %
METHGB BLD QL: 0.5 %
METHGB BLD QL: 0.5 %
METHGB BLD QL: 0.6 %
METHGB BLD QL: 0.7 % (ref 0–1.5)
MODALITY: ABNORMAL
NITRITE UR QL STRIP: NEGATIVE
NOTE: ABNORMAL
NOTIFIED BY: ABNORMAL
NOTIFIED WHO: ABNORMAL
OXYHGB MFR BLDV: 75.7 %
OXYHGB MFR BLDV: 82.4 %
OXYHGB MFR BLDV: 83.1 %
OXYHGB MFR BLDV: 83.4 %
OXYHGB MFR BLDV: 83.8 %
OXYHGB MFR BLDV: 83.9 %
OXYHGB MFR BLDV: 84.2 %
OXYHGB MFR BLDV: 84.5 %
OXYHGB MFR BLDV: 85 %
OXYHGB MFR BLDV: 89.8 % (ref 94–99)
PAW @ PEAK INSP FLOW SETTING VENT: 0 CMH2O
PCO2 BLDA: 63.6 MM HG (ref 35–45)
PCO2 BLDV: 65.8 MM HG (ref 41–51)
PCO2 BLDV: 66 MM HG (ref 41–51)
PCO2 BLDV: 66.1 MM HG (ref 41–51)
PCO2 BLDV: 66.2 MM HG (ref 41–51)
PCO2 BLDV: 66.5 MM HG (ref 41–51)
PCO2 BLDV: 66.6 MM HG (ref 41–51)
PCO2 BLDV: 66.6 MM HG (ref 41–51)
PCO2 BLDV: 66.7 MM HG (ref 41–51)
PCO2 BLDV: 66.8 MM HG (ref 41–51)
PCO2 TEMP ADJ BLD: 63.6 MM HG (ref 35–45)
PH BLDA: 7.29 PH UNITS (ref 7.35–7.45)
PH BLDV: 7.27 PH UNITS (ref 7.31–7.41)
PH BLDV: 7.28 PH UNITS (ref 7.31–7.41)
PH UR STRIP.AUTO: 6 [PH] (ref 5–8)
PH, TEMP CORRECTED: 7.29 PH UNITS
PO2 BLDA: 62.5 MM HG (ref 83–108)
PO2 BLDV: 43.1 MM HG (ref 27–53)
PO2 BLDV: 50.1 MM HG (ref 27–53)
PO2 BLDV: 51 MM HG (ref 27–53)
PO2 BLDV: 51.3 MM HG (ref 27–53)
PO2 BLDV: 51.7 MM HG (ref 27–53)
PO2 BLDV: 52.2 MM HG (ref 27–53)
PO2 BLDV: 52.6 MM HG (ref 27–53)
PO2 BLDV: 53.3 MM HG (ref 27–53)
PO2 BLDV: 53.8 MM HG (ref 27–53)
PO2 TEMP ADJ BLD: 62.5 MM HG (ref 83–108)
PROT UR QL STRIP: NEGATIVE
QT INTERVAL: 374 MS
QTC INTERVAL: 452 MS
SP GR UR STRIP: 1.02 (ref 1–1.03)
TOTAL RATE: 0 BREATHS/MINUTE
TROPONIN T SERPL-MCNC: <0.01 NG/ML (ref 0–0.03)
UROBILINOGEN UR QL STRIP: ABNORMAL

## 2021-04-08 PROCEDURE — C1894 INTRO/SHEATH, NON-LASER: HCPCS | Performed by: INTERNAL MEDICINE

## 2021-04-08 PROCEDURE — 0 IOPAMIDOL PER 1 ML: Performed by: INTERNAL MEDICINE

## 2021-04-08 PROCEDURE — 93320 DOPPLER ECHO COMPLETE: CPT | Performed by: INTERNAL MEDICINE

## 2021-04-08 PROCEDURE — G0378 HOSPITAL OBSERVATION PER HR: HCPCS

## 2021-04-08 PROCEDURE — 93321 DOPPLER ECHO F-UP/LMTD STD: CPT

## 2021-04-08 PROCEDURE — 25010000002 MIDAZOLAM PER 1 MG: Performed by: INTERNAL MEDICINE

## 2021-04-08 PROCEDURE — B2111ZZ FLUOROSCOPY OF MULTIPLE CORONARY ARTERIES USING LOW OSMOLAR CONTRAST: ICD-10-PCS | Performed by: INTERNAL MEDICINE

## 2021-04-08 PROCEDURE — B24BZZ4 ULTRASONOGRAPHY OF HEART WITH AORTA, TRANSESOPHAGEAL: ICD-10-PCS | Performed by: INTERNAL MEDICINE

## 2021-04-08 PROCEDURE — 63710000001 PROMETHAZINE PER 25 MG: Performed by: INTERNAL MEDICINE

## 2021-04-08 PROCEDURE — 93325 DOPPLER ECHO COLOR FLOW MAPG: CPT

## 2021-04-08 PROCEDURE — 63710000001 PROMETHAZINE PER 25 MG: Performed by: NURSE PRACTITIONER

## 2021-04-08 PROCEDURE — 93460 R&L HRT ART/VENTRICLE ANGIO: CPT | Performed by: INTERNAL MEDICINE

## 2021-04-08 PROCEDURE — 84484 ASSAY OF TROPONIN QUANT: CPT | Performed by: PHYSICIAN ASSISTANT

## 2021-04-08 PROCEDURE — 4A023N7 MEASUREMENT OF CARDIAC SAMPLING AND PRESSURE, LEFT HEART, PERCUTANEOUS APPROACH: ICD-10-PCS | Performed by: INTERNAL MEDICINE

## 2021-04-08 PROCEDURE — 83050 HGB METHEMOGLOBIN QUAN: CPT

## 2021-04-08 PROCEDURE — 25010000002 FENTANYL CITRATE (PF) 100 MCG/2ML SOLUTION: Performed by: INTERNAL MEDICINE

## 2021-04-08 PROCEDURE — 93312 ECHO TRANSESOPHAGEAL: CPT | Performed by: INTERNAL MEDICINE

## 2021-04-08 PROCEDURE — 82375 ASSAY CARBOXYHB QUANT: CPT

## 2021-04-08 PROCEDURE — 81003 URINALYSIS AUTO W/O SCOPE: CPT | Performed by: NURSE PRACTITIONER

## 2021-04-08 PROCEDURE — C1769 GUIDE WIRE: HCPCS | Performed by: INTERNAL MEDICINE

## 2021-04-08 PROCEDURE — C1751 CATH, INF, PER/CENT/MIDLINE: HCPCS | Performed by: INTERNAL MEDICINE

## 2021-04-08 PROCEDURE — 82962 GLUCOSE BLOOD TEST: CPT

## 2021-04-08 PROCEDURE — 25010000002 HYDROMORPHONE 1 MG/ML SOLUTION: Performed by: INTERNAL MEDICINE

## 2021-04-08 PROCEDURE — 82805 BLOOD GASES W/O2 SATURATION: CPT

## 2021-04-08 PROCEDURE — 82820 HEMOGLOBIN-OXYGEN AFFINITY: CPT

## 2021-04-08 PROCEDURE — 93325 DOPPLER ECHO COLOR FLOW MAPG: CPT | Performed by: INTERNAL MEDICINE

## 2021-04-08 PROCEDURE — 25010000002 KETOROLAC TROMETHAMINE PER 15 MG: Performed by: PHYSICIAN ASSISTANT

## 2021-04-08 PROCEDURE — 93005 ELECTROCARDIOGRAM TRACING: CPT | Performed by: PHYSICIAN ASSISTANT

## 2021-04-08 PROCEDURE — 25010000002 HYDROMORPHONE PER 4 MG: Performed by: INTERNAL MEDICINE

## 2021-04-08 PROCEDURE — 93312 ECHO TRANSESOPHAGEAL: CPT

## 2021-04-08 PROCEDURE — 25010000002 HYDROMORPHONE PER 4 MG: Performed by: NURSE PRACTITIONER

## 2021-04-08 PROCEDURE — 25010000002 PROMETHAZINE PER 50 MG: Performed by: INTERNAL MEDICINE

## 2021-04-08 PROCEDURE — 99233 SBSQ HOSP IP/OBS HIGH 50: CPT | Performed by: INTERNAL MEDICINE

## 2021-04-08 PROCEDURE — 36600 WITHDRAWAL OF ARTERIAL BLOOD: CPT

## 2021-04-08 PROCEDURE — 99152 MOD SED SAME PHYS/QHP 5/>YRS: CPT

## 2021-04-08 RX ORDER — NALOXONE HCL 0.4 MG/ML
0.4 VIAL (ML) INJECTION
Status: DISCONTINUED | OUTPATIENT
Start: 2021-04-08 | End: 2021-04-08

## 2021-04-08 RX ORDER — PHENYLEPHRINE HCL IN 0.9% NACL 1 MG/10 ML
SYRINGE (ML) INTRAVENOUS AS NEEDED
Status: DISCONTINUED | OUTPATIENT
Start: 2021-04-08 | End: 2021-04-08 | Stop reason: HOSPADM

## 2021-04-08 RX ORDER — SODIUM CHLORIDE 9 MG/ML
250 INJECTION, SOLUTION INTRAVENOUS ONCE AS NEEDED
Status: COMPLETED | OUTPATIENT
Start: 2021-04-08 | End: 2021-04-09

## 2021-04-08 RX ORDER — FENTANYL CITRATE 50 UG/ML
INJECTION, SOLUTION INTRAMUSCULAR; INTRAVENOUS AS NEEDED
Status: DISCONTINUED | OUTPATIENT
Start: 2021-04-08 | End: 2021-04-08 | Stop reason: HOSPADM

## 2021-04-08 RX ORDER — LIDOCAINE HYDROCHLORIDE 10 MG/ML
INJECTION, SOLUTION EPIDURAL; INFILTRATION; INTRACAUDAL; PERINEURAL AS NEEDED
Status: DISCONTINUED | OUTPATIENT
Start: 2021-04-08 | End: 2021-04-08 | Stop reason: HOSPADM

## 2021-04-08 RX ORDER — FLUDROCORTISONE ACETATE 0.1 MG/1
100 TABLET ORAL ONCE
Status: COMPLETED | OUTPATIENT
Start: 2021-04-08 | End: 2021-04-08

## 2021-04-08 RX ORDER — ACETAMINOPHEN 325 MG/1
650 TABLET ORAL EVERY 4 HOURS PRN
Status: DISCONTINUED | OUTPATIENT
Start: 2021-04-08 | End: 2021-04-14 | Stop reason: HOSPADM

## 2021-04-08 RX ORDER — MIDAZOLAM HYDROCHLORIDE 1 MG/ML
INJECTION INTRAMUSCULAR; INTRAVENOUS AS NEEDED
Status: DISCONTINUED | OUTPATIENT
Start: 2021-04-08 | End: 2021-04-08 | Stop reason: HOSPADM

## 2021-04-08 RX ORDER — DEXAMETHASONE SODIUM PHOSPHATE 4 MG/ML
4 INJECTION, SOLUTION INTRA-ARTICULAR; INTRALESIONAL; INTRAMUSCULAR; INTRAVENOUS; SOFT TISSUE ONCE
Status: DISCONTINUED | OUTPATIENT
Start: 2021-04-08 | End: 2021-04-14 | Stop reason: HOSPADM

## 2021-04-08 RX ORDER — FLUMAZENIL 0.1 MG/ML
INJECTION INTRAVENOUS
Status: DISCONTINUED
Start: 2021-04-08 | End: 2021-04-08 | Stop reason: WASHOUT

## 2021-04-08 RX ORDER — FLUDROCORTISONE ACETATE 0.1 MG/1
100 TABLET ORAL DAILY
Status: DISCONTINUED | OUTPATIENT
Start: 2021-04-09 | End: 2021-04-14 | Stop reason: HOSPADM

## 2021-04-08 RX ORDER — FENTANYL CITRATE 50 UG/ML
INJECTION, SOLUTION INTRAMUSCULAR; INTRAVENOUS
Status: DISCONTINUED
Start: 2021-04-08 | End: 2021-04-14 | Stop reason: HOSPADM

## 2021-04-08 RX ORDER — NALOXONE HCL 0.4 MG/ML
0.4 VIAL (ML) INJECTION
Status: DISCONTINUED | OUTPATIENT
Start: 2021-04-08 | End: 2021-04-14 | Stop reason: HOSPADM

## 2021-04-08 RX ORDER — HYDROMORPHONE HYDROCHLORIDE 1 MG/ML
0.5 INJECTION, SOLUTION INTRAMUSCULAR; INTRAVENOUS; SUBCUTANEOUS
Status: DISCONTINUED | OUTPATIENT
Start: 2021-04-08 | End: 2021-04-08

## 2021-04-08 RX ORDER — MIDAZOLAM HYDROCHLORIDE 1 MG/ML
INJECTION INTRAMUSCULAR; INTRAVENOUS
Status: COMPLETED | OUTPATIENT
Start: 2021-04-08 | End: 2021-04-08

## 2021-04-08 RX ORDER — KETOROLAC TROMETHAMINE 15 MG/ML
15 INJECTION, SOLUTION INTRAMUSCULAR; INTRAVENOUS ONCE
Status: COMPLETED | OUTPATIENT
Start: 2021-04-08 | End: 2021-04-08

## 2021-04-08 RX ORDER — MIDAZOLAM HYDROCHLORIDE 1 MG/ML
INJECTION INTRAMUSCULAR; INTRAVENOUS
Status: DISCONTINUED
Start: 2021-04-08 | End: 2021-04-14 | Stop reason: HOSPADM

## 2021-04-08 RX ORDER — MAGNESIUM SULFATE HEPTAHYDRATE 40 MG/ML
2 INJECTION, SOLUTION INTRAVENOUS ONCE
Status: DISCONTINUED | OUTPATIENT
Start: 2021-04-08 | End: 2021-04-14 | Stop reason: HOSPADM

## 2021-04-08 RX ADMIN — HYDROMORPHONE HYDROCHLORIDE 0.5 MG: 1 INJECTION, SOLUTION INTRAMUSCULAR; INTRAVENOUS; SUBCUTANEOUS at 12:11

## 2021-04-08 RX ADMIN — HYDROMORPHONE HYDROCHLORIDE 0.5 MG: 1 INJECTION, SOLUTION INTRAMUSCULAR; INTRAVENOUS; SUBCUTANEOUS at 05:42

## 2021-04-08 RX ADMIN — NADOLOL 20 MG: 20 TABLET ORAL at 21:16

## 2021-04-08 RX ADMIN — HYDROMORPHONE HYDROCHLORIDE 0.5 MG: 1 INJECTION, SOLUTION INTRAMUSCULAR; INTRAVENOUS; SUBCUTANEOUS at 01:30

## 2021-04-08 RX ADMIN — HYDROMORPHONE HYDROCHLORIDE 0.5 MG: 1 INJECTION, SOLUTION INTRAMUSCULAR; INTRAVENOUS; SUBCUTANEOUS at 08:23

## 2021-04-08 RX ADMIN — POTASSIUM CHLORIDE 40 MEQ: 1.5 FOR SOLUTION ORAL at 21:16

## 2021-04-08 RX ADMIN — MIDAZOLAM 1 MG: 1 INJECTION INTRAMUSCULAR; INTRAVENOUS at 13:25

## 2021-04-08 RX ADMIN — POTASSIUM CHLORIDE 40 MEQ: 1.5 FOR SOLUTION ORAL at 18:05

## 2021-04-08 RX ADMIN — Medication 1000 MG: at 21:17

## 2021-04-08 RX ADMIN — CALCITRIOL CAPSULES 0.25 MCG 0.5 MCG: 0.25 CAPSULE ORAL at 21:17

## 2021-04-08 RX ADMIN — ACETAMINOPHEN 650 MG: 325 TABLET ORAL at 10:42

## 2021-04-08 RX ADMIN — GABAPENTIN 600 MG: 300 CAPSULE ORAL at 18:06

## 2021-04-08 RX ADMIN — HYDROMORPHONE HYDROCHLORIDE 1 MG: 1 INJECTION, SOLUTION INTRAMUSCULAR; INTRAVENOUS; SUBCUTANEOUS at 21:17

## 2021-04-08 RX ADMIN — METHOCARBAMOL 500 MG: 500 TABLET, FILM COATED ORAL at 21:16

## 2021-04-08 RX ADMIN — CYCLOSPORINE 1 DROP: 0.5 EMULSION OPHTHALMIC at 21:16

## 2021-04-08 RX ADMIN — GABAPENTIN 600 MG: 300 CAPSULE ORAL at 06:39

## 2021-04-08 RX ADMIN — PROMETHAZINE HYDROCHLORIDE 12.5 MG: 25 INJECTION INTRAMUSCULAR; INTRAVENOUS at 20:15

## 2021-04-08 RX ADMIN — GABAPENTIN 600 MG: 300 CAPSULE ORAL at 21:16

## 2021-04-08 RX ADMIN — LEVOTHYROXINE SODIUM 75 MCG: 75 TABLET ORAL at 06:40

## 2021-04-08 RX ADMIN — HYDROMORPHONE HYDROCHLORIDE 1 MG: 1 INJECTION, SOLUTION INTRAMUSCULAR; INTRAVENOUS; SUBCUTANEOUS at 16:33

## 2021-04-08 RX ADMIN — METHOHEXITAL SODIUM 30 MG: 500 INJECTION, POWDER, LYOPHILIZED, FOR SOLUTION INTRAMUSCULAR; INTRAVENOUS; RECTAL at 13:25

## 2021-04-08 RX ADMIN — METHOHEXITAL SODIUM 20 MG: 500 INJECTION, POWDER, LYOPHILIZED, FOR SOLUTION INTRAMUSCULAR; INTRAVENOUS; RECTAL at 13:30

## 2021-04-08 RX ADMIN — PROMETHAZINE HYDROCHLORIDE 25 MG: 25 TABLET ORAL at 07:47

## 2021-04-08 RX ADMIN — KETOROLAC TROMETHAMINE 15 MG: 15 INJECTION, SOLUTION INTRAMUSCULAR; INTRAVENOUS at 04:07

## 2021-04-08 RX ADMIN — HYDROMORPHONE HYDROCHLORIDE 1 MG: 1 INJECTION, SOLUTION INTRAMUSCULAR; INTRAVENOUS; SUBCUTANEOUS at 18:12

## 2021-04-08 RX ADMIN — METHOHEXITAL SODIUM 30 MG: 500 INJECTION, POWDER, LYOPHILIZED, FOR SOLUTION INTRAMUSCULAR; INTRAVENOUS; RECTAL at 13:18

## 2021-04-08 RX ADMIN — METHOHEXITAL SODIUM 20 MG: 500 INJECTION, POWDER, LYOPHILIZED, FOR SOLUTION INTRAMUSCULAR; INTRAVENOUS; RECTAL at 13:19

## 2021-04-08 RX ADMIN — HYDROCORTISONE 10 MG: 10 TABLET ORAL at 21:16

## 2021-04-08 RX ADMIN — FLUDROCORTISONE ACETATE 100 MCG: 0.1 TABLET ORAL at 18:06

## 2021-04-08 RX ADMIN — SODIUM CHLORIDE 250 ML: 9 INJECTION, SOLUTION INTRAVENOUS at 18:04

## 2021-04-08 RX ADMIN — VENLAFAXINE 75 MG: 75 TABLET ORAL at 21:42

## 2021-04-08 NOTE — PLAN OF CARE
Goal Outcome Evaluation:  Plan of Care Reviewed With: patient  Progress: no change  Outcome Summary: On 2 L NC. Pt was hypotensive at beginning of the shift. BP improved through out the day. Frequent reports of pain while awake. PRNS administered as requested. Pt had a syncopal episode during the shift. Pt did not fall, staff transitioned patient back to the bed. After syncopal pt had an episode of tetany. Medications ordered and administered. Fiance at the bedside. Pt promoted to reposition frequently. Pt states that she does not want a waffle mattress on her bed because they bother and irritate her. No skin issues or breakdown. Will continue to monitor.

## 2021-04-08 NOTE — PROGRESS NOTES
[facility] Medicine Services  PROGRESS NOTE    Patient Name: Krista Richter  : 1990  MRN: [id number]    Date of Admission: 2021  Primary Care Physician: Agustin Turner MD    Subjective   Subjective     CC:  Syncope/spells/hypoxia    HPI:  Patient tearful and reports she had a rough night. Fiance at bedside and stayed the night with her. Patient reports desats and shows me a picture on her phone of desat to 66%- nursing reports that nightshift noted her oxygen had been off at times. Patient reports she has difficulty keeping her oxygen in her nose at night. Sitting up in bed and complaining of chest pain on left side and weakened left  strength- notes that pain is very severe and she typically needs higher doses of pain medications due to her vasculature. Going for heart cath this morning    ROS:  Gen- No fevers, chills  CV- + chest pain, palpitations  Resp- No cough,+ dyspnea  GI- No N/V/D, abd pain    +Chest pain, +dyspnea     Objective   Objective     Vital Signs:   Temp:  [98 °F (36.7 °C)-98.8 °F (37.1 °C)] 98.8 °F (37.1 °C)  Heart Rate:  [57-97] 77  Resp:  [18-20] 20  BP: ()/(54-79) 96/65        Physical Exam:  GEN- awake, tearful, appears much older than stated age  HEENT- atraumatic, normocephalic, eomi  NECK- supple, trachea midline, no masses  RESP: ctab, slightly diminished effort, on 2L  CV: no murmurs, on tele, TTP along left chest wall and into shoulder  MSK: no edema noted, spontaneous movement of all extremities  NEURO: alert, oriented, left  strength does appear weaker than right   SKIN: no rashes  PSYCH: appropriate mood and affect but tearful    Results Reviewed:  Results from last 7 days   Lab Units 21  0602 21  2131   WBC 10*3/mm3 12.64* 13.56*   HEMOGLOBIN g/dL 14.2 14.8   HEMATOCRIT % 44.9 45.1   PLATELETS 10*3/mm3 255 287     Results from last 7 days   Lab Units 21  0433 21  0602 21  2131   SODIUM mmol/L   --  135* 137   POTASSIUM mmol/L  --  4.6 5.0   CHLORIDE mmol/L  --  97* 95*   CO2 mmol/L  --  23.0 28.0   BUN mg/dL  --  16 13   CREATININE mg/dL  --  0.75 0.71   GLUCOSE mg/dL  --  61* 69   CALCIUM mg/dL  --  9.2 9.7   ALT (SGPT) U/L  --   --  18   AST (SGOT) U/L  --   --  38*   TROPONIN T ng/mL <0.010  --  <0.010     Estimated Creatinine Clearance: 98.3 mL/min (by C-G formula based on SCr of 0.75 mg/dL).    Microbiology Results Abnormal     Procedure Component Value - Date/Time    COVID PRE-OP / PRE-PROCEDURE SCREENING ORDER (NO ISOLATION) - Swab, Nasopharynx [[id number]]  (Normal) Collected: 04/07/21 0516    Lab Status: Final result Specimen: Swab from Nasopharynx Updated: 04/07/21 0637    Narrative:      The following orders were created for panel order COVID PRE-OP / PRE-PROCEDURE SCREENING ORDER (NO ISOLATION) - Swab, Nasopharynx.  Procedure                               Abnormality         Status                     ---------                               -----------         ------                     COVID-19 and FLU A/B PCR...[[id number]]  Normal              Final result                 Please view results for these tests on the individual orders.    COVID-19 and FLU A/B PCR - Swab, Nasopharynx [[id number]]  (Normal) Collected: 04/07/21 0516    Lab Status: Final result Specimen: Swab from Nasopharynx Updated: 04/07/21 0637     COVID19 Not Detected     Influenza A PCR Not Detected     Influenza B PCR Not Detected    Narrative:      Fact sheet for providers: https://www.fda.gov/media/205911/download    Fact sheet for patients: https://www.fda.gov/media/744491/download    Test performed by PCR.          Imaging Results (Last 24 Hours)     ** No results found for the last 24 hours. **          Results for orders placed during the hospital encounter of 04/06/21    Adult Transthoracic Echo Complete W/ Cont if Necessary Per Protocol    Interpretation Summary  · Calculated left ventricular EF = 55%  · No significant  valvular abnormalities  · Technically difficult bubble study. Appears negative for interatrial shunting      I have reviewed the medications:  Scheduled Meds:[MAR Hold] ascorbic acid, 1,000 mg, Oral, Daily  [MAR Hold] azaTHIOprine, 150 mg, Oral, Daily  [MAR Hold] calcitriol, 0.5 mcg, Oral, Q12H  [MAR Hold] calcium carbonate (oyster shell), 500 mg, Oral, Daily  [MAR Hold] cycloSPORINE, 1 drop, Both Eyes, BID  [MAR Hold] dextrose, 25 g, Intravenous, Once  [MAR Hold] dextrose, 50 mL, Intravenous, Once  [MAR Hold] fludrocortisone, 50 mcg, Oral, Daily  [MAR Hold] fluticasone, 1 spray, Each Nare, Daily  [MAR Hold] folic acid, 4 mg, Oral, Daily  gabapentin, 600 mg, Oral, Q8H  [MAR Hold] hydrocortisone, 10 mg, Oral, Nightly  [MAR Hold] hydrocortisone, 20 mg, Oral, Daily With Breakfast  [MAR Hold] levothyroxine, 75 mcg, Oral, Q AM  [MAR Hold] magnesium oxide, 1,000 mg, Oral, TID  [MAR Hold] methocarbamol, 500 mg, Oral, TID  [MAR Hold] multivitamin with minerals, 1 tablet, Oral, Daily  nadolol, 20 mg, Oral, Q12H  [MAR Hold] pantoprazole, 40 mg, Oral, Daily  [MAR Hold] Parathyroid Hormone (Recomb), 25 mcg, Subcutaneous, BID  potassium chloride, 40 mEq, Oral, TID  [MAR Hold] progesterone, 200 mg, Oral, Daily  [MAR Hold] sodium chloride, 10 mL, Intravenous, Q12H  sodium chloride, 3 mL, Intravenous, Q12H  [MAR Hold] venlafaxine, 75 mg, Oral, TID  [MAR Hold] zinc sulfate, 220 mg, Oral, Daily      Continuous Infusions:   PRN Meds:.•  [MAR Hold] acetaminophen **OR** [MAR Hold] acetaminophen **OR** [MAR Hold] acetaminophen  •  [MAR Hold] butalbital-acetaminophen-caffeine  •  [MAR Hold] dextrose  •  [MAR Hold] dextrose  •  fentaNYL citrate (PF)  •  [MAR Hold] glucagon (human recombinant)  •  [MAR Hold] HYDROmorphone **AND** [MAR Hold] naloxone  •  lidocaine PF 1%  •  midazolam  •  O2  •  [MAR Hold] promethazine  •  [MAR Hold] sodium chloride  •  [COMPLETED] Insert peripheral IV **AND** [MAR Hold] sodium chloride  •  [MAR Hold] sodium  chloride  •  sodium chloride    Assessment/Plan   Assessment & Plan     Active Hospital Problems    Diagnosis  POA   • **Syncope [R55]  Yes   • Leukocytosis [D72.829]  Yes   • Hypoparathyroidism (CMS/HCC) [E20.9]  Yes   • Polyglandular autoimmune syndrome, 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        Brief Hospital Course to date:  Krista Richter is a  30 y.o. female with PMH significant for polyglandular deficiency syndrome, sleep apnea, prolonged QT syndrome, DM 2, hyperparathyroid, anxiety, asthma, adrenal insufficiency who presents to the ED with complaint of progressive shortness of breath and syncope.    This patient's problems and plans were partially entered by my partner and updated as appropriate by me 04/08/21.         Syncope  Episodes of Tetany  Chest pain, ongoing  Hypoxia  -Cardiology following, planning for R/L heart cath today as well as BAO, patient NPO, possible concern for shunt pathology given hypoxia and multiple recurrent spells  -duplex as well as TTE noted- no evidence of shunting with normal EF but technically difficult study  --patient noted to have chest pain overnight- trop and EKG reviewed- negative. As above LHC/RHC today with cardiology.   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 Alek Gillis  -Continue fludrocortisone  -Continue hydrocortisone, she notes that she normally requires solucortef while in the hospital due to stress   -Continue levothyroxine      D/w patient and fiance at bedside this morning.     DVT prophylaxis: Mechanical      Disposition: I expect the patient to be discharged pending further workup/improvement in pain control/resolution of episodes       I called the patient around 6pm and d/w her plan of care regarding her negative BAO/heart caths. She is still in significant pain and have d/w her palliative care consultation earlier in the day- will place. I also d/w her neurology consultation as she was due to see Dr Mann earlier this year but did not make this appointment she reports due to family emergency. Unclear if these spells are neurologic in nature but will ask their assistance for tomorrow.     CODE STATUS:   Code Status and Medical Interventions:   Ordered at: 04/07/21 0443     Level Of Support Discussed With:    Patient     Code Status:    CPR     Medical Interventions (Level of Support Prior to Arrest):    Full       Marisol Odonnell MD  04/08/21

## 2021-04-08 NOTE — PROGRESS NOTES
Clinical Nutrition     Reason for Visit: Identified at risk by screening criteria    Patient Name: Krista Richter  YOB: 1990  MRN: [id number]  Date of Encounter: 04/08/21 16:16 EDT  Admission date: 4/6/2021    Nutrition Assessment   Admission Problem List:  Syncope  CP  Hypoxia  Polyglandular Autoimmune Syndrome    s/p heart cath, BAO 4-8-21    PMH:   She  has a past medical history of Adrenal insufficiency (CMS/HCC), Alopecia, Anemia, Anxiety, APS type 1 (CMS/HCC), Asthma, Autoimmune hepatitis (CMS/HCC), Depression, Dermatomyositis (CMS/HCC), Disease of thyroid gland, Fibromyalgia, Functional asplenia, History of kidney stones, Hypoparathyroidism (CMS/HCC), Insulin dependent diabetes mellitus, Long Q-T syndrome, Oxygen desaturation, Pancreatic insufficiency, Parathyroid abnormality (CMS/HCC), Polyglandular deficiency syndrome (CMS/HCC), Premature ovarian failure, Sleep apnea, Spleen absent, Tetany, and Transfusion history.  PSxH:  She  has a past surgical history that includes Portacath placement; Hernia repair; Muscle biopsy; Cholecystectomy; Esophagogastroduodenoscopy (N/A, 4/26/2018); Cardiac surgery; Esophagogastroduodenoscopy (N/A, 8/9/2018); Hand surgery; Liver biopsy; Peg Tube Insertion; PEG tube removal; Appendectomy; Exploratory laparotomy; Muscle biopsy; Esophagogastroduodenoscopy; Esophagogastroduodenoscopy (N/A, 2/6/2019); Esophagogastroduodenoscopy (N/A, 7/24/2019); Esophagogastroduodenoscopy (N/A, 10/18/2019); Esophagogastroduodenoscopy (N/A, 6/4/2020); Colonoscopy (N/A, 6/5/2020); and Esophagogastroduodenoscopy (N/A, 1/29/2021).      Reported/Observed/Food/Nutrition Related History:     Pt resting in bed, on nasal cannula, c/o pain of 6, which she says is not being controlled, concerned regarding her hypoxia, states she has not been eating well at home, has lost ~20lb's over past 3 weeks, has had N/V/D, states her weight tends to fluctuate depending on her  medical status, she had a feeding tube as a child from 1 years old  to the age of 13, states she is able to tolerate regular foods      Anthropometrics   Height: 62in  Weight: 149lb bedscale  BMI: 27  BMI classification: Overweight: 25.0-29.9kg/m2      Weight change: Reported 20lb loss over 3 weeks    Per EMR: 18lb loss since last June    Last 15 Recorded Weights  View Complete Flowsheet  Weight Weight (kg) Weight (lbs) Weight Method   4/8/2021 68 kg  149 lb 14.6 oz  Bed scale   4/8/2021 68 kg 149 lb 14.6 oz -   4/7/2021 68 kg 149 lb 14.6 oz -   4/6/2021 68.04 kg 150 lb Stated   4/2/2021 75.297 kg 166 lb -   2/25/2021 75.297 kg 166 lb Stated   1/9/2021 72.576 kg 160 lb Stated   9/17/2020 72.576 kg 160 lb -   8/31/2020 69.854 kg 154 lb -   6/3/2020 75.932 kg 167 lb 6.4 oz Bed scale   3/9/2020 78.472 kg 173 lb -   1/15/2020 77.111 kg 170 lb Stated   11/28/2019 78.472 kg 173 lb Stated       Labs reviewed     Results from last 7 days   Lab Units 04/07/21  0602 04/06/21  2131   SODIUM mmol/L 135* 137   POTASSIUM mmol/L 4.6 5.0   CHLORIDE mmol/L 97* 95*   CO2 mmol/L 23.0 28.0   BUN mg/dL 16 13   CREATININE mg/dL 0.75 0.71   CALCIUM mg/dL 9.2 9.7   BILIRUBIN mg/dL  --  1.1   ALK PHOS U/L  --  69   ALT (SGPT) U/L  --  18   AST (SGOT) U/L  --  38*   GLUCOSE mg/dL 61* 69       Results from last 7 days   Lab Units 04/08/21  0726 04/07/21 2014 04/07/21  1659 04/07/21  1630 04/07/21  1403 04/07/21  1153   GLUCOSE mg/dL 113 110 96 94 81 90     Lab Results   Lab Value Date/Time    HGBA1C 6.10 (H) 06/03/2020 0030       Medications reviewed   Pertinent:vitamin C. Imuran, calcitriol, Ca++ carbonate, steroids, folate, MVI, protonix, zinc    Intake/Ouptut 24 hrs (7:00AM - 6:59 AM)     Intake & Output (last day)       04/07 0701 - 04/08 0700 04/08 0701 - 04/09 0700    IV Piggyback      Total Intake(mL/kg)      Urine (mL/kg/hr)  300 (0.5)    Total Output  300    Net  -300          Urine Unmeasured Occurrence 3 x                GI:  abdomen soft, nondistended    SKIN: bruised    Current Nutrition Prescription     PO: Diet Regular; Cardiac    Intake: no po documented    Nutrition Diagnosis   Date: 4-8-21  Problem Unintended weight loss   Etiology Per Clinical Status   Signs/Symptoms Reported 20lb wt loss     Nutrition Intervention   1.  Follow treatment progress, Advised available snacks, Interview for preferences, Menu adjusted, Supplement provided    Premier Protein 2x/day    Goal:   General: Nutrition support treatment  PO: Establish PO    Monitoring/Evaluation:   Per protocol    Jaquelin Steven RD  Time Spent:30min

## 2021-04-08 NOTE — PLAN OF CARE
Goal Outcome Evaluation:  Plan of Care Reviewed With: patient  Progress: no change  Outcome Summary: PT on 3 L. Pt complaining of pain and treated with prns. Pt states that she does not want a waffle mattress on her bed. Will continue to monitor.

## 2021-04-09 ENCOUNTER — APPOINTMENT (OUTPATIENT)
Dept: CARDIOLOGY | Facility: HOSPITAL | Age: 31
End: 2021-04-09

## 2021-04-09 ENCOUNTER — APPOINTMENT (OUTPATIENT)
Dept: CT IMAGING | Facility: HOSPITAL | Age: 31
End: 2021-04-09

## 2021-04-09 ENCOUNTER — APPOINTMENT (OUTPATIENT)
Dept: MRI IMAGING | Facility: HOSPITAL | Age: 31
End: 2021-04-09

## 2021-04-09 ENCOUNTER — APPOINTMENT (OUTPATIENT)
Dept: NEUROLOGY | Facility: HOSPITAL | Age: 31
End: 2021-04-09

## 2021-04-09 LAB
ALBUMIN SERPL-MCNC: 4.1 G/DL (ref 3.5–5.2)
ALBUMIN/GLOB SERPL: 1.4 G/DL
ALP SERPL-CCNC: 65 U/L (ref 39–117)
ALT SERPL W P-5'-P-CCNC: 13 U/L (ref 1–33)
ANION GAP SERPL CALCULATED.3IONS-SCNC: 7 MMOL/L (ref 5–15)
AST SERPL-CCNC: 20 U/L (ref 1–32)
BASOPHILS # BLD AUTO: 0.05 10*3/MM3 (ref 0–0.2)
BASOPHILS NFR BLD AUTO: 0.5 % (ref 0–1.5)
BH CV VAS TCD LEFT DISTAL M1: 91 CM/SEC
BH CV VAS TCD LEFT MID M1: 101 CM/SEC
BH CV VAS TCD LEFT PROXIMAL M1: 96 CM/SEC
BH CV VAS TCD LEFT TERMINAL ICA: 72 CM/SEC
BH CV VAS TCD RIGHT DISTAL M1: 53 CM/SEC
BH CV VAS TCD RIGHT MID M1: 80 CM/SEC
BH CV VAS TCD RIGHT PROXIMAL M1: 84 CM/SEC
BH CV VAS TCD RIGHT TERMINAL ICA: 86 CM/SEC
BILIRUB SERPL-MCNC: 0.4 MG/DL (ref 0–1.2)
BUN SERPL-MCNC: 8 MG/DL (ref 6–20)
BUN/CREAT SERPL: 12.1 (ref 7–25)
CALCIUM SPEC-SCNC: 8.7 MG/DL (ref 8.6–10.5)
CHLORIDE SERPL-SCNC: 98 MMOL/L (ref 98–107)
CO2 SERPL-SCNC: 33 MMOL/L (ref 22–29)
CREAT SERPL-MCNC: 0.66 MG/DL (ref 0.57–1)
DEPRECATED RDW RBC AUTO: 48.3 FL (ref 37–54)
EOSINOPHIL # BLD AUTO: 0.19 10*3/MM3 (ref 0–0.4)
EOSINOPHIL NFR BLD AUTO: 2 % (ref 0.3–6.2)
ERYTHROCYTE [DISTWIDTH] IN BLOOD BY AUTOMATED COUNT: 13.2 % (ref 12.3–15.4)
GFR SERPL CREATININE-BSD FRML MDRD: 104 ML/MIN/1.73
GLOBULIN UR ELPH-MCNC: 2.9 GM/DL
GLUCOSE BLDC GLUCOMTR-MCNC: 161 MG/DL (ref 70–130)
GLUCOSE BLDC GLUCOMTR-MCNC: 168 MG/DL (ref 70–130)
GLUCOSE BLDC GLUCOMTR-MCNC: 83 MG/DL (ref 70–130)
GLUCOSE BLDC GLUCOMTR-MCNC: 91 MG/DL (ref 70–130)
GLUCOSE SERPL-MCNC: 157 MG/DL (ref 65–99)
HCT VFR BLD AUTO: 39.4 % (ref 34–46.6)
HGB BLD-MCNC: 12.5 G/DL (ref 12–15.9)
IMM GRANULOCYTES # BLD AUTO: 0.05 10*3/MM3 (ref 0–0.05)
IMM GRANULOCYTES NFR BLD AUTO: 0.5 % (ref 0–0.5)
LYMPHOCYTES # BLD AUTO: 0.86 10*3/MM3 (ref 0.7–3.1)
LYMPHOCYTES NFR BLD AUTO: 9.2 % (ref 19.6–45.3)
MCH RBC QN AUTO: 31.6 PG (ref 26.6–33)
MCHC RBC AUTO-ENTMCNC: 31.7 G/DL (ref 31.5–35.7)
MCV RBC AUTO: 99.5 FL (ref 79–97)
MONOCYTES # BLD AUTO: 1.25 10*3/MM3 (ref 0.1–0.9)
MONOCYTES NFR BLD AUTO: 13.3 % (ref 5–12)
NEUTROPHILS NFR BLD AUTO: 6.98 10*3/MM3 (ref 1.7–7)
NEUTROPHILS NFR BLD AUTO: 74.5 % (ref 42.7–76)
NRBC BLD AUTO-RTO: 0 /100 WBC (ref 0–0.2)
PLATELET # BLD AUTO: 244 10*3/MM3 (ref 140–450)
PMV BLD AUTO: 12.7 FL (ref 6–12)
POTASSIUM SERPL-SCNC: 4.3 MMOL/L (ref 3.5–5.2)
PROT SERPL-MCNC: 7 G/DL (ref 6–8.5)
QT INTERVAL: 350 MS
QT INTERVAL: 400 MS
QTC INTERVAL: 428 MS
QTC INTERVAL: 449 MS
RBC # BLD AUTO: 3.96 10*6/MM3 (ref 3.77–5.28)
SODIUM SERPL-SCNC: 138 MMOL/L (ref 136–145)
WBC # BLD AUTO: 9.38 10*3/MM3 (ref 3.4–10.8)

## 2021-04-09 PROCEDURE — 25010000002 HYDROMORPHONE 1 MG/ML SOLUTION: Performed by: INTERNAL MEDICINE

## 2021-04-09 PROCEDURE — 63710000001 AZATHIOPRINE PER 50 MG: Performed by: INTERNAL MEDICINE

## 2021-04-09 PROCEDURE — 0 DIATRIZOATE MEGLUMINE & SODIUM PER 1 ML: Performed by: HOSPITALIST

## 2021-04-09 PROCEDURE — 99254 IP/OBS CNSLTJ NEW/EST MOD 60: CPT | Performed by: PSYCHIATRY & NEUROLOGY

## 2021-04-09 PROCEDURE — 82962 GLUCOSE BLOOD TEST: CPT

## 2021-04-09 PROCEDURE — 80053 COMPREHEN METABOLIC PANEL: CPT | Performed by: INTERNAL MEDICINE

## 2021-04-09 PROCEDURE — 63710000001 PROMETHAZINE PER 25 MG: Performed by: INTERNAL MEDICINE

## 2021-04-09 PROCEDURE — 94640 AIRWAY INHALATION TREATMENT: CPT

## 2021-04-09 PROCEDURE — 74177 CT ABD & PELVIS W/CONTRAST: CPT

## 2021-04-09 PROCEDURE — 25010000002 IOPAMIDOL 61 % SOLUTION: Performed by: HOSPITALIST

## 2021-04-09 PROCEDURE — G0378 HOSPITAL OBSERVATION PER HR: HCPCS

## 2021-04-09 PROCEDURE — 93893 TCD STD ICR ART VEN-ART SHNT: CPT

## 2021-04-09 PROCEDURE — 70551 MRI BRAIN STEM W/O DYE: CPT

## 2021-04-09 PROCEDURE — 85025 COMPLETE CBC W/AUTO DIFF WBC: CPT | Performed by: INTERNAL MEDICINE

## 2021-04-09 PROCEDURE — 99223 1ST HOSP IP/OBS HIGH 75: CPT | Performed by: INTERNAL MEDICINE

## 2021-04-09 PROCEDURE — 63710000001 PROCHLORPERAZINE MALEATE PER 5 MG: Performed by: HOSPITALIST

## 2021-04-09 PROCEDURE — 95819 EEG AWAKE AND ASLEEP: CPT

## 2021-04-09 PROCEDURE — 99233 SBSQ HOSP IP/OBS HIGH 50: CPT | Performed by: HOSPITALIST

## 2021-04-09 PROCEDURE — 25010000002 MAGNESIUM SULFATE IN D5W 1G/100ML (PREMIX) 1-5 GM/100ML-% SOLUTION: Performed by: HOSPITALIST

## 2021-04-09 PROCEDURE — 25010000002 CALCIUM GLUCONATE PER 10 ML: Performed by: HOSPITALIST

## 2021-04-09 RX ORDER — BUDESONIDE AND FORMOTEROL FUMARATE DIHYDRATE 160; 4.5 UG/1; UG/1
2 AEROSOL RESPIRATORY (INHALATION)
Status: DISCONTINUED | OUTPATIENT
Start: 2021-04-09 | End: 2021-04-14 | Stop reason: HOSPADM

## 2021-04-09 RX ORDER — PROCHLORPERAZINE MALEATE 5 MG/1
5 TABLET ORAL EVERY 6 HOURS PRN
Status: DISCONTINUED | OUTPATIENT
Start: 2021-04-09 | End: 2021-04-14 | Stop reason: HOSPADM

## 2021-04-09 RX ORDER — PROCHLORPERAZINE 25 MG
25 SUPPOSITORY, RECTAL RECTAL EVERY 12 HOURS PRN
Status: DISCONTINUED | OUTPATIENT
Start: 2021-04-09 | End: 2021-04-14 | Stop reason: HOSPADM

## 2021-04-09 RX ORDER — HYDROMORPHONE HYDROCHLORIDE 2 MG/1
2 TABLET ORAL 3 TIMES DAILY
Status: DISCONTINUED | OUTPATIENT
Start: 2021-04-09 | End: 2021-04-12

## 2021-04-09 RX ORDER — PROMETHAZINE HYDROCHLORIDE 25 MG/1
25 SUPPOSITORY RECTAL EVERY 8 HOURS PRN
Status: DISCONTINUED | OUTPATIENT
Start: 2021-04-09 | End: 2021-04-14 | Stop reason: HOSPADM

## 2021-04-09 RX ORDER — POLYETHYLENE GLYCOL 3350 17 G/17G
17 POWDER, FOR SOLUTION ORAL DAILY
Status: DISCONTINUED | OUTPATIENT
Start: 2021-04-10 | End: 2021-04-14 | Stop reason: HOSPADM

## 2021-04-09 RX ORDER — HYDROMORPHONE HYDROCHLORIDE 2 MG/1
2 TABLET ORAL EVERY 6 HOURS PRN
Status: DISCONTINUED | OUTPATIENT
Start: 2021-04-09 | End: 2021-04-12

## 2021-04-09 RX ORDER — PROCHLORPERAZINE EDISYLATE 5 MG/ML
5 INJECTION INTRAMUSCULAR; INTRAVENOUS EVERY 6 HOURS PRN
Status: DISCONTINUED | OUTPATIENT
Start: 2021-04-09 | End: 2021-04-14 | Stop reason: HOSPADM

## 2021-04-09 RX ORDER — MAGNESIUM SULFATE 1 G/100ML
1 INJECTION INTRAVENOUS ONCE
Status: COMPLETED | OUTPATIENT
Start: 2021-04-09 | End: 2021-04-09

## 2021-04-09 RX ORDER — CHOLECALCIFEROL (VITAMIN D3) 125 MCG
5 CAPSULE ORAL NIGHTLY PRN
Status: DISCONTINUED | OUTPATIENT
Start: 2021-04-09 | End: 2021-04-14 | Stop reason: HOSPADM

## 2021-04-09 RX ADMIN — POTASSIUM CHLORIDE 40 MEQ: 1.5 FOR SOLUTION ORAL at 20:55

## 2021-04-09 RX ADMIN — HYDROMORPHONE HYDROCHLORIDE 2 MG: 2 TABLET ORAL at 20:54

## 2021-04-09 RX ADMIN — MELATONIN TAB 5 MG 5 MG: 5 TAB at 23:41

## 2021-04-09 RX ADMIN — VENLAFAXINE 75 MG: 75 TABLET ORAL at 20:53

## 2021-04-09 RX ADMIN — PANTOPRAZOLE SODIUM 40 MG: 40 TABLET, DELAYED RELEASE ORAL at 08:20

## 2021-04-09 RX ADMIN — HYDROMORPHONE HYDROCHLORIDE 1 MG: 1 INJECTION, SOLUTION INTRAMUSCULAR; INTRAVENOUS; SUBCUTANEOUS at 23:41

## 2021-04-09 RX ADMIN — Medication 1 TABLET: at 08:19

## 2021-04-09 RX ADMIN — NADOLOL 20 MG: 20 TABLET ORAL at 20:53

## 2021-04-09 RX ADMIN — GABAPENTIN 600 MG: 300 CAPSULE ORAL at 05:57

## 2021-04-09 RX ADMIN — SODIUM CHLORIDE, PRESERVATIVE FREE 10 ML: 5 INJECTION INTRAVENOUS at 08:41

## 2021-04-09 RX ADMIN — CYCLOSPORINE 1 DROP: 0.5 EMULSION OPHTHALMIC at 08:26

## 2021-04-09 RX ADMIN — VENLAFAXINE 75 MG: 75 TABLET ORAL at 08:20

## 2021-04-09 RX ADMIN — IOPAMIDOL 85 ML: 612 INJECTION, SOLUTION INTRAVENOUS at 18:40

## 2021-04-09 RX ADMIN — METHOCARBAMOL 500 MG: 500 TABLET, FILM COATED ORAL at 16:19

## 2021-04-09 RX ADMIN — GABAPENTIN 600 MG: 300 CAPSULE ORAL at 13:10

## 2021-04-09 RX ADMIN — FLUDROCORTISONE ACETATE 100 MCG: 0.1 TABLET ORAL at 08:24

## 2021-04-09 RX ADMIN — SODIUM CHLORIDE, PRESERVATIVE FREE 10 ML: 5 INJECTION INTRAVENOUS at 20:57

## 2021-04-09 RX ADMIN — OXYCODONE HYDROCHLORIDE AND ACETAMINOPHEN 1000 MG: 500 TABLET ORAL at 08:20

## 2021-04-09 RX ADMIN — POTASSIUM CHLORIDE 40 MEQ: 1.5 FOR SOLUTION ORAL at 16:18

## 2021-04-09 RX ADMIN — HYDROMORPHONE HYDROCHLORIDE 1 MG: 1 INJECTION, SOLUTION INTRAMUSCULAR; INTRAVENOUS; SUBCUTANEOUS at 02:03

## 2021-04-09 RX ADMIN — CALCITRIOL CAPSULES 0.25 MCG 0.5 MCG: 0.25 CAPSULE ORAL at 08:21

## 2021-04-09 RX ADMIN — LEVOTHYROXINE SODIUM 75 MCG: 75 TABLET ORAL at 05:57

## 2021-04-09 RX ADMIN — HYDROMORPHONE HYDROCHLORIDE 2 MG: 2 TABLET ORAL at 18:47

## 2021-04-09 RX ADMIN — DIATRIZOATE MEGLUMINE AND DIATRIZOATE SODIUM 15 ML: 660; 100 LIQUID ORAL; RECTAL at 16:20

## 2021-04-09 RX ADMIN — BUDESONIDE AND FORMOTEROL FUMARATE DIHYDRATE 2 PUFF: 160; 4.5 AEROSOL RESPIRATORY (INHALATION) at 21:17

## 2021-04-09 RX ADMIN — VENLAFAXINE 75 MG: 75 TABLET ORAL at 16:17

## 2021-04-09 RX ADMIN — CALCIUM 500 MG: 500 TABLET ORAL at 08:21

## 2021-04-09 RX ADMIN — Medication 1000 MG: at 20:53

## 2021-04-09 RX ADMIN — FOLIC ACID 4 MG: 1 TABLET ORAL at 08:19

## 2021-04-09 RX ADMIN — Medication 1000 MG: at 08:19

## 2021-04-09 RX ADMIN — HYDROMORPHONE HYDROCHLORIDE 1 MG: 1 INJECTION, SOLUTION INTRAMUSCULAR; INTRAVENOUS; SUBCUTANEOUS at 10:09

## 2021-04-09 RX ADMIN — CYCLOSPORINE 1 DROP: 0.5 EMULSION OPHTHALMIC at 20:56

## 2021-04-09 RX ADMIN — HYDROCORTISONE 10 MG: 10 TABLET ORAL at 20:55

## 2021-04-09 RX ADMIN — Medication 1000 MG: at 16:17

## 2021-04-09 RX ADMIN — PROMETHAZINE HYDROCHLORIDE 25 MG: 25 TABLET ORAL at 02:23

## 2021-04-09 RX ADMIN — MAGNESIUM SULFATE HEPTAHYDRATE 1 G: 1 INJECTION, SOLUTION INTRAVENOUS at 17:04

## 2021-04-09 RX ADMIN — CALCIUM GLUCONATE 2 G: 98 INJECTION, SOLUTION INTRAVENOUS at 16:21

## 2021-04-09 RX ADMIN — AZATHIOPRINE 150 MG: 50 TABLET ORAL at 08:24

## 2021-04-09 RX ADMIN — GABAPENTIN 600 MG: 300 CAPSULE ORAL at 20:55

## 2021-04-09 RX ADMIN — BUTALBITAL, ACETAMINOPHEN AND CAFFEINE 1 TABLET: 50; 325; 40 TABLET ORAL at 08:41

## 2021-04-09 RX ADMIN — ZINC SULFATE 220 MG (50 MG) CAPSULE 220 MG: CAPSULE at 08:19

## 2021-04-09 RX ADMIN — PARATHYROID HORMONE 25 MCG: 25 INJECTION, POWDER, LYOPHILIZED, FOR SOLUTION SUBCUTANEOUS at 20:55

## 2021-04-09 RX ADMIN — POTASSIUM CHLORIDE 40 MEQ: 1.5 FOR SOLUTION ORAL at 08:19

## 2021-04-09 RX ADMIN — CALCITRIOL CAPSULES 0.25 MCG 0.5 MCG: 0.25 CAPSULE ORAL at 20:55

## 2021-04-09 RX ADMIN — HYDROCORTISONE 20 MG: 20 TABLET ORAL at 08:23

## 2021-04-09 RX ADMIN — HYDROMORPHONE HYDROCHLORIDE 1 MG: 1 INJECTION, SOLUTION INTRAMUSCULAR; INTRAVENOUS; SUBCUTANEOUS at 13:10

## 2021-04-09 RX ADMIN — PROCHLORPERAZINE MALEATE 5 MG: 5 TABLET ORAL at 08:41

## 2021-04-09 RX ADMIN — METHOCARBAMOL 500 MG: 500 TABLET, FILM COATED ORAL at 08:21

## 2021-04-09 RX ADMIN — HYDROMORPHONE HYDROCHLORIDE 2 MG: 2 TABLET ORAL at 16:16

## 2021-04-09 RX ADMIN — METHOCARBAMOL 500 MG: 500 TABLET, FILM COATED ORAL at 20:53

## 2021-04-09 RX ADMIN — HYDROMORPHONE HYDROCHLORIDE 1 MG: 1 INJECTION, SOLUTION INTRAMUSCULAR; INTRAVENOUS; SUBCUTANEOUS at 05:57

## 2021-04-09 NOTE — CONSULTS
"    Referring Provider: Dr. Velez  Reason for Consultation: Jesus    Patient Care Team:  Agustin Turner MD as PCP - General (Internal Medicine)    Chief complaint recurrent syncope    Subjective .     History of present illness: 30-year-old ambidextrous woman with PMH notable for polyglandular deficiency syndrome with adrenal insufficiency (followed at [facility] per patient), KATINA, prolonged QT syndrome, DM 2, hyperparathyroid, asthma presented to the ED on 4/7/2021 with complaint of progressive shortness of breath and syncope.  She reported O2 sats dropping into the 70s at home and reportedly had pulmonology evaluation at the [facility] that was negative.  She noted 5 episodes of loss of consciousness on the night she presented to the ED, as well as innumerable episodes after arriving at the ED, as well as an episode of tetany.  She was recently evaluated by Dr. Meyer felt hypoxia most likely 2/2 cardiac shunting, and recommended she see a structural heart specialist.  Patient reports she gets no warning for episodes with loss of consciousness and has no memory of them.  Her fiancé at the bedside reports that she will typically complain of pain in her left side, then stare off unresponsive, then \"pass out.\"  She reports she had a seizure associated with hypoglycemia at the age of 6 when her blood sugar was 26, but she has also had innumerable \"seizure-like\" episodes over the years which have been investigated with EEG.  About a year ago while in [place] she had a prolonged EEG where a typical episode was recorded, and they report this showed no epileptic activity.  Patient also complains of recent migraines, body pain that is relieved by pain medicine for only 2 hours, a feeling of numbness and vibration behind her kneecaps, and numbness in her left arm for about 2 weeks.  She has also noted shortness of breath and fatigue, but no fever, cough, abdominal pain, nausea or vomiting or diarrhea.    Review of " Systems  Pertinent items are noted in HPI, all other systems reviewed and negative     History  Past Medical History:   Diagnosis Date   • Adrenal insufficiency (CMS/HCC)    • Alopecia    • Anemia    • Anxiety    • APS type 1 (CMS/HCC)    • Asthma    • Autoimmune hepatitis (CMS/HCC)    • Depression    • Dermatomyositis (CMS/HCC)    • Disease of thyroid gland    • Fibromyalgia    • Functional asplenia    • History of kidney stones    • Hypoparathyroidism (CMS/HCC)    • Insulin dependent diabetes mellitus    • Long Q-T syndrome    • Oxygen desaturation    • Pancreatic insufficiency    • Parathyroid abnormality (CMS/HCC)    • Polyglandular deficiency syndrome (CMS/HCC)    • Premature ovarian failure    • Sleep apnea    • Spleen absent    • Tetany     HAS EPISODES   • Transfusion history    ,   Past Surgical History:   Procedure Laterality Date   • APPENDECTOMY     • CARDIAC CATHETERIZATION N/A 4/8/2021    Procedure: RIGHT AND LEFT HEART CATH;  Surgeon: Poli Anderson IV, MD;  Location:  [facility];  Service: Cardiovascular;  Laterality: N/A;   • CARDIAC SURGERY      LOOP RECORDER   • CHOLECYSTECTOMY     • COLONOSCOPY N/A 6/5/2020    Procedure: COLONOSCOPY;  Surgeon: Gavin Vargas MD;  Location:  [facility];  Service: Gastroenterology;  Laterality: N/A;   • ENDOSCOPY N/A 4/26/2018    Procedure: ESOPHAGOGASTRODUODENOSCOPY;  Surgeon: Gavin Vargas MD;  Location:  [facility];  Service: Gastroenterology   • ENDOSCOPY N/A 8/9/2018    Procedure: ESOPHAGOGASTRODUODENOSCOPY-DILATION;  Surgeon: aGvin Vargas MD;  Location:  [facility];  Service: Gastroenterology   • ENDOSCOPY     • ENDOSCOPY N/A 2/6/2019    Procedure: ESOPHAGOGASTRODUODENOSCOPY;  Surgeon: Gavin Vargas MD;  Location:  [facility];  Service: Gastroenterology   • ENDOSCOPY N/A 7/24/2019    Procedure: ESOPHAGOGASTRODUODENOSCOPY;  Surgeon: Alicia  Gavin Sue MD;  Location:  [facility];  Service: Gastroenterology   • ENDOSCOPY N/A 10/18/2019    Procedure: ESOPHAGOGASTRODUODENOSCOPY WITH BALLOON DILATION;  Surgeon: Gavin Vargas MD;  Location:  [facility];  Service: Gastroenterology   • ENDOSCOPY N/A 6/4/2020    Procedure: ESOPHAGOGASTRODUODENOSCOPY  WITH ESOPHAGEAL BALLOON DILATATION;  Surgeon: Gavin Vargas MD;  Location:  [facility];  Service: Gastroenterology;  Laterality: N/A;  dilation done with 60F dilator    • ENDOSCOPY N/A 1/29/2021    Procedure: ESOPHAGOGASTRODUODENOSCOPY;  Surgeon: Gavin Vargas MD;  Location:  [facility];  Service: Gastroenterology;  Laterality: N/A;   • EXPLORATORY LAPAROTOMY      MULTIPLE   • HAND SURGERY      4 TOTAL   • HERNIA REPAIR     • LIVER BIOPSY     • MUSCLE BIOPSY     • MUSCLE BIOPSY     • PEG TUBE INSERTION     • PEG TUBE REMOVAL     • PORTACATH PLACEMENT     ,   Family History   Problem Relation Age of Onset   • Kidney disease Father    ,   Social History     Socioeconomic History   • Marital status: Significant Other     Spouse name: Not on file   • Number of children: Not on file   • Years of education: Not on file   • Highest education level: Not on file   Tobacco Use   • Smoking status: Never Smoker   • Smokeless tobacco: Never Used   Substance and Sexual Activity   • Alcohol use: Yes     Comment: SOCIAL   • Drug use: No   • Sexual activity: Defer     E-cigarette/Vaping     E-cigarette/Vaping Substances     E-cigarette/Vaping Devices       ,   Medications Prior to Admission   Medication Sig Dispense Refill Last Dose   • albuterol (PROVENTIL HFA;VENTOLIN HFA) 108 (90 Base) MCG/ACT inhaler Take 2 Puffs by inhalation every 4 hours as needed for wheezing.      • Alcohol Swabs (ALCOHOL PREP) 70 % pads Check blood glucose up to 2 times daily      • ascorbic acid (VITAMIN C) 1000 MG tablet Take 1,000 mg by mouth Daily.      • azaTHIOprine (IMURAN)  100 MG tablet Take 150 mg by mouth Daily.      • azaTHIOprine (IMURAN) 50 MG tablet Take 3 tablets by mouth Daily. 90 tablet 2    • azithromycin (ZITHROMAX) 250 MG tablet Take 250 mg by mouth Daily.      • Blood Glucose Calibration (OT ULTRA/FASTTK CNTRL SOLN) solution Use weekly and as needed to calibrate meter      • Blood Glucose Monitoring Suppl (ONE TOUCH ULTRA 2) w/Device kit Use to check blood glucose up to 2 times daily      • calcitriol (ROCALTROL) 0.5 MCG capsule Take 0.5 mcg by mouth 2 (Two) Times a Day.      • Calcium Citrate 250 MG tablet Take 500 mg by mouth Daily.      • cycloSPORINE (RESTASIS) 0.05 % ophthalmic emulsion 1 drop. PRN      • cyproheptadine (PERIACTIN) 4 MG tablet Take 4 mg by mouth 2 (Two) Times a Day.      • dronabinol (MARINOL) 5 MG capsule 5 mg 4 (Four) Times a Day As Needed.      • dronabinol (Marinol) 5 MG capsule Take 1 capsule by mouth Daily As Needed (anorexia). 30 capsule 0    • EPINEPHrine (EPIPEN 2-TEDDY) 0.3 MG/0.3ML solution auto-injector injection EpiPen 0.3 mg/0.3 mL injection, auto-injector   Take 1 auto by injection route.      • ESTRADIOL PO Take  by mouth.      • eszopiclone (Lunesta) 2 MG tablet Take 2 mg by mouth Every Night. Take immediately before bedtime      • fludrocortisone 0.1 MG tablet TAKE ONE TABLET BY MOUTH DAILY..05MG PER PT      • fluticasone (FLONASE) 50 MCG/ACT nasal spray 1 spray.      • folic acid (FOLVITE) 1 MG tablet Take 4 mg by mouth Daily.      • gabapentin (NEURONTIN) 600 MG tablet Take 900 mg by mouth 3 (Three) Times a Day.      • hydrocortisone (CORTEF) 10 MG tablet Take 10 mg by mouth Every Night.      • hydrocortisone (CORTEF) 20 MG tablet Take 20 mg by mouth Every Morning. 20 MG IN AM      • hydrocortisone sodium succinate (SOLU-CORTEF) 100 MG injection Give 100 mg ( 2 mL ) intramuscular as needed for vomiting, severe illness  Dispense actovial      • Hypromellose (SYSTANE OVERNIGHT THERAPY) 0.3 % gel Systane Gel 0.3 % eye gel      •  "Insulin Pen Needle 32G X 4 MM misc Use to administer Natpara subcutaneously daily      • levothyroxine (SYNTHROID, LEVOTHROID) 75 MCG tablet levothyroxine 75 mcg tablet      • lidocaine (LIDODERM) 5 % Place 1 patch on the skin as directed by provider Daily. Remove & Discard patch within 12 hours or as directed by MD 14 patch 0    • magnesium oxide (MAGOX) 400 (241.3 Mg) MG tablet tablet Take 1,000 mg by mouth 3 (Three) Times a Day.      • metFORMIN (GLUCOPHAGE) 500 MG tablet Take 500 mg by mouth 2 (Two) Times a Day With Meals.      • methocarbamol (ROBAXIN) 500 MG tablet Take 1 tablet by mouth 3 (Three) Times a Day. 14 tablet 0    • Misc Natural Products (RA XYDRA EF PO) Take  by mouth.      • Multiple Vitamins-Minerals (MULTIVITAMIN ADULT PO) Take 1 tablet by mouth.      • nadolol (CORGARD) 20 MG tablet Take 20 mg by mouth 2 (two) times a day.      • omeprazole (priLOSEC) 40 MG capsule Take 1 capsule by mouth 2 (two) times a day. 30 minutes before a meal. (Patient taking differently: Take 40 mg by mouth 2 (two) times a day. 30 minutes before a meal. PRN) 60 capsule 2    • Parathyroid Hormone, Recomb, (NATPARA SC) Inject 1 Cartridge under the skin into the appropriate area as directed 2 (two) times a day.      • Potassium Chloride (KLOR-CON 10 PO) Take 40 mEq by mouth 3 (Three) Times a Day.      • progesterone (PROMETRIUM) 200 MG capsule Take 200 mg by mouth Daily.      • promethazine (PHENERGAN) 25 MG suppository Insert 1 suppository into the rectum Daily As Needed for Nausea or Vomiting. 30 suppository 0    • promethazine (PHENERGAN) 25 MG tablet Take 1 tablet by mouth Every 6 (Six) Hours As Needed for Nausea or Vomiting. 12 tablet 0    • Transparent Dressings (TEGADERM FILM 2-3/8\"X2-3/4\") misc Apply over pump insertion site every 2 days..      • venlafaxine (EFFEXOR) 25 MG tablet Take 75 mg by mouth 3 (Three) Times a Day.      • Vitamin D, Cholecalciferol, 1000 units capsule Take 1,000 unit marking on U-100 " syringe by mouth 2 (Two) Times a Day.      • zinc sulfate (ZINCATE) 220 (50 Zn) MG capsule zinc sulfate 220 mg (50 mg) capsule      • ONETOUCH DELICA LANCETS 33G misc Use to check blood glucose up to 2 times daily      , Scheduled Meds:  ascorbic acid, 1,000 mg, Oral, Daily  azaTHIOprine, 150 mg, Oral, Daily  calcitriol, 0.5 mcg, Oral, Q12H  calcium carbonate (oyster shell), 500 mg, Oral, Daily  calcium gluconate, 2 g, Intravenous, Once  cycloSPORINE, 1 drop, Both Eyes, BID  dexamethasone, 4 mg, Intravenous, Once  dextrose, 25 g, Intravenous, Once  dextrose, 50 mL, Intravenous, Once  fentaNYL citrate (PF), , ,   fludrocortisone, 100 mcg, Oral, Daily  fluticasone, 1 spray, Each Nare, Daily  folic acid, 4 mg, Oral, Daily  gabapentin, 600 mg, Oral, Q8H  hydrocortisone, 10 mg, Oral, Nightly  hydrocortisone, 20 mg, Oral, Daily With Breakfast  levothyroxine, 75 mcg, Oral, Q AM  magnesium oxide, 1,000 mg, Oral, TID  magnesium sulfate, 2 g, Intravenous, Once  methocarbamol, 500 mg, Oral, TID  methohexital, , ,   midazolam, , ,   multivitamin with minerals, 1 tablet, Oral, Daily  nadolol, 20 mg, Oral, Q12H  pantoprazole, 40 mg, Oral, Daily  Parathyroid Hormone (Recomb), 25 mcg, Subcutaneous, BID  potassium chloride, 40 mEq, Oral, TID  progesterone, 200 mg, Oral, Daily  sodium chloride, 10 mL, Intravenous, Q12H  venlafaxine, 75 mg, Oral, TID  zinc sulfate, 220 mg, Oral, Daily    , Continuous Infusions:   , PRN Meds:  •  acetaminophen **OR** acetaminophen **OR** acetaminophen  •  acetaminophen  •  atropine  •  butalbital-acetaminophen-caffeine  •  dextrose  •  dextrose  •  glucagon (human recombinant)  •  HYDROmorphone **AND** naloxone  •  prochlorperazine **OR** prochlorperazine **OR** prochlorperazine  •  promethazine  •  sodium chloride  •  [COMPLETED] Insert peripheral IV **AND** sodium chloride  •  sodium chloride and Allergies:  Azithromycin, Cefpodoxime, Cephalosporins, Clarithromycin, Levofloxacin, Nitrofurantoin,  "Nystatin, Penicillins, Sulfa antibiotics, Midazolam, Morphine, Nitrofurantoin macrocrystal, and Ondansetron    Objective     Vital Signs   Blood pressure 98/62, pulse 72, temperature 98.2 °F (36.8 °C), temperature source Oral, resp. rate 16, height 160 cm (62.99\"), weight 67.6 kg (149 lb), SpO2 100 %.    Physical Exam:   Well-developed young white woman sitting up in the hospital bed in no acute distress, although tearful when discussing symptoms and frustrations.  She is alert and fully oriented, with normal language, attention, memory and fund of knowledge.  No dysarthria.  Pupils 3 mm and reactive, VF F, EOMI, normal facial sensation and movement, hearing intact to finger rub bilaterally, palate and shoulders elevates symmetrically and tongue protrudes midline  Motor: 5/5 throughout with encouragement, initially slow to  on the left.  No pronator drift.  Coordination intact including finger-nose-finger and heel-knee-shin  Muscle tone normal  Reflexes 2+ throughout and symmetric  Sensory exam shows diminished vibration to the ankle on the right, to mid shin on the left  HEENT: NCAT, moist mucous membranes  Neck supple, no carotid bruit appreciated  Heart RRR no murmur appreciated  Lungs clear to auscultation, normal respiratory effort  Abdomen soft, ND with positive bowel sounds  Skin warm and dry, no rashes appreciated  Extremities without cyanosis or edema    Results Review:   I reviewed the patient's new clinical results.  I reviewed the patient's new imaging results and agree with the interpretation.  I reviewed the patient's other test results and agree with the interpretation  Transcranial Doppler study today normal/negative  Cardiac catheterization this admission with normal coronary arteries, moderately elevated LV filling pressure and mild pulmonary hypertension.  Supranormal cardiac output, no evidence of intracardiac or pulmonary shunt  BAO with saline negative for evidence of intra-atrial " shunt  Labs reviewed  ABG yesterday 7.29/63/62/30  UA with 40 ketones otherwise negative  BMP on 4/7 with glucose 61 sodium 135 chloride 97 otherwise normal  CBC on 4/7 with white count 12.6  otherwise unremarkable  Troponin normal  Magnesium normal  TSH normal 1.7        Assessment/Plan       Syncope    Hypoxia    Polyglandular autoimmune syndrome, type 1 (CMS/HCC)    Type 2 diabetes mellitus (CMS/HCC)    Hypoparathyroidism (CMS/HCC)    Leukocytosis      Syncope-previous work-up suggest this may be recurrence of nonepileptic events.  Will obtain EEG.  Given complaints of new left upper extremity numbness and patient's complex medical conditions, will also obtain brain MRI.    I discussed the patient's findings and my recommendations with patient and family    Elda Aldana MD  04/09/21  10:28 EDT

## 2021-04-09 NOTE — CONSULTS
Pulmonary Consult Note     Chief Complaint:  Syncope    History of Present Illness     30-year-old female with a past medical history significant for polyglandular deficiency syndrome, sleep apnea, prolonged QT syndrome, diabetes mellitus type 2, hyperparathyroidism, anxiety, and adrenal insufficiency.  Who presents to [facility] initially on 4/7/2021 with increasing shortness of breath and chest pain.  She had requiring 2 L nasal cannula for a while and was seen Dr. Park at [facility] and supposedly had a negative work-up.  She came back after she was having syncope.  She was seen Dr. Meyer in cardiology who was concerned of a possible shunt.  She underwent a TTE/BAO with saline showing no signs of a shunt, she underwent a right and left heart cath.  Left heart cath unremarkable.  The right heart cath showed elevated pulmonary artery pressure and elevated wedge pressure with a normal pulmonary vascular resistance.  She denies any fever, chills, nausea, or vomiting.  She does have chronic pain issues.  She continues on her steroids for her polyglandular deficiency.  Pulmonary was consulted for further evaluation of the patient's hypoxemia.    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.39]    • Polyglandular autoimmune syndrome, type 1 (CMS/HCC) [E31.8]    • Type 2 diabetes mellitus (CMS/HCC) [E11.9]    • Hypoparathyroidism (CMS/HCC) [E20.9]    • Adrenal insufficiency ([place]'s disease) (CMS/HCC) [E27.1]    • Asplenia [Q89.01]    • Epigastric pain [R10.13]    • Dilatation of esophagus [K22.8]    • Gastroparesis [K31.84]    • Irritable bowel syndrome with constipation [K58.1]    • Esophageal dysphagia [R13.10]      Past  Surgical History:   Procedure Laterality Date   • APPENDECTOMY     • CARDIAC CATHETERIZATION N/A 4/8/2021    Procedure: RIGHT AND LEFT HEART CATH;  Surgeon: Poli Anderson IV, MD;  Location:  [facility];  Service: Cardiovascular;  Laterality: N/A;   • CARDIAC SURGERY      LOOP RECORDER   • CHOLECYSTECTOMY     • COLONOSCOPY N/A 6/5/2020    Procedure: COLONOSCOPY;  Surgeon: Gavin Vargas MD;  Location:  [facility];  Service: Gastroenterology;  Laterality: N/A;   • ENDOSCOPY N/A 4/26/2018    Procedure: ESOPHAGOGASTRODUODENOSCOPY;  Surgeon: Gavin Vargas MD;  Location:  [facility];  Service: Gastroenterology   • ENDOSCOPY N/A 8/9/2018    Procedure: ESOPHAGOGASTRODUODENOSCOPY-DILATION;  Surgeon: Gavin Vargas MD;  Location:  [facility];  Service: Gastroenterology   • ENDOSCOPY     • ENDOSCOPY N/A 2/6/2019    Procedure: ESOPHAGOGASTRODUODENOSCOPY;  Surgeon: Gavin Vargas MD;  Location:  [facility];  Service: Gastroenterology   • ENDOSCOPY N/A 7/24/2019    Procedure: ESOPHAGOGASTRODUODENOSCOPY;  Surgeon: Gavin Vargas MD;  Location:  [facility];  Service: Gastroenterology   • ENDOSCOPY N/A 10/18/2019    Procedure: ESOPHAGOGASTRODUODENOSCOPY WITH BALLOON DILATION;  Surgeon: Gavin Vargas MD;  Location:  [facility];  Service: Gastroenterology   • ENDOSCOPY N/A 6/4/2020    Procedure: ESOPHAGOGASTRODUODENOSCOPY  WITH ESOPHAGEAL BALLOON DILATATION;  Surgeon: Gavin Vragas MD;  Location:  [facility];  Service: Gastroenterology;  Laterality: N/A;  dilation done with 60F dilator    • ENDOSCOPY N/A 1/29/2021    Procedure: ESOPHAGOGASTRODUODENOSCOPY;  Surgeon: Gavin Vargas MD;  Location:  [facility];  Service: Gastroenterology;  Laterality: N/A;   • EXPLORATORY LAPAROTOMY      MULTIPLE   • HAND SURGERY      4 TOTAL   • HERNIA REPAIR     •  "LIVER BIOPSY     • MUSCLE BIOPSY     • MUSCLE BIOPSY     • PEG TUBE INSERTION     • PEG TUBE REMOVAL     • PORTACATH PLACEMENT         Allergies   Allergen Reactions   • Azithromycin Hives   • Cefpodoxime Hives   • Cephalosporins Hives   • Clarithromycin Hives     biaxin  biaxin  biaxin   • Levofloxacin Hives   • Nitrofurantoin Hives   • Nystatin Hives   • Penicillins Hives   • Sulfa Antibiotics Hives   • Midazolam Unknown - Low Severity   • Morphine Other (See Comments)     \"it doesn't work. [facility] told me to list it as an allergy\"   • Nitrofurantoin Macrocrystal Other (See Comments) and Hives   • Ondansetron Other (See Comments)     \"it doesn't work. [facility] told me to list it as an allergy\"     No current facility-administered medications on file prior to encounter.     Current Outpatient Medications on File Prior to Encounter   Medication Sig   • albuterol (PROVENTIL HFA;VENTOLIN HFA) 108 (90 Base) MCG/ACT inhaler Take 2 Puffs by inhalation every 4 hours as needed for wheezing.   • Alcohol Swabs (ALCOHOL PREP) 70 % pads Check blood glucose up to 2 times daily   • ascorbic acid (VITAMIN C) 1000 MG tablet Take 1,000 mg by mouth Daily.   • azaTHIOprine (IMURAN) 100 MG tablet Take 150 mg by mouth Daily.   • azaTHIOprine (IMURAN) 50 MG tablet Take 3 tablets by mouth Daily.   • azithromycin (ZITHROMAX) 250 MG tablet Take 250 mg by mouth Daily.   • Blood Glucose Calibration (OT ULTRA/FASTTK CNTRL SOLN) solution Use weekly and as needed to calibrate meter   • Blood Glucose Monitoring Suppl (ONE TOUCH ULTRA 2) w/Device kit Use to check blood glucose up to 2 times daily   • calcitriol (ROCALTROL) 0.5 MCG capsule Take 0.5 mcg by mouth 2 (Two) Times a Day.   • Calcium Citrate 250 MG tablet Take 500 mg by mouth Daily.   • cycloSPORINE (RESTASIS) 0.05 % ophthalmic emulsion 1 drop. PRN   • cyproheptadine (PERIACTIN) 4 MG tablet Take 4 mg by mouth 2 (Two) Times a Day.   • dronabinol (MARINOL) 5 MG capsule 5 mg 4 (Four) Times a Day As " Needed.   • dronabinol (Marinol) 5 MG capsule Take 1 capsule by mouth Daily As Needed (anorexia).   • EPINEPHrine (EPIPEN 2-TEDDY) 0.3 MG/0.3ML solution auto-injector injection EpiPen 0.3 mg/0.3 mL injection, auto-injector   Take 1 auto by injection route.   • ESTRADIOL PO Take  by mouth.   • eszopiclone (Lunesta) 2 MG tablet Take 2 mg by mouth Every Night. Take immediately before bedtime   • fludrocortisone 0.1 MG tablet TAKE ONE TABLET BY MOUTH DAILY..05MG PER PT   • fluticasone (FLONASE) 50 MCG/ACT nasal spray 1 spray.   • folic acid (FOLVITE) 1 MG tablet Take 4 mg by mouth Daily.   • gabapentin (NEURONTIN) 600 MG tablet Take 900 mg by mouth 3 (Three) Times a Day.   • hydrocortisone (CORTEF) 10 MG tablet Take 10 mg by mouth Every Night.   • hydrocortisone (CORTEF) 20 MG tablet Take 20 mg by mouth Every Morning. 20 MG IN AM   • hydrocortisone sodium succinate (SOLU-CORTEF) 100 MG injection Give 100 mg ( 2 mL ) intramuscular as needed for vomiting, severe illness  Dispense actovial   • Hypromellose (SYSTANE OVERNIGHT THERAPY) 0.3 % gel Systane Gel 0.3 % eye gel   • Insulin Pen Needle 32G X 4 MM misc Use to administer Natpara subcutaneously daily   • levothyroxine (SYNTHROID, LEVOTHROID) 75 MCG tablet levothyroxine 75 mcg tablet   • lidocaine (LIDODERM) 5 % Place 1 patch on the skin as directed by provider Daily. Remove & Discard patch within 12 hours or as directed by MD   • magnesium oxide (MAGOX) 400 (241.3 Mg) MG tablet tablet Take 1,000 mg by mouth 3 (Three) Times a Day.   • metFORMIN (GLUCOPHAGE) 500 MG tablet Take 500 mg by mouth 2 (Two) Times a Day With Meals.   • methocarbamol (ROBAXIN) 500 MG tablet Take 1 tablet by mouth 3 (Three) Times a Day.   • Misc Natural Products (RA XYDRA EF PO) Take  by mouth.   • Multiple Vitamins-Minerals (MULTIVITAMIN ADULT PO) Take 1 tablet by mouth.   • nadolol (CORGARD) 20 MG tablet Take 20 mg by mouth 2 (two) times a day.   • omeprazole (priLOSEC) 40 MG capsule Take 1 capsule  "by mouth 2 (two) times a day. 30 minutes before a meal. (Patient taking differently: Take 40 mg by mouth 2 (two) times a day. 30 minutes before a meal. PRN)   • Parathyroid Hormone, Recomb, (NATPARA SC) Inject 1 Cartridge under the skin into the appropriate area as directed 2 (two) times a day.   • Potassium Chloride (KLOR-CON 10 PO) Take 40 mEq by mouth 3 (Three) Times a Day.   • progesterone (PROMETRIUM) 200 MG capsule Take 200 mg by mouth Daily.   • promethazine (PHENERGAN) 25 MG suppository Insert 1 suppository into the rectum Daily As Needed for Nausea or Vomiting.   • promethazine (PHENERGAN) 25 MG tablet Take 1 tablet by mouth Every 6 (Six) Hours As Needed for Nausea or Vomiting.   • Transparent Dressings (TEGADERM FILM 2-3/8\"X2-3/4\") misc Apply over pump insertion site every 2 days..   • venlafaxine (EFFEXOR) 25 MG tablet Take 75 mg by mouth 3 (Three) Times a Day.   • Vitamin D, Cholecalciferol, 1000 units capsule Take 1,000 unit marking on U-100 syringe by mouth 2 (Two) Times a Day.   • zinc sulfate (ZINCATE) 220 (50 Zn) MG capsule zinc sulfate 220 mg (50 mg) capsule   • ONETOUCH DELICA LANCETS 33G misc Use to check blood glucose up to 2 times daily     MEDICATION LIST AND ALLERGIES REVIEWED.    Family History   Problem Relation Age of Onset   • Kidney disease Father      Social History     Tobacco Use   • Smoking status: Never Smoker   • Smokeless tobacco: Never Used   Substance Use Topics   • Alcohol use: Yes     Comment: SOCIAL   • Drug use: No     Social History     Social History Narrative   • Not on file     FAMILY AND SOCIAL HISTORY REVIEWED.    Review of Systems   Constitutional: Negative for activity change, appetite change, chills and fever.   HENT: Negative for congestion, sore throat and voice change.    Eyes: Negative for photophobia and visual disturbance.   Respiratory: Positive for shortness of breath. Negative for cough and wheezing.    Cardiovascular: Negative for chest pain, palpitations " "and leg swelling.   Gastrointestinal: Negative for abdominal distention and abdominal pain.   Genitourinary: Negative for difficulty urinating and flank pain.   Musculoskeletal: Negative for myalgias and neck stiffness.   Skin: Negative for color change and rash.   Neurological: Negative for dizziness, seizures and headaches.   Hematological: Negative for adenopathy.   Psychiatric/Behavioral: Negative for agitation, hallucinations and sleep disturbance.     ALL OTHER SYSTEMS REVIEWED AND ARE NEGATIVE.    Physical Exam and Clinical Information   BP 98/62   Pulse 72   Temp 98.2 °F (36.8 °C) (Oral)   Resp 16   Ht 160 cm (62.99\")   Wt 67.6 kg (149 lb)   SpO2 100%   BMI 26.40 kg/m²   Physical Exam  Vitals and nursing note reviewed.   Constitutional:       General: She is not in acute distress.     Appearance: She is well-developed and normal weight. She is not ill-appearing or toxic-appearing.   HENT:      Head: Normocephalic and atraumatic.      Right Ear: External ear normal.      Left Ear: External ear normal.      Nose: Nose normal.      Mouth/Throat:      Mouth: Mucous membranes are moist.      Pharynx: Oropharynx is clear. No oropharyngeal exudate or posterior oropharyngeal erythema.   Eyes:      Conjunctiva/sclera: Conjunctivae normal.      Pupils: Pupils are equal, round, and reactive to light.   Cardiovascular:      Rate and Rhythm: Normal rate and regular rhythm.      Pulses: Normal pulses.      Heart sounds: Normal heart sounds. No murmur heard.   No friction rub. No gallop.    Pulmonary:      Effort: Pulmonary effort is normal. No respiratory distress.      Breath sounds: Normal breath sounds. No wheezing, rhonchi or rales.   Abdominal:      General: Bowel sounds are normal. There is no distension.      Palpations: Abdomen is soft.      Tenderness: There is no abdominal tenderness. There is no rebound.   Musculoskeletal:         General: Normal range of motion.      Cervical back: Normal range of " motion and neck supple. No rigidity.      Right lower leg: No edema.      Left lower leg: No edema.   Skin:     General: Skin is warm and dry.      Capillary Refill: Capillary refill takes less than 2 seconds.   Neurological:      General: No focal deficit present.      Mental Status: She is alert and oriented to person, place, and time.   Psychiatric:         Mood and Affect: Mood normal.         Behavior: Behavior normal.         Thought Content: Thought content normal.         Judgment: Judgment normal.         Results from last 7 days   Lab Units 04/07/21  0602 04/06/21  2131   WBC 10*3/mm3 12.64* 13.56*   HEMOGLOBIN g/dL 14.2 14.8   PLATELETS 10*3/mm3 255 287     Results from last 7 days   Lab Units 04/07/21  0602 04/06/21  2131   SODIUM mmol/L 135* 137   POTASSIUM mmol/L 4.6 5.0   CO2 mmol/L 23.0 28.0   BUN mg/dL 16 13   CREATININE mg/dL 0.75 0.71   MAGNESIUM mg/dL 2.5 1.8   PHOSPHORUS mg/dL 5.2*  --    GLUCOSE mg/dL 61* 69     Estimated Creatinine Clearance: 100.4 mL/min (by C-G formula based on SCr of 0.75 mg/dL).      Results from last 7 days   Lab Units 04/08/21  1020   PH, ARTERIAL pH units 7.287*   PCO2, ARTERIAL mm Hg 63.6*   PO2 ART mm Hg 62.5*     Lab Results   Component Value Date    LACTATE 2.4 (C) 09/17/2020          I reviewed the patient's results/ Images and I agree with the reports     Impression     Syncope    Hypoxia    Polyglandular autoimmune syndrome, type 1 (CMS/HCC)    Type 2 diabetes mellitus (CMS/HCC)    Hypoparathyroidism (CMS/HCC)    Leukocytosis      Plan/Recommendations     30-year-old female with a past medical history significant for polyglandular deficiency syndrome, sleep apnea, prolonged QT syndrome, diabetes mellitus type 2, hyperparathyroidism, anxiety, and adrenal insufficiency.  Pulmonary is consulted secondary to hypoxia.  I did review the CT scan of the chest which showed no acute cardiopulmonary process.  In review of her blood gases it does not appear that she has some  CO2 retention with a pH of 7.28 PCO2 of 63.  While I was in the room I did have the patient lay flat to see if she would get hypoxic.  She was very stable for a few minutes I noticed that she was watching the saturations, suddenly she started changing her breathing pattern and appeared to be doing a Valsalva maneuver.  After that time she did finally drop her saturation and appeared to pass out, during that time she did not take any breaths.  And then suddenly took a gasp of air and oxygen returned back up to 95% on 2 L while laying down, patient was sat back up and was symptomatically fine.  The events did not appear to be orthodeoxia platypnea.    The patient does have hypoxia, but I actually think that she is inducing the hypoxia made by Valsalva, the most likely situation that could think it with that she would have a intracardiac shunt that can be manipulated to exertion or doing a Valsalva maneuver.  I would be somehow to increase intrapulmonary pressure such as placing her on PEEP noted to find the shunt.  I will go ahead and get a set of pulmonary function testing, but I did review what we had even though she holds a diagnosis of asthma really do not see any signs of asthma on her previous PFTs.  Although she does have some level of chronic CO2 retention.  For now I will go ahead and start her on Symbicort as she takes an inhaler at home.  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  And ultimately I would refer her to a larger facility for pulmonary hypertension management long-term.  Although I think is less likely will get a CT of the abdomen pelvis  with contrast to look for any possible AVM that could lead to a shunt.  If the CT abdomen pelvis is negative, PFTs are unremarkable, and the very likely option could be to go back for a right heart cath looking for a shunt in the work-up was negative.  Pulmonary will continue to follow      RADHA Long DO  Pulmonary and Critical Care Medicine  04/09/21 11:31 EDT     CC: Agustin Turner MD

## 2021-04-09 NOTE — PROGRESS NOTES
[facility]  Progress Note       LOS: 1 day   Patient Care Team:  Agustin Turner MD as PCP - General (Internal Medicine)    Chief Complaint:  SOB, syncope     Subjective     Interval History: Patient underwent right and left heart cath yesterday on 4/8/2021 revealing normal coronary arteries with normal LV systolic function, moderate elevated LV filling pressure, mild pulmonary hypertension and no evidence of intracardiac or pulmonary shunting.  She had a BAO yesterday also which was negative for evidence of an intraatrial shunt  She continues to remain hypoxic if off oxygen.  Currently oxygen level stable on 3 L of oxygen via nasal cannula.  No current chest pain.  Patient is comfortable sitting up in bed.  Groin site is within normal limits.    Problem List:     1. Recurrent syncope  2. 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 Dx by Dr. Chapman at age 4 after abnormal EKG   b. Trouble regulating potassium   c. Black out periods and seizure like activity jayden with swimming and does get anxiolytic without a LifeVest.   d. Initiated on Nadolol for tachycardia with HR up into low high 190s.  e.  twelve-lead EKG 11/20 1/2015 QTc 490 ms  3. Polyglandular autoimmune syndrome  a. Rituxan in the past   4. Chronic Lung Disease/Hpoxia  a. Follows pulmonologist at [facility]  b. Echo 4/7/2021:  EF 55%, no significant valvular abnormalities, appears negative for interatrial shunting.  5. Esophageal stenosis   a. Status post dilatation 1-   6. T2DM  7. Hypoparathyroidism  8. Dermatomyositis  9. Autoimmune hepatitis  10. Obstructive sleep apnea  10.  Adrenal Insufficiency  11.  Gastroparesis  12.  Depression  13.  Anxiety   14.  Surgeries:  a. Cholecystectomy  b. Appendectomy  c. G-tube insertion and removal  d. Umbilical hernia repair  e. Implantable loop recorder insertion    Review of Systems:   Pertinent positives in HPI, all others reviewed and negative.      Objective        Current Facility-Administered Medications:   •  acetaminophen (TYLENOL) tablet 650 mg, 650 mg, Oral, Q4H PRN **OR** acetaminophen (TYLENOL) 160 MG/5ML solution 650 mg, 650 mg, Oral, Q4H PRN **OR** acetaminophen (TYLENOL) suppository 650 mg, 650 mg, Rectal, Q4H PRN, Poli Anderson IV, MD  •  acetaminophen (TYLENOL) tablet 650 mg, 650 mg, Oral, Q4H PRN, Poli Anderson IV, MD, 650 mg at 04/08/21 1042  •  ascorbic acid (VITAMIN C) tablet 1,000 mg, 1,000 mg, Oral, Daily, Poli Anderson IV, MD, 1,000 mg at 04/07/21 1012  •  atropine sulfate injection 0.5 mg, 0.5 mg, Intravenous, Q5 Min PRN, Poli Anderson IV, MD  •  azaTHIOprine (IMURAN) tablet 150 mg, 150 mg, Oral, Daily, Poli Anderson IV, MD, 150 mg at 04/07/21 1020  •  butalbital-acetaminophen-caffeine (FIORICET, ESGIC) -40 MG per tablet 1 tablet, 1 tablet, Oral, Q4H PRN, Poli Anderson IV, MD, 1 tablet at 04/07/21 1714  •  calcitriol (ROCALTROL) capsule 0.5 mcg, 0.5 mcg, Oral, Q12H, Poli Anderson IV, MD, 0.5 mcg at 04/08/21 2117  •  calcium carbonate (oyster shell) tablet 500 mg, 500 mg, Oral, Daily, Poli Anderson IV, MD, 500 mg at 04/07/21 1013  •  calcium gluconate 2 g in sodium chloride 0.9 % 100 mL IVPB, 2 g, Intravenous, Once, Poli Anderson IV, MD  •  cycloSPORINE (RESTASIS) 0.05 % ophthalmic emulsion 1 drop, 1 drop, Both Eyes, BID, Poli Anderson IV, MD, 1 drop at 04/08/21 2116  •  dexamethasone (DECADRON) injection 4 mg, 4 mg, Intravenous, Once, Poli Anderson IV, MD  •  dextrose (D50W) 25 g/ 50mL Intravenous Solution 25 g, 25 g, Intravenous, Once, Poli Anderson IV, MD, Stopped at 04/07/21 0216  •  dextrose (D50W) 25 g/ 50mL Intravenous Solution 25 g, 25 g, Intravenous, Q15 Min PRN, Poli Anderson IV, MD  •  dextrose (D50W) 25 g/ 50mL Intravenous Solution 50 mL, 50 mL, Intravenous, Once, Poli Anderson  Prabhu GUERRIER IV, MD  •  dextrose (GLUTOSE) oral gel 15 g, 15 g, Oral, Q15 Min PRN, Poli Anderson IV, MD, 15 g at 04/07/21 0738  •  fentaNYL citrate (PF) (SUBLIMAZE) 100 MCG/2ML injection  - ADS Override Pull, , , ,   •  fludrocortisone tablet 100 mcg, 100 mcg, Oral, Daily, Marisol Odonnell MD  •  fluticasone (FLONASE) 50 MCG/ACT nasal spray 1 spray, 1 spray, Each Nare, Daily, Poli Anderson IV, MD, 1 spray at 04/07/21 1014  •  folic acid (FOLVITE) tablet 4 mg, 4 mg, Oral, Daily, Poli Anderson IV, MD, 4 mg at 04/07/21 1037  •  gabapentin (NEURONTIN) capsule 600 mg, 600 mg, Oral, Q8H, Poli Anderson IV, MD, 600 mg at 04/09/21 0557  •  glucagon (human recombinant) (GLUCAGEN DIAGNOSTIC) injection 1 mg, 1 mg, Subcutaneous, Q15 Min PRN, Poli Anderson IV, MD  •  hydrocortisone (CORTEF) tablet 10 mg, 10 mg, Oral, Nightly, Poli Anderson IV, MD, 10 mg at 04/08/21 2116  •  hydrocortisone (CORTEF) tablet 20 mg, 20 mg, Oral, Daily With Breakfast, Poli Anderson IV, MD, 20 mg at 04/07/21 1015  •  HYDROmorphone (DILAUDID) injection 1 mg, 1 mg, Intravenous, Q3H PRN, 1 mg at 04/09/21 0557 **AND** naloxone (NARCAN) injection 0.4 mg, 0.4 mg, Intravenous, Q5 Min PRN, Marisol Odonnell MD  •  levothyroxine (SYNTHROID, LEVOTHROID) tablet 75 mcg, 75 mcg, Oral, Q AM, Poli Anderson IV, MD, 75 mcg at 04/09/21 0557  •  magnesium oxide (MAG-OX) tablet 1,000 mg, 1,000 mg, Oral, TID, Poli Anderson IV, MD, 1,000 mg at 04/08/21 2117  •  magnesium sulfate 2g/50 mL (PREMIX) infusion, 2 g, Intravenous, Once, Poli Anderson IV, MD  •  methocarbamol (ROBAXIN) tablet 500 mg, 500 mg, Oral, TID, Poli Anderson IV, MD, 500 mg at 04/08/21 2116  •  methohexital (BREVITAL) 50 MG/5ML injection solution prefilled syringe  - ADS Override Pull, , , ,   •  midazolam (VERSED) 2 MG/2ML injection  - ADS Override Pull, , , ,   •  multivitamin with  minerals 1 tablet, 1 tablet, Oral, Daily, Poli Anderson IV, MD, 1 tablet at 04/07/21 1008  •  nadolol (CORGARD) tablet 20 mg, 20 mg, Oral, Q12H, Poli Anderson IV, MD, 20 mg at 04/08/21 2116  •  pantoprazole (PROTONIX) EC tablet 40 mg, 40 mg, Oral, Daily, Poli Anderson IV, MD, 40 mg at 04/07/21 1011  •  Parathyroid Hormone (Recomb) cartridge 25 mcg **PATIENT SUPPLIED MED**, 25 mcg, Subcutaneous, BID, Poli Anderson IV, MD, 25 mcg at 04/07/21 1824  •  potassium chloride (KLOR-CON) packet 40 mEq, 40 mEq, Oral, TID, Poli Anderson IV, MD, 40 mEq at 04/08/21 2116  •  prochlorperazine (COMPAZINE) injection 5 mg, 5 mg, Intravenous, Q6H PRN **OR** prochlorperazine (COMPAZINE) tablet 5 mg, 5 mg, Oral, Q6H PRN **OR** prochlorperazine (COMPAZINE) suppository 25 mg, 25 mg, Rectal, Q12H PRN, Lui Velez MD  •  progesterone (PROMETRIUM) capsule 200 mg, 200 mg, Oral, Daily, Poli Anderson IV, MD, 200 mg at 04/07/21 1018  •  promethazine (PHENERGAN) tablet 25 mg, 25 mg, Oral, Q6H PRN, Poli Anderson IV, MD, 25 mg at 04/09/21 0223  •  sodium chloride 0.9 % flush 10 mL, 10 mL, Intravenous, PRN, Poli Anderson IV, MD  •  [COMPLETED] Insert peripheral IV, , , Once **AND** sodium chloride 0.9 % flush 10 mL, 10 mL, Intravenous, PRN, Poli Anderson IV, MD  •  sodium chloride 0.9 % flush 10 mL, 10 mL, Intravenous, Q12H, Poli Anderson IV, MD  •  sodium chloride 0.9 % flush 10 mL, 10 mL, Intravenous, PRN, Poli Anderson IV, MD  •  venlafaxine (EFFEXOR) tablet 75 mg, 75 mg, Oral, TID, Poli Anderson IV, MD, 75 mg at 04/08/21 2142  •  zinc sulfate (ZINCATE) capsule 220 mg, 220 mg, Oral, Daily, Poli Anderson IV, MD, 220 mg at 04/07/21 1012    Vital Sign Min/Max for last 24 hours  Temp  Min: 98 °F (36.7 °C)  Max: 98.5 °F (36.9 °C)   BP  Min: 89/56  Max: 113/71   Pulse  Min: 66  Max: 104   Resp   "Min: 16  Max: 20   SpO2  Min: 84 %  Max: 100 %   Flow (L/min)  Min: 3  Max: 4   Weight  Min: 68 kg (149 lb 14.6 oz)  Max: 68 kg (149 lb 14.6 oz)     Flowsheet Rows      First Filed Value   Admission Height  157.5 cm (62\") Documented at 04/06/2021 1823   Admission Weight  68 kg (150 lb) Documented at 04/06/2021 1823            Intake/Output Summary (Last 24 hours) at 4/9/2021 0817  Last data filed at 4/8/2021 1528  Gross per 24 hour   Intake --   Output 300 ml   Net -300 ml       Physical Exam:     General Appearance:    Alert, cooperative, in no acute distress.  O2 via nasal cannula present   Lungs:     Clear to auscultation,respirations regular, even and                  unlabored    Heart:    Regular rhythm and normal rate, normal S1 and S2, no            murmur, no gallop, no rub, no click   Chest Wall:    No abnormalities observed   Abdomen:     Normal bowel sounds, no masses, no organomegaly, soft        non-tender, non-distended, no guarding, no rebound                tenderness   Extremities:   Moves all extremities well, no edema, no cyanosis, no             Redness.  Right groin soft with no evidence of hematoma.  No oozing or bleeding   Pulses:   Pulses palpable and equal bilaterally   Skin:   No bleeding, bruising or rash        Results Review:   Results from last 7 days   Lab Units 04/07/21  0602 04/06/21 2131   WBC 10*3/mm3 12.64* 13.56*   HEMOGLOBIN g/dL 14.2 14.8   HEMATOCRIT % 44.9 45.1   PLATELETS 10*3/mm3 255 287     Results from last 7 days   Lab Units 04/07/21  0602 04/06/21  2131   SODIUM mmol/L 135* 137   POTASSIUM mmol/L 4.6 5.0   CHLORIDE mmol/L 97* 95*   CO2 mmol/L 23.0 28.0   BUN mg/dL 16 13   CREATININE mg/dL 0.75 0.71   GLUCOSE mg/dL 61* 69              Results from last 7 days   Lab Units 04/07/21  0602   TSH uIU/mL 1.700             Results from last 7 days   Lab Units 04/08/21  0433 04/06/21  2131   TROPONIN T ng/mL <0.010 <0.010       Intake/Output Summary (Last 24 hours) at 4/9/2021 " 0817  Last data filed at 4/8/2021 1528  Gross per 24 hour   Intake --   Output 300 ml   Net -300 ml       I personally viewed and interpreted the patient's EKG/Telemetry data    EKG: Normal sinus rhythm 69 bpm, normal OH normal QTc interval    Telemetry: NSR  bpm    Ejection Fraction  No results found for: EF    Echo EF Estimated  Lab Results   Component Value Date    ECHOEFEST 55 04/08/2021         Present on Admission:  • Hypoparathyroidism (CMS/HCC)  • Polyglandular autoimmune syndrome, type 1 (CMS/HCC)  • Type 2 diabetes mellitus (CMS/HCC)  • Leukocytosis  • Syncope  • Hypoxia    Assessment/Plan   1. Hypoxia with recurrent syncope:   -Echocardiogram 4/7/2021: EF 55%, no significant valvular abnormalities, technically difficult bubble study, appears negative for intra-arterial shunting  -Carotid duplex 4/7/2021: Mildly elevated carotid velocities bilaterally, no evidence of atherosclerosis, vertebral artery flow antegrade bilateral, proximal R ICA normal, proximal L ICA normal  -RHC/left HC 4/8/2021: Normal coronary arteries, normal LV systolic function, moderately elevated LV filling pressure, mild pulmonary hypertension, supranormal cardiac output/index/no evidence of intracardiac or pulmonary shunt  -BAO 4/8/2021: EF 55%, LV systolic function normal, saline test with Valsalva negative for evidence of an intra atrial shunt  -Will order transcranial Doppler today with agitated saline for further evaluation of possible cardiac shunt (PFO). Also ask Pulmonary for rconsultatio. Finally karyna pursue ICE study with probing for PFO on Monday with possible PFO closure if present.     2.  Long QT syndrome: Asymptomatic on nadolol with normal QTC on EKG today    3.  Polyglandular autoimmune deficiency  - Follows outpatient with endocrinology Dr. Alek Gillis  -Continue fludrocortisone  -Continue hydrocortisone, she notes that she normally requires solucortef while in the hospital due to stress   -Continue  levothyroxine        Electronically signed by MIRELA Mandujano, 04/09/21, 8:17 AM EDT.      I, Jeramy Meyer MD, personally performed the services face to face as described and documented by the above named individual. I have made any necessary edits and it is both accurate and complete 4/10/2021  08:53 EDT

## 2021-04-09 NOTE — PLAN OF CARE
Visit at bedside with patient and her fiance.  Patient sitting up in bed, service dog with her.  Introduced that Palliative Care consulted to assist with pain management, explained Palliative Nurse and Nurse Practitioner would be seeing and would assess symptom needs.  Patient confirms she lives in [facility], she lives with her Mom, and she states her Mom is her Medical Guardian through [facility].  Patient receptive to Palliative Care consult, active listening, supportive presence.

## 2021-04-09 NOTE — PLAN OF CARE
Goal Outcome Evaluation:      VSS. Patient frequently required prn medications for pain and nausea. See MAR. Patient O2 sat remained stable on 3L nasal cannula this shift. SR. Right groin site C/D/I.  No further complaints at this time will continue to closely monitor. Palliative and neuro consults on for today.

## 2021-04-09 NOTE — PROGRESS NOTES
[facility] Medicine Services  PROGRESS NOTE    Patient Name: Krista Richter  : 1990  MRN: [id number]    Date of Admission: 2021  Primary Care Physician: Agustin Turner MD    Subjective   Subjective     CC:  Syncope/spells/hypoxia    HPI:  Anxious. Had epistaxis this AM. Continues with intermittent chest pain, radiating to L arm with associated SOA and hypoxia. Notes nausea with emesis.     Review of Systems   Constitutional: Positive for activity change and fatigue.   HENT: Positive for nosebleeds.    Respiratory: Positive for chest tightness and shortness of breath.    Cardiovascular: Positive for chest pain. Negative for palpitations and leg swelling.   Gastrointestinal: Negative.    Genitourinary: Negative.    Psychiatric/Behavioral: The patient is nervous/anxious.          Objective   Objective     Vital Signs:   Temp:  [98 °F (36.7 °C)-98.5 °F (36.9 °C)] 98.2 °F (36.8 °C)  Heart Rate:  [] 67  Resp:  [16-20] 16  BP: ()/(50-93) 113/71        Physical Exam:  NAD, alert and oriented, anxious  No family at bedside this AM  OP clear, MMM  PERRL  Neck supple  No LAD  RRR  CTAB  +BS, ND, NT, soft  R cath site without redness/tenderness  THOMPSON  No obvious rashes, tattoos noted    Results Reviewed:  Results from last 7 days   Lab Units 21  0602 21  2131   WBC 10*3/mm3 12.64* 13.56*   HEMOGLOBIN g/dL 14.2 14.8   HEMATOCRIT % 44.9 45.1   PLATELETS 10*3/mm3 255 287     Results from last 7 days   Lab Units 21  0433 21  0602 21  2131   SODIUM mmol/L  --  135* 137   POTASSIUM mmol/L  --  4.6 5.0   CHLORIDE mmol/L  --  97* 95*   CO2 mmol/L  --  23.0 28.0   BUN mg/dL  --  16 13   CREATININE mg/dL  --  0.75 0.71   GLUCOSE mg/dL  --  61* 69   CALCIUM mg/dL  --  9.2 9.7   ALT (SGPT) U/L  --   --  18   AST (SGOT) U/L  --   --  38*   TROPONIN T ng/mL <0.010  --  <0.010     Estimated Creatinine Clearance: 98.3 mL/min (by C-G formula based on SCr of  0.75 mg/dL).    Microbiology Results Abnormal     Procedure Component Value - Date/Time    COVID PRE-OP / PRE-PROCEDURE SCREENING ORDER (NO ISOLATION) - Swab, Nasopharynx [[id number]]  (Normal) Collected: 04/07/21 0516    Lab Status: Final result Specimen: Swab from Nasopharynx Updated: 04/07/21 0637    Narrative:      The following orders were created for panel order COVID PRE-OP / PRE-PROCEDURE SCREENING ORDER (NO ISOLATION) - Swab, Nasopharynx.  Procedure                               Abnormality         Status                     ---------                               -----------         ------                     COVID-19 and FLU A/B PCR...[[id number]]  Normal              Final result                 Please view results for these tests on the individual orders.    COVID-19 and FLU A/B PCR - Swab, Nasopharynx [[id number]]  (Normal) Collected: 04/07/21 0516    Lab Status: Final result Specimen: Swab from Nasopharynx Updated: 04/07/21 0637     COVID19 Not Detected     Influenza A PCR Not Detected     Influenza B PCR Not Detected    Narrative:      Fact sheet for providers: https://www.fda.gov/media/558477/download    Fact sheet for patients: https://www.fda.gov/media/361521/download    Test performed by PCR.          Imaging Results (Last 24 Hours)     ** No results found for the last 24 hours. **          Results for orders placed during the hospital encounter of 04/06/21    Adult Transesophageal Echo (BAO) W/ Cont if Necessary Per Protocol    Interpretation Summary  · Estimated left ventricular EF = 55%. Left ventricular systolic function is normal.  · Saline test with Valsalva negative for evidence of an intra-atrial shunt.      I have reviewed the medications:  Scheduled Meds:ascorbic acid, 1,000 mg, Oral, Daily  azaTHIOprine, 150 mg, Oral, Daily  calcitriol, 0.5 mcg, Oral, Q12H  calcium carbonate (oyster shell), 500 mg, Oral, Daily  calcium gluconate, 2 g, Intravenous, Once  cycloSPORINE, 1 drop, Both Eyes,  BID  dexamethasone, 4 mg, Intravenous, Once  dextrose, 25 g, Intravenous, Once  dextrose, 50 mL, Intravenous, Once  fentaNYL citrate (PF), , ,   fludrocortisone, 100 mcg, Oral, Daily  fluticasone, 1 spray, Each Nare, Daily  folic acid, 4 mg, Oral, Daily  gabapentin, 600 mg, Oral, Q8H  hydrocortisone, 10 mg, Oral, Nightly  hydrocortisone, 20 mg, Oral, Daily With Breakfast  levothyroxine, 75 mcg, Oral, Q AM  magnesium oxide, 1,000 mg, Oral, TID  magnesium sulfate, 2 g, Intravenous, Once  methocarbamol, 500 mg, Oral, TID  methohexital, , ,   midazolam, , ,   multivitamin with minerals, 1 tablet, Oral, Daily  nadolol, 20 mg, Oral, Q12H  pantoprazole, 40 mg, Oral, Daily  Parathyroid Hormone (Recomb), 25 mcg, Subcutaneous, BID  potassium chloride, 40 mEq, Oral, TID  progesterone, 200 mg, Oral, Daily  sodium chloride, 10 mL, Intravenous, Q12H  venlafaxine, 75 mg, Oral, TID  zinc sulfate, 220 mg, Oral, Daily      Continuous Infusions:   PRN Meds:.•  acetaminophen **OR** acetaminophen **OR** acetaminophen  •  acetaminophen  •  atropine  •  butalbital-acetaminophen-caffeine  •  dextrose  •  dextrose  •  glucagon (human recombinant)  •  HYDROmorphone **AND** naloxone  •  prochlorperazine **OR** prochlorperazine **OR** prochlorperazine  •  promethazine  •  sodium chloride  •  [COMPLETED] Insert peripheral IV **AND** sodium chloride  •  sodium chloride    Assessment/Plan   Assessment & Plan     Active Hospital Problems    Diagnosis  POA   • **Syncope [R55]  Yes   • Leukocytosis [D72.829]  Yes   • Hypoparathyroidism (CMS/HCC) [E20.9]  Yes   • Polyglandular autoimmune syndrome, 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        Brief Hospital Course to date:  Krista Aiken Neelam is a  30 y.o. female with PMH significant for polyglandular deficiency syndrome, sleep apnea, prolonged QT syndrome, DM 2,  hyperparathyroid, anxiety, asthma, adrenal insufficiency who presents to the ED with complaint of progressive shortness of breath and syncope.    This patient's problems and plans were partially entered by my partner and updated as appropriate by me 04/09/21.         Syncope  Episodes of Tetany  Chest pain, ongoing  Hypoxia  --CTA negative  --Duplex and TTE reviewed  --R/L heart cath studies reviewed  --episodic tetany treated with Mg/CA/steroids, resolved  --follow up with cardiology, unclear etiology thus far     Hypoxia  --unremarkable CTA and heart catheterizations noted  --oxygen, wean as tolerated, consider pulmonary input    Leukocytosis  -UA neg  -CTA negative for acute findings  --on steroidds     Long QT syndrome  -Originally diagnosed at age 4 (probable congenital LQT1)  -Recent EKG noting normal QT interval  -Holding nadolol at this time as hypotensive on presentation, monitor BP     Diabetes mellitus 2  -relatively stable glucose levels     Polyglandular autoimmune syndrome/hypoparathyroidism  -Follows outpatient with endocrinology Dr. Alek Gillis  -Continue fludrocortisone  -Continue hydrocortisone, she notes that she normally requires solucortef while in the hospital due to stress   -Continue levothyroxine     Awaiting neurology and palliative care input     DVT prophylaxis: Mechanical      Disposition: I expect the patient to be discharged pending further workup/improvement in pain control/resolution of episodes       CODE STATUS:   Code Status and Medical Interventions:   Ordered at: 04/07/21 0443     Level Of Support Discussed With:    Patient     Code Status:    CPR     Medical Interventions (Level of Support Prior to Arrest):    Full       Lui Velez MD  04/09/21

## 2021-04-09 NOTE — PROGRESS NOTES
Continued Stay Note   [place]     Patient Name: Krista Richter  MRN: [id number]  Today's Date: 4/9/2021    Admit Date: 4/6/2021    Discharge Plan     Row Name 04/09/21 1334       Plan    Plan  Home    Patient/Family in Agreement with Plan  yes    Plan Comments  Spoke with pt. and SO at bedside. Pt has numerous complaints. So far testing has been negative. Neuro is following now and have ordered a MRI of brain. Will continue to follow.    Final Discharge Disposition Code  30 - still a patient        Discharge Codes    No documentation.             Jasmina Razo RN

## 2021-04-09 NOTE — CONSULTS
Agustin Turner MD  Consulting physician: Marisol Odonnell  Reason for referral: pain management  Chief Complaint   Patient presents with   • Chest Pain     HPI:   30 y.o. female with PMH significant for polyglandular deficiency syndrome, sleep apnea, prolonged QT syndrome, DM 2, hyperparathyroidism, anxiety, asthma, adrenal insufficiency (follows at [facility]) who presents to the ED with complaint of progressive shortness of breath and syncope on 4/6/2021.  Per admission history reported diffuse muscle pain secondary to the tetany episode.  Symptoms:   Patient reports bilateral knee cap pain, describes as tingling like when you arm falls asleep.  Onset one week ago. Chronic gabapentin dose is not effective. No notice of venlafaxine.   Second pain - starts lateral Left chest and radiates up into her axillary space, then radiates down her LUE to her fingers, pain extends mid scapular line anterior wrap around to posterior.  Currently severe pain.  Prn iv hydromorphone effective but wears off in short time period.   + dyspnea - patient reports oxygen dependency at home on 2L/min  + nausea with emesis, last BM 4/6.  Reports promethazine suppositories are effective.   + insomnia - takes Lunesta 2mg regularly at home and is effective, home med is at hospital now.  Chronic migraine history - fiorcet effective  Advance care planning discussed:   Code Status:   Current Code Status     Date Active Code Status Order ID Comments User Context       4/7/2021 0443 CPR [id number]  Madelyn Johnston, APRN ED     Advance Care Planning Activity      Questions for Current Code Status     Question Answer Comment    Code Status CPR     Medical Interventions (Level of Support Prior to Arrest) Full     Level Of Support Discussed With Patient         Advance Directive: none on medical record  Surrogate decision maker:   Past Medical History:   Diagnosis Date   • Adrenal insufficiency (CMS/HCC)    • Alopecia    • Anemia    • Anxiety    • APS type 1  (CMS/HCC)    • Asthma    • Autoimmune hepatitis (CMS/HCC)    • Depression    • Dermatomyositis (CMS/HCC)    • Disease of thyroid gland    • Fibromyalgia    • Functional asplenia    • History of kidney stones    • Hypoparathyroidism (CMS/HCC)    • Insulin dependent diabetes mellitus    • Long Q-T syndrome    • Oxygen desaturation    • Pancreatic insufficiency    • Parathyroid abnormality (CMS/HCC)    • Polyglandular deficiency syndrome (CMS/HCC)    • Premature ovarian failure    • Sleep apnea    • Spleen absent    • Tetany     HAS EPISODES   • Transfusion history      Past Surgical History:   Procedure Laterality Date   • APPENDECTOMY     • CARDIAC CATHETERIZATION N/A 4/8/2021    Procedure: RIGHT AND LEFT HEART CATH;  Surgeon: Poli Anderson IV, MD;  Location:  [facility];  Service: Cardiovascular;  Laterality: N/A;   • CARDIAC SURGERY      LOOP RECORDER   • CHOLECYSTECTOMY     • COLONOSCOPY N/A 6/5/2020    Procedure: COLONOSCOPY;  Surgeon: Gaivn Vargas MD;  Location:  [facility];  Service: Gastroenterology;  Laterality: N/A;   • ENDOSCOPY N/A 4/26/2018    Procedure: ESOPHAGOGASTRODUODENOSCOPY;  Surgeon: Gavin Vargas MD;  Location:  [facility];  Service: Gastroenterology   • ENDOSCOPY N/A 8/9/2018    Procedure: ESOPHAGOGASTRODUODENOSCOPY-DILATION;  Surgeon: Gavin Vargas MD;  Location:  [facility];  Service: Gastroenterology   • ENDOSCOPY     • ENDOSCOPY N/A 2/6/2019    Procedure: ESOPHAGOGASTRODUODENOSCOPY;  Surgeon: Gavin Vargas MD;  Location:  [facility];  Service: Gastroenterology   • ENDOSCOPY N/A 7/24/2019    Procedure: ESOPHAGOGASTRODUODENOSCOPY;  Surgeon: Gavin Vargas MD;  Location:  [facility];  Service: Gastroenterology   • ENDOSCOPY N/A 10/18/2019    Procedure: ESOPHAGOGASTRODUODENOSCOPY WITH BALLOON DILATION;  Surgeon: Gavin Vargas MD;  Location:  [facility];  Service: Gastroenterology   • ENDOSCOPY N/A 6/4/2020    Procedure: ESOPHAGOGASTRODUODENOSCOPY  WITH ESOPHAGEAL BALLOON DILATATION;  Surgeon: Gavin Vargas MD;  Location:  [facility];  Service: Gastroenterology;  Laterality: N/A;  dilation done with 60F dilator    • ENDOSCOPY N/A 1/29/2021    Procedure: ESOPHAGOGASTRODUODENOSCOPY;  Surgeon: Gavin Vargas MD;  Location:  [facility];  Service: Gastroenterology;  Laterality: N/A;   • EXPLORATORY LAPAROTOMY      MULTIPLE   • HAND SURGERY      4 TOTAL   • HERNIA REPAIR     • LIVER BIOPSY     • MUSCLE BIOPSY     • MUSCLE BIOPSY     • PEG TUBE INSERTION     • PEG TUBE REMOVAL     • PORTACATH PLACEMENT         Reviewed current scheduled and prn medications for route, type, dose and frequency.    Current Facility-Administered Medications   Medication Dose Route Frequency Provider Last Rate Last Admin   • acetaminophen (TYLENOL) tablet 650 mg  650 mg Oral Q4H PRN Poli Anderson IV, MD        Or   • acetaminophen (TYLENOL) 160 MG/5ML solution 650 mg  650 mg Oral Q4H PRN Poli Anderson IV, MD        Or   • acetaminophen (TYLENOL) suppository 650 mg  650 mg Rectal Q4H PRN Poli Anderson IV, MD       • acetaminophen (TYLENOL) tablet 650 mg  650 mg Oral Q4H PRN Poli Anderson IV, MD   650 mg at 04/08/21 1042   • ascorbic acid (VITAMIN C) tablet 1,000 mg  1,000 mg Oral Daily Poli Anderson IV, MD   1,000 mg at 04/09/21 0820   • atropine sulfate injection 0.5 mg  0.5 mg Intravenous Q5 Min PRN Poli Anderson IV, MD       • azaTHIOprine (IMURAN) tablet 150 mg  150 mg Oral Daily Poli Anderson IV, MD   150 mg at 04/09/21 0824   • butalbital-acetaminophen-caffeine (FIORICET, ESGIC) -40 MG per tablet 1 tablet  1 tablet Oral Q4H PRN Poli Anderson IV, MD   1 tablet at 04/09/21 0841   • calcitriol (ROCALTROL) capsule 0.5 mcg  0.5 mcg Oral Q12H  Poli Anderson IV, MD   0.5 mcg at 04/09/21 0821   • calcium carbonate (oyster shell) tablet 500 mg  500 mg Oral Daily Poli Anderson IV, MD   500 mg at 04/09/21 0821   • calcium gluconate 2 g in sodium chloride 0.9 % 100 mL IVPB  2 g Intravenous Once Poli Anderson IV, MD       • cycloSPORINE (RESTASIS) 0.05 % ophthalmic emulsion 1 drop  1 drop Both Eyes BID Poli Anderson IV, MD   1 drop at 04/09/21 0826   • dexamethasone (DECADRON) injection 4 mg  4 mg Intravenous Once Poli Anderson IV, MD       • dextrose (D50W) 25 g/ 50mL Intravenous Solution 25 g  25 g Intravenous Once Poli Anderson IV, MD   Stopped at 04/07/21 0216   • dextrose (D50W) 25 g/ 50mL Intravenous Solution 25 g  25 g Intravenous Q15 Min PRN Poli Anderson IV, MD       • dextrose (D50W) 25 g/ 50mL Intravenous Solution 50 mL  50 mL Intravenous Once Poli Anderson IV, MD       • dextrose (GLUTOSE) oral gel 15 g  15 g Oral Q15 Min PRN Poli Anderson IV, MD   15 g at 04/07/21 0738   • fentaNYL citrate (PF) (SUBLIMAZE) 100 MCG/2ML injection  - ADS Override Pull            • fludrocortisone tablet 100 mcg  100 mcg Oral Daily Marisol Odonnell MD   100 mcg at 04/09/21 0824   • fluticasone (FLONASE) 50 MCG/ACT nasal spray 1 spray  1 spray Each Nare Daily Poli Anderson IV, MD   1 spray at 04/07/21 1014   • folic acid (FOLVITE) tablet 4 mg  4 mg Oral Daily Poli Anderson IV, MD   4 mg at 04/09/21 0819   • gabapentin (NEURONTIN) capsule 600 mg  600 mg Oral Q8H Poli Anderson IV, MD   600 mg at 04/09/21 0557   • glucagon (human recombinant) (GLUCAGEN DIAGNOSTIC) injection 1 mg  1 mg Subcutaneous Q15 Min PRN Poli Anderson IV, MD       • hydrocortisone (CORTEF) tablet 10 mg  10 mg Oral Nightly Poli Anderson IV, MD   10 mg at 04/08/21 2116   • hydrocortisone (CORTEF) tablet 20 mg  20 mg Oral Daily With  Breakfast Poli Anderson IV, MD   20 mg at 04/09/21 0823   • HYDROmorphone (DILAUDID) injection 1 mg  1 mg Intravenous Q3H PRN Marisol Odonnell MD   1 mg at 04/09/21 1009    And   • naloxone (NARCAN) injection 0.4 mg  0.4 mg Intravenous Q5 Min PRN Marisol Odonnell MD       • levothyroxine (SYNTHROID, LEVOTHROID) tablet 75 mcg  75 mcg Oral Q AM Poli Anderson IV, MD   75 mcg at 04/09/21 0557   • magnesium oxide (MAG-OX) tablet 1,000 mg  1,000 mg Oral TID Poli Anderson IV, MD   1,000 mg at 04/09/21 0819   • magnesium sulfate 2g/50 mL (PREMIX) infusion  2 g Intravenous Once Poli Anderson IV, MD       • methocarbamol (ROBAXIN) tablet 500 mg  500 mg Oral TID Poli Anderson IV, MD   500 mg at 04/09/21 0821   • methohexital (BREVITAL) 50 MG/5ML injection solution prefilled syringe  - ADS Override Pull            • midazolam (VERSED) 2 MG/2ML injection  - ADS Override Pull            • multivitamin with minerals 1 tablet  1 tablet Oral Daily Poli Anderson IV, MD   1 tablet at 04/09/21 0819   • nadolol (CORGARD) tablet 20 mg  20 mg Oral Q12H Poli Anderson IV, MD   20 mg at 04/08/21 2116   • pantoprazole (PROTONIX) EC tablet 40 mg  40 mg Oral Daily Poli Anderson IV, MD   40 mg at 04/09/21 0820   • Parathyroid Hormone (Recomb) cartridge 25 mcg **PATIENT SUPPLIED MED**  25 mcg Subcutaneous BID Poli Anderson IV, MD   25 mcg at 04/07/21 1824   • potassium chloride (KLOR-CON) packet 40 mEq  40 mEq Oral TID Poli Anderson IV, MD   40 mEq at 04/09/21 0819   • prochlorperazine (COMPAZINE) injection 5 mg  5 mg Intravenous Q6H PRN Lui Velez MD        Or   • prochlorperazine (COMPAZINE) tablet 5 mg  5 mg Oral Q6H PRN Lui Velez MD   5 mg at 04/09/21 0841    Or   • prochlorperazine (COMPAZINE) suppository 25 mg  25 mg Rectal Q12H PRN Lui Velez MD       • progesterone (PROMETRIUM) capsule 200 mg  200 mg  "Oral Daily Poli Anderson IV, MD   200 mg at 04/07/21 1018   • promethazine (PHENERGAN) tablet 25 mg  25 mg Oral Q6H PRN Poli Anderson IV, MD   25 mg at 04/09/21 0223   • sodium chloride 0.9 % flush 10 mL  10 mL Intravenous PRN Poli Anderson IV, MD       • sodium chloride 0.9 % flush 10 mL  10 mL Intravenous PRN Poli Anderson IV, MD       • sodium chloride 0.9 % flush 10 mL  10 mL Intravenous Q12H Poli Anderson IV, MD   10 mL at 04/09/21 0841   • sodium chloride 0.9 % flush 10 mL  10 mL Intravenous PRN Poli Anderson IV, MD       • venlafaxine (EFFEXOR) tablet 75 mg  75 mg Oral TID Poli Anderson IV, MD   75 mg at 04/09/21 0820   • zinc sulfate (ZINCATE) capsule 220 mg  220 mg Oral Daily Poli Anderson IV, MD   220 mg at 04/09/21 0819        •  acetaminophen **OR** acetaminophen **OR** acetaminophen  •  acetaminophen  •  atropine  •  butalbital-acetaminophen-caffeine  •  dextrose  •  dextrose  •  glucagon (human recombinant)  •  HYDROmorphone **AND** naloxone  •  prochlorperazine **OR** prochlorperazine **OR** prochlorperazine  •  promethazine  •  sodium chloride  •  [COMPLETED] Insert peripheral IV **AND** sodium chloride  •  sodium chloride  Allergies   Allergen Reactions   • Azithromycin Hives   • Cefpodoxime Hives   • Cephalosporins Hives   • Clarithromycin Hives     biaxin  biaxin  biaxin   • Levofloxacin Hives   • Nitrofurantoin Hives   • Nystatin Hives   • Penicillins Hives   • Sulfa Antibiotics Hives   • Midazolam Unknown - Low Severity   • Morphine Other (See Comments)     \"it doesn't work. [facility] told me to list it as an allergy\"   • Nitrofurantoin Macrocrystal Other (See Comments) and Hives   • Ondansetron Other (See Comments)     \"it doesn't work. [facility] told me to list it as an allergy\"     Family History   Problem Relation Age of Onset   • Kidney disease Father      Social History     Socioeconomic History   • Marital " "status: Significant Other     Spouse name: Not on file   • Number of children: Not on file   • Years of education: Not on file   • Highest education level: Not on file   Tobacco Use   • Smoking status: Never Smoker   • Smokeless tobacco: Never Used   Substance and Sexual Activity   • Alcohol use: Yes     Comment: SOCIAL   • Drug use: No   • Sexual activity: Defer       Review of Systems - +/- per HPI and symptom review.    PPS: 50%  BP 98/62   Pulse 72   Temp 98.2 °F (36.8 °C) (Oral)   Resp 16   Ht 160 cm (62.99\")   Wt 67.6 kg (149 lb)   SpO2 100%   BMI 26.40 kg/m²   67.6 kg (149 lb) Body mass index is 26.4 kg/m².  Intake & Output (last day)       04/08 0701 - 04/09 0700 04/09 0701 - 04/10 0700    P.O.  360    Total Intake(mL/kg)  360 (5.3)    Urine (mL/kg/hr) 300 (0.2)     Total Output 300     Net -300 +360          Urine Unmeasured Occurrence 2 x     Emesis Unmeasured Occurrence 2 x         Physical Exam:  General Appearance: No acute distress, sitting upright in bed unsupported, intermittently tearful  Head: Normocephalic without obvious abnormality, atraumatic  Eyes: Conjunctivae and sclerae normal, no icterus, JERICA  Throat: No oral lesions, no thrush, oral mucosa moist  Back: No kyphosis present, no skin lesions, erythema or scars, no tenderness midline  Lungs: Clear to auscultation, respirations regular, even and non-labored, 4L/min  Heart: Regular rhythm and normal rate, normal S1  Abdomen: Normal bowel sounds, soft, non-distended, non-tender  Extremities: Moves all extremities, no redness, no cyanosis, no edema, full ROM bilateral shoulders  Pulses: Distal pulses palpable and equal bilaterally  Skin: Warm and dry  Neurological: Awake, interactive, appropriate conversation, no myoclonus, equal hand  and strength, able to life lower extremities off bed    Reviewed labs and diagnostic results.  Lab Results   Component Value Date    HGBA1C 6.10 (H) 06/03/2020     Results from last 7 days   Lab Units " 04/07/21  0602   WBC 10*3/mm3 12.64*   HEMOGLOBIN g/dL 14.2   HEMATOCRIT % 44.9   PLATELETS 10*3/mm3 255     Results from last 7 days   Lab Units 04/07/21  0602 04/06/21  2131   SODIUM mmol/L 135* 137   POTASSIUM mmol/L 4.6 5.0   CHLORIDE mmol/L 97* 95*   CO2 mmol/L 23.0 28.0   BUN mg/dL 16 13   CREATININE mg/dL 0.75 0.71   CALCIUM mg/dL 9.2 9.7   BILIRUBIN mg/dL  --  1.1   ALK PHOS U/L  --  69   ALT (SGPT) U/L  --  18   AST (SGOT) U/L  --  38*   GLUCOSE mg/dL 61* 69     Results from last 7 days   Lab Units 04/07/21  0602   SODIUM mmol/L 135*   POTASSIUM mmol/L 4.6   CHLORIDE mmol/L 97*   CO2 mmol/L 23.0   BUN mg/dL 16   CREATININE mg/dL 0.75   GLUCOSE mg/dL 61*   CALCIUM mg/dL 9.2     Imaging Results (Last 72 Hours)     Procedure Component Value Units Date/Time    CT Angiogram Chest [[id number]] Collected: 04/07/21 0216     Updated: 04/07/21 0219    Narrative:      CTA Chest    INDICATION:   Chest pain and shortness of air. Syncope.    TECHNIQUE:   CT angiogram of the chest with IV contrast. 3-D reconstructions were obtained and reviewed.   Radiation dose reduction techniques included automated exposure control or exposure modulation based on body size. Count of known CT and cardiac nuc med studies  performed in previous 12 months: 1.     COMPARISON:   2/25/2021    FINDINGS:   No pulmonary embolism or aortic dissection is identified. The left vertebral artery has a separate origin from the aortic arch as an anatomic variant. Great vessels are otherwise unremarkable. No pleural or pericardial effusion is seen. There is a mildly  enlarged right paratracheal lymph node measuring 1.1 cm. No other adenopathy is seen. Upper abdominal images show cholecystectomy. A loop recorder is present in the left chest wall. Central venous catheter tip is at the cavoatrial junction.    Other than some mild atelectasis in the lower lobes, the lungs are clear. No CT findings present to indicate pneumonia. Note: CT may be negative in the  earliest stages of COVID-19.      Impression:        1. No PE or aortic dissection.  2. Negative for pneumonia.    Signer Name: Elmer Mims MD   Signed: 4/7/2021 2:16 AM   [facility]: [facility]    [facility]        Patient Active Problem List     Diagnosis     • *Syncope [R55]     • Leukocytosis [D72.829]     • Dysphagia [R13.10]     • Hypoxia [R09.02]     • Bronchitis [J40]     • Pleuritic chest pain [R07.81]     • Pneumonitis [J18.9]     • Premature ovarian failure [E28.39]     • Polyglandular autoimmune syndrome, type 1 (CMS/HCC) [E31.8]     • Type 2 diabetes mellitus (CMS/HCC) [E11.9]     • Hypoparathyroidism (CMS/HCC) [E20.9]     • Adrenal insufficiency (Job's disease) (CMS/HCC) [E27.1]     • Asplenia [Q89.01]     • Epigastric pain [R10.13]     • Dilatation of esophagus [K22.8]     • Gastroparesis [K31.84]     • Irritable bowel syndrome with constipation [K58.1]     • Esophageal dysphagia [R13.10]        Impression: 30 y.o. female with polyglandular deficiency syndrome, acute hypoxia respiratory failure, syncope  Plan:   Acute bilateral knee cap pain - continue to monitor, may consider trial of lidocaine ointment. Patient also reporting generalized lower extremity weakness.     Acute Left chest/axillary/LUE pain - recommend CT scan of lower cervical, thoracic spine for area effected with pain report, unsure of underlying cause of pain. Abd/pelvis CT scan pending.   hydromorphone effective, patient reports itching with hydrocodone/oxycodone. Tolerates iv hydromorphone and effective for pain.  Schedule hydromorphone 2mg po tid with every 4 hours prn. Continue iv hydromorphone for severe pain.   Started discussion about outpatient pain management referral for continuation of analgesia med regimen if not resolved this admission.     Chronic peripheral neuropathy - managed with scheduled gabapentin    Constipation - scheduled bowel med regimen at home - daily miralax with her  supplements. Continue to monitor and adjust for scheduled opioid.    Palliative team provide support       BEAR Kuo  [phone]  04/09/21  10:39 EDT      Time: 60 minutes spent reviewing medical and medication records, assessing and examining patient, discussing with patient, family and nursing staff, answering questions, formulating a plan and documentation of care. > 50% time spent face to face

## 2021-04-09 NOTE — PLAN OF CARE
Goal Outcome Evaluation:  Plan of Care Reviewed With: patient, significant other  Progress: no change  Outcome Summary: VSS. Pt alert and oriented. c/o chronic debilitating pain and discomfort. Also suffers from anxiety. Extensive conversation with patient about her pain control and how she will get medications prescribed when she leaves the hospital. Pt sts she will go to [facility] when she leaves [facility]. Palliative following for symptom mgmt.    1300 Palliative Team Conference: NICHOLAS Rodriguez, ; NATI Whitaker RN, PN; RON Velazco, BEAR; RADHA Russell, PEGGY, ADITI; DEVEN Wong, [facility], [facility]; JT Robles, [facility]; JB Horta RN, PN      Problem: Palliative Care  Goal: Enhanced Quality of Life  Outcome: Ongoing, Progressing  Intervention: Promote Advance Care Planning  Flowsheets (Taken 4/9/2021 1529)  Life Transition/Adjustment:   palliative care discussed   palliative care initiated  Intervention: Optimize Psychosocial Wellbeing  Flowsheets (Taken 4/9/2021 1529)  Supportive Measures:   active listening utilized   positive reinforcement provided

## 2021-04-10 ENCOUNTER — APPOINTMENT (OUTPATIENT)
Dept: CARDIOLOGY | Facility: HOSPITAL | Age: 31
End: 2021-04-10

## 2021-04-10 ENCOUNTER — APPOINTMENT (OUTPATIENT)
Dept: PULMONOLOGY | Facility: HOSPITAL | Age: 31
End: 2021-04-10

## 2021-04-10 LAB
BH CV ECHO MEAS - BSA(HAYCOCK): 1.7 M^2
BH CV ECHO MEAS - BSA: 1.7 M^2
BH CV ECHO MEAS - BZI_BMI: 27.3 KILOGRAMS/M^2
BH CV ECHO MEAS - BZI_METRIC_HEIGHT: 157.5 CM
BH CV ECHO MEAS - BZI_METRIC_WEIGHT: 67.6 KG
GLUCOSE BLDC GLUCOMTR-MCNC: 101 MG/DL (ref 70–130)
GLUCOSE BLDC GLUCOMTR-MCNC: 125 MG/DL (ref 70–130)
GLUCOSE BLDC GLUCOMTR-MCNC: 149 MG/DL (ref 70–130)

## 2021-04-10 PROCEDURE — 94729 DIFFUSING CAPACITY: CPT

## 2021-04-10 PROCEDURE — 94727 GAS DIL/WSHOT DETER LNG VOL: CPT

## 2021-04-10 PROCEDURE — 93308 TTE F-UP OR LMTD: CPT | Performed by: INTERNAL MEDICINE

## 2021-04-10 PROCEDURE — 94799 UNLISTED PULMONARY SVC/PX: CPT

## 2021-04-10 PROCEDURE — G0378 HOSPITAL OBSERVATION PER HR: HCPCS

## 2021-04-10 PROCEDURE — 25010000002 HYDROMORPHONE 1 MG/ML SOLUTION: Performed by: INTERNAL MEDICINE

## 2021-04-10 PROCEDURE — 63710000001 PROMETHAZINE PER 25 MG: Performed by: INTERNAL MEDICINE

## 2021-04-10 PROCEDURE — 94010 BREATHING CAPACITY TEST: CPT

## 2021-04-10 PROCEDURE — 82962 GLUCOSE BLOOD TEST: CPT

## 2021-04-10 PROCEDURE — 99232 SBSQ HOSP IP/OBS MODERATE 35: CPT | Performed by: NURSE PRACTITIONER

## 2021-04-10 PROCEDURE — 99232 SBSQ HOSP IP/OBS MODERATE 35: CPT | Performed by: INTERNAL MEDICINE

## 2021-04-10 PROCEDURE — 94727 GAS DIL/WSHOT DETER LNG VOL: CPT | Performed by: INTERNAL MEDICINE

## 2021-04-10 PROCEDURE — 94729 DIFFUSING CAPACITY: CPT | Performed by: INTERNAL MEDICINE

## 2021-04-10 PROCEDURE — 63710000001 AZATHIOPRINE PER 50 MG: Performed by: INTERNAL MEDICINE

## 2021-04-10 PROCEDURE — 93308 TTE F-UP OR LMTD: CPT

## 2021-04-10 PROCEDURE — 93005 ELECTROCARDIOGRAM TRACING: CPT | Performed by: NURSE PRACTITIONER

## 2021-04-10 PROCEDURE — 94010 BREATHING CAPACITY TEST: CPT | Performed by: INTERNAL MEDICINE

## 2021-04-10 RX ADMIN — POTASSIUM CHLORIDE 40 MEQ: 1.5 FOR SOLUTION ORAL at 22:39

## 2021-04-10 RX ADMIN — METHOCARBAMOL 500 MG: 500 TABLET, FILM COATED ORAL at 21:31

## 2021-04-10 RX ADMIN — VENLAFAXINE 75 MG: 75 TABLET ORAL at 09:42

## 2021-04-10 RX ADMIN — HYDROMORPHONE HYDROCHLORIDE 1 MG: 1 INJECTION, SOLUTION INTRAMUSCULAR; INTRAVENOUS; SUBCUTANEOUS at 12:03

## 2021-04-10 RX ADMIN — PARATHYROID HORMONE 25 MCG: 25 INJECTION, POWDER, LYOPHILIZED, FOR SOLUTION SUBCUTANEOUS at 23:36

## 2021-04-10 RX ADMIN — GABAPENTIN 600 MG: 300 CAPSULE ORAL at 06:19

## 2021-04-10 RX ADMIN — BUDESONIDE AND FORMOTEROL FUMARATE DIHYDRATE 2 PUFF: 160; 4.5 AEROSOL RESPIRATORY (INHALATION) at 09:36

## 2021-04-10 RX ADMIN — VENLAFAXINE 75 MG: 75 TABLET ORAL at 15:08

## 2021-04-10 RX ADMIN — CALCITRIOL CAPSULES 0.25 MCG 0.5 MCG: 0.25 CAPSULE ORAL at 09:42

## 2021-04-10 RX ADMIN — LEVOTHYROXINE SODIUM 75 MCG: 75 TABLET ORAL at 06:19

## 2021-04-10 RX ADMIN — PROMETHAZINE HYDROCHLORIDE 25 MG: 25 TABLET ORAL at 22:38

## 2021-04-10 RX ADMIN — BUTALBITAL, ACETAMINOPHEN AND CAFFEINE 1 TABLET: 50; 325; 40 TABLET ORAL at 06:22

## 2021-04-10 RX ADMIN — Medication 1 TABLET: at 09:41

## 2021-04-10 RX ADMIN — GABAPENTIN 600 MG: 300 CAPSULE ORAL at 22:38

## 2021-04-10 RX ADMIN — ZINC SULFATE 220 MG (50 MG) CAPSULE 220 MG: CAPSULE at 09:43

## 2021-04-10 RX ADMIN — AZATHIOPRINE 150 MG: 50 TABLET ORAL at 09:44

## 2021-04-10 RX ADMIN — PANTOPRAZOLE SODIUM 40 MG: 40 TABLET, DELAYED RELEASE ORAL at 09:43

## 2021-04-10 RX ADMIN — METHOCARBAMOL 500 MG: 500 TABLET, FILM COATED ORAL at 15:04

## 2021-04-10 RX ADMIN — Medication 1000 MG: at 15:03

## 2021-04-10 RX ADMIN — POLYETHYLENE GLYCOL 3350 17 G: 17 POWDER, FOR SOLUTION ORAL at 09:44

## 2021-04-10 RX ADMIN — POTASSIUM CHLORIDE 40 MEQ: 1.5 FOR SOLUTION ORAL at 09:50

## 2021-04-10 RX ADMIN — VENLAFAXINE 75 MG: 75 TABLET ORAL at 22:38

## 2021-04-10 RX ADMIN — METHOCARBAMOL 500 MG: 500 TABLET, FILM COATED ORAL at 09:42

## 2021-04-10 RX ADMIN — HYDROMORPHONE HYDROCHLORIDE 2 MG: 2 TABLET ORAL at 22:38

## 2021-04-10 RX ADMIN — HYDROCORTISONE 20 MG: 20 TABLET ORAL at 09:43

## 2021-04-10 RX ADMIN — OXYCODONE HYDROCHLORIDE AND ACETAMINOPHEN 1000 MG: 500 TABLET ORAL at 09:43

## 2021-04-10 RX ADMIN — CYCLOSPORINE 1 DROP: 0.5 EMULSION OPHTHALMIC at 22:41

## 2021-04-10 RX ADMIN — GABAPENTIN 600 MG: 300 CAPSULE ORAL at 15:03

## 2021-04-10 RX ADMIN — NADOLOL 20 MG: 20 TABLET ORAL at 22:37

## 2021-04-10 RX ADMIN — Medication 1000 MG: at 22:38

## 2021-04-10 RX ADMIN — HYDROMORPHONE HYDROCHLORIDE 2 MG: 2 TABLET ORAL at 15:03

## 2021-04-10 RX ADMIN — FLUDROCORTISONE ACETATE 100 MCG: 0.1 TABLET ORAL at 09:43

## 2021-04-10 RX ADMIN — FOLIC ACID 4 MG: 1 TABLET ORAL at 09:42

## 2021-04-10 RX ADMIN — HYDROMORPHONE HYDROCHLORIDE 2 MG: 2 TABLET ORAL at 09:43

## 2021-04-10 RX ADMIN — HYDROCORTISONE 10 MG: 10 TABLET ORAL at 22:39

## 2021-04-10 RX ADMIN — HYDROMORPHONE HYDROCHLORIDE 2 MG: 2 TABLET ORAL at 18:15

## 2021-04-10 RX ADMIN — PARATHYROID HORMONE 25 MCG: 25 INJECTION, POWDER, LYOPHILIZED, FOR SOLUTION SUBCUTANEOUS at 09:56

## 2021-04-10 RX ADMIN — CALCITRIOL CAPSULES 0.25 MCG 0.5 MCG: 0.25 CAPSULE ORAL at 21:20

## 2021-04-10 RX ADMIN — PROMETHAZINE HYDROCHLORIDE 25 MG: 25 TABLET ORAL at 12:20

## 2021-04-10 RX ADMIN — BUDESONIDE AND FORMOTEROL FUMARATE DIHYDRATE 2 PUFF: 160; 4.5 AEROSOL RESPIRATORY (INHALATION) at 20:07

## 2021-04-10 RX ADMIN — CYCLOSPORINE 1 DROP: 0.5 EMULSION OPHTHALMIC at 09:50

## 2021-04-10 RX ADMIN — CALCIUM 500 MG: 500 TABLET ORAL at 09:42

## 2021-04-10 RX ADMIN — Medication 1000 MG: at 09:41

## 2021-04-10 RX ADMIN — NADOLOL 20 MG: 20 TABLET ORAL at 09:42

## 2021-04-10 NOTE — PLAN OF CARE
Goal Outcome Evaluation:      VSS. MRI completed this shift. Patient complained of generalized pain this shift, treated see MAR. NPO since 0000. Patient said she had a home med taken to pharmacy for verification during the previous shift, patient requested this med to help her sleep but med was taken to pharmacy and placed in the patient med safe and not reordered. APRN contacted to request that patients home med, lunesta, be reordered but was advised that they would not reorder that medication to substitute it with melatonin. Administered as ordered, see MAR. Patient seemed upset that she was unable to take her home med but patient was encouraged to discuss this with providers today for more explanation or to resolve issue for tonight. Will pass along to next shift as well. No further complaints. Will continue to closely monitor. SR. 2L nasal cannula.

## 2021-04-10 NOTE — PROGRESS NOTES
[facility] Medicine Services  PROGRESS NOTE    Patient Name: Krista Richter  : 1990  MRN: [id number]    Date of Admission: 2021  Primary Care Physician: Agustin Turner MD    Subjective   Subjective     CC:  Syncope/spells/hypoxia    HPI:  Didn't sleep well last night. Anticipates procedure Monday.    ROS  Gen- No fevers, chills  CV- No chest pain, palpitations  Resp- No cough, dyspnea  GI- No N/V/D, abd pain          Objective   Objective     Vital Signs:   Temp:  [97.8 °F (36.6 °C)-98.2 °F (36.8 °C)] 97.8 °F (36.6 °C)  Heart Rate:  [59-81] 62  Resp:  [16-18] 16  BP: ()/(63-95) 122/87        Physical Exam:  Constitutional - no acute distress, nontoxic, in bed  HEENT-NCAT, mucous membranes moist  CV-RRR, S1 S2 normal, no m/r/g  Resp-CTAB, no wheezes, rhonchi or rales  Abd-soft, nontender, nondistended, normoactive bowel sounds  Ext-No lower extremity cyanosis, clubbing or edema bilaterally  Neuro-alert and oriented, speech clear, moves all extremities   Psych-normal affect   Skin- No rash on exposed UE or LE bilaterally      Results Reviewed:  Results from last 7 days   Lab Units 21  1228 21  0602 21  2131   WBC 10*3/mm3 9.38 12.64* 13.56*   HEMOGLOBIN g/dL 12.5 14.2 14.8   HEMATOCRIT % 39.4 44.9 45.1   PLATELETS 10*3/mm3 244 255 287     Results from last 7 days   Lab Units 21  1228 21  0433 21  0602 21  2131   SODIUM mmol/L 138  --  135* 137   POTASSIUM mmol/L 4.3  --  4.6 5.0   CHLORIDE mmol/L 98  --  97* 95*   CO2 mmol/L 33.0*  --  23.0 28.0   BUN mg/dL 8  --  16 13   CREATININE mg/dL 0.66  --  0.75 0.71   GLUCOSE mg/dL 157*  --  61* 69   CALCIUM mg/dL 8.7  --  9.2 9.7   ALT (SGPT) U/L 13  --   --  18   AST (SGOT) U/L 20  --   --  38*   TROPONIN T ng/mL  --  <0.010  --  <0.010     Estimated Creatinine Clearance: 114.1 mL/min (by C-G formula based on SCr of 0.66 mg/dL).    Microbiology Results Abnormal     Procedure  Component Value - Date/Time    COVID PRE-OP / PRE-PROCEDURE SCREENING ORDER (NO ISOLATION) - Swab, Nasopharynx [[id number]]  (Normal) Collected: 04/07/21 0516    Lab Status: Final result Specimen: Swab from Nasopharynx Updated: 04/07/21 0637    Narrative:      The following orders were created for panel order COVID PRE-OP / PRE-PROCEDURE SCREENING ORDER (NO ISOLATION) - Swab, Nasopharynx.  Procedure                               Abnormality         Status                     ---------                               -----------         ------                     COVID-19 and FLU A/B PCR...[[id number]]  Normal              Final result                 Please view results for these tests on the individual orders.    COVID-19 and FLU A/B PCR - Swab, Nasopharynx [[id number]]  (Normal) Collected: 04/07/21 0516    Lab Status: Final result Specimen: Swab from Nasopharynx Updated: 04/07/21 0637     COVID19 Not Detected     Influenza A PCR Not Detected     Influenza B PCR Not Detected    Narrative:      Fact sheet for providers: https://www.fda.gov/media/169082/download    Fact sheet for patients: https://www.fda.gov/media/809365/download    Test performed by PCR.          Imaging Results (Last 24 Hours)     Procedure Component Value Units Date/Time    MRI Brain Without Contrast [[id number]] Resulted: 04/09/21 2150     Updated: 04/09/21 2217    CT Abdomen Pelvis With Contrast [[id number]] Collected: 04/09/21 1846     Updated: 04/09/21 2050    Narrative:      EXAMINATION: CT ABDOMEN PELVIS W CONTRAST-      INDICATION: Abdominal pain, acute, nonlocalized; R55-Syncope and  collapse; R07.9-Chest pain, unspecified     TECHNIQUE: Axial IV contrast-enhanced CT of the abdomen and pelvis with  multiplanar reconstruction     The radiation dose reduction device was turned on for each scan per the  ALARA (As Low as Reasonably Achievable) protocol.     COMPARISON: NONE     FINDINGS: The lung bases are grossly clear. Body wall soft tissues  are  unremarkable. There is no evidence of acute fracture or aggressive  osseous lesion. The liver, pancreas and bilateral adrenal glands  demonstrate homogeneous enhancement without evidence of suspicious focal  lesion. The spleen is absent. No free fluid or pneumoperitoneum. Normal  caliber abdominal aorta. No bulky retroperitoneal adenopathy. The  kidneys demonstrate symmetric nephrogram and contrast excretion without  evidence of hydronephrosis or contour deforming mass. Small and large  bowel loops are nondilated. There is no suspicious focal bowel wall  thickening. Gallbladder is surgically absent. Prior appendectomy. The  pelvic viscera are unremarkable.       Impression:      No acute findings in the abdomen and pelvis.     Findings compatible with provided history of prior cholecystectomy,  appendectomy and asplenia.        This report was finalized on 4/9/2021 6:49 PM by Cecil Colby.             Results for orders placed during the hospital encounter of 04/06/21    Adult Transesophageal Echo (BAO) W/ Cont if Necessary Per Protocol    Interpretation Summary  · Estimated left ventricular EF = 55%. Left ventricular systolic function is normal.  · Saline test with Valsalva negative for evidence of an intra-atrial shunt.      I have reviewed the medications:  Scheduled Meds:Pharmacy Consult, , Does not apply, Daily  ascorbic acid, 1,000 mg, Oral, Daily  azaTHIOprine, 150 mg, Oral, Daily  budesonide-formoterol, 2 puff, Inhalation, BID - RT  calcitriol, 0.5 mcg, Oral, Q12H  calcium carbonate (oyster shell), 500 mg, Oral, Daily  calcium gluconate, 2 g, Intravenous, Once  cycloSPORINE, 1 drop, Both Eyes, BID  dexamethasone, 4 mg, Intravenous, Once  dextrose, 25 g, Intravenous, Once  dextrose, 50 mL, Intravenous, Once  fentaNYL citrate (PF), , ,   fludrocortisone, 100 mcg, Oral, Daily  fluticasone, 1 spray, Each Nare, Daily  folic acid, 4 mg, Oral, Daily  gabapentin, 600 mg, Oral, Q8H  hydrocortisone, 10 mg,  Oral, Nightly  hydrocortisone, 20 mg, Oral, Daily With Breakfast  HYDROmorphone, 2 mg, Oral, TID  levothyroxine, 75 mcg, Oral, Q AM  magnesium oxide, 1,000 mg, Oral, TID  magnesium sulfate, 2 g, Intravenous, Once  methocarbamol, 500 mg, Oral, TID  methohexital, , ,   midazolam, , ,   multivitamin with minerals, 1 tablet, Oral, Daily  nadolol, 20 mg, Oral, Q12H  pantoprazole, 40 mg, Oral, Daily  Parathyroid Hormone (Recomb), 25 mcg, Subcutaneous, BID  polyethylene glycol, 17 g, Oral, Daily  potassium chloride, 40 mEq, Oral, TID  progesterone, 200 mg, Oral, Daily  sodium chloride, 10 mL, Intravenous, Q12H  venlafaxine, 75 mg, Oral, TID  zinc sulfate, 220 mg, Oral, Daily      Continuous Infusions:   PRN Meds:.acetaminophen **OR** acetaminophen **OR** acetaminophen  •  acetaminophen  •  atropine  •  butalbital-acetaminophen-caffeine  •  dextrose  •  dextrose  •  glucagon (human recombinant)  •  HYDROmorphone **AND** naloxone  •  HYDROmorphone  •  melatonin  •  prochlorperazine **OR** prochlorperazine **OR** prochlorperazine  •  promethazine  •  promethazine  •  sodium chloride  •  [COMPLETED] Insert peripheral IV **AND** sodium chloride  •  sodium chloride    Assessment/Plan   Assessment & Plan     Active Hospital Problems    Diagnosis  POA   • **Syncope [R55]  Yes   • Leukocytosis [D72.829]  Yes   • Hypoparathyroidism (CMS/HCC) [E20.9]  Yes   • Polyglandular autoimmune syndrome, 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        Brief Hospital Course to date:  Krista Richter is a  30 y.o. female with PMH significant for polyglandular deficiency syndrome, sleep apnea, prolonged QT syndrome, DM 2, hyperparathyroid, anxiety, asthma, adrenal insufficiency who presents to the ED with complaint of progressive shortness of breath and syncope.    This patient's problems and plans were  partially entered by my partner and updated as appropriate by me 04/10/21.         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 Alek Gillis  -Continue fludrocortisone  -Continue hydrocortisone, she notes that she normally requires solucortef while in the hospital due to stress   -Continue levothyroxine     Insomnia  - patient asks about home lunesta -- discussed with patient and mother at bedside that as patient currently receiving narcotics, would prefer not to provide lunesta at this time to maintain patient safety    Acute on chronic pain  - palliative evaluation. May benefit from outpatient pain management.    DVT prophylaxis: Mechanical      Disposition: I expect the patient to be discharged pending further workup/improvement in pain control/resolution of episodes       CODE STATUS:   Code Status and Medical Interventions:   Ordered at: 04/07/21 0443     Level Of Support Discussed With:    Patient     Code Status:    CPR     Medical Interventions (Level of Support Prior to Arrest):    Full       Alber Aggarwal MD  04/10/21

## 2021-04-10 NOTE — PROGRESS NOTES
Krista Richter  [id number]  1990   LOS: 1 day   Patient Care Team:  Agustin Turner MD as PCP - General (Internal Medicine)    Chief Complaint: Follow-up on syncope, prolonged QTC, pulmonary hypertension    Subjective Patient currently getting echocardiogram with bubble study, with and without Valsalva with Dr. Juarez present at bedside.  The patient desaturates to 68% on oxygen therapy when lying in a flat position.  The patient states that she has been having some sharp chest pain/pressure that causes some numbness in her arms.     Objective     Vital Sign Min/Max for last 24 hours  Temp  Min: 97.8 °F (36.6 °C)  Max: 98.3 °F (36.8 °C)   BP  Min: 98/63  Max: 124/81   Pulse  Min: 59  Max: 81   Resp  Min: 16  Max: 18   SpO2  Min: 96 %  Max: 100 %   No data recorded   No data recorded         04/08/21  1450 04/09/21  0958   Weight: 68 kg (149 lb 14.6 oz) (Simultaneous filing. User may be unaware of other data.) 67.6 kg (149 lb)         Intake/Output Summary (Last 24 hours) at 4/10/2021 1045  Last data filed at 4/9/2021 1223  Gross per 24 hour   Intake --   Output 400 ml   Net -400 ml       Physical Exam:     General Appearance:    Alert, cooperative, in no acute distress   Lungs:     Clear to auscultation,respirations regular, even and                   unlabored, 2 L O2 NC    Heart:    Regular and normal rate, normal S1 and S2, no            murmur, no gallop, no rub, no click   Abdomen:  Extremities:   Soft, non-tender, bowel sounds audible x4    No edema, normal range of motion   Pulses:   Pulses palpable and equal bilaterally      Results Review:   Results from last 7 days   Lab Units 04/09/21  1228 04/07/21  0602 04/06/21  2131   SODIUM mmol/L 138 135* 137   POTASSIUM mmol/L 4.3 4.6 5.0   CHLORIDE mmol/L 98 97* 95*   CO2 mmol/L 33.0* 23.0 28.0   BUN mg/dL 8 16 13   CREATININE mg/dL 0.66 0.75 0.71   GLUCOSE mg/dL 157* 61* 69   CALCIUM mg/dL 8.7 9.2 9.7     Results from last 7 days   Lab Units  04/09/21  1228 04/07/21  0602 04/06/21  2131   WBC 10*3/mm3 9.38 12.64* 13.56*   HEMOGLOBIN g/dL 12.5 14.2 14.8   HEMATOCRIT % 39.4 44.9 45.1   PLATELETS 10*3/mm3 244 255 287             Results from last 7 days   Lab Units 04/08/21  0433 04/06/21  2131   TROPONIN T ng/mL <0.010 <0.010   EEG 4/9/2021:  Normal EEG in the awake and lightly asleep states     No epileptiform activity is seen     No new chest x-ray or ECG to review    MRI brain pending    Medication Review: Reviewed    Assessment/Plan   Patient with syncopal episode with hypoxia had right and left heart catheterization 4/8/2021 showing normal coronary arteries and normal LV systolic function, moderately elevated LV filling pressure, mild pulmonary hypertension, no evidence of intracardiac or pulmonary shunting.  BAO negative for interatrial shunt.  The patient will have ICE study 4/12/2021 to evaluate for PFO with possible closure if found present.  EEG negative for seizures.  Will review MRI of brain results when available. Will order new ECG to reassess QTc.  Patient currently having an echocardiogram with bubble study, with and without Valsalva maneuver with no evidence of intra atrial shunt with Dr. Juarez at bedside.  She desaturates to 68% on oxygen therapy when lying flat.      Syncope    Hypoxia    Polyglandular autoimmune syndrome, type 1 (CMS/HCC)    Type 2 diabetes mellitus (CMS/HCC)    Hypoparathyroidism (CMS/HCC)    Leukocytosis    Electronically signed by BEAR Gilbert, 04/10/21, 11:06 AM EDT.    04/10/21  10:45 EDT

## 2021-04-10 NOTE — PROGRESS NOTES
I visited with patient per palliative care consult.  Patient shared about her supportive family and also support groups she is involved in.  She said she learned early on at [facility] that her illness does not define her.  She presented as hopeful while at the same time frustrated, wanting to get back to her life that is full and active. She requested prayer; appreciated prayer and empathic listening.

## 2021-04-11 LAB
ALBUMIN SERPL-MCNC: 4.2 G/DL (ref 3.5–5.2)
ALP SERPL-CCNC: 77 U/L (ref 39–117)
ALT SERPL W P-5'-P-CCNC: 13 U/L (ref 1–33)
ANION GAP SERPL CALCULATED.3IONS-SCNC: 8 MMOL/L (ref 5–15)
AST SERPL-CCNC: 23 U/L (ref 1–32)
BILIRUB CONJ SERPL-MCNC: <0.2 MG/DL (ref 0–0.3)
BILIRUB INDIRECT SERPL-MCNC: NORMAL MG/DL
BILIRUB SERPL-MCNC: 0.5 MG/DL (ref 0–1.2)
BUN SERPL-MCNC: 10 MG/DL (ref 6–20)
BUN/CREAT SERPL: 16.7 (ref 7–25)
CA-I SERPL ISE-MCNC: 1.55 MMOL/L (ref 1.12–1.32)
CALCIUM SPEC-SCNC: 11.2 MG/DL (ref 8.6–10.5)
CHLORIDE SERPL-SCNC: 98 MMOL/L (ref 98–107)
CK SERPL-CCNC: 48 U/L (ref 20–180)
CO2 SERPL-SCNC: 35 MMOL/L (ref 22–29)
CREAT SERPL-MCNC: 0.6 MG/DL (ref 0.57–1)
DEPRECATED RDW RBC AUTO: 49.1 FL (ref 37–54)
ERYTHROCYTE [DISTWIDTH] IN BLOOD BY AUTOMATED COUNT: 13.3 % (ref 12.3–15.4)
GFR SERPL CREATININE-BSD FRML MDRD: 117 ML/MIN/1.73
GLUCOSE BLDC GLUCOMTR-MCNC: 119 MG/DL (ref 70–130)
GLUCOSE BLDC GLUCOMTR-MCNC: 76 MG/DL (ref 70–130)
GLUCOSE SERPL-MCNC: 122 MG/DL (ref 65–99)
HCT VFR BLD AUTO: 42.9 % (ref 34–46.6)
HGB BLD-MCNC: 13.8 G/DL (ref 12–15.9)
MAGNESIUM SERPL-MCNC: 2.2 MG/DL (ref 1.6–2.6)
MCH RBC QN AUTO: 32.2 PG (ref 26.6–33)
MCHC RBC AUTO-ENTMCNC: 32.2 G/DL (ref 31.5–35.7)
MCV RBC AUTO: 100 FL (ref 79–97)
PLATELET # BLD AUTO: 242 10*3/MM3 (ref 140–450)
PMV BLD AUTO: 12.8 FL (ref 6–12)
POTASSIUM SERPL-SCNC: 4.3 MMOL/L (ref 3.5–5.2)
PROT SERPL-MCNC: 7.3 G/DL (ref 6–8.5)
QT INTERVAL: 398 MS
QTC INTERVAL: 420 MS
RBC # BLD AUTO: 4.29 10*6/MM3 (ref 3.77–5.28)
SODIUM SERPL-SCNC: 141 MMOL/L (ref 136–145)
WBC # BLD AUTO: 11.87 10*3/MM3 (ref 3.4–10.8)

## 2021-04-11 PROCEDURE — 82330 ASSAY OF CALCIUM: CPT | Performed by: NURSE PRACTITIONER

## 2021-04-11 PROCEDURE — 63710000001 PROMETHAZINE PER 25 MG: Performed by: INTERNAL MEDICINE

## 2021-04-11 PROCEDURE — G0378 HOSPITAL OBSERVATION PER HR: HCPCS

## 2021-04-11 PROCEDURE — 82962 GLUCOSE BLOOD TEST: CPT

## 2021-04-11 PROCEDURE — 83735 ASSAY OF MAGNESIUM: CPT | Performed by: INTERNAL MEDICINE

## 2021-04-11 PROCEDURE — 85027 COMPLETE CBC AUTOMATED: CPT | Performed by: INTERNAL MEDICINE

## 2021-04-11 PROCEDURE — 94799 UNLISTED PULMONARY SVC/PX: CPT

## 2021-04-11 PROCEDURE — 80048 BASIC METABOLIC PNL TOTAL CA: CPT | Performed by: INTERNAL MEDICINE

## 2021-04-11 PROCEDURE — 25010000002 PROCHLORPERAZINE 10 MG/2ML SOLUTION: Performed by: HOSPITALIST

## 2021-04-11 PROCEDURE — 99232 SBSQ HOSP IP/OBS MODERATE 35: CPT | Performed by: INTERNAL MEDICINE

## 2021-04-11 PROCEDURE — 25010000002 CALCIUM GLUCONATE PER 10 ML: Performed by: NURSE PRACTITIONER

## 2021-04-11 PROCEDURE — 25010000002 HYDROMORPHONE 1 MG/ML SOLUTION: Performed by: INTERNAL MEDICINE

## 2021-04-11 PROCEDURE — 82550 ASSAY OF CK (CPK): CPT | Performed by: INTERNAL MEDICINE

## 2021-04-11 PROCEDURE — 99232 SBSQ HOSP IP/OBS MODERATE 35: CPT | Performed by: PSYCHIATRY & NEUROLOGY

## 2021-04-11 PROCEDURE — 80076 HEPATIC FUNCTION PANEL: CPT | Performed by: INTERNAL MEDICINE

## 2021-04-11 PROCEDURE — 99232 SBSQ HOSP IP/OBS MODERATE 35: CPT | Performed by: NURSE PRACTITIONER

## 2021-04-11 PROCEDURE — 63710000001 AZATHIOPRINE PER 50 MG: Performed by: INTERNAL MEDICINE

## 2021-04-11 RX ADMIN — HYDROMORPHONE HYDROCHLORIDE 1 MG: 1 INJECTION, SOLUTION INTRAMUSCULAR; INTRAVENOUS; SUBCUTANEOUS at 12:20

## 2021-04-11 RX ADMIN — CALCIUM GLUCONATE 2 G: 98 INJECTION, SOLUTION INTRAVENOUS at 00:33

## 2021-04-11 RX ADMIN — GABAPENTIN 600 MG: 300 CAPSULE ORAL at 15:18

## 2021-04-11 RX ADMIN — Medication 1 TABLET: at 08:52

## 2021-04-11 RX ADMIN — PROCHLORPERAZINE EDISYLATE 5 MG: 5 INJECTION INTRAMUSCULAR; INTRAVENOUS at 12:14

## 2021-04-11 RX ADMIN — CALCITRIOL CAPSULES 0.25 MCG 0.5 MCG: 0.25 CAPSULE ORAL at 20:33

## 2021-04-11 RX ADMIN — Medication 1000 MG: at 23:48

## 2021-04-11 RX ADMIN — CALCITRIOL CAPSULES 0.25 MCG 0.5 MCG: 0.25 CAPSULE ORAL at 08:52

## 2021-04-11 RX ADMIN — BUTALBITAL, ACETAMINOPHEN AND CAFFEINE 1 TABLET: 50; 325; 40 TABLET ORAL at 22:27

## 2021-04-11 RX ADMIN — GABAPENTIN 600 MG: 300 CAPSULE ORAL at 06:10

## 2021-04-11 RX ADMIN — Medication 1000 MG: at 08:51

## 2021-04-11 RX ADMIN — FLUDROCORTISONE ACETATE 100 MCG: 0.1 TABLET ORAL at 08:51

## 2021-04-11 RX ADMIN — HYDROMORPHONE HYDROCHLORIDE 2 MG: 2 TABLET ORAL at 08:51

## 2021-04-11 RX ADMIN — FOLIC ACID 4 MG: 1 TABLET ORAL at 08:52

## 2021-04-11 RX ADMIN — LEVOTHYROXINE SODIUM 75 MCG: 75 TABLET ORAL at 06:10

## 2021-04-11 RX ADMIN — CYCLOSPORINE 1 DROP: 0.5 EMULSION OPHTHALMIC at 09:00

## 2021-04-11 RX ADMIN — OXYCODONE HYDROCHLORIDE AND ACETAMINOPHEN 1000 MG: 500 TABLET ORAL at 08:51

## 2021-04-11 RX ADMIN — HYDROMORPHONE HYDROCHLORIDE 2 MG: 2 TABLET ORAL at 20:17

## 2021-04-11 RX ADMIN — HYDROCORTISONE 10 MG: 10 TABLET ORAL at 20:30

## 2021-04-11 RX ADMIN — NADOLOL 20 MG: 20 TABLET ORAL at 08:53

## 2021-04-11 RX ADMIN — POTASSIUM CHLORIDE 40 MEQ: 1.5 FOR SOLUTION ORAL at 15:18

## 2021-04-11 RX ADMIN — CALCIUM 500 MG: 500 TABLET ORAL at 08:52

## 2021-04-11 RX ADMIN — Medication 1000 MG: at 15:17

## 2021-04-11 RX ADMIN — BUDESONIDE AND FORMOTEROL FUMARATE DIHYDRATE 2 PUFF: 160; 4.5 AEROSOL RESPIRATORY (INHALATION) at 20:31

## 2021-04-11 RX ADMIN — HYDROMORPHONE HYDROCHLORIDE 1 MG: 1 INJECTION, SOLUTION INTRAMUSCULAR; INTRAVENOUS; SUBCUTANEOUS at 10:17

## 2021-04-11 RX ADMIN — POLYETHYLENE GLYCOL 3350 17 G: 17 POWDER, FOR SOLUTION ORAL at 08:53

## 2021-04-11 RX ADMIN — NADOLOL 20 MG: 20 TABLET ORAL at 20:31

## 2021-04-11 RX ADMIN — METHOCARBAMOL 500 MG: 500 TABLET, FILM COATED ORAL at 08:53

## 2021-04-11 RX ADMIN — CYCLOSPORINE 1 DROP: 0.5 EMULSION OPHTHALMIC at 20:30

## 2021-04-11 RX ADMIN — PARATHYROID HORMONE 25 MCG: 25 INJECTION, POWDER, LYOPHILIZED, FOR SOLUTION SUBCUTANEOUS at 20:38

## 2021-04-11 RX ADMIN — PANTOPRAZOLE SODIUM 40 MG: 40 TABLET, DELAYED RELEASE ORAL at 08:52

## 2021-04-11 RX ADMIN — VENLAFAXINE 75 MG: 75 TABLET ORAL at 15:18

## 2021-04-11 RX ADMIN — SODIUM CHLORIDE 250 ML: 9 INJECTION, SOLUTION INTRAVENOUS at 11:48

## 2021-04-11 RX ADMIN — ZINC SULFATE 220 MG (50 MG) CAPSULE 220 MG: CAPSULE at 08:51

## 2021-04-11 RX ADMIN — HYDROMORPHONE HYDROCHLORIDE 1 MG: 1 INJECTION, SOLUTION INTRAMUSCULAR; INTRAVENOUS; SUBCUTANEOUS at 20:28

## 2021-04-11 RX ADMIN — METHOCARBAMOL 500 MG: 500 TABLET, FILM COATED ORAL at 20:30

## 2021-04-11 RX ADMIN — HYDROMORPHONE HYDROCHLORIDE 2 MG: 2 TABLET ORAL at 15:18

## 2021-04-11 RX ADMIN — METHOCARBAMOL 500 MG: 500 TABLET, FILM COATED ORAL at 15:17

## 2021-04-11 RX ADMIN — POTASSIUM CHLORIDE 40 MEQ: 1.5 FOR SOLUTION ORAL at 08:53

## 2021-04-11 RX ADMIN — VENLAFAXINE 75 MG: 75 TABLET ORAL at 20:18

## 2021-04-11 RX ADMIN — BUDESONIDE AND FORMOTEROL FUMARATE DIHYDRATE 2 PUFF: 160; 4.5 AEROSOL RESPIRATORY (INHALATION) at 10:33

## 2021-04-11 RX ADMIN — HYDROMORPHONE HYDROCHLORIDE 2 MG: 2 TABLET ORAL at 17:59

## 2021-04-11 RX ADMIN — HYDROCORTISONE 20 MG: 20 TABLET ORAL at 08:51

## 2021-04-11 RX ADMIN — AZATHIOPRINE 150 MG: 50 TABLET ORAL at 08:51

## 2021-04-11 RX ADMIN — GABAPENTIN 600 MG: 300 CAPSULE ORAL at 20:18

## 2021-04-11 RX ADMIN — HYDROMORPHONE HYDROCHLORIDE 1 MG: 1 INJECTION, SOLUTION INTRAMUSCULAR; INTRAVENOUS; SUBCUTANEOUS at 00:39

## 2021-04-11 RX ADMIN — PROMETHAZINE HYDROCHLORIDE 25 MG: 25 TABLET ORAL at 06:10

## 2021-04-11 RX ADMIN — PROMETHAZINE HYDROCHLORIDE 25 MG: 25 TABLET ORAL at 22:27

## 2021-04-11 RX ADMIN — POTASSIUM CHLORIDE 40 MEQ: 1.5 FOR SOLUTION ORAL at 20:18

## 2021-04-11 RX ADMIN — VENLAFAXINE 75 MG: 75 TABLET ORAL at 08:51

## 2021-04-11 RX ADMIN — HYDROMORPHONE HYDROCHLORIDE 2 MG: 2 TABLET ORAL at 23:51

## 2021-04-11 NOTE — PROGRESS NOTES
Krista Richter  [id number]  1990   LOS: 1 day   Patient Care Team:  Agustin Turner MD as PCP - General (Internal Medicine)    Chief Complaint: Follow-up on syncope, pulmonary hypertension    Subjective The patient has had nausea and vomiting last night and was given Phenergan.  She states that she had a very rough night and her pain was not managed well.  The patient says that she was having tetany for 2 hours and she needed calcium and IV pain meds and these were given.  She continues to have some shortness of breath and chest discomfort at times.    Objective     Vital Sign Min/Max for last 24 hours  Temp  Min: 97.9 °F (36.6 °C)  Max: 98.5 °F (36.9 °C)   BP  Min: 100/63  Max: 122/76   Pulse  Min: 62  Max: 91   Resp  Min: 16  Max: 18   SpO2  Min: 94 %  Max: 100 %   No data recorded   Weight  Min: 67.6 kg (149 lb)  Max: 67.6 kg (149 lb)         04/09/21  0958 04/10/21  1203   Weight: 67.6 kg (149 lb) 67.6 kg (149 lb)         Intake/Output Summary (Last 24 hours) at 4/11/2021 0726  Last data filed at 4/10/2021 1400  Gross per 24 hour   Intake 1160 ml   Output --   Net 1160 ml       Physical Exam:     General Appearance:    Alert, cooperative, in no acute distress   Lungs:     Clear to auscultation,respirations regular, even and                   unlabored, 2 L O2 NC    Heart:    Regular and normal rate, normal S1 and S2, no            murmur, no gallop, no rub, no click   Abdomen:  Extremities:   Soft, non-tender, bowel sounds audible x4    No edema, normal range of motion   Pulses:   Pulses palpable and equal bilaterally      Results Review:   Results from last 7 days   Lab Units 04/09/21  1228 04/07/21  0602 04/06/21  2131   SODIUM mmol/L 138 135* 137   POTASSIUM mmol/L 4.3 4.6 5.0   CHLORIDE mmol/L 98 97* 95*   CO2 mmol/L 33.0* 23.0 28.0   BUN mg/dL 8 16 13   CREATININE mg/dL 0.66 0.75 0.71   GLUCOSE mg/dL 157* 61* 69   CALCIUM mg/dL 8.7 9.2 9.7     Results from last 7 days   Lab Units 04/09/21  1227  04/07/21  0602 04/06/21 2131   WBC 10*3/mm3 9.38 12.64* 13.56*   HEMOGLOBIN g/dL 12.5 14.2 14.8   HEMATOCRIT % 39.4 44.9 45.1   PLATELETS 10*3/mm3 244 255 287             Results from last 7 days   Lab Units 04/08/21  0433 04/06/21  2131   TROPONIN T ng/mL <0.010 <0.010   ECG 4/10/2021:  Normal sinus rhythm  Normal ECG  When compared with ECG of 08-APR-2021 02:57,  No significant change was found    No new chest x-ray to review    Echocardiogram 4/10/2021:  · This study was done to evaluate a patient who develops arterial oxygen desaturation in the supine position.  · I was present during the study and supervised the procedure(s).  · The patient was receiving oxygen by nasal prongs at 2L/min throughout the period of evaluation.  · In a sitting position, the patients arterial saturation is 97% and an agitated saline study reveals no evidence of R>L shunting.  · When the patient assumes a supine position, saturation falls to 70% within one minute and no evidence of R>L shunting is noted.  · When the patient assumes a supine position and carries out a Valsalva maneuver, saturation falls to 70% within a minute with no evidence of R>L shunting.  · Bouts of positional oxygen desaturation resolve within one minute on assuming a sitting position.  · NO evidence of intracardiac or pulmonary shunting is noted during this study despite the occurence of rapid and profound arterial desaturation when the patient assumes a supine position.       Medication Review: Reviewed    Assessment/Plan   Patient with syncopal episode with hypoxia had right and left heart catheterization 4/8/2021 showing normal coronary arteries and normal LV systolic function, moderately elevated LV filling pressure, mild pulmonary hypertension, no evidence of intracardiac or pulmonary shunting.  BAO negative for interatrial shunt.  Limited echo yesterday had no evidence of intracardiac or pulmonary shunting but patient had rapid oxygen saturation when  lying supine with sats down to 68%.  PFTs pending as well as MRI of the brain.      Syncope    Hypoxia    Polyglandular autoimmune syndrome, type 1 (CMS/HCC)    Type 2 diabetes mellitus (CMS/HCC)    Hypoparathyroidism (CMS/HCC)    Leukocytosis    Electronically signed by BEAR Gilbert, 04/11/21, 7:38 AM EDT.    04/11/21  07:26 EDT

## 2021-04-11 NOTE — PLAN OF CARE
Pt complained during the night about how her pain meds are scheduled. I explained to her that she was just given the break through medication and that I could give her scheduled no sooner than it was due. She then called out saying she was nauseous and vomiting. I went back to her room to see exactly what she was throwing up and she had very little puke in her blue bag. I then gave her phenergan and called pharmacy to get her meds straight and too see what I could give at one time. I was told to wait 40 mins after her nausea medicine then give her pain meds and scheduled meds. In the meantime she called out saying she had tetany and needed calcium and iv pain meds. Charge nurse spoke with the patients mom during this whole episode. Called APRN and she put in stat labs and ordered calcium. Pt received calcium and was immediately better. She then took all of her po meds. She also requested her pain meds again as soon as they are due. Patient is asleep in between asking for pain meds and falling asleep during talking to you explaining how much pain she is in. VSS. Patient is alert and oriented.

## 2021-04-11 NOTE — PROGRESS NOTES
"Neurology       Patient Care Team:  Agustin Turner MD as PCP - General (Internal Medicine)    Chief complaint syncope      Subjective .     History: Patient reports she continues to lose consciousness.  Reports she continues to have oxygen desaturations and that no one can figure out why.  Does not describe further numbness.  Would like to see a neurologist here in [place] for her migraines.    ROS: No fever or chest pain.    Objective     Vital Signs   Blood pressure 110/88, pulse 70, temperature 97.9 °F (36.6 °C), temperature source Oral, resp. rate 16, height 160 cm (62.99\"), weight 67.6 kg (149 lb), SpO2 95 %.    Physical Exam:              Neuro: Well-developed young white woman sitting on side of bed in no acute distress, alert, oriented, fluent with no dysarthria or dysphonia.  Pupils 2.5 mm and reactive EOMI face symmetric  Motor: Moves all extremities symmetrically against gravity    Results Review:              Brain MRI images reviewed, agree normal  EEG normal awake and asleep  CK this morning 48    Assessment/Plan     30-year-old woman with multiple medical problems and complaints with frequent episodes of reported syncope-previous work up showed nonepileptic events per fiancé and patient, and current evaluation does not suggest underlying seizure disorder.  After I left the room, I was told patient had had tetany.  This is something that has occurred multiple times in the past.  CK is normal.  Would not recommend further evaluation or treatment other than reassurance at this time.    I discussed the patients findings and my recommendations with patient, family and primary care team    Elda Aldana MD  04/11/21  12:02 EDT    "

## 2021-04-11 NOTE — PROGRESS NOTES
[facility] Medicine Services  PROGRESS NOTE    Patient Name: Krista Richter  : 1990  MRN: [id number]    Date of Admission: 2021  Primary Care Physician: Agustin Turner MD    Subjective   Subjective     CC:  Syncope/spells/hypoxia    HPI:  Had a rough night, mother says patient had another episode of tetany. Better this morning.     ROS  Gen- No fevers, chills  CV- No chest pain, palpitations  Resp- No cough, dyspnea  GI- No N/V/D, abd pain      Objective   Objective     Vital Signs:   Temp:  [97.9 °F (36.6 °C)-98.5 °F (36.9 °C)] 97.9 °F (36.6 °C)  Heart Rate:  [62-91] 63  Resp:  [16-18] 16  BP: (100-122)/(63-88) 110/88        Physical Exam:  Constitutional - no acute distress, nontoxic, in bed  HEENT-NCAT, mucous membranes moist  CV-RRR, S1 S2 normal, no m/r/g  Resp-CTAB, no wheezes, rhonchi or rales  Abd-soft, nontender, nondistended, normoactive bowel sounds  Ext-No lower extremity cyanosis, clubbing or edema bilaterally  Neuro-alert and oriented, speech clear, moves all extremities   Psych-normal affect   Skin- No rash on exposed UE or LE bilaterally      Results Reviewed:  Results from last 7 days   Lab Units 21  1228 21  0602 21  2131   WBC 10*3/mm3 9.38 12.64* 13.56*   HEMOGLOBIN g/dL 12.5 14.2 14.8   HEMATOCRIT % 39.4 44.9 45.1   PLATELETS 10*3/mm3 244 255 287     Results from last 7 days   Lab Units 21  1228 21  0433 21  0602 21  2131   SODIUM mmol/L 138  --  135* 137   POTASSIUM mmol/L 4.3  --  4.6 5.0   CHLORIDE mmol/L 98  --  97* 95*   CO2 mmol/L 33.0*  --  23.0 28.0   BUN mg/dL 8  --  16 13   CREATININE mg/dL 0.66  --  0.75 0.71   GLUCOSE mg/dL 157*  --  61* 69   CALCIUM mg/dL 8.7  --  9.2 9.7   ALT (SGPT) U/L 13  --   --  18   AST (SGOT) U/L 20  --   --  38*   TROPONIN T ng/mL  --  <0.010  --  <0.010     Estimated Creatinine Clearance: 114.1 mL/min (by C-G formula based on SCr of 0.66 mg/dL).    Microbiology Results  Abnormal     Procedure Component Value - Date/Time    COVID PRE-OP / PRE-PROCEDURE SCREENING ORDER (NO ISOLATION) - Swab, Nasopharynx [[id number]]  (Normal) Collected: 04/07/21 0516    Lab Status: Final result Specimen: Swab from Nasopharynx Updated: 04/07/21 0637    Narrative:      The following orders were created for panel order COVID PRE-OP / PRE-PROCEDURE SCREENING ORDER (NO ISOLATION) - Swab, Nasopharynx.  Procedure                               Abnormality         Status                     ---------                               -----------         ------                     COVID-19 and FLU A/B PCR...[[id number]]  Normal              Final result                 Please view results for these tests on the individual orders.    COVID-19 and FLU A/B PCR - Swab, Nasopharynx [[id number]]  (Normal) Collected: 04/07/21 0516    Lab Status: Final result Specimen: Swab from Nasopharynx Updated: 04/07/21 0637     COVID19 Not Detected     Influenza A PCR Not Detected     Influenza B PCR Not Detected    Narrative:      Fact sheet for providers: https://www.fda.gov/media/019282/download    Fact sheet for patients: https://www.fda.gov/media/467496/download    Test performed by PCR.          Imaging Results (Last 24 Hours)     ** No results found for the last 24 hours. **          Results for orders placed during the hospital encounter of 04/06/21    Adult Transthoracic Echo Limited W/ Cont if Necessary Per Protocol    Interpretation Summary  · This study was done to evaluate a patient who develops arterial oxygen desaturation in the supine position.  · I was present during the study and supervised the procedure(s).  · The patient was receiving oxygen by nasal prongs at 2L/min throughout the period of evaluation.  · In a sitting position, the patients arterial saturation is 97% and an agitated saline study reveals no evidence of R>L shunting.  · When the patient assumes a supine position, saturation falls to 70% within one  minute and no evidence of R>L shunting is noted.  · When the patient assumes a supine position and carries out a Valsalva maneuver, saturation falls to 70% within a minute with no evidence of R>L shunting.  · Bouts of positional oxygen desaturation resolve within one minute on assuming a sitting position.  · NO evidence of intracardiac or pulmonary shunting is noted during this study despite the occurence of rapid and profound arterial desaturation when the patient assumes a supine position.      I have reviewed the medications:  Scheduled Meds:Pharmacy Consult, , Does not apply, Daily  ascorbic acid, 1,000 mg, Oral, Daily  azaTHIOprine, 150 mg, Oral, Daily  budesonide-formoterol, 2 puff, Inhalation, BID - RT  calcitriol, 0.5 mcg, Oral, Q12H  calcium carbonate (oyster shell), 500 mg, Oral, Daily  cycloSPORINE, 1 drop, Both Eyes, BID  dexamethasone, 4 mg, Intravenous, Once  dextrose, 25 g, Intravenous, Once  dextrose, 50 mL, Intravenous, Once  fentaNYL citrate (PF), , ,   fludrocortisone, 100 mcg, Oral, Daily  fluticasone, 1 spray, Each Nare, Daily  folic acid, 4 mg, Oral, Daily  gabapentin, 600 mg, Oral, Q8H  hydrocortisone, 10 mg, Oral, Nightly  hydrocortisone, 20 mg, Oral, Daily With Breakfast  HYDROmorphone, 2 mg, Oral, TID  levothyroxine, 75 mcg, Oral, Q AM  magnesium oxide, 1,000 mg, Oral, TID  magnesium sulfate, 2 g, Intravenous, Once  methocarbamol, 500 mg, Oral, TID  methohexital, , ,   midazolam, , ,   multivitamin with minerals, 1 tablet, Oral, Daily  nadolol, 20 mg, Oral, Q12H  pantoprazole, 40 mg, Oral, Daily  Parathyroid Hormone (Recomb), 25 mcg, Subcutaneous, BID  polyethylene glycol, 17 g, Oral, Daily  potassium chloride, 40 mEq, Oral, TID  progesterone, 200 mg, Oral, Daily  sodium chloride, 10 mL, Intravenous, Q12H  venlafaxine, 75 mg, Oral, TID  zinc sulfate, 220 mg, Oral, Daily      Continuous Infusions:   PRN Meds:.acetaminophen **OR** acetaminophen **OR** acetaminophen  •  acetaminophen  •   atropine  •  butalbital-acetaminophen-caffeine  •  dextrose  •  dextrose  •  glucagon (human recombinant)  •  HYDROmorphone **AND** naloxone  •  HYDROmorphone  •  melatonin  •  prochlorperazine **OR** prochlorperazine **OR** prochlorperazine  •  promethazine  •  promethazine  •  sodium chloride  •  [COMPLETED] Insert peripheral IV **AND** sodium chloride  •  sodium chloride    Assessment/Plan   Assessment & Plan     Active Hospital Problems    Diagnosis  POA   • **Syncope [R55]  Yes   • Leukocytosis [D72.829]  Yes   • Hypoparathyroidism (CMS/HCC) [E20.9]  Yes   • Polyglandular autoimmune syndrome, 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        Brief Hospital Course to date:  Krista Richter is a  30 y.o. female with PMH significant for polyglandular deficiency syndrome, sleep apnea, prolonged QT syndrome, DM 2, hyperparathyroid, anxiety, asthma, adrenal insufficiency who presents to the ED with complaint of progressive shortness of breath and syncope.    This patient's problems and plans were partially entered by my partner and updated as appropriate by me 04/11/21.         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 Alek Gillis  -Continue fludrocortisone  -Continue hydrocortisone, she notes that she normally requires solucortef while in the hospital due to stress   -Continue levothyroxine     Insomnia  - patient asks about home lunesta -- discussed with patient and mother at bedside that as patient currently receiving narcotics, would prefer not to provide lunesta at this time to maintain patient safety    Acute on chronic pain  - palliative evaluation. May benefit from outpatient pain management.    DVT prophylaxis: Mechanical      Disposition: I expect the patient to be discharged pending further workup/improvement in pain control/resolution of episodes     Addendum 11:49 am:   -Called to bedside for muscle spasm episode. Patient in bed with legs flexed at knees with heels at buttocks, lying in left side, right arm behind her back. Oxygen saturations in upper 90s, .  - Patient and mother ask about an additional calcium infusion (patient was given 2 gms of IV calcium last night), however this morning calcium levels are high (calcium 11.2 with and ionized level of 1.55), therefore cannot reasonably give an additional infusion without concern for side effects.  - will check mag level however, and if low, can give replacement.  - CK checked this morning (after episode overnight) with level of 48 (normal) which is reassuring.  - Offered to give the patient and additional baclofen tablet - the patient can speak clearly, but says her jaws are locked and she says she cannot take an oral medication at present.  Discussed alteratives to baclofen (ie topical preparations) with PharmD, however we do not carry muscle relaxants in topical formulations at this facility.  - mother states that she has been told some of these episodes  "are \"somatic\" in nature, typically triggered by an environmental cause -- in this case she feels it was the patient's blood pressure cuff. I agreed that a calm environment with minimal stimuli would be helpful, discussed with nursing staff.  - because calcium level is high, will give short run (250 ml) of IV normal saline  - discussed events with Neurology Dr Aldana. EEG unremarkable. MRI brain no acute changes.  Will continue close observation            CODE STATUS:   Code Status and Medical Interventions:   Ordered at: 04/07/21 0443     Level Of Support Discussed With:    Patient     Code Status:    CPR     Medical Interventions (Level of Support Prior to Arrest):    Full       Alber Aggarwal MD  04/11/21              "

## 2021-04-12 LAB
ALBUMIN SERPL-MCNC: 4.6 G/DL (ref 3.5–5.2)
ALBUMIN/GLOB SERPL: 1.4 G/DL
ALP SERPL-CCNC: 83 U/L (ref 39–117)
ALT SERPL W P-5'-P-CCNC: 18 U/L (ref 1–33)
ANION GAP SERPL CALCULATED.3IONS-SCNC: 5 MMOL/L (ref 5–15)
AST SERPL-CCNC: 32 U/L (ref 1–32)
BASOPHILS # BLD AUTO: 0.09 10*3/MM3 (ref 0–0.2)
BASOPHILS NFR BLD AUTO: 0.9 % (ref 0–1.5)
BILIRUB SERPL-MCNC: 0.5 MG/DL (ref 0–1.2)
BUN SERPL-MCNC: 11 MG/DL (ref 6–20)
BUN/CREAT SERPL: 16.9 (ref 7–25)
CA-I SERPL ISE-MCNC: 1.59 MMOL/L (ref 1.12–1.32)
CALCIUM SPEC-SCNC: 11 MG/DL (ref 8.6–10.5)
CHLORIDE SERPL-SCNC: 97 MMOL/L (ref 98–107)
CK SERPL-CCNC: 58 U/L (ref 20–180)
CO2 SERPL-SCNC: 36 MMOL/L (ref 22–29)
CREAT SERPL-MCNC: 0.65 MG/DL (ref 0.57–1)
DEPRECATED RDW RBC AUTO: 48 FL (ref 37–54)
EOSINOPHIL # BLD AUTO: 0.63 10*3/MM3 (ref 0–0.4)
EOSINOPHIL NFR BLD AUTO: 6.4 % (ref 0.3–6.2)
ERYTHROCYTE [DISTWIDTH] IN BLOOD BY AUTOMATED COUNT: 13.2 % (ref 12.3–15.4)
GFR SERPL CREATININE-BSD FRML MDRD: 106 ML/MIN/1.73
GLOBULIN UR ELPH-MCNC: 3.3 GM/DL
GLUCOSE BLDC GLUCOMTR-MCNC: 105 MG/DL (ref 70–130)
GLUCOSE BLDC GLUCOMTR-MCNC: 111 MG/DL (ref 70–130)
GLUCOSE BLDC GLUCOMTR-MCNC: 123 MG/DL (ref 70–130)
GLUCOSE BLDC GLUCOMTR-MCNC: 95 MG/DL (ref 70–130)
GLUCOSE SERPL-MCNC: 106 MG/DL (ref 65–99)
HCT VFR BLD AUTO: 45.5 % (ref 34–46.6)
HGB BLD-MCNC: 14.5 G/DL (ref 12–15.9)
IMM GRANULOCYTES # BLD AUTO: 0.03 10*3/MM3 (ref 0–0.05)
IMM GRANULOCYTES NFR BLD AUTO: 0.3 % (ref 0–0.5)
LYMPHOCYTES # BLD AUTO: 1.85 10*3/MM3 (ref 0.7–3.1)
LYMPHOCYTES NFR BLD AUTO: 18.7 % (ref 19.6–45.3)
MCH RBC QN AUTO: 31.4 PG (ref 26.6–33)
MCHC RBC AUTO-ENTMCNC: 31.9 G/DL (ref 31.5–35.7)
MCV RBC AUTO: 98.5 FL (ref 79–97)
MONOCYTES # BLD AUTO: 1.42 10*3/MM3 (ref 0.1–0.9)
MONOCYTES NFR BLD AUTO: 14.3 % (ref 5–12)
NEUTROPHILS NFR BLD AUTO: 5.89 10*3/MM3 (ref 1.7–7)
NEUTROPHILS NFR BLD AUTO: 59.4 % (ref 42.7–76)
NRBC BLD AUTO-RTO: 0 /100 WBC (ref 0–0.2)
PLATELET # BLD AUTO: 271 10*3/MM3 (ref 140–450)
PMV BLD AUTO: 12.8 FL (ref 6–12)
POTASSIUM SERPL-SCNC: 5.4 MMOL/L (ref 3.5–5.2)
PROT SERPL-MCNC: 7.9 G/DL (ref 6–8.5)
PTH-INTACT SERPL-MCNC: 289 PG/ML (ref 15–65)
RBC # BLD AUTO: 4.62 10*6/MM3 (ref 3.77–5.28)
SODIUM SERPL-SCNC: 138 MMOL/L (ref 136–145)
WBC # BLD AUTO: 9.91 10*3/MM3 (ref 3.4–10.8)

## 2021-04-12 PROCEDURE — 99232 SBSQ HOSP IP/OBS MODERATE 35: CPT | Performed by: PSYCHIATRY & NEUROLOGY

## 2021-04-12 PROCEDURE — 85025 COMPLETE CBC W/AUTO DIFF WBC: CPT | Performed by: INTERNAL MEDICINE

## 2021-04-12 PROCEDURE — 82962 GLUCOSE BLOOD TEST: CPT

## 2021-04-12 PROCEDURE — 82330 ASSAY OF CALCIUM: CPT | Performed by: INTERNAL MEDICINE

## 2021-04-12 PROCEDURE — 92524 BEHAVRAL QUALIT ANALYS VOICE: CPT

## 2021-04-12 PROCEDURE — 63710000001 PROCHLORPERAZINE MALEATE PER 5 MG: Performed by: HOSPITALIST

## 2021-04-12 PROCEDURE — 99254 IP/OBS CNSLTJ NEW/EST MOD 60: CPT | Performed by: OTOLARYNGOLOGY

## 2021-04-12 PROCEDURE — 99232 SBSQ HOSP IP/OBS MODERATE 35: CPT | Performed by: INTERNAL MEDICINE

## 2021-04-12 PROCEDURE — 94799 UNLISTED PULMONARY SVC/PX: CPT

## 2021-04-12 PROCEDURE — 83970 ASSAY OF PARATHORMONE: CPT | Performed by: INTERNAL MEDICINE

## 2021-04-12 PROCEDURE — 80053 COMPREHEN METABOLIC PANEL: CPT | Performed by: INTERNAL MEDICINE

## 2021-04-12 PROCEDURE — 63710000001 AZATHIOPRINE PER 50 MG: Performed by: INTERNAL MEDICINE

## 2021-04-12 PROCEDURE — 25010000002 HYDROMORPHONE 1 MG/ML SOLUTION: Performed by: INTERNAL MEDICINE

## 2021-04-12 PROCEDURE — 82550 ASSAY OF CK (CPK): CPT | Performed by: INTERNAL MEDICINE

## 2021-04-12 PROCEDURE — 99233 SBSQ HOSP IP/OBS HIGH 50: CPT | Performed by: INTERNAL MEDICINE

## 2021-04-12 PROCEDURE — 63710000001 PROMETHAZINE PER 25 MG: Performed by: INTERNAL MEDICINE

## 2021-04-12 RX ORDER — HYDROMORPHONE HYDROCHLORIDE 2 MG/1
1 TABLET ORAL EVERY 6 HOURS PRN
Status: DISPENSED | OUTPATIENT
Start: 2021-04-12 | End: 2021-04-13

## 2021-04-12 RX ORDER — IBUPROFEN 400 MG/1
400 TABLET ORAL EVERY 6 HOURS PRN
Status: DISCONTINUED | OUTPATIENT
Start: 2021-04-12 | End: 2021-04-14 | Stop reason: HOSPADM

## 2021-04-12 RX ORDER — HEPARIN SODIUM (PORCINE) LOCK FLUSH IV SOLN 100 UNIT/ML 100 UNIT/ML
500 SOLUTION INTRAVENOUS ONCE
Status: DISCONTINUED | OUTPATIENT
Start: 2021-04-12 | End: 2021-04-14

## 2021-04-12 RX ORDER — HYDROXYZINE HYDROCHLORIDE 25 MG/1
25 TABLET, FILM COATED ORAL 3 TIMES DAILY PRN
Status: DISCONTINUED | OUTPATIENT
Start: 2021-04-12 | End: 2021-04-14 | Stop reason: HOSPADM

## 2021-04-12 RX ADMIN — HYDROMORPHONE HYDROCHLORIDE 2 MG: 2 TABLET ORAL at 06:33

## 2021-04-12 RX ADMIN — CALCIUM 500 MG: 500 TABLET ORAL at 09:24

## 2021-04-12 RX ADMIN — Medication 1000 MG: at 15:48

## 2021-04-12 RX ADMIN — HYDROMORPHONE HYDROCHLORIDE 1 MG: 2 TABLET ORAL at 21:12

## 2021-04-12 RX ADMIN — HYDROCORTISONE 20 MG: 20 TABLET ORAL at 09:24

## 2021-04-12 RX ADMIN — OXYCODONE HYDROCHLORIDE AND ACETAMINOPHEN 1000 MG: 500 TABLET ORAL at 09:15

## 2021-04-12 RX ADMIN — GABAPENTIN 600 MG: 300 CAPSULE ORAL at 06:32

## 2021-04-12 RX ADMIN — POLYETHYLENE GLYCOL 3350 17 G: 17 POWDER, FOR SOLUTION ORAL at 09:15

## 2021-04-12 RX ADMIN — Medication 1000 MG: at 21:13

## 2021-04-12 RX ADMIN — FLUTICASONE PROPIONATE 1 SPRAY: 50 SPRAY, METERED NASAL at 13:20

## 2021-04-12 RX ADMIN — PANTOPRAZOLE SODIUM 40 MG: 40 TABLET, DELAYED RELEASE ORAL at 09:17

## 2021-04-12 RX ADMIN — FLUDROCORTISONE ACETATE 100 MCG: 0.1 TABLET ORAL at 09:25

## 2021-04-12 RX ADMIN — Medication 1000 MG: at 09:16

## 2021-04-12 RX ADMIN — BUTALBITAL, ACETAMINOPHEN AND CAFFEINE 1 TABLET: 50; 325; 40 TABLET ORAL at 21:14

## 2021-04-12 RX ADMIN — POTASSIUM CHLORIDE 40 MEQ: 1.5 FOR SOLUTION ORAL at 09:17

## 2021-04-12 RX ADMIN — ZINC SULFATE 220 MG (50 MG) CAPSULE 220 MG: CAPSULE at 09:17

## 2021-04-12 RX ADMIN — NADOLOL 20 MG: 20 TABLET ORAL at 21:12

## 2021-04-12 RX ADMIN — METHOCARBAMOL 500 MG: 500 TABLET, FILM COATED ORAL at 16:03

## 2021-04-12 RX ADMIN — Medication 1 TABLET: at 09:15

## 2021-04-12 RX ADMIN — HYDROMORPHONE HYDROCHLORIDE 1 MG: 2 TABLET ORAL at 11:06

## 2021-04-12 RX ADMIN — GABAPENTIN 600 MG: 300 CAPSULE ORAL at 21:11

## 2021-04-12 RX ADMIN — POTASSIUM CHLORIDE 40 MEQ: 1.5 FOR SOLUTION ORAL at 21:14

## 2021-04-12 RX ADMIN — SODIUM CHLORIDE, PRESERVATIVE FREE 10 ML: 5 INJECTION INTRAVENOUS at 21:17

## 2021-04-12 RX ADMIN — AZATHIOPRINE 150 MG: 50 TABLET ORAL at 09:26

## 2021-04-12 RX ADMIN — FOLIC ACID 4 MG: 1 TABLET ORAL at 09:17

## 2021-04-12 RX ADMIN — CALCITRIOL CAPSULES 0.25 MCG 0.5 MCG: 0.25 CAPSULE ORAL at 09:23

## 2021-04-12 RX ADMIN — CYCLOSPORINE 1 DROP: 0.5 EMULSION OPHTHALMIC at 21:16

## 2021-04-12 RX ADMIN — HYDROCORTISONE 10 MG: 10 TABLET ORAL at 21:12

## 2021-04-12 RX ADMIN — BUDESONIDE AND FORMOTEROL FUMARATE DIHYDRATE 2 PUFF: 160; 4.5 AEROSOL RESPIRATORY (INHALATION) at 09:36

## 2021-04-12 RX ADMIN — POTASSIUM CHLORIDE 40 MEQ: 1.5 FOR SOLUTION ORAL at 15:48

## 2021-04-12 RX ADMIN — BUDESONIDE AND FORMOTEROL FUMARATE DIHYDRATE 2 PUFF: 160; 4.5 AEROSOL RESPIRATORY (INHALATION) at 20:40

## 2021-04-12 RX ADMIN — PROCHLORPERAZINE MALEATE 5 MG: 5 TABLET ORAL at 09:40

## 2021-04-12 RX ADMIN — HYDROMORPHONE HYDROCHLORIDE 1 MG: 1 INJECTION, SOLUTION INTRAMUSCULAR; INTRAVENOUS; SUBCUTANEOUS at 01:14

## 2021-04-12 RX ADMIN — METHOCARBAMOL 500 MG: 500 TABLET, FILM COATED ORAL at 21:13

## 2021-04-12 RX ADMIN — PARATHYROID HORMONE 25 MCG: 25 INJECTION, POWDER, LYOPHILIZED, FOR SOLUTION SUBCUTANEOUS at 21:14

## 2021-04-12 RX ADMIN — PROMETHAZINE HYDROCHLORIDE 25 MG: 25 TABLET ORAL at 06:33

## 2021-04-12 RX ADMIN — VENLAFAXINE 75 MG: 75 TABLET ORAL at 15:48

## 2021-04-12 RX ADMIN — CALCITRIOL CAPSULES 0.25 MCG 0.5 MCG: 0.25 CAPSULE ORAL at 21:14

## 2021-04-12 RX ADMIN — VENLAFAXINE 75 MG: 75 TABLET ORAL at 09:15

## 2021-04-12 RX ADMIN — LEVOTHYROXINE SODIUM 75 MCG: 75 TABLET ORAL at 06:32

## 2021-04-12 RX ADMIN — PARATHYROID HORMONE 25 MCG: 25 INJECTION, POWDER, LYOPHILIZED, FOR SOLUTION SUBCUTANEOUS at 09:29

## 2021-04-12 RX ADMIN — GABAPENTIN 600 MG: 300 CAPSULE ORAL at 13:20

## 2021-04-12 RX ADMIN — HYDROMORPHONE HYDROCHLORIDE 2 MG: 2 TABLET ORAL at 09:13

## 2021-04-12 RX ADMIN — METHOCARBAMOL 500 MG: 500 TABLET, FILM COATED ORAL at 09:22

## 2021-04-12 RX ADMIN — VENLAFAXINE 75 MG: 75 TABLET ORAL at 21:11

## 2021-04-12 RX ADMIN — SODIUM CHLORIDE, PRESERVATIVE FREE 10 ML: 5 INJECTION INTRAVENOUS at 09:32

## 2021-04-12 RX ADMIN — PROCHLORPERAZINE MALEATE 5 MG: 5 TABLET ORAL at 01:13

## 2021-04-12 RX ADMIN — PROGESTERONE 200 MG: 100 CAPSULE ORAL at 09:20

## 2021-04-12 NOTE — PROGRESS NOTES
Continued Stay Note  [facility]     Patient Name: Krista Richter  MRN: [id number]  Today's Date: 4/12/2021    Admit Date: 4/6/2021    Discharge Plan     Row Name 04/12/21 1333       Plan    Plan  TBD    Patient/Family in Agreement with Plan  yes    Plan Comments  Visited pt. at bedside. Neuro and Pulmonary have signed off. Cardiology states needs work-up for congenital heart condition. Will continue to follow and update.    Final Discharge Disposition Code  30 - still a patient        Discharge Codes    No documentation.             Jasmina Razo RN

## 2021-04-12 NOTE — PROGRESS NOTES
Palliative Care Progress Note    Date of Admission: 4/6/2021    Subjective: Patient does still complain about intermittent pain that she feels is made better by the Dilaudid.  Current Code Status     Date Active Code Status Order ID Comments User Context       4/7/2021 0443 CPR [id number]  Madelyn Johnston, [facility]     Advance Care Planning Activity      Questions for Current Code Status     Question Answer Comment    Code Status CPR     Medical Interventions (Level of Support Prior to Arrest) Full     Level Of Support Discussed With Patient         No current facility-administered medications on file prior to encounter.     Current Outpatient Medications on File Prior to Encounter   Medication Sig Dispense Refill   • albuterol (PROVENTIL HFA;VENTOLIN HFA) 108 (90 Base) MCG/ACT inhaler Take 2 Puffs by inhalation every 4 hours as needed for wheezing.     • Alcohol Swabs (ALCOHOL PREP) 70 % pads Check blood glucose up to 2 times daily     • ascorbic acid (VITAMIN C) 1000 MG tablet Take 1,000 mg by mouth Daily.     • azaTHIOprine (IMURAN) 100 MG tablet Take 150 mg by mouth Daily.     • azaTHIOprine (IMURAN) 50 MG tablet Take 3 tablets by mouth Daily. 90 tablet 2   • azithromycin (ZITHROMAX) 250 MG tablet Take 250 mg by mouth Daily.     • Blood Glucose Calibration (OT ULTRA/FASTTK CNTRL SOLN) solution Use weekly and as needed to calibrate meter     • Blood Glucose Monitoring Suppl (ONE TOUCH ULTRA 2) w/Device kit Use to check blood glucose up to 2 times daily     • calcitriol (ROCALTROL) 0.5 MCG capsule Take 0.5 mcg by mouth 2 (Two) Times a Day.     • Calcium Citrate 250 MG tablet Take 500 mg by mouth Daily.     • cycloSPORINE (RESTASIS) 0.05 % ophthalmic emulsion 1 drop. PRN     • cyproheptadine (PERIACTIN) 4 MG tablet Take 4 mg by mouth 2 (Two) Times a Day.     • dronabinol (MARINOL) 5 MG capsule 5 mg 4 (Four) Times a Day As Needed.     • dronabinol (Marinol) 5 MG capsule Take 1 capsule by mouth Daily As Needed  (anorexia). 30 capsule 0   • EPINEPHrine (EPIPEN 2-TEDDY) 0.3 MG/0.3ML solution auto-injector injection EpiPen 0.3 mg/0.3 mL injection, auto-injector   Take 1 auto by injection route.     • ESTRADIOL PO Take  by mouth.     • eszopiclone (Lunesta) 2 MG tablet Take 2 mg by mouth Every Night. Take immediately before bedtime     • fludrocortisone 0.1 MG tablet TAKE ONE TABLET BY MOUTH DAILY..05MG PER PT     • fluticasone (FLONASE) 50 MCG/ACT nasal spray 1 spray.     • folic acid (FOLVITE) 1 MG tablet Take 4 mg by mouth Daily.     • gabapentin (NEURONTIN) 600 MG tablet Take 900 mg by mouth 3 (Three) Times a Day.     • hydrocortisone (CORTEF) 10 MG tablet Take 10 mg by mouth Every Night.     • hydrocortisone (CORTEF) 20 MG tablet Take 20 mg by mouth Every Morning. 20 MG IN AM     • hydrocortisone sodium succinate (SOLU-CORTEF) 100 MG injection Give 100 mg ( 2 mL ) intramuscular as needed for vomiting, severe illness  Dispense actovial     • Hypromellose (SYSTANE OVERNIGHT THERAPY) 0.3 % gel Systane Gel 0.3 % eye gel     • Insulin Pen Needle 32G X 4 MM misc Use to administer Natpara subcutaneously daily     • levothyroxine (SYNTHROID, LEVOTHROID) 75 MCG tablet levothyroxine 75 mcg tablet     • lidocaine (LIDODERM) 5 % Place 1 patch on the skin as directed by provider Daily. Remove & Discard patch within 12 hours or as directed by MD 14 patch 0   • magnesium oxide (MAGOX) 400 (241.3 Mg) MG tablet tablet Take 1,000 mg by mouth 3 (Three) Times a Day.     • metFORMIN (GLUCOPHAGE) 500 MG tablet Take 500 mg by mouth 2 (Two) Times a Day With Meals.     • methocarbamol (ROBAXIN) 500 MG tablet Take 1 tablet by mouth 3 (Three) Times a Day. 14 tablet 0   • Misc Natural Products (RA XYDRA EF PO) Take  by mouth.     • Multiple Vitamins-Minerals (MULTIVITAMIN ADULT PO) Take 1 tablet by mouth.     • nadolol (CORGARD) 20 MG tablet Take 20 mg by mouth 2 (two) times a day.     • omeprazole (priLOSEC) 40 MG capsule Take 1 capsule by mouth 2  "(two) times a day. 30 minutes before a meal. (Patient taking differently: Take 40 mg by mouth 2 (two) times a day. 30 minutes before a meal. PRN) 60 capsule 2   • Parathyroid Hormone, Recomb, (NATPARA SC) Inject 1 Cartridge under the skin into the appropriate area as directed 2 (two) times a day.     • Potassium Chloride (KLOR-CON 10 PO) Take 40 mEq by mouth 3 (Three) Times a Day.     • progesterone (PROMETRIUM) 200 MG capsule Take 200 mg by mouth Daily.     • promethazine (PHENERGAN) 25 MG suppository Insert 1 suppository into the rectum Daily As Needed for Nausea or Vomiting. 30 suppository 0   • promethazine (PHENERGAN) 25 MG tablet Take 1 tablet by mouth Every 6 (Six) Hours As Needed for Nausea or Vomiting. 12 tablet 0   • Transparent Dressings (TEGADERM FILM 2-3/8\"X2-3/4\") misc Apply over pump insertion site every 2 days..     • venlafaxine (EFFEXOR) 25 MG tablet Take 75 mg by mouth 3 (Three) Times a Day.     • Vitamin D, Cholecalciferol, 1000 units capsule Take 1,000 unit marking on U-100 syringe by mouth 2 (Two) Times a Day.     • zinc sulfate (ZINCATE) 220 (50 Zn) MG capsule zinc sulfate 220 mg (50 mg) capsule     • ONETOUCH DELICA LANCETS 33G misc Use to check blood glucose up to 2 times daily          acetaminophen **OR** acetaminophen **OR** acetaminophen  •  acetaminophen  •  atropine  •  butalbital-acetaminophen-caffeine  •  dextrose  •  dextrose  •  glucagon (human recombinant)  •  HYDROmorphone  •  ibuprofen  •  melatonin  •  [DISCONTINUED] HYDROmorphone **AND** naloxone  •  prochlorperazine **OR** prochlorperazine **OR** prochlorperazine  •  promethazine  •  promethazine  •  sodium chloride  •  [COMPLETED] Insert peripheral IV **AND** sodium chloride  •  sodium chloride    Objective: /86 (BP Location: Left arm, Patient Position: Lying)   Pulse 89   Temp 98.8 °F (37.1 °C) (Oral)   Resp 18   Ht 160 cm (62.99\")   Wt 67.6 kg (149 lb)   SpO2 93%   BMI 26.40 kg/m²      Intake/Output Summary " (Last 24 hours) at 4/12/2021 1408  Last data filed at 4/12/2021 0935  Gross per 24 hour   Intake 618 ml   Output --   Net 618 ml     Physical Exam:      General Appearance:    Alert, cooperative, in no acute distress, terarful at times   Head:    Normocephalic, without obvious abnormality, atraumatic   Eyes:            Lids and lashes normal, conjunctivae and sclerae normal, no   icterus, no pallor, corneas clear, PERRLA   Ears:    Ears appear intact with no abnormalities noted   Throat:   No oral lesions, no thrush, oral mucosa moist   Neck:   No adenopathy, supple, trachea midline, no thyromegaly, no     carotid bruit, no JVD   Back:     No kyphosis present, no scoliosis present, no skin lesions,       erythema or scars, no tenderness to percussion or                   palpation,   range of motion normal   Lungs:     Clear to auscultation,respirations regular, even and                   unlabored    Heart:    Regular rhythm and normal rate, normal S1 and S2, no            murmur, no gallop, no rub, no click   Breast Exam:    Deferred   Abdomen:     Normal bowel sounds, no masses, no organomegaly, soft        non-tender, non-distended, no guarding, no rebound                 tenderness   Genitalia:    Deferred   Extremities:   Moves all extremities well, no edema, no cyanosis, no              redness   Pulses:   Pulses palpable and equal bilaterally   Skin:   No bleeding, bruising or rash   Lymph nodes:   No palpable adenopathy   Neurologic:   Cranial nerves 2 - 12 grossly intact, sensation intact, DTR        present and equal bilaterally     Results from last 7 days   Lab Units 04/12/21  0829   WBC 10*3/mm3 9.91   HEMOGLOBIN g/dL 14.5   HEMATOCRIT % 45.5   PLATELETS 10*3/mm3 271     Results from last 7 days   Lab Units 04/12/21  0829   SODIUM mmol/L 138   POTASSIUM mmol/L 5.4*   CHLORIDE mmol/L 97*   CO2 mmol/L 36.0*   BUN mg/dL 11   CREATININE mg/dL 0.65   CALCIUM mg/dL 11.0*   BILIRUBIN mg/dL 0.5   ALK PHOS U/L  83   ALT (SGPT) U/L 18   AST (SGOT) U/L 32   GLUCOSE mg/dL 106*       Impression: Polyglandular dificiency  M/S pain  Chest pain    Plan: I do not feel that long-term opioids is a very good answer in this patient given the chronicity of her underlying medical issues, especially compounded on some the ambiguity of the patient's disease.  With this in mind I am going to decrease the patient's Dilaudid by stopping the scheduled dose and making Dilaudid 1 mg as needed available.  Would recommend stopping the Dilaudid tomorrow, I have put a stop date on this.  Did discuss stopping the Dilaudid with the patient.  Palliative medicine will sign off.        Jose Manuel Rodriguez DO  04/12/21  14:08 EDT

## 2021-04-12 NOTE — PLAN OF CARE
Goal Outcome Evaluation:  Plan of Care Reviewed With: patient  Progress: no change   SLP evaluation completed. Will address suspected PVFM in tx on trial basis for now. Please see note for further details and recommendations.

## 2021-04-12 NOTE — PLAN OF CARE
"Goal Outcome Evaluation:  Plan of Care Reviewed With: patient, significant other     Outcome Summary: VSS, but corgard held this am due to bp 102/, c/o's no pain relief, diluadid iv dc per pallative, pt ref speech consult, waiting for ENT, \"nothing wrong with my vocal chords\", pt more confident in cardiology assessment  "

## 2021-04-12 NOTE — PLAN OF CARE
Problem List updated      Problem List:     1. Recurrent syncope  2. 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 Dx by Dr. Chapman at age 4 after abnormal EKG   b. Trouble regulating potassium   c. Black out periods and seizure like activity jayden with swimming and does get anxiolytic without a LifeVest.   d. Initiated on Nadolol for tachycardia with HR up into low high 190s.  e.  twelve-lead EKG 11/20 1/2015 QTc 490 ms  3. Orthodeoxia -   a. 4/7/2021 bilateral carotid duplex: Mildly elevated carotid velocities bilaterally.  No evidence of atherosclerosis.  Vertebral artery flow is antegrade bilaterally  b. 4/8/2021 heart catheterization: Normal coronary arteries, normal LV systolic function.  Moderately elevated LV filling pressure, mild pulmonary hypertension (mean PA pressure 32 mmHg), supranormal cardiac output/index.  No evidence of intracardiac or pulmonary shunt.  c. 4/8/2021 BAO estimated EF 55%, LV systolic function normal.  Saline test with Valsalva negative for evidence of intra-atrial shunt.  d. 4/9/2021 Doppler transcranial microbubble injection: Mean velocities and waveforms in the right and left MCA and terminal ICA are normal.  Following injection of agitated saline both before and after Valsalva no abnormal signal are seen in the right MCA.  Negative bubble study.  e. 4/10/21 complete echocardiogram: Patient received 2 L of oxygen per nasal cannula throughout the evaluation.  In the sitting position arterial saturation 97% and an agitated saline study reveals no evidence of right to left shunting.  When patient assumes supine position saturation falls to 70% within 1 minute no evidence of right to left shunting noted.  When the patient assumes supine position and carries a Valsalva maneuver saturation pulse of 70% within a minute with no evidence of right to left shunting.  Also positional oxygen desaturation resolved within 1 minute of assuming a seated position.  No evidence of intracardiac or  pulmonary shunting noted.  4. Polyglandular autoimmune syndrome  a. Rituxan in the past   5. Chronic Lung Disease/Hpoxia  a. Follows pulmonologist at [facility]  b. Echo 4/7/2021:  EF 55%, no significant valvular abnormalities, appears negative for interatrial shunting.  6. Esophageal stenosis   a. Status post dilatation 1-   7. T2DM  8. Hypoparathyroidism  9. Dermatomyositis  10. Autoimmune hepatitis  11. Obstructive sleep apnea  10.  Adrenal Insufficiency  11.  Gastroparesis  12.  Depression  13.  Anxiety   14.  Surgeries:  a. Cholecystectomy  b. Appendectomy  c. G-tube insertion and removal  d. Umbilical hernia repair  e. Implantable loop recorder insertion

## 2021-04-12 NOTE — PROGRESS NOTES
INTENSIVIST / PULMONARY FOLLOW UP NOTE     Hospital:  LOS: 1 day   Ms. Krista Aiken [place], 30 y.o. female is followed for:     Syncope    Hypoxia    Polyglandular autoimmune syndrome, type 1 (CMS/HCC)    Type 2 diabetes mellitus (CMS/HCC)    Hypoparathyroidism (CMS/HCC)    Leukocytosis          SUBJECTIVE   Patient breathing comfortably on room air    The patient's relevant past medical, surgical, family, and social history were reviewed    Allergies and medications were reviewed    ROS:  Per subjective, all other systems were reviewed and were negative        OBJECTIVE     Vital Sign Min/Max for last 24 hours:  Temp  Min: 98.1 °F (36.7 °C)  Max: 98.8 °F (37.1 °C)   BP  Min: 93/59  Max: 120/86   Pulse  Min: 56  Max: 79   Resp  Min: 16  Max: 18   SpO2  Min: 90 %  Max: 100 %   No data recorded     Physical Exam:  General Appearance:  Conversant, in no acute distress  Eyes:  No scleral icterus or pallor, pupils normal  Ears, Nose, Mouth, Throat:  Atraumatic, oropharynx clear  Neck:  Trachea midline, thyroid normal  Respiratory:  Clear to auscultation bilaterally, normal effort, no tenderness to palpation  Cardiovascular:  Regular rate and rhythm, no murmurs, no peripheral edema, no thrill  Gastrointestinal:  Soft, nontender, nondistended, no hepatosplenomegaly  Skin:  Normal temperature, no rash  Psychiatric:  Alert and oriented x 3, normal judgement and insight  Neuro:  No new focal neurologic deficits observed    Telemetry:              Hemodynamics:   CVP:     PAP:     PAOP:     CO:     CI:     SVI:     SVR:       SpO2: 90 % SpO2  Min: 90 %  Max: 100 %   Device:      Flow Rate:   No data recorded     Mechanical Ventilator Settings:                                         Intake/Ouptut 24 hrs (7:00AM - 6:59 AM)  Intake & Output (last 3 days)       04/09 0701 - 04/10 0700 04/10 0701 - 04/11 0700 04/11 0701 - 04/12 0700 04/12 0701 - 04/13 0700    P.O. 360 1160  618    Total Intake(mL/kg) 360 (5.3) 1160 (17.2)  618  (9.1)    Urine (mL/kg/hr) 1200 (0.7)       Total Output 1200       Net -840 +1160  +618            Urine Unmeasured Occurrence 1 x 1 x 1 x           Lines, Drains & Airways    Active LDAs     Name:   Placement date:   Placement time:   Site:   Days:    Peripheral IV 04/10/21 1100 Posterior;Right Forearm   04/10/21    1100    Forearm   2    Single Lumen Implantable Port 01/25/17 Left Subclavian   01/25/17    0900    Subclavian   1538                Hematology:  Results from last 7 days   Lab Units 04/12/21  0829 04/11/21 0916 04/09/21  1228 04/07/21  0602 04/06/21  2131   WBC 10*3/mm3 9.91 11.87* 9.38 12.64* 13.56*   HEMOGLOBIN g/dL 14.5 13.8 12.5 14.2 14.8   HEMATOCRIT % 45.5 42.9 39.4 44.9 45.1   PLATELETS 10*3/mm3 271 242 244 255 287     Electrolytes, Magnesium and Phosphorus:  Results from last 7 days   Lab Units 04/11/21 0916 04/09/21 1228 04/07/21 0602 04/06/21  2131   SODIUM mmol/L 141 138 135* 137   CHLORIDE mmol/L 98 98 97* 95*   POTASSIUM mmol/L 4.3 4.3 4.6 5.0   CO2 mmol/L 35.0* 33.0* 23.0 28.0   MAGNESIUM mg/dL 2.2  --  2.5 1.8   PHOSPHORUS mg/dL  --   --  5.2*  --      Renal:  Results from last 7 days   Lab Units 04/11/21 0916 04/09/21 1228 04/07/21 0602 04/06/21  2131   CREATININE mg/dL 0.60 0.66 0.75 0.71   BUN mg/dL 10 8 16 13     Estimated Creatinine Clearance: 125.5 mL/min (by C-G formula based on SCr of 0.6 mg/dL).  Hepatic:  Results from last 7 days   Lab Units 04/11/21 0916 04/09/21 1228 04/06/21  2131   ALK PHOS U/L 77 65 69   BILIRUBIN mg/dL 0.5 0.4 1.1   BILIRUBIN DIRECT mg/dL <0.2  --   --    ALT (SGPT) U/L 13 13 18   AST (SGOT) U/L 23 20 38*     Arterial Blood Gases:  Results from last 7 days   Lab Units 04/08/21  1023 04/08/21  1021 04/08/21  1020 04/08/21  1016 04/08/21  1015 04/08/21  1013 04/08/21  1011   PH, ARTERIAL pH units  --   --  7.287*  --   --   --   --    PCO2, ARTERIAL mm Hg  --   --  63.6*  --   --   --   --    PO2 ART mm Hg  --   --  62.5*  --   --   --   --    FIO2  % 21 21 21 21 21 21 21             Lab Results   Component Value Date    LACTATE 2.4 (C) 09/17/2020       Relevant imaging studies and labs from 04/12/21 were reviewed and interpreted by me    Medications (danielle):       Pharmacy Consult, , Does not apply, Daily  ascorbic acid, 1,000 mg, Oral, Daily  azaTHIOprine, 150 mg, Oral, Daily  budesonide-formoterol, 2 puff, Inhalation, BID - RT  calcitriol, 0.5 mcg, Oral, Q12H  calcium carbonate (oyster shell), 500 mg, Oral, Daily  cycloSPORINE, 1 drop, Both Eyes, BID  dexamethasone, 4 mg, Intravenous, Once  dextrose, 25 g, Intravenous, Once  dextrose, 50 mL, Intravenous, Once  fentaNYL citrate (PF), , ,   fludrocortisone, 100 mcg, Oral, Daily  fluticasone, 1 spray, Each Nare, Daily  folic acid, 4 mg, Oral, Daily  gabapentin, 600 mg, Oral, Q8H  heparin, 500 Units, Intracatheter, Once  hydrocortisone, 10 mg, Oral, Nightly  hydrocortisone, 20 mg, Oral, Daily With Breakfast  levothyroxine, 75 mcg, Oral, Q AM  magnesium oxide, 1,000 mg, Oral, TID  magnesium sulfate, 2 g, Intravenous, Once  methocarbamol, 500 mg, Oral, TID  methohexital, , ,   midazolam, , ,   multivitamin with minerals, 1 tablet, Oral, Daily  nadolol, 20 mg, Oral, Q12H  pantoprazole, 40 mg, Oral, Daily  Parathyroid Hormone (Recomb), 25 mcg, Subcutaneous, BID  polyethylene glycol, 17 g, Oral, Daily  potassium chloride, 40 mEq, Oral, TID  progesterone, 200 mg, Oral, Daily  sodium chloride, 10 mL, Intravenous, Q12H  venlafaxine, 75 mg, Oral, TID  zinc sulfate, 220 mg, Oral, Daily        Assessment/Plan   IMPRESSION / PLAN     Inpatient Problem List:  30 y.o.female:  Active Hospital Problems    Diagnosis    • **Syncope    • Leukocytosis    • Hypoparathyroidism (CMS/HCC)    • Polyglandular autoimmune syndrome, type 1 (CMS/HCC)    • Type 2 diabetes mellitus (CMS/HCC)    • Hypoxia         Impression:  30 y.o.female with relevant PMH of polyglandular deficiency syndrome, sleep apnea, prolonged QT syndrome, diabetes  "mellitus type 2, hyperparathyroidism, anxiety, and adrenal insufficiency admitted 4/6/2021 for evaluation of episodic hypoxemia and syncope.  On admission patient reported \"5 episodes of syncope prior to arrival at the ED. Since arrival to the ED, she reported at least 40 episodes of syncope lasting a few seconds at a time as well as an episode of tetany\".    Has had extensive workup.  CTA w/o AVM, normal appearing lung parenchyma.  PFT with normal FEV1, normal DLCO when corrected for alveolar volume, low TLC likely due to body habitus, but no restriction base on ATS criteria.  There is an abnormal expiratory limb of the FV loop suggestive of dynamic variable extrathoracic obstruction.    BAO nor mal EF 55% no shunt.  Echo during \"episode\" with no shunt.  LHC w/ normal coronaries, LVEDP 24, mean PA 32, PCWP 20, CO 7.55, no shunt.PVR 1.05 VELA.      Patient had a witnessed episode by my partner Dr. Long (detailed in note 4/9/21) which clearly seemed to have at least some voluntary component.    Hypercapnia noted on ABG (unclear if this was done during \"episode\").  She has known KATINA.  On multiple other sedating meds that could contribute to hypoventilation but this would not explain the \"episodes\".    Overall, I am suspicious that she may have some component of vocal cord dysfunction which may be partially volitional.      Plan:  Intermittent Hypoxemia / Syncope - PFT / CTA / Shunt studies all reassuring.  Normal lung parenchyma.  See discussion above.  ENT eval for VCD.  If proven (hopefully she can lay flat during this to help precipitate an \"episode\") she would benefit from speech therapy.    Hypercapnia - I suspect this blood gas was affected by one of these \"episodes\" and doesn't reflect her steady state.  Will be exacerbated by not using CPAP (will order for HS) and sedating meds.  Ideally would try to minimize / discontinue lunesta, neurontin, robaxin, and narcotics.  I don't see any underlying pulmonary " reason for hypercapnia unless she has a neuromuscular disorder (neurology following) - though I find that unlikely.    Defer further cardiac evaluation to Cardiology.    I don't have anything else to add while inpatient.  Can see patient again as needed.  Call if any questions.         Schuyler Niño MD  Intensive Care Medicine  04/12/21 11:55 EDT

## 2021-04-12 NOTE — PROGRESS NOTES
[facility] at [facility]  Progress Note       LOS: 1 day   Patient Care Team:  Agustin Turner MD as PCP - General (Internal Medicine)    Chief Complaint:  Long QT, hypoxia     Subjective pt had a better night last night. Pain is okay. Has been compliant with sleeping with HOB elevated.      Interval History:  Testing reviewed and problem list updated.     Problem List Update:   Problem List:     1. Recurrent syncope  2. 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 Dx by Dr. Chapman at age 4 after abnormal EKG   b. Trouble regulating potassium   c. Black out periods and seizure like activity jayden with swimming and does get anxiolytic without a LifeVest.   d. Initiated on Nadolol for tachycardia with HR up into low high 190s.  e.  twelve-lead EKG 11/20 1/2015 QTc 490 ms  f. 4 7/21 bilateral carotid duplex: Mildly elevated carotid velocities bilaterally.  No evidence of atherosclerosis.  Vertebral artery flow is antegrade bilaterally.  g. 4/8/2021 heart catheterization: Normal coronary arteries, normal LV systolic function.  Moderately elevated LV filling pressure, mild pulmonary hypertension (mean PA pressure 32 mmHg), supranormal cardiac output/index.  No evidence of intracardiac or pulmonary shunt.  h. 4/8/2021 ABO estimated EF 55%, LV systolic function normal.  Saline test with Valsalva negative for evidence of intra-atrial shunt.  i. 4/9/2021 Doppler transcranial microbubble injection: Mean velocities and waveforms in the right and left MCA and terminal ICA are normal.  Following injection of agitated saline both before and after Valsalva no abnormal signal are seen in the right MCA.  Negative bubble study.  j. 4/10/21 complete echocardiogram: Patient received 2 L of oxygen per nasal cannula throughout the evaluation.  In the sitting position arterial saturation 97% and an agitated saline study reveals no evidence of right to left shunting.  When patient assumes supine position  saturation falls to 70% within 1 minute no evidence of right to left shunting noted.  When the patient assumes supine position and carries a Valsalva maneuver saturation pulse of 70% within a minute with no evidence of right to left shunting.  Also positional oxygen desaturation resolved within 1 minute of assuming a seated position.  No evidence of intracardiac or pulmonary shunting noted.  3. Polyglandular autoimmune syndrome  a. Rituxan in the past   4. Chronic Lung Disease/Hpoxia  a. Follows pulmonologist at [facility]  b. Echo 4/7/2021:  EF 55%, no significant valvular abnormalities, appears negative for interatrial shunting.  5. Esophageal stenosis   a. Status post dilatation 1-   6. T2DM  7. Hypoparathyroidism  8. Dermatomyositis  9. Autoimmune hepatitis  10. Obstructive sleep apnea  10.  Adrenal Insufficiency  11.  Gastroparesis  12.  Depression  13.  Anxiety   14.  Surgeries:  a. Cholecystectomy  b. Appendectomy  c. G-tube insertion and removal  d. Umbilical hernia repair  e. Implantable loop recorder insertion        Review of Systems:   Pertinent positives in HPI, all others reviewed and negative.      Objective       Current Facility-Administered Medications:   •  ! home medications stored in central pharmacy, contact for return prior to discharge, , Does not apply, Daily, Rafael Soriano, [facility]  •  acetaminophen (TYLENOL) tablet 650 mg, 650 mg, Oral, Q4H PRN **OR** acetaminophen (TYLENOL) 160 MG/5ML solution 650 mg, 650 mg, Oral, Q4H PRN **OR** acetaminophen (TYLENOL) suppository 650 mg, 650 mg, Rectal, Q4H PRN, Poli Anderson IV, MD  •  acetaminophen (TYLENOL) tablet 650 mg, 650 mg, Oral, Q4H PRN, Poli Anderson IV, MD, 650 mg at 04/08/21 1042  •  ascorbic acid (VITAMIN C) tablet 1,000 mg, 1,000 mg, Oral, Daily, Poli Anderson IV, MD, 1,000 mg at 04/12/21 0915  •  atropine sulfate injection 0.5 mg, 0.5 mg, Intravenous, Q5 Min PRN, Poli Anderson IV, MD  •   azaTHIOprine (IMURAN) tablet 150 mg, 150 mg, Oral, Daily, Poli Anderson IV, MD, 150 mg at 04/12/21 0926  •  budesonide-formoterol (SYMBICORT) 160-4.5 MCG/ACT inhaler 2 puff, 2 puff, Inhalation, BID - RT, Bacilio Long, DO, 2 puff at 04/12/21 0936  •  butalbital-acetaminophen-caffeine (FIORICET, ESGIC) -40 MG per tablet 1 tablet, 1 tablet, Oral, Q4H PRN, Poli Anderson IV, MD, 1 tablet at 04/11/21 2227  •  calcitriol (ROCALTROL) capsule 0.5 mcg, 0.5 mcg, Oral, Q12H, Poli Anderson IV, MD, 0.5 mcg at 04/12/21 0923  •  calcium carbonate (oyster shell) tablet 500 mg, 500 mg, Oral, Daily, Poli Anderson IV, MD, 500 mg at 04/12/21 0924  •  cycloSPORINE (RESTASIS) 0.05 % ophthalmic emulsion 1 drop, 1 drop, Both Eyes, BID, Poli Anderson IV, MD, 1 drop at 04/11/21 2030  •  dexamethasone (DECADRON) injection 4 mg, 4 mg, Intravenous, Once, Poli Anderson IV, MD  •  dextrose (D50W) 25 g/ 50mL Intravenous Solution 25 g, 25 g, Intravenous, Once, Poli Anderson IV, MD, Stopped at 04/07/21 0216  •  dextrose (D50W) 25 g/ 50mL Intravenous Solution 25 g, 25 g, Intravenous, Q15 Min PRN, Poli Anderson IV, MD  •  dextrose (D50W) 25 g/ 50mL Intravenous Solution 50 mL, 50 mL, Intravenous, Once, Poli Anderson IV, MD  •  dextrose (GLUTOSE) oral gel 15 g, 15 g, Oral, Q15 Min PRN, Poli Anderson IV, MD, 15 g at 04/07/21 0738  •  fentaNYL citrate (PF) (SUBLIMAZE) 100 MCG/2ML injection  - ADS Override Pull, , , ,   •  fludrocortisone tablet 100 mcg, 100 mcg, Oral, Daily, Marisol Odonnell MD, 100 mcg at 04/12/21 0925  •  fluticasone (FLONASE) 50 MCG/ACT nasal spray 1 spray, 1 spray, Each Nare, Daily, Poli Anderson IV, MD, 1 spray at 04/07/21 1014  •  folic acid (FOLVITE) tablet 4 mg, 4 mg, Oral, Daily, Poli Anderson IV, MD, 4 mg at 04/12/21 0917  •  gabapentin (NEURONTIN) capsule 600 mg, 600  mg, Oral, Q8H, Poli Anderson IV, MD, 600 mg at 04/12/21 0632  •  glucagon (human recombinant) (GLUCAGEN DIAGNOSTIC) injection 1 mg, 1 mg, Subcutaneous, Q15 Min PRN, Poli Anderson IV, MD  •  heparin injection 500 Units, 500 Units, Intracatheter, Once, Alber Aggarwal MD  •  hydrocortisone (CORTEF) tablet 10 mg, 10 mg, Oral, Nightly, Poli Anderson IV, MD, 10 mg at 04/11/21 2030  •  hydrocortisone (CORTEF) tablet 20 mg, 20 mg, Oral, Daily With Breakfast, Poli Anderson IV, MD, 20 mg at 04/12/21 0924  •  HYDROmorphone (DILAUDID) injection 1 mg, 1 mg, Intravenous, Q3H PRN, 1 mg at 04/12/21 0114 **AND** naloxone (NARCAN) injection 0.4 mg, 0.4 mg, Intravenous, Q5 Min PRN, Marisol Odonnell MD  •  HYDROmorphone (DILAUDID) tablet 2 mg, 2 mg, Oral, TID, Lela Velazco APRN, 2 mg at 04/12/21 0913  •  HYDROmorphone (DILAUDID) tablet 2 mg, 2 mg, Oral, Q6H PRN, Lela Velazco APRN, 2 mg at 04/12/21 0633  •  levothyroxine (SYNTHROID, LEVOTHROID) tablet 75 mcg, 75 mcg, Oral, Q AM, Poli Anderson IV, MD, 75 mcg at 04/12/21 0632  •  magnesium oxide (MAG-OX) tablet 1,000 mg, 1,000 mg, Oral, TID, Poli Anderson IV, MD, 1,000 mg at 04/12/21 0916  •  magnesium sulfate 2g/50 mL (PREMIX) infusion, 2 g, Intravenous, Once, Poli Anderson IV, MD  •  melatonin tablet 5 mg, 5 mg, Oral, Nightly PRN, Christina Berry, APRN, 5 mg at 04/09/21 2341  •  methocarbamol (ROBAXIN) tablet 500 mg, 500 mg, Oral, TID, Poli Anderson IV, MD, 500 mg at 04/12/21 0922  •  methohexital (BREVITAL) 50 MG/5ML injection solution prefilled syringe  - ADS Override Pull, , , ,   •  midazolam (VERSED) 2 MG/2ML injection  - ADS Override Pull, , , ,   •  multivitamin with minerals 1 tablet, 1 tablet, Oral, Daily, Poli Anderson IV, MD, 1 tablet at 04/12/21 0915  •  nadolol (CORGARD) tablet 20 mg, 20 mg, Oral, Q12H, Poli Anderson IV, MD, 20 mg at  04/11/21 2031  •  pantoprazole (PROTONIX) EC tablet 40 mg, 40 mg, Oral, Daily, Poli Anderson IV, MD, 40 mg at 04/12/21 0917  •  Parathyroid Hormone (Recomb) cartridge 25 mcg **PATIENT SUPPLIED MED**, 25 mcg, Subcutaneous, BID, Poli Anderson IV, MD, 25 mcg at 04/12/21 0929  •  polyethylene glycol (MIRALAX) packet 17 g, 17 g, Oral, Daily, Lela Velazco APRN, 17 g at 04/12/21 0915  •  potassium chloride (KLOR-CON) packet 40 mEq, 40 mEq, Oral, TID, Poli Anderson IV, MD, 40 mEq at 04/12/21 0917  •  prochlorperazine (COMPAZINE) injection 5 mg, 5 mg, Intravenous, Q6H PRN, 5 mg at 04/11/21 1214 **OR** prochlorperazine (COMPAZINE) tablet 5 mg, 5 mg, Oral, Q6H PRN, 5 mg at 04/12/21 0940 **OR** prochlorperazine (COMPAZINE) suppository 25 mg, 25 mg, Rectal, Q12H PRN, Lui Velez MD  •  progesterone (PROMETRIUM) capsule 200 mg, 200 mg, Oral, Daily, Poli Anderson IV, MD, 200 mg at 04/12/21 0920  •  promethazine (PHENERGAN) suppository 25 mg, 25 mg, Rectal, Q8H PRN, Lela Velazco APRN  •  promethazine (PHENERGAN) tablet 25 mg, 25 mg, Oral, Q6H PRN, Poli Anderson IV, MD, 25 mg at 04/12/21 0633  •  sodium chloride 0.9 % flush 10 mL, 10 mL, Intravenous, PRN, Poli Anderson IV, MD  •  [COMPLETED] Insert peripheral IV, , , Once **AND** sodium chloride 0.9 % flush 10 mL, 10 mL, Intravenous, PRN, Poli Anderson IV, MD  •  sodium chloride 0.9 % flush 10 mL, 10 mL, Intravenous, Q12H, oPli Anderson IV, MD, 10 mL at 04/12/21 0932  •  sodium chloride 0.9 % flush 10 mL, 10 mL, Intravenous, PRN, Poli Anderson IV, MD  •  venlafaxine (EFFEXOR) tablet 75 mg, 75 mg, Oral, TID, Poli Anderson IV, MD, 75 mg at 04/12/21 0915  •  zinc sulfate (ZINCATE) capsule 220 mg, 220 mg, Oral, Daily, Poli Anderson IV, MD, 220 mg at 04/12/21 0917    Vital Sign Min/Max for last 24 hours  Temp  Min: 98.1 °F (36.7 °C)  Max: 98.4  "°F (36.9 °C)   BP  Min: 93/59  Max: 106/67   Pulse  Min: 56  Max: 79   Resp  Min: 16  Max: 18   SpO2  Min: 90 %  Max: 100 %   Flow (L/min)  Min: 2  Max: 3   No data recorded     Flowsheet Rows      First Filed Value   Admission Height  157.5 cm (62\") Documented at 04/06/2021 1823   Admission Weight  68 kg (150 lb) Documented at 04/06/2021 1823            Intake/Output Summary (Last 24 hours) at 4/12/2021 0954  Last data filed at 4/12/2021 0935  Gross per 24 hour   Intake 618 ml   Output --   Net 618 ml       Physical Exam:     General Appearance:    Alert, cooperative, in no acute distress   Lungs:     Clear to auscultation,respirations regular, even and                  unlabored    Heart:    Regular rhythm and normal rate, normal S1 and S2, no            murmur, no gallop, no rub, no click   Chest Wall:    No abnormalities observed   Abdomen:     Normal bowel sounds, no masses, no organomegaly, soft        non-tender, non-distended, no guarding, no rebound                tenderness   Extremities:   Moves all extremities well, no edema, no cyanosis, no             redness   Pulses:   Pulses palpable and equal bilaterally   Skin:   No bleeding, bruising or rash        Results Review:   Results from last 7 days   Lab Units 04/11/21  0916 04/09/21 1228 04/07/21  0602   WBC 10*3/mm3 11.87* 9.38 12.64*   HEMOGLOBIN g/dL 13.8 12.5 14.2   HEMATOCRIT % 42.9 39.4 44.9   PLATELETS 10*3/mm3 242 244 255     Results from last 7 days   Lab Units 04/11/21  0916 04/09/21  1228 04/07/21  0602   SODIUM mmol/L 141 138 135*   POTASSIUM mmol/L 4.3 4.3 4.6   CHLORIDE mmol/L 98 98 97*   CO2 mmol/L 35.0* 33.0* 23.0   BUN mg/dL 10 8 16   CREATININE mg/dL 0.60 0.66 0.75   GLUCOSE mg/dL 122* 157* 61*              Results from last 7 days   Lab Units 04/07/21  0602   TSH uIU/mL 1.700             Results from last 7 days   Lab Units 04/11/21  0916 04/08/21  0433 04/06/21  2131   CK TOTAL U/L 48  --   --    TROPONIN T ng/mL  --  <0.010 <0.010 "       Intake/Output Summary (Last 24 hours) at 4/12/2021 0954  Last data filed at 4/12/2021 0935  Gross per 24 hour   Intake 618 ml   Output --   Net 618 ml       I personally viewed and interpreted the patient's EKG/Telemetry data      Telemetry:  NSR  56-79    Ejection Fraction  No results found for: EF    Echo EF Estimated  Lab Results   Component Value Date    ECHOEFEST 55 04/08/2021       Present on Admission:  • Hypoparathyroidism (CMS/HCC)  • Polyglandular autoimmune syndrome, type 1 (CMS/HCC)  • Type 2 diabetes mellitus (CMS/HCC)  • Leukocytosis  • Syncope  • Hypoxia    Assessment/Plan   1) Hypoxia with recurrent syncope:  -See above for complete updated problem list of cardiovascular testing performed during this admission.   -continue with supplemental 02, Sleep with HOB elevated,   - Reviewed results at length with patient and family- will discuss admission at either the [facility] or [facility] with further eval with congenital heart specialist. We are working on arranging this.     2) Long QT syndrome syndrome: resume  nadolol when BP will allow and avoid QT prolonging medications.    3) Polyglandular autoimmune deficiency:  Follows as an outpatient with endocrinology Dr. Gaston  in [place]      Plan for disposition: Would like to have evaluation at a facility with a congential heart team prior to discharge.   Electronically signed by BEAR Michelle, 04/12/21, 10:35 AM EDT.

## 2021-04-12 NOTE — PROGRESS NOTES
"Neurology       Patient Care Team:  Agustin Turner MD as PCP - General (Internal Medicine)    Chief complaint syncope, muscle spasms      Subjective .     History:  Pt states that \"tetany\" MUST be treated with calcium, even if it is high, and that this was decided when she was evaluated at [facility].  Care everywhere notes reviewed. Pt seen by Dr. Hair Gutierrez at [facility] neurology in November 2017, where she had an episode of muscle spasm that was reported to be typical during her office visit.  It was noted that these episodes have previously occurred with hypocalcemia but were occurring more frequently at that point in the setting of normocalcemia.  After history and exam, he noted that she was having spells of strong muscle contraction, but had no evidence of dystonia, and did not recommend further work-up.  Patient had an episode yesterday witnessed by Dr. Aggarwal, and as calcium was high, patient was not treated with calcium.  She was offered baclofen but stated she could not take any oral meds at that time.  At that time, patient's mother stated that she had been told some of the episodes were \"somatic\" in nature, typically triggered by environmental cause such as blood pressure cuff.  This was also noted in Dr. Gutierrez's note.  Extensive notes, brain MRIs and laboratory tests reviewed in care everywhere, including additional notes by patient's primary neurologist, Dr. Nita Pelletier.    ROS: No fever, no new chest pain    Objective     Vital Signs   Blood pressure 102/64, pulse 56, temperature 98.2 °F (36.8 °C), temperature source Oral, resp. rate 16, height 160 cm (62.99\"), weight 67.6 kg (149 lb), SpO2 90 %.    Physical Exam:              Neuro: Well-developed young white woman in mild distress immediately after cardiology visit, tearful when I enter the room.  She is alert, fluent, with no dysarthria or dysphonia.  Pupils 3 mm and reactive EOMI face symmetric  Symmetric antigravity movement in extremities  No " current abnormal movement or abnormality of tone    Results Review:           4/11 labs:   CBC white count 11.8  of is unremarkable  BMP with glucose 122 CO2 35 calcium 11.2  CK yesterday was 48 as previously noted    Assessment/Plan     1.  Syncope-previous and current work-up not suggestive of epileptic events or underlying seizure disorder.  Cardiology evaluation continues.  Note plan for evaluation at [facility].    2.  Muscle spasms-not consistent with dystonia.  Not associated with hypocalcemia at this point.  Per patient's mother, these have been considered potentially somatic in nature in the past.  Description of events, in comparison to those described in the chart does not indicate a new problem.  Patient insists these must be treated with calcium, but discussed that this is potentially life-threatening depending on starting calcium levels.  Very strong concern for potential iatrogenic complications of treatment of symptoms that resolve without treatment.  Would NOT recommend treatment with calcium, or any medication that may cause iatrogenic complications.  3.  Migraines and neuropathic pain followed at [facility] per care everywhere.  Recommend patient continue to follow at a research center.  Neurology will sign off, please call if needed further this admission.      I discussed the patients findings and my recommendations with patient and family     More than 35 minutes spent delivering neurologic care, including obtaining additional history, patient examination, review of chart, formulation of plan, and discussion as above.      Elda Aldana MD  04/12/21  10:31 EDT

## 2021-04-12 NOTE — THERAPY EVALUATION
Acute Care - Speech Language Pathology Initial Evaluation  [facility]   Paradoxical Vocal Fold Movement (PVFM) Evaluation     Patient Name: Krista Richter  : 1990  MRN: [id number]  Today's Date: 2021               Admit Date: 2021     Visit Dx:    ICD-10-CM ICD-9-CM   1. Syncope, unspecified syncope type  R55 780.2   2. Chest pain, unspecified type  R07.9 786.50   3. Upper respiratory tract hypersensitivity reaction, site unspecified  J39.3 478.8     Patient Active Problem List   Diagnosis   • Esophageal dysphagia   • Gastroparesis   • Irritable bowel syndrome with constipation   • Dilatation of esophagus   • Epigastric pain   • Hypoxia   • Bronchitis   • Pleuritic chest pain   • Pneumonitis   • Premature ovarian failure   • Polyglandular autoimmune syndrome, type 1 (CMS/HCC)   • Type 2 diabetes mellitus (CMS/HCC)   • Hypoparathyroidism (CMS/HCC)   • Adrenal insufficiency (Eaton's disease) (CMS/HCC)   • Asplenia   • Dysphagia   • Leukocytosis   • Syncope     Past Medical History:   Diagnosis Date   • Adrenal insufficiency (CMS/HCC)    • Alopecia    • Anemia    • Anxiety    • APS type 1 (CMS/HCC)    • Asthma    • Autoimmune enteropathy    • Autoimmune hepatitis (CMS/HCC)    • Autoimmune urticaria    • Depression    • Dermatomyositis (CMS/HCC)    • Disease of thyroid gland    • Fibromyalgia    • Functional asplenia    • History of kidney stones    • Hypoparathyroidism (CMS/HCC)    • Insulin dependent diabetes mellitus    • Long Q-T syndrome    • Malabsorption    • Nephrocalcinosis    • Neuropathy     BLE   • Oxygen desaturation    • Pancreatic insufficiency    • Parathyroid abnormality (CMS/HCC)    • Polyglandular deficiency syndrome (CMS/HCC)    • Premature ovarian failure    • Sjogren's syndrome (CMS/HCC)    • Sleep apnea    • Spleen absent    • Tetany     HAS EPISODES   • Transfusion history      Past Surgical History:   Procedure Laterality Date   • APPENDECTOMY     • CARDIAC  CATHETERIZATION N/A 4/8/2021    Procedure: RIGHT AND LEFT HEART CATH;  Surgeon: Poli Anderson IV, MD;  Location:  [facility];  Service: Cardiovascular;  Laterality: N/A;   • CARDIAC SURGERY      LOOP RECORDER   • CHOLECYSTECTOMY     • COLONOSCOPY N/A 6/5/2020    Procedure: COLONOSCOPY;  Surgeon: Gavin Vargas MD;  Location:  [facility];  Service: Gastroenterology;  Laterality: N/A;   • ENDOSCOPY N/A 4/26/2018    Procedure: ESOPHAGOGASTRODUODENOSCOPY;  Surgeon: Gavin Vargas MD;  Location:  [facility];  Service: Gastroenterology   • ENDOSCOPY N/A 8/9/2018    Procedure: ESOPHAGOGASTRODUODENOSCOPY-DILATION;  Surgeon: Gavin Vargas MD;  Location:  [facility];  Service: Gastroenterology   • ENDOSCOPY     • ENDOSCOPY N/A 2/6/2019    Procedure: ESOPHAGOGASTRODUODENOSCOPY;  Surgeon: Gavin Vargas MD;  Location:  [facility];  Service: Gastroenterology   • ENDOSCOPY N/A 7/24/2019    Procedure: ESOPHAGOGASTRODUODENOSCOPY;  Surgeon: Gavin Vargas MD;  Location:  [facility];  Service: Gastroenterology   • ENDOSCOPY N/A 10/18/2019    Procedure: ESOPHAGOGASTRODUODENOSCOPY WITH BALLOON DILATION;  Surgeon: Gavin Vargas MD;  Location:  [facility];  Service: Gastroenterology   • ENDOSCOPY N/A 6/4/2020    Procedure: ESOPHAGOGASTRODUODENOSCOPY  WITH ESOPHAGEAL BALLOON DILATATION;  Surgeon: Gavin Vargas MD;  Location:  [facility];  Service: Gastroenterology;  Laterality: N/A;  dilation done with 60F dilator    • ENDOSCOPY N/A 1/29/2021    Procedure: ESOPHAGOGASTRODUODENOSCOPY;  Surgeon: Gavin Vargas MD;  Location:  [facility];  Service: Gastroenterology;  Laterality: N/A;   • EXPLORATORY LAPAROTOMY      MULTIPLE   • HAND SURGERY      4 TOTAL   • HERNIA REPAIR     • LIVER BIOPSY     • MUSCLE BIOPSY     • MUSCLE BIOPSY     • PEG TUBE INSERTION      • PEG TUBE REMOVAL     • PORTACATH PLACEMENT       Voice Assessment/Intervention   Document Type evaluation   Subjective Information   (pt frustrated)   Patient Observations alert;cooperative   Patient/Family/Caregiver Comments/Observations Fiance present.   Patient Effort fair   General Information   Patient Profile Reviewed yes   Pertinent History Of Current Problem Adm w/ c/o progressive SOA/syncope. Extensive medical hx including Sjogrens, fibromyalgia, FT from ages [age] yrs, esophageal stenosis s/p dilation, GERD, polyglandular deficiency syndrome, sleep apnea, prolonged QT syndrome, DM2, hyperparathyroid, anxiety, asthma, adrenal insufficiency. Neuro & pulmonology signed off. PFT/CTA/lung shunt studies unremarkable. Pulmonologist suspects PVFM, consulted ENT.    Precautions/Limitations, Vision WFL;for purposes of eval   Precautions/Limitations, Hearing WFL;for purposes of eval   Plans/Goals Discussed with patient;spouse/S.O.;agreed upon   Barriers to Rehab resistant to information  (pt does not agree w/ VCD/PVFM dx)   Patient's Goals for Discharge patient did not state   Family Goals for Discharge family did not state   Pain   Additional Documentation Pain Scale: FACES Pre/Post-Treatment (Group)   Pain Scale: FACES Pre/Post-Treatment   Pain: FACES Scale, Pretreatment 0-->no hurt   Posttreatment Pain Rating 0-->no hurt          VOICE ASSESSMENT (last 72 hours)      Voice Assessment     Row Name 04/12/21 1500                   Paradoxical Vocal Fold Movement History    Relevant Medical History  Patient has reflux;Patient is diagnosed with asthma;Patient has had pulmonary function testing;Patient has had chest x-ray/CT;History of ER Visits and/or Hospitalizations (see comments)  -AC        Reported Symptoms Characteristic of Paradoxical Vocal Fold Movement  Breathing problems during the day;Breathing problems at night;Chest tightness;Nothing that has been tried has helped breathing  -AC        Triggers  Other  (see comments) u/a to ID particular trigger, ? reflux  -AC        Symptoms  Shortness of breath  -AC        Effect/Function  interferes frequently  -AC           Vocal Hygiene    Pulmonary Status  Obstructive Sleep Apnea;Asthma  -AC           SLP Clinical Impression    Paradoxical Vocal Fold Movement Impression  Symptoms consistent with diagnosis of PVFM;Did not observe PVFM  -AC        SLP Voice Diagnosis Comments  Pt resistant to info- not convinced that sxs are r/t PVFM/VCD. Provided instruction and handouts re: PVFM, head/neck/shoulder stretches, and breathing techniques (slow exhale + quick sniff inhale, as well as diaphragmatic breathing). Pt reported that she is familiar w/ breathing/relaxation techniques from doing advanced yoga. Pt demonstrated breathing techniques w/ max cues.  -AC        Rehab Potential/Prognosis  fair  -AC        SLP Criteria for Skilled Therapy Intervention  yes  -AC        Functional Impact  restrictions in personal and social life  -AC           SLP Voice Recommendations    SLP Voice Therapy Frequency  PRN  -AC        Predicted Duration Therapy Intervention (Days)  until discharge  -AC        SLP Voice Recommended and Strategies  Reflux precautions  -AC        Anticipated Discharge Disposition  anticipated therapy at next level of care;home w/ OP services  -          User Key  (r) = Recorded By, (t) = Taken By, (c) = Cosigned By    Initials Name Effective Dates    AC Verona Taylor, MS CCC-SLP 07/27/17 -              EDUCATION  The patient has been educated in the following areas:     PVFM education.    SLP Recommendation and Plan      Some sxs/complaints c/w dx of PVFM. Did not observe PVFM episode during eval.   Will initiate tx on trial basis PRN, pending ENT findings & pt interest in participating.   Rec reflux precautions and use of stretches/breathing techniques discussed above.       Plan of Care Reviewed With: patient  Progress: no change      SLP GOALS     Row Name 04/12/21  1500             SLP VOICE GOALS    SLP PVFM SHORT TERM GOAL  Pt will demonstrate improved breathing pattern in order to reduce tension in the laryngeal area by using diaphragmatic breathing at rest/during phonation;Pt will demonstrate improved breathing pattern to reduce tension in laryngeal area by using slow exhalation;Pt will demonstrate improved breathing pattern to reduce tension in laryngeal area by using sniff inhalation  -AC      SLP PVFM LONG TERM GOAL  Pt will be able to participate in activities of daily living/physical activity without experiencing restricted breathing due to PVFM  -AC         PVFM Long Term Goals Participate without experiencing restricted breathing    Pt will be able to participate in activities of daily living/physical activity without experiencing restricted breathing due to PVFM  w/ 100% acc w/o cues.  -AC      Time Frame (PVFM Participate in activities of Daily Living)  long term goal (LTG)  -AC         PVFM Short Term Goal Improve breathing pattern    Pt will demonstrate improved breathing pattern in order to reduce tension in the laryngeal area by using diaphragmatic breathing at rest/during phonation  w/ 90% acc w/o cues.  -AC      Time Frame (PVFM I using diaphragmatic breathing at rest/during phonation)  short term goal (STG)  -AC         PVFM Short Term Goal Pt to improve breathing by using slow exhalation    Pt will demonstrate improved breathing pattern in order to reduce tension in the laryngeal area by using dslow exhalation  w/ 90% acc w/o cues.  -AC      Time Frame (PVFM I using slow exhalation)  short term goal (STG)  -AC         PVFM Short Term Goal Pt to improve breathing by using sniff inhalation    Pt will demonstrate improved breathing pattern in order to reduce tension in the laryngeal area by using sniff inhaltion  w/ 90% acc w/o cues.  -AC      Time Frame (PVFM I using sniff inhalation)  short term goal (STG)  -AC        User Key  (r) = Recorded By, (t) =  Taken By, (c) = Cosigned By    Initials Name Provider Type    Verona Sr MS CCC-SLP Speech and Language Pathologist            Time Calculation:     Time Calculation- SLP     Row Name 04/12/21 1645             Time Calculation- SLP    SLP Start Time  1500  -      SLP Received On  04/12/21  -        User Key  (r) = Recorded By, (t) = Taken By, (c) = Cosigned By    Initials Name Provider Type    Verona Sr MS CCC-SLP Speech and Language Pathologist          Therapy Charges for Today     Code Description Service Date Service Provider Modifiers Qty    [id number] [facility]ONC 4 4/12/2021 Verona Taylor MS CCC-SLP GN 1        Patient was not wearing a face mask and did exhibit coughing during this therapy encounter.  Procedure performed was aerosolizing, involved close contact (within 6 feet for at least 15 minutes or longer), and did not involve contact with infectious secretions or specimens.  Therapist used appropriate personal protective equipment including gloves, standard procedure mask and eye protection.  Appropriate PPE was worn during the entire therapy session.  Hand hygiene was completed before and after therapy session.                MS JAZMINE Tenorio  4/12/2021

## 2021-04-12 NOTE — PROGRESS NOTES
Clinical Nutrition     Reason for Visit: Follow-up protocol    Patient Name: Krista Richter  YOB: 1990  MRN: [id number]  Date of Encounter: 04/12/21 13:26 EDT  Admission date: 4/6/2021    Nutrition Assessment   Admission Problem List:  Syncope  CP  Hypoxia  Polyglandular Autoimmune Syndrome    s/p heart cath, BAO 4-8-21    PMH:   She  has a past medical history of Adrenal insufficiency (CMS/HCC), Alopecia, Anemia, Anxiety, APS type 1 (CMS/HCC), Asthma, Autoimmune enteropathy, Autoimmune hepatitis (CMS/HCC), Autoimmune urticaria, Depression, Dermatomyositis (CMS/HCC), Disease of thyroid gland, Fibromyalgia, Functional asplenia, History of kidney stones, Hypoparathyroidism (CMS/HCC), Insulin dependent diabetes mellitus, Long Q-T syndrome, Malabsorption, Nephrocalcinosis, Neuropathy, Oxygen desaturation, Pancreatic insufficiency, Parathyroid abnormality (CMS/HCC), Polyglandular deficiency syndrome (CMS/HCC), Premature ovarian failure, Sjogren's syndrome (CMS/HCC), Sleep apnea, Spleen absent, Tetany, and Transfusion history.  PSxH:  She  has a past surgical history that includes Portacath placement; Hernia repair; Muscle biopsy; Cholecystectomy; Esophagogastroduodenoscopy (N/A, 4/26/2018); Cardiac surgery; Esophagogastroduodenoscopy (N/A, 8/9/2018); Hand surgery; Liver biopsy; Peg Tube Insertion; PEG tube removal; Appendectomy; Exploratory laparotomy; Muscle biopsy; Esophagogastroduodenoscopy; Esophagogastroduodenoscopy (N/A, 2/6/2019); Esophagogastroduodenoscopy (N/A, 7/24/2019); Esophagogastroduodenoscopy (N/A, 10/18/2019); Esophagogastroduodenoscopy (N/A, 6/4/2020); Colonoscopy (N/A, 6/5/2020); Esophagogastroduodenoscopy (N/A, 1/29/2021); and Cardiac catheterization (N/A, 4/8/2021).      Reported/Observed/Food/Nutrition Related History:     4/12) Spoke with patient at bedside. Patient states she does not have a desire to eat. Has not been drinking ONS. Patient denies PO  preferences.     4/6) Pt resting in bed, on nasal cannula, c/o pain of 6, which she says is not being controlled, concerned regarding her hypoxia, states she has not been eating well at home, has lost ~20lb's over past 3 weeks, has had N/V/D, states her weight tends to fluctuate depending on her medical status, she had a feeding tube as a child from 1 years old  to the age of 13, states she is able to tolerate regular foods      Anthropometrics   Height: 62in  Weight: 149lb bedscale  BMI: 27  BMI classification: Overweight: 25.0-29.9kg/m2        Per previous RD assessment:   Weight change: Reported 20lb loss over 3 weeks    Per EMR: 18lb loss since last June    Last 15 Recorded Weights  View Complete Flowsheet  Weight Weight (kg) Weight (lbs) Weight Method   4/8/2021 68 kg  149 lb 14.6 oz  Bed scale   4/8/2021 68 kg 149 lb 14.6 oz -   4/7/2021 68 kg 149 lb 14.6 oz -   4/6/2021 68.04 kg 150 lb Stated   4/2/2021 75.297 kg 166 lb -   2/25/2021 75.297 kg 166 lb Stated   1/9/2021 72.576 kg 160 lb Stated   9/17/2020 72.576 kg 160 lb -   8/31/2020 69.854 kg 154 lb -   6/3/2020 75.932 kg 167 lb 6.4 oz Bed scale   3/9/2020 78.472 kg 173 lb -   1/15/2020 77.111 kg 170 lb Stated   11/28/2019 78.472 kg 173 lb Stated       Labs reviewed     Results from last 7 days   Lab Units 04/12/21  0829   SODIUM mmol/L 138   POTASSIUM mmol/L 5.4*   CHLORIDE mmol/L 97*   CO2 mmol/L 36.0*   BUN mg/dL 11   CREATININE mg/dL 0.65   CALCIUM mg/dL 11.0*   BILIRUBIN mg/dL 0.5   ALK PHOS U/L 83   ALT (SGPT) U/L 18   AST (SGOT) U/L 32   GLUCOSE mg/dL 106*       Results from last 7 days   Lab Units 04/12/21  1116 04/12/21  0705 04/11/21  1934 04/11/21  0702 04/10/21  1952 04/10/21  1630   GLUCOSE mg/dL 111 123 76 119 125 149*       Medications reviewed   Pertinent:vitamin C. Imuran, calcitriol, Ca++ carbonate, steroids, folate, MVI, protonix, zinc, decadron    Intake/Ouptut 24 hrs (7:00AM - 6:59 AM)     Intake & Output (last day)       04/11 0701 -  04/12 0700 04/12 0701 - 04/13 0700    P.O.  618    Total Intake(mL/kg)  618 (9.1)    Net  +618          Urine Unmeasured Occurrence 1 x           Current Nutrition Prescription     PO: Diet Regular    Intake: Insufficient data    Nutrition Diagnosis   Date: 4-8-21  Problem Unintended weight loss   Etiology Per Clinical Status   Signs/Symptoms Reported 20lb wt loss     Nutrition Intervention   1.  Follow treatment progress, Care plan reviewed, Interview for preferences, Supplement offered/refused, denies PO preferences      Goal:   General: Nutrition support treatment  PO: Establish PO, Increase intake    Monitoring/Evaluation:   Per protocol    Monica Holloway RD  Time Spent:30min

## 2021-04-12 NOTE — PROGRESS NOTES
[facility] Medicine Services  PROGRESS NOTE    Patient Name: Krista Richter  : 1990  MRN: [id number]    Date of Admission: 2021  Primary Care Physician: Agustin Turner MD    Subjective   Subjective     CC:  Syncope/spells/hypoxia    HPI:  Hopeful to go home soon. Still suffering from musculoskeletal pain. Has dog in room that helps with her mood, but doesn't relieve the pain. States she has suffered from tetany events since she was a young girl. Has followed with pain management in the past, but not currently.  Receives IV calcium at home whenever she has a bout of tetany.     ROS  Gen- No fevers, chills  CV- No chest pain, palpitations  Resp- No cough, dyspnea  GI- No N/V/D, abd pain        Objective   Objective     Vital Signs:   Temp:  [98.1 °F (36.7 °C)-98.8 °F (37.1 °C)] 98.8 °F (37.1 °C)  Heart Rate:  [56-89] 74  Resp:  [16-18] 18  BP: ()/(59-86) 120/86        Physical Exam:  Constitutional - no acute distress, nontoxic, sitting up in bed  HEENT-NCAT, mucous membranes moist  CV-RRR, S1 S2 normal, no m/r/g  Resp-CTAB, no wheezes, rhonchi or rales  Abd-soft, nontender, nondistended, normoactive bowel sounds  Ext-No lower extremity cyanosis, clubbing or edema bilaterally  Neuro-alert and oriented, speech clear, moves all extremities   Psych-normal affect, tearful at times   Skin- No rash on exposed UE or LE bilaterally      Results Reviewed:  Results from last 7 days   Lab Units 21  0829 21  0916 21  1228   WBC 10*3/mm3 9.91 11.87* 9.38   HEMOGLOBIN g/dL 14.5 13.8 12.5   HEMATOCRIT % 45.5 42.9 39.4   PLATELETS 10*3/mm3 271 242 244     Results from last 7 days   Lab Units 21  0829 21  0916 21  1228 21  0433 21  2131   SODIUM mmol/L 138 141 138  --  137   POTASSIUM mmol/L 5.4* 4.3 4.3  --  5.0   CHLORIDE mmol/L 97* 98 98  --  95*   CO2 mmol/L 36.0* 35.0* 33.0*  --  28.0   BUN mg/dL 11 10 8  --  13   CREATININE mg/dL  0.65 0.60 0.66  --  0.71   GLUCOSE mg/dL 106* 122* 157*  --  69   CALCIUM mg/dL 11.0* 11.2* 8.7  --  9.7   ALT (SGPT) U/L 18 13 13  --  18   AST (SGOT) U/L 32 23 20  --  38*   TROPONIN T ng/mL  --   --   --  <0.010 <0.010     Estimated Creatinine Clearance: 115.8 mL/min (by C-G formula based on SCr of 0.65 mg/dL).    Microbiology Results Abnormal     Procedure Component Value - Date/Time    COVID PRE-OP / PRE-PROCEDURE SCREENING ORDER (NO ISOLATION) - Swab, Nasopharynx [[id number]]  (Normal) Collected: 04/07/21 0516    Lab Status: Final result Specimen: Swab from Nasopharynx Updated: 04/07/21 0637    Narrative:      The following orders were created for panel order COVID PRE-OP / PRE-PROCEDURE SCREENING ORDER (NO ISOLATION) - Swab, Nasopharynx.  Procedure                               Abnormality         Status                     ---------                               -----------         ------                     COVID-19 and FLU A/B PCR...[[id number]]  Normal              Final result                 Please view results for these tests on the individual orders.    COVID-19 and FLU A/B PCR - Swab, Nasopharynx [[id number]]  (Normal) Collected: 04/07/21 0516    Lab Status: Final result Specimen: Swab from Nasopharynx Updated: 04/07/21 0637     COVID19 Not Detected     Influenza A PCR Not Detected     Influenza B PCR Not Detected    Narrative:      Fact sheet for providers: https://www.fda.gov/media/557469/download    Fact sheet for patients: https://www.fda.gov/media/555075/download    Test performed by PCR.          Imaging Results (Last 24 Hours)     ** No results found for the last 24 hours. **          Results for orders placed during the hospital encounter of 04/06/21    Adult Transthoracic Echo Limited W/ Cont if Necessary Per Protocol    Interpretation Summary  · This study was done to evaluate a patient who develops arterial oxygen desaturation in the supine position.  · I was present during the study and  supervised the procedure(s).  · The patient was receiving oxygen by nasal prongs at 2L/min throughout the period of evaluation.  · In a sitting position, the patients arterial saturation is 97% and an agitated saline study reveals no evidence of R>L shunting.  · When the patient assumes a supine position, saturation falls to 70% within one minute and no evidence of R>L shunting is noted.  · When the patient assumes a supine position and carries out a Valsalva maneuver, saturation falls to 70% within a minute with no evidence of R>L shunting.  · Bouts of positional oxygen desaturation resolve within one minute on assuming a sitting position.  · NO evidence of intracardiac or pulmonary shunting is noted during this study despite the occurence of rapid and profound arterial desaturation when the patient assumes a supine position.      I have reviewed the medications:  Scheduled Meds:Pharmacy Consult, , Does not apply, Daily  ascorbic acid, 1,000 mg, Oral, Daily  azaTHIOprine, 150 mg, Oral, Daily  budesonide-formoterol, 2 puff, Inhalation, BID - RT  calcitriol, 0.5 mcg, Oral, Q12H  calcium carbonate (oyster shell), 500 mg, Oral, Daily  cycloSPORINE, 1 drop, Both Eyes, BID  dexamethasone, 4 mg, Intravenous, Once  dextrose, 25 g, Intravenous, Once  dextrose, 50 mL, Intravenous, Once  fentaNYL citrate (PF), , ,   fludrocortisone, 100 mcg, Oral, Daily  fluticasone, 1 spray, Each Nare, Daily  folic acid, 4 mg, Oral, Daily  gabapentin, 600 mg, Oral, Q8H  heparin, 500 Units, Intracatheter, Once  hydrocortisone, 10 mg, Oral, Nightly  hydrocortisone, 20 mg, Oral, Daily With Breakfast  levothyroxine, 75 mcg, Oral, Q AM  magnesium oxide, 1,000 mg, Oral, TID  magnesium sulfate, 2 g, Intravenous, Once  methocarbamol, 500 mg, Oral, TID  methohexital, , ,   midazolam, , ,   multivitamin with minerals, 1 tablet, Oral, Daily  nadolol, 20 mg, Oral, Q12H  pantoprazole, 40 mg, Oral, Daily  Parathyroid Hormone (Recomb), 25 mcg,  Subcutaneous, BID  polyethylene glycol, 17 g, Oral, Daily  potassium chloride, 40 mEq, Oral, TID  progesterone, 200 mg, Oral, Daily  sodium chloride, 10 mL, Intravenous, Q12H  venlafaxine, 75 mg, Oral, TID  zinc sulfate, 220 mg, Oral, Daily      Continuous Infusions:   PRN Meds:.acetaminophen **OR** acetaminophen **OR** acetaminophen  •  acetaminophen  •  atropine  •  butalbital-acetaminophen-caffeine  •  dextrose  •  dextrose  •  glucagon (human recombinant)  •  HYDROmorphone  •  ibuprofen  •  melatonin  •  [DISCONTINUED] HYDROmorphone **AND** naloxone  •  prochlorperazine **OR** prochlorperazine **OR** prochlorperazine  •  promethazine  •  promethazine  •  sodium chloride  •  [COMPLETED] Insert peripheral IV **AND** sodium chloride  •  sodium chloride    Assessment/Plan   Assessment & Plan     Active Hospital Problems    Diagnosis  POA   • **Syncope [R55]  Yes   • Leukocytosis [D72.829]  Yes   • Hypoparathyroidism (CMS/HCC) [E20.9]  Yes   • Polyglandular autoimmune syndrome, 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        Brief Hospital Course to date:  Krista Richter is a  30 y.o. female with PMH significant for polyglandular deficiency syndrome, sleep apnea, prolonged QT syndrome, DM 2, hyperparathyroid, anxiety, asthma, adrenal insufficiency who presents to the ED with complaint of progressive shortness of breath and syncope.    This patient's problems and plans were partially entered by my partner and updated as appropriate by me 04/12/21.         Syncope  Chest pain, ongoing  Hypoxia  --CTA negative  --Duplex and TTE reviewed   --discussed evaluation plan with Pulmonary Dr Niño, Cardiology Dr Juarez and with EP Dr Meyer. Bubble study by Dr Juarez did not show a PFO however the patient's oxygen saturations dropped when laying flat. No shunt observed on CT abdomen.  Consider vocal cord dysfunction, ENT consultation placed. Cardiology and EP would like further evaluation at a higher level of care such as the [facility] or [facility]. Records faxed to the [facility] today, await response -- continued hospitalization for clinical monitoring recommended at present.  Strongly urged patient to remain hospitalized until an acceptable evaluation and treatment plan is in place.    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 Alek Gillis  -Continue fludrocortisone  -Continue hydrocortisone, she notes that she normally requires solucortef while in the hospital due to stress   -Continue levothyroxine  --discussed elevated PTH level with patient/mother (she is on parathyroid hormone replacement) -- they say patient should have an appointment with Dr Gaston in the near future.  Asked family to have his office call medical records so we can forward results to him in anticipation of this clinic visit.     Insomnia  - patient asks about home lunesta -- discussed with patient and mother at bedside that as patient currently receiving narcotics, would prefer not to provide lunesta at this time to maintain patient safety    Acute on chronic pain  - palliative evaluation, long term opiates not in patient's best interest, but I did suggest outpatient followup with pain management, recommended Dr Carbajal to family.    Muscle spasms  - patient seen by Neurology, felt not consistent with dystonia. No observed association with hypocalcemia noted. These events appear to be a chronic issue. Patient/family have been treating at home with IV  calcium infusions, however calcium level here high -- any further calcium infusions would risk serious complications including calciphylaxis or arrhythmias. Discussed in detail reasoning for not giving IV calcium and the dangers involved with patient and family    Syncope  - patient seen by neurology Dr Aldana, workup including EEG and MRI brain not suggestive of epileptic events or seizure disorder. Cardiac evaluation continues    Migraines  - continue followup with the [facility].        DVT prophylaxis: Mechanical      Disposition: I expect the patient to be discharged TBD    With patient permission, discussed the above in detain with patient's mother, including Cardiology's recommendation to remain hospitalized until an acceptable plan for further evaluation is in place.    More than 50% of time spent counseling on current illness and plan of care. Case discussed with: patient at bedside with mother on phone regarding ongoing plans for further evaluation of patient hypoxia.Total time spent face to face with the patient was 25 minutes.  Total time of the encounter was 45 minutes.        CODE STATUS:   Code Status and Medical Interventions:   Ordered at: 04/07/21 0443     Level Of Support Discussed With:    Patient     Code Status:    CPR     Medical Interventions (Level of Support Prior to Arrest):    Full       Alber Aggarwal MD  04/12/21

## 2021-04-13 LAB
ALBUMIN SERPL-MCNC: 4.5 G/DL (ref 3.5–5.2)
ALBUMIN SERPL-MCNC: 4.7 G/DL (ref 3.5–5.2)
ALBUMIN/GLOB SERPL: 1.4 G/DL
ALBUMIN/GLOB SERPL: 1.6 G/DL
ALP SERPL-CCNC: 101 U/L (ref 39–117)
ALP SERPL-CCNC: 89 U/L (ref 39–117)
ALT SERPL W P-5'-P-CCNC: 16 U/L (ref 1–33)
ALT SERPL W P-5'-P-CCNC: 17 U/L (ref 1–33)
ANION GAP SERPL CALCULATED.3IONS-SCNC: 6 MMOL/L (ref 5–15)
ANION GAP SERPL CALCULATED.3IONS-SCNC: 6 MMOL/L (ref 5–15)
AST SERPL-CCNC: 27 U/L (ref 1–32)
AST SERPL-CCNC: 27 U/L (ref 1–32)
BILIRUB SERPL-MCNC: 0.3 MG/DL (ref 0–1.2)
BILIRUB SERPL-MCNC: 0.4 MG/DL (ref 0–1.2)
BUN SERPL-MCNC: 15 MG/DL (ref 6–20)
BUN SERPL-MCNC: 18 MG/DL (ref 6–20)
BUN/CREAT SERPL: 24.2 (ref 7–25)
BUN/CREAT SERPL: 27.7 (ref 7–25)
CA-I SERPL ISE-MCNC: 1.73 MMOL/L (ref 1.12–1.32)
CA-I SERPL ISE-MCNC: 1.9 MMOL/L (ref 1.12–1.32)
CALCIUM SPEC-SCNC: 11.8 MG/DL (ref 8.6–10.5)
CALCIUM SPEC-SCNC: 13.5 MG/DL (ref 8.6–10.5)
CHLORIDE SERPL-SCNC: 94 MMOL/L (ref 98–107)
CHLORIDE SERPL-SCNC: 97 MMOL/L (ref 98–107)
CO2 SERPL-SCNC: 32 MMOL/L (ref 22–29)
CO2 SERPL-SCNC: 36 MMOL/L (ref 22–29)
CREAT SERPL-MCNC: 0.62 MG/DL (ref 0.57–1)
CREAT SERPL-MCNC: 0.65 MG/DL (ref 0.57–1)
GFR SERPL CREATININE-BSD FRML MDRD: 106 ML/MIN/1.73
GFR SERPL CREATININE-BSD FRML MDRD: 112 ML/MIN/1.73
GLOBULIN UR ELPH-MCNC: 3 GM/DL
GLOBULIN UR ELPH-MCNC: 3.2 GM/DL
GLUCOSE BLDC GLUCOMTR-MCNC: 100 MG/DL (ref 70–130)
GLUCOSE BLDC GLUCOMTR-MCNC: 131 MG/DL (ref 70–130)
GLUCOSE BLDC GLUCOMTR-MCNC: 89 MG/DL (ref 70–130)
GLUCOSE BLDC GLUCOMTR-MCNC: 96 MG/DL (ref 70–130)
GLUCOSE SERPL-MCNC: 112 MG/DL (ref 65–99)
GLUCOSE SERPL-MCNC: 96 MG/DL (ref 65–99)
POTASSIUM SERPL-SCNC: 5.2 MMOL/L (ref 3.5–5.2)
POTASSIUM SERPL-SCNC: 5.6 MMOL/L (ref 3.5–5.2)
POTASSIUM SERPL-SCNC: 6.4 MMOL/L (ref 3.5–5.2)
PROT SERPL-MCNC: 7.7 G/DL (ref 6–8.5)
PROT SERPL-MCNC: 7.7 G/DL (ref 6–8.5)
SODIUM SERPL-SCNC: 135 MMOL/L (ref 136–145)
SODIUM SERPL-SCNC: 136 MMOL/L (ref 136–145)

## 2021-04-13 PROCEDURE — 82330 ASSAY OF CALCIUM: CPT | Performed by: INTERNAL MEDICINE

## 2021-04-13 PROCEDURE — 99232 SBSQ HOSP IP/OBS MODERATE 35: CPT | Performed by: NURSE PRACTITIONER

## 2021-04-13 PROCEDURE — 94799 UNLISTED PULMONARY SVC/PX: CPT

## 2021-04-13 PROCEDURE — 80053 COMPREHEN METABOLIC PANEL: CPT | Performed by: INTERNAL MEDICINE

## 2021-04-13 PROCEDURE — 84132 ASSAY OF SERUM POTASSIUM: CPT | Performed by: INTERNAL MEDICINE

## 2021-04-13 PROCEDURE — 63710000001 PROMETHAZINE PER 25 MG: Performed by: INTERNAL MEDICINE

## 2021-04-13 PROCEDURE — 82962 GLUCOSE BLOOD TEST: CPT

## 2021-04-13 PROCEDURE — 63710000001 AZATHIOPRINE PER 50 MG: Performed by: INTERNAL MEDICINE

## 2021-04-13 PROCEDURE — 94660 CPAP INITIATION&MGMT: CPT

## 2021-04-13 PROCEDURE — 25010000002 HYDROCORTISONE SODIUM SUCCINATE 100 MG RECONSTITUTED SOLUTION: Performed by: INTERNAL MEDICINE

## 2021-04-13 PROCEDURE — 93005 ELECTROCARDIOGRAM TRACING: CPT | Performed by: INTERNAL MEDICINE

## 2021-04-13 PROCEDURE — 25010000002 PROCHLORPERAZINE 10 MG/2ML SOLUTION: Performed by: HOSPITALIST

## 2021-04-13 RX ORDER — BUDESONIDE AND FORMOTEROL FUMARATE DIHYDRATE 160; 4.5 UG/1; UG/1
2 AEROSOL RESPIRATORY (INHALATION)
Qty: 1 EACH | Refills: 0 | Status: SHIPPED | OUTPATIENT
Start: 2021-04-13

## 2021-04-13 RX ORDER — HYDROMORPHONE HYDROCHLORIDE 2 MG/1
1 TABLET ORAL EVERY 6 HOURS PRN
Start: 2021-04-13 | End: 2021-04-14 | Stop reason: HOSPADM

## 2021-04-13 RX ORDER — HEPARIN SODIUM (PORCINE) LOCK FLUSH IV SOLN 100 UNIT/ML 100 UNIT/ML
500 SOLUTION INTRAVENOUS ONCE
Status: DISCONTINUED | OUTPATIENT
Start: 2021-04-13 | End: 2021-04-14

## 2021-04-13 RX ORDER — SODIUM CHLORIDE 9 MG/ML
100 INJECTION, SOLUTION INTRAVENOUS CONTINUOUS
Status: DISCONTINUED | OUTPATIENT
Start: 2021-04-13 | End: 2021-04-14 | Stop reason: HOSPADM

## 2021-04-13 RX ORDER — HYDROMORPHONE HYDROCHLORIDE 2 MG/1
1 TABLET ORAL EVERY 6 HOURS PRN
Status: DISCONTINUED | OUTPATIENT
Start: 2021-04-13 | End: 2021-04-14 | Stop reason: HOSPADM

## 2021-04-13 RX ORDER — SODIUM CHLORIDE 9 MG/ML
100 INJECTION, SOLUTION INTRAVENOUS CONTINUOUS
Start: 2021-04-13

## 2021-04-13 RX ADMIN — BUDESONIDE AND FORMOTEROL FUMARATE DIHYDRATE 2 PUFF: 160; 4.5 AEROSOL RESPIRATORY (INHALATION) at 09:37

## 2021-04-13 RX ADMIN — HYDROMORPHONE HYDROCHLORIDE 1 MG: 2 TABLET ORAL at 14:58

## 2021-04-13 RX ADMIN — BUTALBITAL, ACETAMINOPHEN AND CAFFEINE 1 TABLET: 50; 325; 40 TABLET ORAL at 22:03

## 2021-04-13 RX ADMIN — Medication 1000 MG: at 10:01

## 2021-04-13 RX ADMIN — PROGESTERONE 200 MG: 100 CAPSULE ORAL at 10:07

## 2021-04-13 RX ADMIN — SODIUM CHLORIDE 100 ML/HR: 9 INJECTION, SOLUTION INTRAVENOUS at 22:16

## 2021-04-13 RX ADMIN — POLYETHYLENE GLYCOL 3350 17 G: 17 POWDER, FOR SOLUTION ORAL at 10:00

## 2021-04-13 RX ADMIN — FLUTICASONE PROPIONATE 1 SPRAY: 50 SPRAY, METERED NASAL at 10:18

## 2021-04-13 RX ADMIN — CYCLOSPORINE 1 DROP: 0.5 EMULSION OPHTHALMIC at 10:17

## 2021-04-13 RX ADMIN — Medication 1000 MG: at 21:54

## 2021-04-13 RX ADMIN — VENLAFAXINE 75 MG: 75 TABLET ORAL at 21:55

## 2021-04-13 RX ADMIN — PROMETHAZINE HYDROCHLORIDE 25 MG: 25 TABLET ORAL at 15:00

## 2021-04-13 RX ADMIN — HYDROCORTISONE SODIUM SUCCINATE 50 MG: 100 INJECTION, POWDER, FOR SOLUTION INTRAMUSCULAR; INTRAVENOUS at 21:56

## 2021-04-13 RX ADMIN — Medication 1000 MG: at 15:22

## 2021-04-13 RX ADMIN — NADOLOL 20 MG: 20 TABLET ORAL at 21:55

## 2021-04-13 RX ADMIN — PARATHYROID HORMONE 25 MCG: 25 INJECTION, POWDER, LYOPHILIZED, FOR SOLUTION SUBCUTANEOUS at 10:21

## 2021-04-13 RX ADMIN — PROCHLORPERAZINE EDISYLATE 5 MG: 5 INJECTION INTRAMUSCULAR; INTRAVENOUS at 22:03

## 2021-04-13 RX ADMIN — HYDROCORTISONE SODIUM SUCCINATE 50 MG: 100 INJECTION, POWDER, FOR SOLUTION INTRAMUSCULAR; INTRAVENOUS at 14:45

## 2021-04-13 RX ADMIN — METHOCARBAMOL 500 MG: 500 TABLET, FILM COATED ORAL at 15:23

## 2021-04-13 RX ADMIN — CYCLOSPORINE 1 DROP: 0.5 EMULSION OPHTHALMIC at 21:56

## 2021-04-13 RX ADMIN — SODIUM CHLORIDE, PRESERVATIVE FREE 10 ML: 5 INJECTION INTRAVENOUS at 10:22

## 2021-04-13 RX ADMIN — HYDROCORTISONE 20 MG: 20 TABLET ORAL at 10:12

## 2021-04-13 RX ADMIN — GABAPENTIN 600 MG: 300 CAPSULE ORAL at 14:45

## 2021-04-13 RX ADMIN — SODIUM CHLORIDE, PRESERVATIVE FREE 10 ML: 5 INJECTION INTRAVENOUS at 22:03

## 2021-04-13 RX ADMIN — PANTOPRAZOLE SODIUM 40 MG: 40 TABLET, DELAYED RELEASE ORAL at 10:00

## 2021-04-13 RX ADMIN — Medication 1 TABLET: at 10:02

## 2021-04-13 RX ADMIN — BUTALBITAL, ACETAMINOPHEN AND CAFFEINE 1 TABLET: 50; 325; 40 TABLET ORAL at 14:59

## 2021-04-13 RX ADMIN — OXYCODONE HYDROCHLORIDE AND ACETAMINOPHEN 1000 MG: 500 TABLET ORAL at 10:01

## 2021-04-13 RX ADMIN — VENLAFAXINE 75 MG: 75 TABLET ORAL at 15:22

## 2021-04-13 RX ADMIN — VENLAFAXINE 75 MG: 75 TABLET ORAL at 10:31

## 2021-04-13 RX ADMIN — CALCIUM 500 MG: 500 TABLET ORAL at 10:11

## 2021-04-13 RX ADMIN — BUDESONIDE AND FORMOTEROL FUMARATE DIHYDRATE 2 PUFF: 160; 4.5 AEROSOL RESPIRATORY (INHALATION) at 20:13

## 2021-04-13 RX ADMIN — GABAPENTIN 600 MG: 300 CAPSULE ORAL at 10:00

## 2021-04-13 RX ADMIN — FOLIC ACID 4 MG: 1 TABLET ORAL at 10:00

## 2021-04-13 RX ADMIN — GABAPENTIN 600 MG: 300 CAPSULE ORAL at 21:55

## 2021-04-13 RX ADMIN — HYDROMORPHONE HYDROCHLORIDE 1 MG: 2 TABLET ORAL at 22:02

## 2021-04-13 RX ADMIN — METHOCARBAMOL 500 MG: 500 TABLET, FILM COATED ORAL at 10:11

## 2021-04-13 RX ADMIN — NADOLOL 20 MG: 20 TABLET ORAL at 10:09

## 2021-04-13 RX ADMIN — POTASSIUM CHLORIDE 40 MEQ: 1.5 FOR SOLUTION ORAL at 15:23

## 2021-04-13 RX ADMIN — ZINC SULFATE 220 MG (50 MG) CAPSULE 220 MG: CAPSULE at 10:31

## 2021-04-13 RX ADMIN — CALCITRIOL CAPSULES 0.25 MCG 0.5 MCG: 0.25 CAPSULE ORAL at 10:07

## 2021-04-13 RX ADMIN — AZATHIOPRINE 150 MG: 50 TABLET ORAL at 10:13

## 2021-04-13 RX ADMIN — SODIUM CHLORIDE 100 ML/HR: 9 INJECTION, SOLUTION INTRAVENOUS at 11:22

## 2021-04-13 RX ADMIN — POTASSIUM CHLORIDE 40 MEQ: 1.5 FOR SOLUTION ORAL at 10:02

## 2021-04-13 RX ADMIN — FLUDROCORTISONE ACETATE 100 MCG: 0.1 TABLET ORAL at 10:08

## 2021-04-13 RX ADMIN — METHOCARBAMOL 500 MG: 500 TABLET, FILM COATED ORAL at 21:55

## 2021-04-13 RX ADMIN — LEVOTHYROXINE SODIUM 75 MCG: 75 TABLET ORAL at 10:02

## 2021-04-13 NOTE — NURSING NOTE
"Approached by primary RN regarding patent's dissatisfaction with medication regimen. Pt's IV Dilauded recently d/c'd by palliative team, and her Lunesta wasn't restarted this admission. RN contacted Dr. Burton about these issues, who ordered atarax for patient this evening. Pt refused atarax. I spoke with the patient at length about her medications. I discussed with her that from looking at physician's notes, it appears that her Lunesta and any other sedating meds were held due to her respiratory status and syncopal episodes. Patient states that she got adequate pain control when she had the IV Dilauded and PO Dilauded in combination. When I asked the patient about any other medications we could potentially try for pain control, patient stated \"PO meds don't absorb in my system normally, and my liver only lets certain medications work on me\". Patient only wanting to receive IV Dilauded. I contacted Dr. Burton about this issue and if there was any pain control compromise we would come to. Dr. Burton stated she is not the patient's primary provider, and she is not going to restart something that the day team discontinued, especially when she is here with syncope. She stated the patient has PRN PO Dilauded for pain, as well as the atarax she prescribed earlier. Instructed to tell the patient to discuss with the day team about her pain medication regimen.   "

## 2021-04-13 NOTE — CONSULTS
ENT Hospital Consult Note     Date of Consult: 2021     Patient Name: Krista Richter  MRN: [id number]   : 1990       Care Team: Patient Care Team:  Agustin Turner MD as PCP - General (Internal Medicine)     History of Present Illness: Was contacted for hospital consultation on Krista Richter a 30 y.o. female who presents to the hospital for recurrent syncope. ENT consulted for possible PVCD. Patient has undergone extensive cardio pulmonary, neurology testing. She states there are plans to go to [facility] for further testing regarding possible shunting.   Also seperately notes HO recurrent AOM/effusions. Has had tubes twice when younger. Non recently. Feels hearing is muffled.     Subjective      Pertinent items are noted in HPI.     Past Medical History:   Past Medical History:   Diagnosis Date   • Adrenal insufficiency (CMS/HCC)    • Alopecia    • Anemia    • Anxiety    • APS type 1 (CMS/HCC)    • Asthma    • Autoimmune enteropathy    • Autoimmune hepatitis (CMS/HCC)    • Autoimmune urticaria    • Depression    • Dermatomyositis (CMS/HCC)    • Disease of thyroid gland    • Fibromyalgia    • Functional asplenia    • History of kidney stones    • Hypoparathyroidism (CMS/HCC)    • Insulin dependent diabetes mellitus    • Long Q-T syndrome    • Malabsorption    • Nephrocalcinosis    • Neuropathy     BLE   • Oxygen desaturation    • Pancreatic insufficiency    • Parathyroid abnormality (CMS/HCC)    • Polyglandular deficiency syndrome (CMS/HCC)    • Premature ovarian failure    • Sjogren's syndrome (CMS/HCC)    • Sleep apnea    • Spleen absent    • Tetany     HAS EPISODES   • Transfusion history        Past Surgical History:   Past Surgical History:   Procedure Laterality Date   • APPENDECTOMY     • CARDIAC CATHETERIZATION N/A 2021    Procedure: RIGHT AND LEFT HEART CATH;  Surgeon: Poli Anderson IV, MD;  Location: [facility] CATH INVASIVE LOCATION;  Service:  Cardiovascular;  Laterality: N/A;   • CARDIAC SURGERY      LOOP RECORDER   • CHOLECYSTECTOMY     • COLONOSCOPY N/A 6/5/2020    Procedure: COLONOSCOPY;  Surgeon: Gavin Vargas MD;  Location:  [facility];  Service: Gastroenterology;  Laterality: N/A;   • ENDOSCOPY N/A 4/26/2018    Procedure: ESOPHAGOGASTRODUODENOSCOPY;  Surgeon: Gavin Vargas MD;  Location:  [facility];  Service: Gastroenterology   • ENDOSCOPY N/A 8/9/2018    Procedure: ESOPHAGOGASTRODUODENOSCOPY-DILATION;  Surgeon: Gavin Vargas MD;  Location:  [facility];  Service: Gastroenterology   • ENDOSCOPY     • ENDOSCOPY N/A 2/6/2019    Procedure: ESOPHAGOGASTRODUODENOSCOPY;  Surgeon: Gavin Vargas MD;  Location:  [facility];  Service: Gastroenterology   • ENDOSCOPY N/A 7/24/2019    Procedure: ESOPHAGOGASTRODUODENOSCOPY;  Surgeon: Gavin Vargas MD;  Location:  [facility];  Service: Gastroenterology   • ENDOSCOPY N/A 10/18/2019    Procedure: ESOPHAGOGASTRODUODENOSCOPY WITH BALLOON DILATION;  Surgeon: Gavin Vargas MD;  Location:  [facility];  Service: Gastroenterology   • ENDOSCOPY N/A 6/4/2020    Procedure: ESOPHAGOGASTRODUODENOSCOPY  WITH ESOPHAGEAL BALLOON DILATATION;  Surgeon: Gavin Vargas MD;  Location:  [facility];  Service: Gastroenterology;  Laterality: N/A;  dilation done with 60F dilator    • ENDOSCOPY N/A 1/29/2021    Procedure: ESOPHAGOGASTRODUODENOSCOPY;  Surgeon: Gavin Vargas MD;  Location:  [facility];  Service: Gastroenterology;  Laterality: N/A;   • EXPLORATORY LAPAROTOMY      MULTIPLE   • HAND SURGERY      4 TOTAL   • HERNIA REPAIR     • LIVER BIOPSY     • MUSCLE BIOPSY     • MUSCLE BIOPSY     • PEG TUBE INSERTION     • PEG TUBE REMOVAL     • PORTACATH PLACEMENT         Family History:   Family History   Problem Relation Age of Onset   • Kidney disease Father        Social  History:   Social History     Socioeconomic History   • Marital status: Significant Other     Spouse name: Not on file   • Number of children: Not on file   • Years of education: Not on file   • Highest education level: Not on file   Tobacco Use   • Smoking status: Never Smoker   • Smokeless tobacco: Never Used   Substance and Sexual Activity   • Alcohol use: Yes     Comment: SOCIAL   • Drug use: No   • Sexual activity: Defer       Medications:     Current Facility-Administered Medications:   •  ! home medications stored in central pharmacy, contact for return prior to discharge, , Does not apply, Daily, Rafael Soriano [facility]  •  acetaminophen (TYLENOL) tablet 650 mg, 650 mg, Oral, Q4H PRN **OR** acetaminophen (TYLENOL) 160 MG/5ML solution 650 mg, 650 mg, Oral, Q4H PRN **OR** acetaminophen (TYLENOL) suppository 650 mg, 650 mg, Rectal, Q4H PRN, Poli Anderson IV, MD  •  acetaminophen (TYLENOL) tablet 650 mg, 650 mg, Oral, Q4H PRN, Poli Anderson IV, MD, 650 mg at 04/08/21 1042  •  ascorbic acid (VITAMIN C) tablet 1,000 mg, 1,000 mg, Oral, Daily, Poli Anderson IV, MD, 1,000 mg at 04/12/21 0915  •  atropine sulfate injection 0.5 mg, 0.5 mg, Intravenous, Q5 Min PRN, Poli Anderson IV, MD  •  azaTHIOprine (IMURAN) tablet 150 mg, 150 mg, Oral, Daily, Poli Anderson IV, MD, 150 mg at 04/12/21 0926  •  budesonide-formoterol (SYMBICORT) 160-4.5 MCG/ACT inhaler 2 puff, 2 puff, Inhalation, BID - RT, Bacilio Long, DO, 2 puff at 04/12/21 2040  •  butalbital-acetaminophen-caffeine (FIORICET, ESGIC) -40 MG per tablet 1 tablet, 1 tablet, Oral, Q4H PRN, Poli Anderson IV, MD, 1 tablet at 04/12/21 2114  •  calcitriol (ROCALTROL) capsule 0.5 mcg, 0.5 mcg, Oral, Q12H, Poli Anderson IV, MD, 0.5 mcg at 04/12/21 2114  •  calcium carbonate (oyster shell) tablet 500 mg, 500 mg, Oral, Daily, Poli Anderson IV, MD, 500 mg at  04/12/21 0924  •  cycloSPORINE (RESTASIS) 0.05 % ophthalmic emulsion 1 drop, 1 drop, Both Eyes, BID, Poli Anderson IV, MD, 1 drop at 04/12/21 2116  •  dexamethasone (DECADRON) injection 4 mg, 4 mg, Intravenous, Once, Poli Anderson IV, MD  •  dextrose (D50W) 25 g/ 50mL Intravenous Solution 25 g, 25 g, Intravenous, Once, Poli Anderson IV, MD, Stopped at 04/07/21 0216  •  dextrose (D50W) 25 g/ 50mL Intravenous Solution 25 g, 25 g, Intravenous, Q15 Min PRN, Poli Anderson IV, MD  •  dextrose (D50W) 25 g/ 50mL Intravenous Solution 50 mL, 50 mL, Intravenous, Once, Poli Anderson IV, MD  •  dextrose (GLUTOSE) oral gel 15 g, 15 g, Oral, Q15 Min PRN, Poli Anderson IV, MD, 15 g at 04/07/21 0738  •  fentaNYL citrate (PF) (SUBLIMAZE) 100 MCG/2ML injection  - ADS Override Pull, , , ,   •  fludrocortisone tablet 100 mcg, 100 mcg, Oral, Daily, Marisol Odonnell MD, 100 mcg at 04/12/21 0925  •  fluticasone (FLONASE) 50 MCG/ACT nasal spray 1 spray, 1 spray, Each Nare, Daily, Poli Anderson IV, MD, 1 spray at 04/12/21 1320  •  folic acid (FOLVITE) tablet 4 mg, 4 mg, Oral, Daily, Poli Anderson IV, MD, 4 mg at 04/12/21 0917  •  gabapentin (NEURONTIN) capsule 600 mg, 600 mg, Oral, Q8H, Poli Anderson IV, MD, 600 mg at 04/12/21 2111  •  glucagon (human recombinant) (GLUCAGEN DIAGNOSTIC) injection 1 mg, 1 mg, Subcutaneous, Q15 Min PRN, Poli Anderson IV, MD  •  heparin injection 500 Units, 500 Units, Intracatheter, Once, Alber Aggarwal MD  •  hydrocortisone (CORTEF) tablet 10 mg, 10 mg, Oral, Nightly, Poli Anderson IV, MD, 10 mg at 04/12/21 2112  •  hydrocortisone (CORTEF) tablet 20 mg, 20 mg, Oral, Daily With Breakfast, Poli Anderson IV, MD, 20 mg at 04/12/21 0924  •  HYDROmorphone (DILAUDID) tablet 1 mg, 1 mg, Oral, Q6H PRN, Jose Manuel Rodriguez DO, 1 mg at 04/12/21 2112  •  ibuprofen (ADVIL,MOTRIN)  tablet 400 mg, 400 mg, Oral, Q6H PRN, Alber Aggarwal MD  •  levothyroxine (SYNTHROID, LEVOTHROID) tablet 75 mcg, 75 mcg, Oral, Q AM, Poli Anderson IV, MD, 75 mcg at 04/12/21 0632  •  magnesium oxide (MAG-OX) tablet 1,000 mg, 1,000 mg, Oral, TID, Poli Anderson IV, MD, 1,000 mg at 04/12/21 2113  •  magnesium sulfate 2g/50 mL (PREMIX) infusion, 2 g, Intravenous, Once, Poli Anderson IV, MD  •  melatonin tablet 5 mg, 5 mg, Oral, Nightly PRN, Christina Berry APRN, 5 mg at 04/09/21 2341  •  methocarbamol (ROBAXIN) tablet 500 mg, 500 mg, Oral, TID, Poli Anderson IV, MD, 500 mg at 04/12/21 2113  •  methohexital (BREVITAL) 50 MG/5ML injection solution prefilled syringe  - ADS Override Pull, , , ,   •  midazolam (VERSED) 2 MG/2ML injection  - ADS Override Pull, , , ,   •  multivitamin with minerals 1 tablet, 1 tablet, Oral, Daily, Poli Anderson IV, MD, 1 tablet at 04/12/21 0915  •  nadolol (CORGARD) tablet 20 mg, 20 mg, Oral, Q12H, Poli Anderson IV, MD, 20 mg at 04/12/21 2112  •  [DISCONTINUED] HYDROmorphone (DILAUDID) injection 1 mg, 1 mg, Intravenous, Q3H PRN, 1 mg at 04/12/21 0114 **AND** naloxone (NARCAN) injection 0.4 mg, 0.4 mg, Intravenous, Q5 Min PRN, Marisol Odonnell MD  •  pantoprazole (PROTONIX) EC tablet 40 mg, 40 mg, Oral, Daily, Poli Anderson IV, MD, 40 mg at 04/12/21 0917  •  Parathyroid Hormone (Recomb) cartridge 25 mcg **PATIENT SUPPLIED MED**, 25 mcg, Subcutaneous, BID, Poli Anderson IV, MD, 25 mcg at 04/12/21 2114  •  polyethylene glycol (MIRALAX) packet 17 g, 17 g, Oral, Daily, Lela Velazco, APRN, 17 g at 04/12/21 0915  •  potassium chloride (KLOR-CON) packet 40 mEq, 40 mEq, Oral, TID, Poli Anderson IV, MD, 40 mEq at 04/12/21 2114  •  prochlorperazine (COMPAZINE) injection 5 mg, 5 mg, Intravenous, Q6H PRN, 5 mg at 04/11/21 1214 **OR** prochlorperazine (COMPAZINE) tablet 5 mg, 5 mg, Oral,  "Q6H PRN, 5 mg at 04/12/21 0940 **OR** prochlorperazine (COMPAZINE) suppository 25 mg, 25 mg, Rectal, Q12H PRN, Lui Velez MD  •  progesterone (PROMETRIUM) capsule 200 mg, 200 mg, Oral, Daily, Poli Anderson IV, MD, 200 mg at 04/12/21 0920  •  promethazine (PHENERGAN) suppository 25 mg, 25 mg, Rectal, Q8H PRN, Lela Velazco APRN  •  promethazine (PHENERGAN) tablet 25 mg, 25 mg, Oral, Q6H PRN, Poli Anderson IV, MD, 25 mg at 04/12/21 0633  •  sodium chloride 0.9 % flush 10 mL, 10 mL, Intravenous, PRN, Poli Anderson IV, MD  •  [COMPLETED] Insert peripheral IV, , , Once **AND** sodium chloride 0.9 % flush 10 mL, 10 mL, Intravenous, PRN, Poli Anderson IV, MD  •  sodium chloride 0.9 % flush 10 mL, 10 mL, Intravenous, Q12H, Poli Anderson IV, MD, 10 mL at 04/12/21 2117  •  sodium chloride 0.9 % flush 10 mL, 10 mL, Intravenous, PRN, Poli Anderson IV, MD  •  venlafaxine (EFFEXOR) tablet 75 mg, 75 mg, Oral, TID, Poli Anderson IV, MD, 75 mg at 04/12/21 2111  •  zinc sulfate (ZINCATE) capsule 220 mg, 220 mg, Oral, Daily, Poli Anderson IV, MD, 220 mg at 04/12/21 0917    Allergies:   Allergies   Allergen Reactions   • Azithromycin Hives   • Cefpodoxime Hives   • Cephalosporins Hives   • Clarithromycin Hives     biaxin  biaxin  biaxin   • Levofloxacin Hives   • Nitrofurantoin Hives   • Nystatin Hives   • Penicillins Hives   • Sulfa Antibiotics Hives   • Midazolam Unknown - Low Severity   • Morphine Other (See Comments)     \"it doesn't work. [facility] told me to list it as an allergy\"   • Nitrofurantoin Macrocrystal Other (See Comments) and Hives   • Ondansetron Other (See Comments)     \"it doesn't work. [facility] told me to list it as an allergy\"       Review of Systems:     Systemic Symptoms: No fever, no recent weight loss   Head Symptoms: No headache, no facial pain, no facial weakness   Eye Symptoms: No blurry vision, full range of " motion   Ears: HEARING LOSS   Nose:    Throat: No hoarseness   Cardiovascular Symptoms: No cold hand or feet   Pulmonary Symptoms: No dyspnea or stridor   GI Symptoms: No dysphagia, no choking   Endocrine Symptoms: No heat or cold intolerance   Neurological Symptoms: No vertigo, no taste or smell disturbances, SYNCOPE   Skin Symptoms: LYMPH NODE SWELLING      Objective     Physical Exam:  Vital Signs:   Temp:  [98.1 °F (36.7 °C)-98.8 °F (37.1 °C)] 98.6 °F (37 °C)  Heart Rate:  [] 92  Resp:  [16-18] 18  BP: ()/(59-87) 113/87  67.6 kg (149 lb)  Body mass index is 26.4 kg/m².     General Appearance:  Alert, cooperative, in no acute distress, no audible stridor   Head:  Normocephalic, without obvious abnormality, atraumatic   Eyes:          Conjunctivae and sclerae normal, corneas clear, PERRLA   Ears:  Ear canals clear, right TM normal, left TM normal, SEROUS EFFUSIONS BILATERALLY    Oral Exam: Normal tongue, tonsils    Nose: Septum relatively straight  Turbinates WNL  Nasal passages clear   Neck: No adenopathy, supple, trachea midline, no thyromegaly, no carotid bruit   Salivary Glands: No palpable masses   Lungs:   Clear to auscultation,respirations regular, kimberly and unlabored      Heart:  Regular rhythm and normal rate   Skin: No bleeding, bruising or rash   Lymph nodes: No palpable adenopathy   Neurologic: Cranial nerves II-XII grossly intact, no spontaneous nystagmus     Results Review:   I reviewed the patient's new clinical results.     Results from last 7 days   Lab Units 04/12/21  0829   WBC 10*3/mm3 9.91   HEMOGLOBIN g/dL 14.5   HEMATOCRIT % 45.5   PLATELETS 10*3/mm3 271     Results from last 7 days   Lab Units 04/12/21  0829   SODIUM mmol/L 138   POTASSIUM mmol/L 5.4*   CHLORIDE mmol/L 97*   CO2 mmol/L 36.0*   BUN mg/dL 11   CREATININE mg/dL 0.65   GLUCOSE mg/dL 106*   CALCIUM mg/dL 11.0*     No results found for: ACANTHNAEG, AFBCX, BPERTUSSISCX, BLOODCX  No results found for: BCIDPCR, CXREFLEX,  CSFCX, CULTURETIS  No results found for: CULTURES, HSVCX, URCX  No results found for: EYECULTURE, GCCX, HSVCULTURE, LABHSV  No results found for: LEGIONELLA, MRSACX, MUMPSCX, MYCOPLASCX  No results found for: NOCARDIACX, STOOLCX  No results found for: THROATCX, UNSTIMCULT, URINECX, CULTURE, VZVCULTUR  No results found for: VIRALCULTU, WOUNDCX    Imaging Results (Last 72 Hours)     Procedure Component Value Units Date/Time    MRI Brain Without Contrast [[id number]] Collected: 04/11/21 0941     Updated: 04/11/21 0945    Narrative:      EXAMINATION: MRI BRAIN WO CONTRAST-      INDICATION: LUE [id number], syncope; R55-Syncope and collapse;  R07.9-Chest pain, unspecified     TECHNIQUE: Multiplanar multisequence MRI of the brain performed without  IV contrast     COMPARISON: NONE     FINDINGS: No acute infarct noted on diffusion weighted sequences.  Midline structures are unremarkable and the craniocervical junction is  satisfactory in appearance. There is no evidence of intracranial  hemorrhage, mass or mass effect. The ventricles are normal in size and  configuration. The orbits are normal and the paranasal sinuses are  grossly clear.       Impression:      Normal noncontrast MRI of the brain.        This report was finalized on 4/11/2021 9:42 AM by Cecil Colby.             Assessment / Plan      Assessment/Plan:     29 yo F with intermittent syncope. Extensive PMH, cards, pulm, hospitalist team's notes and testing reviewed. Also reviewed recent MRI, CT. She was adamant with me this evening that she did not think she has PVCD upon reading more on it, and refused the scope exam.     Separately, she does have bilateral retracted TM's, likely mild serous effusions. Would benefit from outpatient audio assessment. Will arrange.     I discussed the patients findings and my recommendations with the patient.         Jorje Srinivasan MD  04/12/21  21:40 EDT

## 2021-04-13 NOTE — PROGRESS NOTES
Continued Stay Note  [facility]     Patient Name: Krista Richter  MRN: [id number]  Today's Date: 4/13/2021    Admit Date: 4/6/2021    Discharge Plan     Row Name 04/13/21 1443       Plan    Plan  [facility]    Plan Comments  Per Dr. Aggarwal, patient has been accepted for transfer to [facility] in [facility]. He is waiting to speak to a hospitalist and confirm a bed. Patient will require an ambulance with cardiac monitoring. ACLS ambulance scheduled with [facility] on will-call. Spoke to Josh, ph. [phone], transfer #[phone]. [facility] is on the chart. RN, please call [facility] when patient is ready for discharge. The will-call status is valid through midnight tonight.  has provided a pulse-oximeter to patient. CM will continue to follow.    Final Discharge Disposition Code  66 - critical access hospital        Discharge Codes    No documentation.             Babita Bryant RN

## 2021-04-13 NOTE — PROGRESS NOTES
[facility] at [facility]  Progress Note       LOS: 2 days   Patient Care Team:  Agustin Turner MD as PCP - General (Internal Medicine)    Chief Complaint:  SOB, syncope     Subjective  Pt complaints about continued generalized pain and dissatisfaction with current pain control regimen. She states that IV dilaudid has been the most helpful for pain control. Will defer to primary team/ palliative teams who had been working diligently on this issue with her.       Problem List:  1. Recurrent syncope  2. 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 Dx by Dr. Chapman at age 4 after abnormal EKG   b. Trouble regulating potassium   c. Black out periods and seizure like activity jayden with swimming and does get anxiolytic without a LifeVest.   d. Initiated on Nadolol for tachycardia with HR up into low high 190s.  e.  twelve-lead EKG 11/20 1/2015 QTc 490 ms3  3. Orthodeoxia -   a. 4/7/2021 bilateral carotid duplex: Mildly elevated carotid velocities bilaterally.  No evidence of atherosclerosis.  Vertebral artery flow is antegrade bilaterally  b. 4/8/2021 heart catheterization: Normal coronary arteries, normal LV systolic function.  Moderately elevated LV filling pressure, mild pulmonary hypertension (mean PA pressure 32 mmHg), supranormal cardiac output/index.  No evidence of intracardiac or pulmonary shunt.  c. 4/8/2021 BAO estimated EF 55%, LV systolic function normal.  Saline test with Valsalva negative for evidence of intra-atrial shunt.  d. 4/9/2021 Doppler transcranial microbubble injection: Mean velocities and waveforms in the right and left MCA and terminal ICA are normal.  Following injection of agitated saline both before and after Valsalva no abnormal signal are seen in the right MCA.  Negative bubble study.  e. 4/10/21 complete echocardiogram: Patient received 2 L of oxygen per nasal cannula throughout the evaluation.  In the sitting position arterial saturation 97% and an agitated  saline study reveals no evidence of right to left shunting.  When patient assumes supine position saturation falls to 70% within 1 minute no evidence of right to left shunting noted.  When the patient assumes supine position and carries a Valsalva maneuver saturation pulse of 70% within a minute with no evidence of right to left shunting.  Also positional oxygen desaturation resolved within 1 minute of assuming a seated position.  No evidence of intracardiac or pulmonary shunting noted.  4.Polyglandular autoimmune syndrome  a. Rituxan in the past   5.Chronic Lung Disease/Hpoxia  a. Follows pulmonologist at [facility]  b. Echo 4/7/2021:  EF 55%, no significant valvular abnormalities, appears negative for interatrial shunting.  6.Esophageal stenosis   a. Status post dilatation 1-   7.T2DM  8.Hypoparathyroidism  9. Dermatomyositis  10. Autoimmune hepatitis  11. Obstructive sleep apnea  12.  Adrenal Insufficiency  13. Gastroparesis  14.  Depression  15.  Anxiety   16.  Surgeries:  a. Cholecystectomy  b. Appendectomy  c. G-tube insertion and removal  d. Umbilical hernia repair         Review of Systems:   Pertinent positives in HPI, all others reviewed and negative.      Objective       Current Facility-Administered Medications:   •  ! home medications stored in central pharmacy, contact for return prior to discharge, , Does not apply, Daily, Rafael Soriano, [facility]  •  acetaminophen (TYLENOL) tablet 650 mg, 650 mg, Oral, Q4H PRN **OR** acetaminophen (TYLENOL) 160 MG/5ML solution 650 mg, 650 mg, Oral, Q4H PRN **OR** acetaminophen (TYLENOL) suppository 650 mg, 650 mg, Rectal, Q4H PRN, Poli Anderson IV, MD  •  acetaminophen (TYLENOL) tablet 650 mg, 650 mg, Oral, Q4H PRN, Poli Anderson IV, MD, 650 mg at 04/08/21 1042  •  ascorbic acid (VITAMIN C) tablet 1,000 mg, 1,000 mg, Oral, Daily, Poli Anderson IV, MD, 1,000 mg at 04/12/21 0915  •  atropine sulfate injection 0.5 mg, 0.5 mg,  Intravenous, Q5 Min PRN, Poli Anderson IV, MD  •  azaTHIOprine (IMURAN) tablet 150 mg, 150 mg, Oral, Daily, Poli Anderson IV, MD, 150 mg at 04/12/21 0926  •  budesonide-formoterol (SYMBICORT) 160-4.5 MCG/ACT inhaler 2 puff, 2 puff, Inhalation, BID - RT, Bacilio Long, DO, 2 puff at 04/12/21 2040  •  butalbital-acetaminophen-caffeine (FIORICET, ESGIC) -40 MG per tablet 1 tablet, 1 tablet, Oral, Q4H PRN, Poli Anderson IV, MD, 1 tablet at 04/12/21 2114  •  calcitriol (ROCALTROL) capsule 0.5 mcg, 0.5 mcg, Oral, Q12H, Poli Anderson IV, MD, 0.5 mcg at 04/12/21 2114  •  calcium carbonate (oyster shell) tablet 500 mg, 500 mg, Oral, Daily, Poli Anderson IV, MD, 500 mg at 04/12/21 0924  •  cycloSPORINE (RESTASIS) 0.05 % ophthalmic emulsion 1 drop, 1 drop, Both Eyes, BID, Poli Anderson IV, MD, 1 drop at 04/12/21 2116  •  dexamethasone (DECADRON) injection 4 mg, 4 mg, Intravenous, Once, Poli Anderson IV, MD  •  dextrose (D50W) 25 g/ 50mL Intravenous Solution 25 g, 25 g, Intravenous, Once, Poli Anderson IV, MD, Stopped at 04/07/21 0216  •  dextrose (D50W) 25 g/ 50mL Intravenous Solution 25 g, 25 g, Intravenous, Q15 Min PRN, Poli Anderson IV, MD  •  dextrose (D50W) 25 g/ 50mL Intravenous Solution 50 mL, 50 mL, Intravenous, Once, Poli Anderson IV, MD  •  dextrose (GLUTOSE) oral gel 15 g, 15 g, Oral, Q15 Min PRN, Poli Anderson IV, MD, 15 g at 04/07/21 0738  •  fentaNYL citrate (PF) (SUBLIMAZE) 100 MCG/2ML injection  - ADS Override Pull, , , ,   •  fludrocortisone tablet 100 mcg, 100 mcg, Oral, Daily, Marisol Odonnell MD, 100 mcg at 04/12/21 0925  •  fluticasone (FLONASE) 50 MCG/ACT nasal spray 1 spray, 1 spray, Each Nare, Daily, Poli Anderson IV, MD, 1 spray at 04/12/21 1320  •  folic acid (FOLVITE) tablet 4 mg, 4 mg, Oral, Daily, Poli Anderson IV, MD, 4 mg at  04/12/21 0917  •  gabapentin (NEURONTIN) capsule 600 mg, 600 mg, Oral, Q8H, Poli Anderson IV, MD, 600 mg at 04/12/21 2111  •  glucagon (human recombinant) (GLUCAGEN DIAGNOSTIC) injection 1 mg, 1 mg, Subcutaneous, Q15 Min PRN, Poli Anderson IV, MD  •  heparin injection 500 Units, 500 Units, Intracatheter, Once, Alber Aggarwal MD  •  hydrocortisone (CORTEF) tablet 10 mg, 10 mg, Oral, Nightly, Poli Anderson IV, MD, 10 mg at 04/12/21 2112  •  hydrocortisone (CORTEF) tablet 20 mg, 20 mg, Oral, Daily With Breakfast, Poli Anderson IV, MD, 20 mg at 04/12/21 0924  •  HYDROmorphone (DILAUDID) tablet 1 mg, 1 mg, Oral, Q6H PRN, Jose Manuel Rodriguez DO, 1 mg at 04/12/21 2112  •  hydrOXYzine (ATARAX) tablet 25 mg, 25 mg, Oral, TID PRN, Malka Burton DO  •  ibuprofen (ADVIL,MOTRIN) tablet 400 mg, 400 mg, Oral, Q6H PRN, Alber Aggarwal MD  •  levothyroxine (SYNTHROID, LEVOTHROID) tablet 75 mcg, 75 mcg, Oral, Q AM, Poli Anderson IV, MD, 75 mcg at 04/12/21 0632  •  magnesium oxide (MAG-OX) tablet 1,000 mg, 1,000 mg, Oral, TID, Poli Anderson IV, MD, 1,000 mg at 04/12/21 2113  •  magnesium sulfate 2g/50 mL (PREMIX) infusion, 2 g, Intravenous, Once, Poli Anderson IV, MD  •  melatonin tablet 5 mg, 5 mg, Oral, Nightly PRN, Christina Berry APRN, 5 mg at 04/09/21 2341  •  methocarbamol (ROBAXIN) tablet 500 mg, 500 mg, Oral, TID, Poli Anderson IV, MD, 500 mg at 04/12/21 2113  •  methohexital (BREVITAL) 50 MG/5ML injection solution prefilled syringe  - ADS Override Pull, , , ,   •  midazolam (VERSED) 2 MG/2ML injection  - ADS Override Pull, , , ,   •  multivitamin with minerals 1 tablet, 1 tablet, Oral, Daily, Poli Anderson IV, MD, 1 tablet at 04/12/21 0915  •  nadolol (CORGARD) tablet 20 mg, 20 mg, Oral, Q12H, Poli Anderson IV, MD, 20 mg at 04/12/21 2112  •  [DISCONTINUED] HYDROmorphone (DILAUDID) injection 1 mg, 1  mg, Intravenous, Q3H PRN, 1 mg at 04/12/21 0114 **AND** naloxone (NARCAN) injection 0.4 mg, 0.4 mg, Intravenous, Q5 Min PRN, Marisol Odonnell MD  •  pantoprazole (PROTONIX) EC tablet 40 mg, 40 mg, Oral, Daily, Poli Anderson IV, MD, 40 mg at 04/12/21 0917  •  Parathyroid Hormone (Recomb) cartridge 25 mcg **PATIENT SUPPLIED MED**, 25 mcg, Subcutaneous, BID, Poli Anderson IV, MD, 25 mcg at 04/12/21 2114  •  polyethylene glycol (MIRALAX) packet 17 g, 17 g, Oral, Daily, Lela Velazco APRN, 17 g at 04/12/21 0915  •  potassium chloride (KLOR-CON) packet 40 mEq, 40 mEq, Oral, TID, Poli Anderson IV, MD, 40 mEq at 04/12/21 2114  •  prochlorperazine (COMPAZINE) injection 5 mg, 5 mg, Intravenous, Q6H PRN, 5 mg at 04/11/21 1214 **OR** prochlorperazine (COMPAZINE) tablet 5 mg, 5 mg, Oral, Q6H PRN, 5 mg at 04/12/21 0940 **OR** prochlorperazine (COMPAZINE) suppository 25 mg, 25 mg, Rectal, Q12H PRN, Lui Velez MD  •  progesterone (PROMETRIUM) capsule 200 mg, 200 mg, Oral, Daily, Poli Anderson IV, MD, 200 mg at 04/12/21 0920  •  promethazine (PHENERGAN) suppository 25 mg, 25 mg, Rectal, Q8H PRN, Lela Velazco APRN  •  promethazine (PHENERGAN) tablet 25 mg, 25 mg, Oral, Q6H PRN, Poli Anderson IV, MD, 25 mg at 04/12/21 0633  •  sodium chloride 0.9 % flush 10 mL, 10 mL, Intravenous, PRN, Poli Anderson IV, MD  •  [COMPLETED] Insert peripheral IV, , , Once **AND** sodium chloride 0.9 % flush 10 mL, 10 mL, Intravenous, PRN, Poli Anderson IV, MD  •  sodium chloride 0.9 % flush 10 mL, 10 mL, Intravenous, Q12H, Poli Anderson IV, MD, 10 mL at 04/12/21 2117  •  sodium chloride 0.9 % flush 10 mL, 10 mL, Intravenous, PRN, Poli Anderson IV, MD  •  venlafaxine (EFFEXOR) tablet 75 mg, 75 mg, Oral, TID, Poli Anderson IV, MD, 75 mg at 04/12/21 2111  •  zinc sulfate (ZINCATE) capsule 220 mg, 220 mg, Oral, Daily,  "Poli Anderson IV, MD, 220 mg at 04/12/21 0917    Vital Sign Min/Max for last 24 hours  Temp  Min: 97.7 °F (36.5 °C)  Max: 98.8 °F (37.1 °C)   BP  Min: 110/83  Max: 123/84   Pulse  Min: 56  Max: 105   Resp  Min: 16  Max: 26   SpO2  Min: 90 %  Max: 97 %   Flow (L/min)  Min: 2  Max: 2   No data recorded     Flowsheet Rows      First Filed Value   Admission Height  157.5 cm (62\") Documented at 04/06/2021 1823   Admission Weight  68 kg (150 lb) Documented at 04/06/2021 1823            Intake/Output Summary (Last 24 hours) at 4/13/2021 0732  Last data filed at 4/12/2021 1500  Gross per 24 hour   Intake 918 ml   Output --   Net 918 ml       Physical Exam:     General Appearance:    Alert, cooperative, in no acute distress.  O2 via nasal cannula present- sitting up in bed    Lungs:     Clear to auscultation,respirations regular, even and                  unlabored    Heart:    Regular rhythm and normal rate, normal S1 and S2, no            murmur, no gallop, no rub, no click   Chest Wall:    No abnormalities observed   Abdomen:     Normal bowel sounds, no masses, no organomegaly, soft        non-tender, non-distended, no guarding, no rebound                tenderness   Extremities:   Moves all extremities well, no edema, no cyanosis, no             Redness.  Right groin soft with no evidence of hematoma.  No oozing or bleeding   Pulses:   Pulses palpable and equal bilaterally   Skin:   No bleeding, bruising or rash        Results Review:   Results from last 7 days   Lab Units 04/12/21  0829 04/11/21  0916 04/09/21  1228   WBC 10*3/mm3 9.91 11.87* 9.38   HEMOGLOBIN g/dL 14.5 13.8 12.5   HEMATOCRIT % 45.5 42.9 39.4   PLATELETS 10*3/mm3 271 242 244     Results from last 7 days   Lab Units 04/12/21  0829 04/11/21  0916 04/09/21  1228   SODIUM mmol/L 138 141 138   POTASSIUM mmol/L 5.4* 4.3 4.3   CHLORIDE mmol/L 97* 98 98   CO2 mmol/L 36.0* 35.0* 33.0*   BUN mg/dL 11 10 8   CREATININE mg/dL 0.65 0.60 0.66   GLUCOSE " mg/dL 106* 122* 157*              Results from last 7 days   Lab Units 04/07/21  0602   TSH uIU/mL 1.700             Results from last 7 days   Lab Units 04/12/21  0829 04/11/21  0916 04/08/21  0433 04/06/21  2131   CK TOTAL U/L 58 48  --   --    TROPONIN T ng/mL  --   --  <0.010 <0.010       Intake/Output Summary (Last 24 hours) at 4/13/2021 0732  Last data filed at 4/12/2021 1500  Gross per 24 hour   Intake 918 ml   Output --   Net 918 ml       I personally viewed and interpreted the patient's EKG/Telemetry data      Telemetry: NSR   bpm    Ejection Fraction  No results found for: EF    Echo EF Estimated  Lab Results   Component Value Date    ECHOEFEST 55 04/08/2021         Present on Admission:  • Hypoparathyroidism (CMS/HCC)  • Polyglandular autoimmune syndrome, type 1 (CMS/HCC)  • Type 2 diabetes mellitus (CMS/HCC)  • Leukocytosis  • Syncope  • Hypoxia    Assessment/Plan   1. Orthodeoxia   See above for complete updated problem list of cardiovascular testing performed during this admission  -Continue supplemental oxygen, sleep with head of the bed elevated  -Dr. Meyer made contact with Dr. Brito (adult congenital heart specialist at the [facility]) regarding Ms. Richter's case.        2.  Long QT syndrome:   -recommend to resume nadalol     3.  Polyglandular autoimmune deficiency  - Follows outpatient with endocrinology Dr. Alek Gillis  -Continue fludrocortisone  -Continue hydrocortisone, she notes that she normally requires solucortef while in the hospital due to stress   -Continue levothyroxine    Okay for discharge from our standpoint.  Make sure the patient has oxygen at home and monitor her oxygen levels using her home pulse oximetry. We have compiled all of her medical records including copies of imaging studies she has had here done at [facility].  We have sent them to Dr. Brito for review and will get back to the patient family regarding plan for possible admission  and further evaluation there as soon as possible.      Electronically signed by BEAR Michelle, 04/13/21, 7:37 AM EDT.      I, Jeramy Meyer MD, personally performed the services face to face as described and documented by the above named individual. I have made any necessary edits and it is both accurate and complete 4/13/2021  07:50 EDT

## 2021-04-13 NOTE — DISCHARGE SUMMARY
[facility] Medicine Services  DISCHARGE SUMMARY    Patient Name: Krista Richter  : 1990  MRN: [id number]    Date of Admission: 2021  8:53 PM  Date of Discharge:  21  Primary Care Physician: Agustin Turner MD    Consults     Date and Time Order Name Status Description    2021 11:55 AM Inpatient Endocrinology Consult      2021 10:14 AM Inpatient ENT Consult Completed     2021 10:55 AM Inpatient Pulmonology Consult Completed     2021 12:33 AM Inpatient Palliative Care MD Consult Completed     2021 12:33 AM Inpatient Neurology Consult General Completed     2021  3:07 AM Inpatient Cardiology Consult Completed           Hospital Course     Presenting Problem:   Syncope, unspecified syncope type [R55]    Active Hospital Problems    Diagnosis  POA   • **Syncope [R55]  Yes   • Leukocytosis [D72.829]  Yes   • Hypoparathyroidism (CMS/HCC) [E20.9]  Yes   • Polyglandular autoimmune syndrome, 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          Hospital Course:  Krista Richter is a 30 y.o. female with PMH significant for polyglandular autoimmune syndrome, sleep apnea, prolonged QT syndrome, DM 2, hypoparathyroid, anxiety, asthma and adrenal insufficiency who presented to the ED with complaints of progressive shortness of breath and syncope. She notes that she has had increased shortness of breath and chest pain, and now requires supplemental oxygen on 2 L at all times due to oxygen saturation dropping to the 70s during episodes.  She has previously been evaluated per pulmonology (Dr Park at [facility]) with reported negative work-up.  On the night of admission, she reported 5 episodes of syncope prior to arrival at the ED.     She recently was evaluated by Electrophysiology Dr. Meyer who raised concern that  "her hypoxia was most likely secondary to cardiac shunting. He felt that she would be best served by seeing a structural heart specialist and assessed with BAO, right and left heart cath with shunt run, and possible split function pulmonary ventilation scan.      Syncope  Chest pain  Hypoxia  --patient seen and evaluated by Electrophysiology Dr Meyer, Cardiology Sudhir Fitzpatrick and Larry, Pulmonology Sudhir Long and [place] as well as Neurology Dr Aldana.   --CT angiogram of the chest showed no acute pulmonary process.   --right and left heart catheterization performed, showing:  · Normal coronary arteries  · Normal LV systolic function  · Moderately elevated LV filling pressure (LVEDP 24 mmHg)  · Mild pulmonary hypertension (mean PA pressure 32 mmHg)  · Supranormal cardiac output/index  · No evidence of intracardiac or pulmonary shunt.  --Pulmonary Dr Long opined regarding the pulmonary hypertension that referral to a larger facility for pulmonary hypertension management long term would be appropriate.    --Dr Long began symbicort  --Pulmonary Dr Niño recommended ENT consultation to see if vocal cord dysfunction may contribute to the patient's hypoxia. Dr Srinivasan with ENT did see the patient, however Ms Richter declined a scope evaluation. Dr Srinivasan did note that the patient has bilateral retracted TM's, likely mild serous effusions, and would benefit from an outpatient audio assessment. He will arrange for this exam.   --patient seen by neurology Dr Aldana, workup including EEG and MRI brain not suggestive of epileptic events or seizure disorder which would explain the syncopal events  --additional investigations were undertaken to see if a \"shunt\" with associated orthodexia could be found to explain the patient's hypoxia. A contrasted CT abdomen and pelvis was unremarkable. An echo with bubble study by Dr Juarez did not show a PFO. An ICE study was initially planned, however Cardiology and EP " subsequently felt that further evaluation at a higher level of care such as the [facility] or [facility] would be more appropriate. Records were faxed to the [facility]'s Dr Brito (adult congenital heart specialist).  Dr Graham's office will contact the family regarding admission and further evaluation at [facility] as soon as possible. From a cardiac perspective the patient could be discharged home until arrangements at [facility] are finalized. [employer] recommended to continue home oxygen and to provide the patient with a pulse oximeter at discharge.     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 Among her several endocrine medications Ms Richter typically takes calcitriol 0.5 mcg BID, calcium carbonate 500 mg daily, parathyroid hormone 25 mcg BID and cortef 20 mg am and 10 mg pm. She also takes oral potassium supplements TID at home.  During her hospitalization she received three IV calcium infusions to help relieve her tetany symptoms. These were discontinued after her calcium level returned at 11.2. Her calcium level was relatively stable at 11.0 the following day, however on the morning of discharge, her calcium level was noted to be 13.5 and her ionized calcium was 1.90.    --IV fluids were started and her evening parathyroid dose was held along with calcium supplements. Ms Richter's case was discussed with her Endocrinologist Dr Morris who recommended transfer so that she could help guide further care of this complex patient. No beds were available at [facility], however Dr Morris's group does have privileges at [facility]), and she suggested transfer to that facility.   [facility] kindly agreed to accept the patient in transfer pending bed availability, and ambulance transport has been arranged.  --In addition to IV fluids, Dr Morris recommended changing the patient's oral cortef to IV solucortef  --upon admission to [facility], would recommend careful review of the patient's endocrine medications as several have been temporarily held for hypercalcemia and hyperkalemia    Hyperkalemia  - patient typically takes oral potassium supplements, however these were held shortly before transfer due to hyperkalemia (K+ 6.4).  The lab specimen was slightly hemolyzed, and repeat labs at this time are pending.     Acute on chronic pain  - palliative evaluation, long term opiates not in patient's best interest, but I did suggest outpatient followup with pain management, recommended Dr Carbajal to family.     Muscle spasms  - patient seen by Neurology, felt not consistent with dystonia. No observed association with hypocalcemia noted. These events appear to be a chronic issue. Patient/family have been treating at home with IV calcium infusions, however calcium levels here are high. Infusions here stopped and discussion with patient and family detailed that continued calcium infusions would risk serious complications including calciphylaxis or arrhythmias.      Migraines  - continue followup with the [facility].       Discharge Follow Up Recommendations for outpatient labs/diagnostics:  Per Endocrinology at [facility]        Day of Discharge     HPI:   Complains of ongoing pain. Unhappy regarding continued hospitalization, but receiving support from family and her significant other.    Review of Systems  Gen- No fevers, chills  CV- No chest pain, palpitations  Resp- No cough, dyspnea  GI- No N/V/D, abd pain        Vital Signs:   Temp:  [97.7 °F (36.5 °C)-98.6 °F (37 °C)] 97.7 °F (36.5 °C)  Heart Rate:  [] 76  Resp:  [16-26] 16  BP: (110-127)/(68-99) 111/68     Physical  Exam:  Constitutional - no acute distress, nontoxic, in bed, sitting up  HEENT-NCAT, mucous membranes moist  CV-RRR  Resp-Grossly clear bilaterally  Abd-soft, nontender, nondistended, normoactive bowel sounds  Ext-No lower extremity cyanosis, clubbing or edema bilaterally  Neuro-alert and oriented, speech clear, moves all extremities   Psych-normal affect   Skin- No rash on exposed UE or LE bilaterally      Pertinent  and/or Most Recent Results     LAB RESULTS:      Lab 04/12/21  0829 04/11/21  0916 04/09/21  1228 04/07/21  0602 04/06/21  2131   WBC 9.91 11.87* 9.38 12.64* 13.56*   HEMOGLOBIN 14.5 13.8 12.5 14.2 14.8   HEMATOCRIT 45.5 42.9 39.4 44.9 45.1   PLATELETS 271 242 244 255 287   NEUTROS ABS 5.89  --  6.98  --  8.21*   IMMATURE GRANS (ABS) 0.03  --  0.05  --  0.07*   LYMPHS ABS 1.85  --  0.86  --  2.66   MONOS ABS 1.42*  --  1.25*  --  1.86*   EOS ABS 0.63*  --  0.19  --  0.68*   MCV 98.5* 100.0* 99.5* 100.4* 96.8         Lab 04/13/21  1519 04/13/21  0857 04/12/21  0829 04/11/21  0916 04/09/21  1228 04/07/21  0602 04/06/21  2131   SODIUM 135* 136 138 141 138 135* 137   POTASSIUM 6.4* 5.2 5.4* 4.3 4.3 4.6 5.0   CHLORIDE 97* 94* 97* 98 98 97* 95*   CO2 32.0* 36.0* 36.0* 35.0* 33.0* 23.0 28.0   ANION GAP 6.0 6.0 5.0 8.0 7.0 15.0 14.0   BUN 15 18 11 10 8 16 13   CREATININE 0.62 0.65 0.65 0.60 0.66 0.75 0.71   GLUCOSE 112* 96 106* 122* 157* 61* 69   CALCIUM 11.8* 13.5* 11.0* 11.2* 8.7 9.2 9.7   IONIZED CALCIUM 1.73* 1.90* 1.59* 1.55*  --   --  1.31   MAGNESIUM  --   --   --  2.2  --  2.5 1.8   PHOSPHORUS  --   --   --   --   --  5.2*  --    TSH  --   --   --   --   --  1.700  --          Lab 04/13/21  1519 04/13/21  0857 04/12/21  0829 04/11/21  0916 04/09/21  1228 04/06/21  2131   TOTAL PROTEIN 7.7 7.7 7.9 7.3 7.0 8.2   ALBUMIN 4.50 4.70 4.60 4.20 4.10 4.80   GLOBULIN 3.2 3.0 3.3  --  2.9 3.4   ALT (SGPT) 16 17 18 13 13 18   AST (SGOT) 27 27 32 23 20 38*   BILIRUBIN 0.4 0.3 0.5 0.5 0.4 1.1   BILIRUBIN DIRECT  --    --   --  <0.2  --   --    ALK PHOS 89 101 83 77 65 69         Lab 04/08/21  0433 04/06/21  2131   TROPONIN T <0.010 <0.010                 Lab 04/08/21  1023 04/08/21  1021 04/08/21  1020   PH, ARTERIAL  --   --  7.287*   PCO2, ARTERIAL  --   --  63.6*   PO2 ART  --   --  62.5*   FIO2 21 21 21   HCO3 ART  --   --  30.4*   BASE EXCESS ART  --   --  2.3*   CARBOXYHEMOGLOBIN  --   --  0.2   CARBOXYHEMOGLOBIN (VENOUS) 0.3 0.3  --      Brief Urine Lab Results  (Last result in the past 365 days)      Color   Clarity   Blood   Leuk Est   Nitrite   Protein   CREAT   Urine HCG        04/08/21 0823 Yellow Clear Negative Negative Negative Negative             Microbiology Results (last 10 days)     Procedure Component Value - Date/Time    COVID PRE-OP / PRE-PROCEDURE SCREENING ORDER (NO ISOLATION) - Swab, Nasopharynx [[id number]]  (Normal) Collected: 04/07/21 0516    Lab Status: Final result Specimen: Swab from Nasopharynx Updated: 04/07/21 0637    Narrative:      The following orders were created for panel order COVID PRE-OP / PRE-PROCEDURE SCREENING ORDER (NO ISOLATION) - Swab, Nasopharynx.  Procedure                               Abnormality         Status                     ---------                               -----------         ------                     COVID-19 and FLU A/B PCR...[[id number]]  Normal              Final result                 Please view results for these tests on the individual orders.    COVID-19 and FLU A/B PCR - Swab, Nasopharynx [[id number]]  (Normal) Collected: 04/07/21 0516    Lab Status: Final result Specimen: Swab from Nasopharynx Updated: 04/07/21 0637     COVID19 Not Detected     Influenza A PCR Not Detected     Influenza B PCR Not Detected    Narrative:      Fact sheet for providers: https://www.fda.gov/media/689299/download    Fact sheet for patients: https://www.fda.gov/media/978342/download    Test performed by PCR.          Adult Transesophageal Echo (BAO) W/ Cont if Necessary Per  Protocol    Result Date: 4/8/2021  · Estimated left ventricular EF = 55%. Left ventricular systolic function is normal. · Saline test with Valsalva negative for evidence of an intra-atrial shunt.      Adult Transthoracic Echo Complete W/ Cont if Necessary Per Protocol    Addendum Date: 4/7/2021    · Calculated left ventricular EF = 55% · No significant valvular abnormalities · Technically difficult bubble study. Appears negative for interatrial shunting      Result Date: 4/7/2021  · Calculated left ventricular EF = 55% · No significant valvular abnormalities · Technically difficult study. Appears negative for interatrial shunting      EEG    Result Date: 4/9/2021  Reason for referral: 30 y.o.female with syncope Technical Summary:  A 19 channel digital EEG was performed using the international 10-20 placement system, including eye leads and EKG leads. Duration: 20 minutes Video: On Findings: The awake tracing shows a well-regulated medium amplitude 9 Hz posterior rhythm seen symmetrically over the occipital and posterior parietal head leads.  Low to medium amplitude theta and intermixed EMG artifact is present anteriorly.  Light sleep is seen with mild slowing of the background and vertex waves.  Photic stimulation does not change the background.  Hyperventilation is not performed.  No focal slowing or epileptiform activity is seen. EKG: Regular, 60-70 bpm     Normal EEG in the awake and lightly asleep states No epileptiform activity is seen This report is transcribed using the Dragon dictation system.      MRI Brain Without Contrast    Result Date: 4/11/2021  EXAMINATION: MRI BRAIN WO CONTRAST-  INDICATION: LUE [id number], syncope; R55-Syncope and collapse; R07.9-Chest pain, unspecified  TECHNIQUE: Multiplanar multisequence MRI of the brain performed without IV contrast  COMPARISON: NONE  FINDINGS: No acute infarct noted on diffusion weighted sequences. Midline structures are unremarkable and the craniocervical  junction is satisfactory in appearance. There is no evidence of intracranial hemorrhage, mass or mass effect. The ventricles are normal in size and configuration. The orbits are normal and the paranasal sinuses are grossly clear.      Normal noncontrast MRI of the brain.   This report was finalized on 4/11/2021 9:42 AM by Cecil Colby.      CT Abdomen Pelvis With Contrast    Result Date: 4/9/2021  EXAMINATION: CT ABDOMEN PELVIS W CONTRAST-  INDICATION: Abdominal pain, acute, nonlocalized; R55-Syncope and collapse; R07.9-Chest pain, unspecified  TECHNIQUE: Axial IV contrast-enhanced CT of the abdomen and pelvis with multiplanar reconstruction  The radiation dose reduction device was turned on for each scan per the ALARA (As Low as Reasonably Achievable) protocol.  COMPARISON: NONE  FINDINGS: The lung bases are grossly clear. Body wall soft tissues are unremarkable. There is no evidence of acute fracture or aggressive osseous lesion. The liver, pancreas and bilateral adrenal glands demonstrate homogeneous enhancement without evidence of suspicious focal lesion. The spleen is absent. No free fluid or pneumoperitoneum. Normal caliber abdominal aorta. No bulky retroperitoneal adenopathy. The kidneys demonstrate symmetric nephrogram and contrast excretion without evidence of hydronephrosis or contour deforming mass. Small and large bowel loops are nondilated. There is no suspicious focal bowel wall thickening. Gallbladder is surgically absent. Prior appendectomy. The pelvic viscera are unremarkable.      No acute findings in the abdomen and pelvis.  Findings compatible with provided history of prior cholecystectomy, appendectomy and asplenia.   This report was finalized on 4/9/2021 6:49 PM by Cecil oClby.      Cardiac Catheterization/Vascular Study    Result Date: 4/8/2021  · Normal coronary arteries · Normal LV systolic function · Moderately elevated LV filling pressure (LVEDP 24 mmHg) · Mild pulmonary  hypertension (mean PA pressure 32 mmHg) · Supranormal cardiac output/index · No evidence of intracardiac or pulmonary shunt.      Bilateral Carotid Duplex    Result Date: 4/7/2021  · Mildly elevated carotid velocities bilaterally. · No evidence of atherosclerosis · Vertebral artery flow is antegrade bilaterally · Proximal right internal carotid artery is normal. · Proximal left internal carotid artery is normal.      Adult Transthoracic Echo Limited W/ Cont if Necessary Per Protocol    Result Date: 4/10/2021  · This study was done to evaluate a patient who develops arterial oxygen desaturation in the supine position. · I was present during the study and supervised the procedure(s). · The patient was receiving oxygen by nasal prongs at 2L/min throughout the period of evaluation. · In a sitting position, the patients arterial saturation is 97% and an agitated saline study reveals no evidence of R>L shunting. · When the patient assumes a supine position, saturation falls to 70% within one minute and no evidence of R>L shunting is noted. · When the patient assumes a supine position and carries out a Valsalva maneuver, saturation falls to 70% within a minute with no evidence of R>L shunting. · Bouts of positional oxygen desaturation resolve within one minute on assuming a sitting position. · NO evidence of intracardiac or pulmonary shunting is noted during this study despite the occurence of rapid and profound arterial desaturation when the patient assumes a supine position.      CT Angiogram Chest    Result Date: 4/7/2021  CTA Chest INDICATION: Chest pain and shortness of air. Syncope. TECHNIQUE: CT angiogram of the chest with IV contrast. 3-D reconstructions were obtained and reviewed.   Radiation dose reduction techniques included automated exposure control or exposure modulation based on body size. Count of known CT and cardiac nuc med studies performed in previous 12 months: 1. COMPARISON: 2/25/2021 FINDINGS: No  pulmonary embolism or aortic dissection is identified. The left vertebral artery has a separate origin from the aortic arch as an anatomic variant. Great vessels are otherwise unremarkable. No pleural or pericardial effusion is seen. There is a mildly enlarged right paratracheal lymph node measuring 1.1 cm. No other adenopathy is seen. Upper abdominal images show cholecystectomy. A loop recorder is present in the left chest wall. Central venous catheter tip is at the cavoatrial junction. Other than some mild atelectasis in the lower lobes, the lungs are clear. No CT findings present to indicate pneumonia. Note: CT may be negative in the earliest stages of COVID-19.     1. No PE or aortic dissection. 2. Negative for pneumonia. Signer Name: Elmer Mims MD  Signed: 4/7/2021 2:16 AM  Workstation Name: [email or web address]  [facility]    Doppler Transcranial Microbubble Injection CAR    Result Date: 4/9/2021  Mean velocities and waveforms in the right and left MCA and terminal ICA are normal Following injection of agitated saline, both before and after Valsalva, no abnormal signals are seen in the right MCA Negative bubble study                         Results for orders placed during the hospital encounter of 04/06/21    Doppler Transcranial Microbubble Injection CAR    Interpretation Summary  Mean velocities and waveforms in the right and left MCA and terminal ICA are normal    Following injection of agitated saline, both before and after Valsalva, no abnormal signals are seen in the right MCA    Negative bubble study      Results for orders placed during the hospital encounter of 04/06/21    Doppler Transcranial Microbubble Injection CAR    Interpretation Summary  Mean velocities and waveforms in the right and left MCA and terminal ICA are normal    Following injection of agitated saline, both before and after Valsalva, no abnormal signals are seen in the right MCA    Negative bubble  study      Results for orders placed during the hospital encounter of 04/06/21    Adult Transthoracic Echo Limited W/ Cont if Necessary Per Protocol    Interpretation Summary  · This study was done to evaluate a patient who develops arterial oxygen desaturation in the supine position.  · I was present during the study and supervised the procedure(s).  · The patient was receiving oxygen by nasal prongs at 2L/min throughout the period of evaluation.  · In a sitting position, the patients arterial saturation is 97% and an agitated saline study reveals no evidence of R>L shunting.  · When the patient assumes a supine position, saturation falls to 70% within one minute and no evidence of R>L shunting is noted.  · When the patient assumes a supine position and carries out a Valsalva maneuver, saturation falls to 70% within a minute with no evidence of R>L shunting.  · Bouts of positional oxygen desaturation resolve within one minute on assuming a sitting position.  · NO evidence of intracardiac or pulmonary shunting is noted during this study despite the occurence of rapid and profound arterial desaturation when the patient assumes a supine position.      Plan for Follow-up of Pending Labs/Results:     Discharge Details        Discharge Medications      New Medications      Instructions Start Date   budesonide-formoterol 160-4.5 MCG/ACT inhaler  Commonly known as: SYMBICORT   2 puffs, Inhalation, 2 Times Daily - RT      HYDROmorphone 2 MG tablet  Commonly known as: DILAUDID   1 mg, Oral, Every 6 Hours PRN      sodium chloride 0.9 % solution   100 mL/hr (100 mL/hr), Intravenous, Continuous         Changes to Medications      Instructions Start Date   azaTHIOprine 100 MG tablet  Commonly known as: IMURAN  What changed: Another medication with the same name was removed. Continue taking this medication, and follow the directions you see here.   150 mg, Oral, Daily      dronabinol 5 MG capsule  Commonly known as: MARINOL  What  changed: Another medication with the same name was removed. Continue taking this medication, and follow the directions you see here.   5 mg, 4 Times Daily PRN      omeprazole 40 MG capsule  Commonly known as: priLOSEC  What changed: additional instructions   40 mg, Oral, 2 times daily, 30 minutes before a meal.         Continue These Medications      Instructions Start Date   albuterol sulfate  (90 Base) MCG/ACT inhaler  Commonly known as: PROVENTIL HFA;VENTOLIN HFA;PROAIR HFA   Take 2 Puffs by inhalation every 4 hours as needed for wheezing.      Alcohol Prep 70 % pads   Check blood glucose up to 2 times daily      ascorbic acid 1000 MG tablet  Commonly known as: VITAMIN C   1,000 mg, Oral, Daily      cycloSPORINE 0.05 % ophthalmic emulsion  Commonly known as: RESTASIS   1 drop. PRN      EpiPen 2-Ck 0.3 MG/0.3ML solution auto-injector injection  Generic drug: EPINEPHrine   EpiPen 0.3 mg/0.3 mL injection, auto-injector   Take 1 auto by injection route.      ESTRADIOL PO   Oral      fludrocortisone 0.1 MG tablet   TAKE ONE TABLET BY MOUTH DAILY..05MG PER PT      fluticasone 50 MCG/ACT nasal spray  Commonly known as: FLONASE   1 spray      folic acid 1 MG tablet  Commonly known as: FOLVITE   4 mg, Oral, Daily      gabapentin 600 MG tablet  Commonly known as: NEURONTIN   900 mg, Oral, 3 Times Daily      Insulin Pen Needle 32G X 4 MM misc   Use to administer Natpara subcutaneously daily      levothyroxine 75 MCG tablet  Commonly known as: SYNTHROID, LEVOTHROID   levothyroxine 75 mcg tablet      lidocaine 5 %  Commonly known as: LIDODERM   1 patch, Transdermal, Every 24 Hours, Remove & Discard patch within 12 hours or as directed by MD Mcintosh 2 MG tablet  Generic drug: eszopiclone   2 mg, Oral, Nightly, Take immediately before bedtime       magnesium oxide 400 (241.3 Mg) MG tablet tablet  Commonly known as: MAGOX   1,000 mg, Oral, 3 Times Daily      metFORMIN 500 MG tablet  Commonly known as: GLUCOPHAGE    "500 mg, Oral, 2 Times Daily With Meals      methocarbamol 500 MG tablet  Commonly known as: ROBAXIN   500 mg, Oral, 3 Times Daily      multivitamin with minerals tablet tablet   1 tablet, Oral      nadolol 20 MG tablet  Commonly known as: CORGARD   20 mg, Oral, 2 times daily      ONE TOUCH ULTRA 2 w/Device kit   Use to check blood glucose up to 2 times daily      OneTouch Delica Lancets 33G misc   Use to check blood glucose up to 2 times daily      OT ULTRA/FASTTK CNTRL SOLN solution   Use weekly and as needed to calibrate meter      progesterone 200 MG capsule  Commonly known as: PROMETRIUM   200 mg, Oral, Daily      promethazine 25 MG suppository  Commonly known as: PHENERGAN   25 mg, Rectal, Daily PRN      promethazine 25 MG tablet  Commonly known as: PHENERGAN   25 mg, Oral, Every 6 Hours PRN      RA XYDRA EF PO   Oral      Solu-CORTEF 100 MG injection  Generic drug: hydrocortisone sodium succinate   Give 100 mg ( 2 mL ) intramuscular as needed for vomiting, severe illness  Dispense actovial      Systane Overnight Therapy 0.3 % gel  Generic drug: Hypromellose   Systane Gel 0.3 % eye gel      Tegaderm Film 2-3/8\"x2-3/4\" misc   Apply over pump insertion site every 2 days..      venlafaxine 25 MG tablet  Commonly known as: EFFEXOR   75 mg, Oral, 3 Times Daily      zinc sulfate 220 (50 Zn) MG capsule  Commonly known as: ZINCATE   zinc sulfate 220 mg (50 mg) capsule         Stop These Medications    azithromycin 250 MG tablet  Commonly known as: ZITHROMAX     calcitriol 0.5 MCG capsule  Commonly known as: ROCALTROL     Calcium Citrate 250 MG tablet     Cortef 10 MG tablet  Generic drug: hydrocortisone     cyproheptadine 4 MG tablet  Commonly known as: PERIACTIN     hydrocortisone 20 MG tablet  Commonly known as: CORTEF     KLOR-CON 10 PO     NATPARA SC     Vitamin D (Cholecalciferol) 25 MCG (1000 UT) capsule            Allergies   Allergen Reactions   • Azithromycin Hives   • Cefpodoxime Hives   • Cephalosporins " "Hives   • Clarithromycin Hives     biaxin  biaxin  biaxin   • Levofloxacin Hives   • Nitrofurantoin Hives   • Nystatin Hives   • Penicillins Hives   • Sulfa Antibiotics Hives   • Midazolam Unknown - Low Severity   • Morphine Other (See Comments)     \"it doesn't work. [facility] told me to list it as an allergy\"   • Nitrofurantoin Macrocrystal Other (See Comments) and Hives   • Ondansetron Other (See Comments)     \"it doesn't work. [facility] told me to list it as an allergy\"         Discharge Disposition:      Diet:  Hospital:  Diet Order   Procedures   • Diet Regular       Activity:      Restrictions or Other Recommendations:         CODE STATUS:    Code Status and Medical Interventions:   Ordered at: 04/07/21 0443     Level Of Support Discussed With:    Patient     Code Status:    CPR     Medical Interventions (Level of Support Prior to Arrest):    Full       Future Appointments   Date Time Provider Department Center   12/3/2021  2:15 PM Jeramy Meyer MD [facility] RAGHAV Aggarwal MD  04/13/21      Time Spent on Discharge:  I spent  45  minutes on this discharge activity which included: face-to-face encounter with the patient, reviewing the data in the system, coordination of the care with the nursing staff as well as consultants, documentation, and entering orders.          "

## 2021-04-13 NOTE — PROGRESS NOTES
[facility] Medicine Services  PROGRESS NOTE    Patient Name: Krista Richter  : 1990  MRN: [id number]    Date of Admission: 2021  Primary Care Physician: Agustin Turner MD    Subjective   Subjective     CC:  Syncope/spells/hypoxia    HPI:  Very hopeful to go home soon. Says that her calcium has been high before and feels she can manage this at home. Declines transfer to the [facility].    ROS  Gen- No fevers, chills  CV- No chest pain, palpitations  Resp- No cough, dyspnea  GI- No N/V/D, abd pain        Objective   Objective     Vital Signs:   Temp:  [97.7 °F (36.5 °C)-98.6 °F (37 °C)] 97.7 °F (36.5 °C)  Heart Rate:  [] 88  Resp:  [18-26] 18  BP: (110-123)/(74-87) 123/84        Physical Exam:  Constitutional - no acute distress, nontoxic, in bed  HEENT-NCAT, mucous membranes moist  CV-RRR, S1 S2 normal, no m/r/g  Resp-CTAB, no wheezes, rhonchi or rales  Abd-soft, nontender, nondistended, normoactive bowel sounds  Ext-No lower extremity cyanosis, clubbing or edema bilaterally  Neuro-alert and oriented, speech clear, moves all extremities   Psych-normal affect   Skin- No rash on exposed UE or LE bilaterally    Results Reviewed:  Results from last 7 days   Lab Units 21  0829 21  0916 21  1228   WBC 10*3/mm3 9.91 11.87* 9.38   HEMOGLOBIN g/dL 14.5 13.8 12.5   HEMATOCRIT % 45.5 42.9 39.4   PLATELETS 10*3/mm3 271 242 244     Results from last 7 days   Lab Units 21  0857 21  0829 21  0916 21  0433 21  0602 21  2131   SODIUM mmol/L 136 138 141  --   --  137   POTASSIUM mmol/L 5.2 5.4* 4.3  --   --  5.0   CHLORIDE mmol/L 94* 97* 98  --   --  95*   CO2 mmol/L 36.0* 36.0* 35.0*  --   --  28.0   BUN mg/dL 18 11 10  --   --  13   CREATININE mg/dL 0.65 0.65 0.60  --   --  0.71   GLUCOSE mg/dL 96 106* 122*  --   --  69   CALCIUM mg/dL 13.5* 11.0* 11.2*  --   --  9.7   ALT (SGPT) U/L 17 18 13  --    < > 18   AST  (SGOT) U/L 27 32 23  --    < > 38*   TROPONIN T ng/mL  --   --   --  <0.010  --  <0.010    < > = values in this interval not displayed.     Estimated Creatinine Clearance: 115.8 mL/min (by C-G formula based on SCr of 0.65 mg/dL).    Microbiology Results Abnormal     Procedure Component Value - Date/Time    COVID PRE-OP / PRE-PROCEDURE SCREENING ORDER (NO ISOLATION) - Swab, Nasopharynx [[id number]]  (Normal) Collected: 04/07/21 0516    Lab Status: Final result Specimen: Swab from Nasopharynx Updated: 04/07/21 0637    Narrative:      The following orders were created for panel order COVID PRE-OP / PRE-PROCEDURE SCREENING ORDER (NO ISOLATION) - Swab, Nasopharynx.  Procedure                               Abnormality         Status                     ---------                               -----------         ------                     COVID-19 and FLU A/B PCR...[[id number]]  Normal              Final result                 Please view results for these tests on the individual orders.    COVID-19 and FLU A/B PCR - Swab, Nasopharynx [[id number]]  (Normal) Collected: 04/07/21 0516    Lab Status: Final result Specimen: Swab from Nasopharynx Updated: 04/07/21 0637     COVID19 Not Detected     Influenza A PCR Not Detected     Influenza B PCR Not Detected    Narrative:      Fact sheet for providers: https://www.fda.gov/media/068270/download    Fact sheet for patients: https://www.fda.gov/media/128267/download    Test performed by PCR.          Imaging Results (Last 24 Hours)     ** No results found for the last 24 hours. **          Results for orders placed during the hospital encounter of 04/06/21    Adult Transthoracic Echo Limited W/ Cont if Necessary Per Protocol    Interpretation Summary  · This study was done to evaluate a patient who develops arterial oxygen desaturation in the supine position.  · I was present during the study and supervised the procedure(s).  · The patient was receiving oxygen by nasal prongs at 2L/min  throughout the period of evaluation.  · In a sitting position, the patients arterial saturation is 97% and an agitated saline study reveals no evidence of R>L shunting.  · When the patient assumes a supine position, saturation falls to 70% within one minute and no evidence of R>L shunting is noted.  · When the patient assumes a supine position and carries out a Valsalva maneuver, saturation falls to 70% within a minute with no evidence of R>L shunting.  · Bouts of positional oxygen desaturation resolve within one minute on assuming a sitting position.  · NO evidence of intracardiac or pulmonary shunting is noted during this study despite the occurence of rapid and profound arterial desaturation when the patient assumes a supine position.      I have reviewed the medications:  Scheduled Meds:Pharmacy Consult, , Does not apply, Daily  ascorbic acid, 1,000 mg, Oral, Daily  azaTHIOprine, 150 mg, Oral, Daily  budesonide-formoterol, 2 puff, Inhalation, BID - RT  cycloSPORINE, 1 drop, Both Eyes, BID  dexamethasone, 4 mg, Intravenous, Once  dextrose, 25 g, Intravenous, Once  dextrose, 50 mL, Intravenous, Once  fentaNYL citrate (PF), , ,   fludrocortisone, 100 mcg, Oral, Daily  fluticasone, 1 spray, Each Nare, Daily  folic acid, 4 mg, Oral, Daily  gabapentin, 600 mg, Oral, Q8H  heparin, 500 Units, Intracatheter, Once  hydrocortisone, 10 mg, Oral, Nightly  hydrocortisone, 20 mg, Oral, Daily With Breakfast  levothyroxine, 75 mcg, Oral, Q AM  magnesium oxide, 1,000 mg, Oral, TID  magnesium sulfate, 2 g, Intravenous, Once  methocarbamol, 500 mg, Oral, TID  methohexital, , ,   midazolam, , ,   multivitamin with minerals, 1 tablet, Oral, Daily  nadolol, 20 mg, Oral, Q12H  pantoprazole, 40 mg, Oral, Daily  polyethylene glycol, 17 g, Oral, Daily  potassium chloride, 40 mEq, Oral, TID  progesterone, 200 mg, Oral, Daily  sodium chloride, 10 mL, Intravenous, Q12H  venlafaxine, 75 mg, Oral, TID  zinc sulfate, 220 mg, Oral,  Daily      Continuous Infusions:sodium chloride, 100 mL/hr, Last Rate: 100 mL/hr (04/13/21 1122)      PRN Meds:.acetaminophen **OR** acetaminophen **OR** acetaminophen  •  acetaminophen  •  atropine  •  butalbital-acetaminophen-caffeine  •  dextrose  •  dextrose  •  glucagon (human recombinant)  •  HYDROmorphone  •  hydrOXYzine  •  ibuprofen  •  melatonin  •  [DISCONTINUED] HYDROmorphone **AND** naloxone  •  prochlorperazine **OR** prochlorperazine **OR** prochlorperazine  •  promethazine  •  promethazine  •  sodium chloride  •  [COMPLETED] Insert peripheral IV **AND** sodium chloride  •  sodium chloride    Assessment/Plan   Assessment & Plan     Active Hospital Problems    Diagnosis  POA   • **Syncope [R55]  Yes   • Leukocytosis [D72.829]  Yes   • Hypoparathyroidism (CMS/HCC) [E20.9]  Yes   • Polyglandular autoimmune syndrome, 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        Brief Hospital Course to date:  Krista Richter is a  30 y.o. female with PMH significant for polyglandular deficiency syndrome, sleep apnea, prolonged QT syndrome, DM 2, hyperparathyroid, anxiety, asthma, adrenal insufficiency who presents to the ED with complaint of progressive shortness of breath and syncope.    This patient's problems and plans were partially entered by my partner and updated as appropriate by me 04/13/21.         Syncope  Chest pain, ongoing  Hypoxia  --CTA negative  --Duplex and TTE reviewed   --discussed evaluation plan with Pulmonary Dr Niño, Cardiology Dr Juarez and with EP Dr Meyer. Bubble study by Dr Juarez did not show a PFO however the patient's oxygen saturations dropped when laying flat. No shunt observed on CT abdomen. Consider vocal cord dysfunction, ENT consultation requested, patient refused scope exam with Dr Srinivasan. Cardiology and EP would like further evaluation  at a higher level of care such as the [facility] or [facility]. Records faxed to the [facility] Dr Brito (adult congenital heart specialist), and Dr Graham's office will contact the family regarding admission and further evaluation at that facility as soon as possible. Continue home oxygen and provide patient with a pulse oximeter at discharge.    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 Parathyroid hormone level was 289 on 4/12.  Patient on Calcitriol 0.5 mcg BID, calcium carbonate 500 mg daily, parathyroid hormone 25 mcg BID and cortef 20 mg am and 10 mg pm. She has also received three infusions of calcium gluconate IV during this hospitalization for tetany episodes (last infusion on 4/11).    --I discussed the elevated calcium level with Ms Richter and her mother (who was on the phone while I was at bedside) and discussed the need to lower the calcium level before safe discharge home could be contemplated. The patient and family state they have dealt with calcium this high at home before, and wish to leave.  I called and spoke to the patient's primary endocrinologist Dr. Ruben Morris at the [facility], reviewed recent labs as well as the reason for hospitalization. Dr Morris agreed that the calcium level is too high for safe discharge, and given the complexity of the patient's underlying syndrome, she would recommend transfer to the [facility] today for further care.   - I discussed transfer to [facility] (and the reasoning behind the transfer) with the  patient and her mother, however the Ms Ledbetter adamantly declines transfer to that [facility]. Both she and her mother would prefer to care for the patient at home.  - I also discussed the treatment plan with Dr Morris for her hypercalcemia -- we will hold further doses of parathyroid hormone, calcium carbonate and calcitriol, and will provide IV fluid hydration. We will also change the patient's oral cortef to IV solucortef 50 mg q8h. Discussed these changes with Ms Ledbetter.  - check cmp and ionized calcium q 8 hours  - inpatient endocrinology consultation requested while patient remains hospitalized here.  -Continue fludrocortisone  -Continue levothyroxine     Insomnia  - patient asks about home lunesta -- discussed with patient and mother at bedside that as patient currently receiving narcotics, would prefer not to provide lunesta at this time to maintain patient safety    Acute on chronic pain  - palliative evaluation, long term opiates not in patient's best interest, but I did suggest outpatient followup with pain management, recommended Dr Carbajal to family.    Muscle spasms  - patient seen by Neurology, felt not consistent with dystonia. No observed association with hypocalcemia noted. These events appear to be a chronic issue. Patient/family have been treating at home with IV calcium infusions, however calcium level here high -- any further calcium infusions would risk serious complications including calciphylaxis or arrhythmias. Discussed in detail reasoning for not giving IV calcium and the dangers involved with patient and family    Syncope  - patient seen by neurology Dr Aldana, workup including EEG and MRI brain not suggestive of epileptic events or seizure disorder. Cardiac evaluation continues    Migraines  - continue followup with the [facility].        DVT prophylaxis: Mechanical      Disposition: I expect the patient to be discharged TBD    More than 50% of time spent  counseling on current illness and plan of care. Case discussed with: patient, significant other at bedside and mother by speakerphone while at bedside, then have returned to update the patient and family several times. Ms Richter is very anxious to leave the hospital today, however unfortunately with the elevated calcium level, it does not currently appear safe. Given the dissatisfaction expressed by the patient and her family, I asked Patient Relations to meet with them as a group to address their concerns.    Total time spent face to face with the patient was 30 minutes.  Total time of the encounter was 55 minutes.    Addendum 4/13/21 at 1:15 PM: discussed case with Endocrinology Dr González Chan--reviewed plan for treatment of hypercalcemia. He agreed with holding parathyroid hormone for at least one dose and with provision of IV fluids. We can likely restart parathyroid hormone in the morning if calcium level is at the high end of normal. Unfortunately Dr Chan' group no longer provides inpatient consults, however Dr Pruitt is on call this evening should additional questions arise.    Updated Ms Richter on the plan, she would now like transfer to the [facility]. I called the [facility] transfer center but no beds are available for the medicine teams, and there is no expectation of bed availability in the near future. They do not keep a waiting list. I called and left a message with the patient's primary Endocrinologist to see if any other arrangements can be made in order to transfer the patient to [facility].    Addendum 4/13/21 at 2:07 pm -- Dr Morris's nurse contacted me, suggested possible transfer to [facility] in [place] as their endocrine team follows at that facility too, but no beds available there either. Updated patient.    Addendum 4/13/21 at 2:38 pm -- Dr Morris recommends transfer to [facility]. Contacted transfer center at [facility], await call back. Also  discussed transfer with Cardiology Dr Meyer.  Transfer acceptable, but would like EKG before transfer. EKG ordered.    Addendum 4/13/21 at 3:46 pm -- spoke with Dr Alvarado Miller at [facility] -- he as agreeable to patient transfer to their facility pending bed availability. They will call if/when a bed is available. Case management has arranged for an ACL[facility] ambulance to be on call via [facility] for transfer this evening and tonight. Discussed EKG with Dr Meyer, it appears OK on his review.    Addendum 4/13/21 at 5:32 pm -- CMP shows calcium level of 11.8, potassium level of 6.4. Ionized calcium of 1.73. I spoke with the chemistry lab - the blood sample was slightly hemolyzed, and the actual value of potassium would be estimated as closer to 6.  Discussed results with patient and her significant other at bedside. Continuing IV fluids, stopping potassium supplements (patient takes supplements TID), and will reorder potassium level at 7 pm. Considered Lokelma, however as patient typically requires potassium supplementation, would prefer to hold oral potassium, continue fluids, and repeat labs.  Will discuss with our [facility] the need to monitor overnight labs. Transfer summary will also be completed if patient has a bed at [facility].    CODE STATUS:   Code Status and Medical Interventions:   Ordered at: 04/07/21 0443     Level Of Support Discussed With:    Patient     Code Status:    CPR     Medical Interventions (Level of Support Prior to Arrest):    Full       Alber Aggarwal MD  04/13/21

## 2021-04-14 VITALS
OXYGEN SATURATION: 99 % | RESPIRATION RATE: 14 BRPM | WEIGHT: 149 LBS | DIASTOLIC BLOOD PRESSURE: 81 MMHG | HEIGHT: 63 IN | TEMPERATURE: 98 F | HEART RATE: 92 BPM | BODY MASS INDEX: 26.4 KG/M2 | SYSTOLIC BLOOD PRESSURE: 120 MMHG

## 2021-04-14 LAB
ALBUMIN SERPL-MCNC: 3.9 G/DL (ref 3.5–5.2)
ALBUMIN SERPL-MCNC: 4.1 G/DL (ref 3.5–5.2)
ALBUMIN/GLOB SERPL: 1.5 G/DL
ALBUMIN/GLOB SERPL: 1.5 G/DL
ALP SERPL-CCNC: 70 U/L (ref 39–117)
ALP SERPL-CCNC: 75 U/L (ref 39–117)
ALT SERPL W P-5'-P-CCNC: 10 U/L (ref 1–33)
ALT SERPL W P-5'-P-CCNC: 12 U/L (ref 1–33)
ANION GAP SERPL CALCULATED.3IONS-SCNC: 5 MMOL/L (ref 5–15)
ANION GAP SERPL CALCULATED.3IONS-SCNC: 8 MMOL/L (ref 5–15)
AST SERPL-CCNC: 17 U/L (ref 1–32)
AST SERPL-CCNC: 19 U/L (ref 1–32)
BILIRUB SERPL-MCNC: 0.6 MG/DL (ref 0–1.2)
BILIRUB SERPL-MCNC: 0.7 MG/DL (ref 0–1.2)
BUN SERPL-MCNC: 13 MG/DL (ref 6–20)
BUN SERPL-MCNC: 15 MG/DL (ref 6–20)
BUN/CREAT SERPL: 20.6 (ref 7–25)
BUN/CREAT SERPL: 25 (ref 7–25)
CA-I SERPL ISE-MCNC: 1.43 MMOL/L (ref 1.12–1.32)
CA-I SERPL ISE-MCNC: 1.56 MMOL/L (ref 1.12–1.32)
CALCIUM SPEC-SCNC: 10.9 MG/DL (ref 8.6–10.5)
CALCIUM SPEC-SCNC: 9.6 MG/DL (ref 8.6–10.5)
CHLORIDE SERPL-SCNC: 97 MMOL/L (ref 98–107)
CHLORIDE SERPL-SCNC: 99 MMOL/L (ref 98–107)
CO2 SERPL-SCNC: 34 MMOL/L (ref 22–29)
CO2 SERPL-SCNC: 34 MMOL/L (ref 22–29)
CREAT SERPL-MCNC: 0.6 MG/DL (ref 0.57–1)
CREAT SERPL-MCNC: 0.63 MG/DL (ref 0.57–1)
GFR SERPL CREATININE-BSD FRML MDRD: 110 ML/MIN/1.73
GFR SERPL CREATININE-BSD FRML MDRD: 117 ML/MIN/1.73
GLOBULIN UR ELPH-MCNC: 2.6 GM/DL
GLOBULIN UR ELPH-MCNC: 2.8 GM/DL
GLUCOSE BLDC GLUCOMTR-MCNC: 122 MG/DL (ref 70–130)
GLUCOSE SERPL-MCNC: 130 MG/DL (ref 65–99)
GLUCOSE SERPL-MCNC: 132 MG/DL (ref 65–99)
POTASSIUM SERPL-SCNC: 4.5 MMOL/L (ref 3.5–5.2)
POTASSIUM SERPL-SCNC: 4.8 MMOL/L (ref 3.5–5.2)
PROT SERPL-MCNC: 6.5 G/DL (ref 6–8.5)
PROT SERPL-MCNC: 6.9 G/DL (ref 6–8.5)
QT INTERVAL: 356 MS
QTC INTERVAL: 386 MS
SODIUM SERPL-SCNC: 138 MMOL/L (ref 136–145)
SODIUM SERPL-SCNC: 139 MMOL/L (ref 136–145)

## 2021-04-14 PROCEDURE — 63710000001 PROMETHAZINE PER 25 MG: Performed by: INTERNAL MEDICINE

## 2021-04-14 PROCEDURE — 82330 ASSAY OF CALCIUM: CPT | Performed by: INTERNAL MEDICINE

## 2021-04-14 PROCEDURE — 25010000002 HEPARIN LOCK FLUSH PER 10 UNITS: Performed by: INTERNAL MEDICINE

## 2021-04-14 PROCEDURE — 99239 HOSP IP/OBS DSCHRG MGMT >30: CPT | Performed by: INTERNAL MEDICINE

## 2021-04-14 PROCEDURE — 80053 COMPREHEN METABOLIC PANEL: CPT | Performed by: INTERNAL MEDICINE

## 2021-04-14 PROCEDURE — 82962 GLUCOSE BLOOD TEST: CPT

## 2021-04-14 PROCEDURE — 63710000001 AZATHIOPRINE PER 50 MG: Performed by: INTERNAL MEDICINE

## 2021-04-14 PROCEDURE — 25010000002 HYDROCORTISONE SODIUM SUCCINATE 100 MG RECONSTITUTED SOLUTION: Performed by: INTERNAL MEDICINE

## 2021-04-14 RX ORDER — HEPARIN SODIUM (PORCINE) LOCK FLUSH IV SOLN 100 UNIT/ML 100 UNIT/ML
5 SOLUTION INTRAVENOUS AS NEEDED
Status: DISCONTINUED | OUTPATIENT
Start: 2021-04-14 | End: 2021-04-14 | Stop reason: HOSPADM

## 2021-04-14 RX ADMIN — PANTOPRAZOLE SODIUM 40 MG: 40 TABLET, DELAYED RELEASE ORAL at 09:48

## 2021-04-14 RX ADMIN — ZINC SULFATE 220 MG (50 MG) CAPSULE 220 MG: CAPSULE at 09:49

## 2021-04-14 RX ADMIN — FLUDROCORTISONE ACETATE 100 MCG: 0.1 TABLET ORAL at 09:49

## 2021-04-14 RX ADMIN — GABAPENTIN 600 MG: 300 CAPSULE ORAL at 05:44

## 2021-04-14 RX ADMIN — LEVOTHYROXINE SODIUM 75 MCG: 75 TABLET ORAL at 05:44

## 2021-04-14 RX ADMIN — Medication 1000 MG: at 09:48

## 2021-04-14 RX ADMIN — FOLIC ACID 4 MG: 1 TABLET ORAL at 09:48

## 2021-04-14 RX ADMIN — BUTALBITAL, ACETAMINOPHEN AND CAFFEINE 1 TABLET: 50; 325; 40 TABLET ORAL at 10:08

## 2021-04-14 RX ADMIN — NADOLOL 20 MG: 20 TABLET ORAL at 09:49

## 2021-04-14 RX ADMIN — HYDROMORPHONE HYDROCHLORIDE 1 MG: 2 TABLET ORAL at 10:08

## 2021-04-14 RX ADMIN — PROMETHAZINE HYDROCHLORIDE 25 MG: 25 TABLET ORAL at 10:08

## 2021-04-14 RX ADMIN — Medication 1 TABLET: at 09:51

## 2021-04-14 RX ADMIN — AZATHIOPRINE 150 MG: 50 TABLET ORAL at 09:51

## 2021-04-14 RX ADMIN — VENLAFAXINE 75 MG: 75 TABLET ORAL at 09:48

## 2021-04-14 RX ADMIN — HEPARIN 500 UNITS: 100 SYRINGE at 10:27

## 2021-04-14 RX ADMIN — METHOCARBAMOL 500 MG: 500 TABLET, FILM COATED ORAL at 09:48

## 2021-04-14 RX ADMIN — HYDROCORTISONE SODIUM SUCCINATE 50 MG: 100 INJECTION, POWDER, FOR SOLUTION INTRAMUSCULAR; INTRAVENOUS at 05:44

## 2021-04-14 RX ADMIN — FLUTICASONE PROPIONATE 1 SPRAY: 50 SPRAY, METERED NASAL at 09:58

## 2021-04-14 RX ADMIN — CYCLOSPORINE 1 DROP: 0.5 EMULSION OPHTHALMIC at 09:51

## 2021-04-14 RX ADMIN — POLYETHYLENE GLYCOL 3350 17 G: 17 POWDER, FOR SOLUTION ORAL at 09:51

## 2021-04-14 RX ADMIN — OXYCODONE HYDROCHLORIDE AND ACETAMINOPHEN 1000 MG: 500 TABLET ORAL at 09:48

## 2021-04-14 NOTE — PROGRESS NOTES
Case Management Discharge Note      Final Note: Spoke with pt. and family at bedside. Her plan is to dc to home. No needs were voiced at this time. SO at bedside and will provide transport.         Selected Continued Care - Admitted Since 4/6/2021     Destination    No services have been selected for the patient.              Durable Medical Equipment    No services have been selected for the patient.              Dialysis/Infusion    No services have been selected for the patient.              Home Medical Care    No services have been selected for the patient.              Therapy    No services have been selected for the patient.              Community Resources    No services have been selected for the patient.                  Transportation Services  Ambulance: [facility] (#[id number])    Final Discharge Disposition Code: 01 - home or self-care   Chief complaint:   No chief complaint on file.      Vitals:  There were no vitals taken for this visit.    HISTORY OF PRESENT ILLNESS     HPI     Problem List    1. Coronary Artery Disease  2. PTCA 06/11/2011: 2.5x14 JASON LAD, 2.5x8 JASON D1  3. Carotid Stenosis: 50-70% RACHEL   4. Hypertension  5. Hyperlipidemia  6. Diabetes Mellitus II: A1C 6.0      Sugar Solomon a 88 year old  female patient, born 11/17/1930, Medical record Number: [id number], was seen in the [facility] at 4:16 PM,on 9/12/2019 for follow-up cardiology visit.    Patient presents for 2-week follow-up with [facility] of coronary artery disease s/p PTCA 2011, carotid stenosis, hypertension, and hyperlipidemia.    Per Dr. Grubbs OV note 06/23/11:   \"The patient is 80 years old female who presented to the emergency  department on 06/08/2011 with a history of chest pain typical for angina.  The patient was found to have changes in the EKG consistent with ischemia;  however, his cardiac care, an EKG was within normal limits.  Due to the  symptoms and EKG changes, the patient was advised to have cardiac  catheterization.  Cardiac catheterization shows significant disease in the  LAD that was stented with drug-eluting stents.  The patient comes today  for follow up.  She denies any further episodes of chest discomfort.  After  the hospitalization, she had 2 episodes of sharp chest pain.  She,  however, states that she has not had any more problems.  She did have one  visit to the emergency department with those episodes, however, workup was  negative, and she was discharged with follow up with us.\"    At office visit 2/7/2019, the patient reported no new or developing cardiovascular symptoms. No changes were made to regime. Recent ED visit 6/11/2019 with complaints of a near syncopal episode and recurrent dizziness. Likely caused by a symptomatic bradycardia with a Mobitz type I. Patient converted to her baseline rhythm while in the ED. It was  determined patient's symptoms were likely due to a chronic verapamil overdose. Her dose was reduced to half and she was discharged in stable condition.    Patient had cardiology office visit on 06/18/2019 with Dmitry Lopez PA-C. Patient reported recent ED visit 1 week prior due to more frequent dizzy spells and associated fall. No related injuries. In the emergency department her heart was found to be with a rate of 50 and the EKG demonstrated WencakbarbachDemetri type I heart block. Her medications were adjusted prior to discharge. At office visit, patient stated that she was significantly less dizzy.     Recent ED visit on 08/29/2019 due to complaints of vertigo, headache, dizziness, and burning sensation throughout body. Patient was prescribed meclizine and discharged in stable condition.    At last visit on 09/04/2019, patient reported ankle swelling and poor appetite. Discontinued simvastatin and verapamil due to complaints of headaches and unsteadiness.    Today, ***      Other significant problems:  Patient Active Problem List   Diagnosis   • Atherosclerotic heart disease   • Hypertension   • Hyperlipidemia   • Renal insufficiency   • Anxiety   • Carotid artery disease (CMS/[facility])   • Shingles   • Controlled type 2 diabetes mellitus, without long-term current use of insulin (CMS/[facility])   • Gastroesophageal reflux disease   • Chronic kidney disease, stage III (moderate) (CMS/[facility])   • Bradycardia   • Atrioventricular block, Mobitz type 1, Apolinar     PAST MEDICAL, FAMILY AND SOCIAL HISTORY     Medications:  Current Outpatient Medications   Medication   • verapamil (CALAN SR, ISOPTIN SR) 120 MG CR tablet   • meclizine (ANTIVERT) 25 MG tablet   • ranitidine (ZANTAC) 150 MG tablet   • latanoprost (XALATAN) 0.005 % ophthalmic solution   • ALPRAZolam (XANAX) 0.25 MG tablet   • metoPROLOL succinate (TOPROL-XL) 25 MG 24 hr tablet   • lisinopril (ZESTRIL) 20 MG tablet   • clopidogrel (PLAVIX) 75 MG tablet   • DISPENSE    • blood glucose test strip   • blood glucose lancets   • Blood Glucose Monitoring Suppl (ACCU-CHEK MAE PLUS) w/Device Kit   • clotrimazole-betamethasone (LOTRISONE) cream   • aspirin 81 MG tablet     No current facility-administered medications for this visit.        Allergies:  ALLERGIES:   Allergen Reactions   • Macrobid [Nitrofurantoin Monohydrate Macrocrystals] SHORTNESS OF BREATH   • Biaxin [Clarithromycin] Other (See Comments)     Not effective   • Ceclor [Cefaclor] Other (See Comments)     Throat swells   • Ciprofloxacin DIZZINESS   • Penicillins Other (See Comments)     Not effective   • Sulfa Antibiotics Other (See Comments)     Throat swells       Past Medical  History/Surgeries:  Past Medical History:   Diagnosis Date   • Anxiety    • Appendicitis 02/01/2010    s/p appendectomy    • Benign essential hypertension    • CAD (coronary artery disease)    • Diabetes    • Dyslipidemia    • GERD (gastroesophageal reflux disease)    • Glaucoma    • Hiatal hernia    • S/P PTCA (percutaneous transluminal coronary angioplasty)     stent of LAD       Past Surgical History:   Procedure Laterality Date   • Hysterectomy     • Pap smear,routine  05/27/2010       Family History:  Family History   Problem Relation Age of Onset   • Cancer Father         Colon   • Heart disease Father    • Patient is unaware of any medical problems Sister    • Myocardial Infarction Brother    • Heart disease Brother    • Hypotension Brother    • Diabetes Son    • Myocardial Infarction Sister    • Cancer Sister         Neck   • Hypotension Sister    • Parkinsonism Son    • Patient is unaware of any medical problems Son        Social History:  Social History     Tobacco Use   • Smoking status: Never Smoker   • Smokeless tobacco: Never Used   Substance Use Topics   • Alcohol use: No     Alcohol/week: 0.0 standard drinks     Frequency: Never       REVIEW OF SYSTEMS     Review of Systems   Constitutional: Positive for appetite change (poor  appetite). Negative for activity change, fatigue and unexpected weight change.   Respiratory: Negative for apnea, chest tightness and shortness of breath.    Cardiovascular: Positive for leg swelling. Negative for chest pain and palpitations.   Musculoskeletal: Negative for myalgias.   Skin: Negative for pallor.   Neurological: Positive for headaches. Negative for dizziness, syncope, light-headedness and numbness.   All other systems reviewed and are negative.      PHYSICAL EXAM     Physical Exam   Constitutional: She is oriented to person, place, and time. She appears well-developed.   HENT:   Head: Normocephalic.   Eyes: Pupils are equal, round, and reactive to light.   Neck: Neck supple. No JVD present. No tracheal deviation present. No thyromegaly present.   Cardiovascular: Normal rate, regular rhythm, S1 normal, S2 normal, normal heart sounds and intact distal pulses. Exam reveals no gallop and no friction rub.   No murmur heard.   No systolic murmur is present.   No diastolic murmur is present.  Pulmonary/Chest: Effort normal and breath sounds normal. No respiratory distress. She has no decreased breath sounds. She has no wheezes. She has no rales. She exhibits no tenderness.   Abdominal: Soft. Musculoskeletal: Normal range of motion.         General: No edema.     Neurological: She is alert and oriented to person, place, and time.   Skin: Skin is warm.   Psychiatric: She has a normal mood and affect.   Vitals reviewed.      Carotid US 05/24/2016  Right side : Moderate diffuse heterogeneous plaque in the bifurcation as well as at the origins of the internal and external carotid arteries. External carotid stenosis of greater than 50%. Internal carotid artery stenosis greater than 50 less than 70%.  Left side : Mild scattered heterogeneous plaque in the bifurcation as well as at the origins of the internal and external carotid arteries ECA stenosis of slightly greater than 50%. ICA reveals mild luminal narrowing  but stenosis is less than 50%.  Both vertebral arteries had antegrade flow. Both subclavian arteries had normal peak velocities. When this study is compared to prior study performed in this patient back in April 2014 overall there has been no significant progression of disease.    ASSESSMENT/PLAN     Recent Labs   Lab 08/25/19  1045 06/11/19  0145 01/16/19  0810 12/29/18  1717   HGB 13.4 13.9 12.3 13.2   BUN 8 33* 38* 31*   Creatinine 0.95 1.82* 1.56* 1.69*   Hemoglobin A1C  --   --  6.0*  --    Potassium 2.9* 4.5 5.2* 4.4   CHOLESTEROL  --   --  146  --    HDL  --   --  57  --    CHOL/HDL  --   --  2.6  --    TRIGLYCERIDE  --   --  159*  --    CALCULATED LDL  --   --  57  --    AST/SGOT  --   --   --  32   ALT/SGPT  --   --   --  22   INR 1.0 1.0  --  1.0     LDL:   CALCULATED LDL (mg/dL)   Date Value   01/16/2019 57     .....................................................................................  ASSESSMENT AND PLAN:    Sugar oSlomon presents with the following active problems:    PROBLEMS:    1. Coronary Artery Disease  2. PTCA 06/08/2011: 2.5x14 JASON LAD, 2.5x8 JASON D1  3. Carotid Stenosis: 50-70% RACHEL   4. Hypertension  5. Hyperlipidemia  6. Diabetes Mellitus II: A1C 6.0    RECOMMENDATIONS:    Coronary Artery Disease  S/P PTCA 06/11/2011: 2.5x14 JASON LAD, 2.5x8 JASON D1  No stress testing since intervention  No recent episodes of chest pain ***  Discontinued simvastatin and verapamil at last visit (9/4/2019) due to complaints of headaches and unsteadiness ***  Continue therapy with DAPT and metoprolol succinate ***    Carotid Stenosis  Carotid US 05/24/16: 50-70% RACHEL, <50% LICA  Denies dizziness or syncope however patient reports headaches and unsteadiness ***  Would consider repeat US if patient has worsening symptoms     Hypertension  Blood pressure has trended stable ***  Discontinued verapamil at last visit (9/4/2019)  Continue present management, including lisinopril 20 mg daily  Encouraged to  keep blood pressure log at home.    Hyperlipidemia  FLP 1/16/2019: total 146, LDL 57  LDL goal <70  Discontinue simvastatin at last visit (9/4/2019)  FLP monitored by PCP      FOLLOW UP: Sugar Solomon is expected to follow up in ***.    ***      ***

## 2021-04-14 NOTE — DISCHARGE SUMMARY
[facility] Medicine Services  DISCHARGE SUMMARY    Patient Name: Krista Richter  : 1990  MRN: [id number]    Date of Admission: 2021  8:53 PM  Date of Discharge:  21  Primary Care Physician: Agustin Turner MD    Consults     Date and Time Order Name Status Description    2021 11:55 AM Inpatient Endocrinology Consult      2021 10:14 AM Inpatient ENT Consult Completed     2021 10:55 AM Inpatient Pulmonology Consult Completed     2021 12:33 AM Inpatient Palliative Care MD Consult Completed     2021 12:33 AM Inpatient Neurology Consult General Completed     2021  3:07 AM Inpatient Cardiology Consult Completed           Hospital Course     Presenting Problem:   Syncope, unspecified syncope type [R55]    Active Hospital Problems    Diagnosis  POA   • **Syncope [R55]  Yes     Priority: Medium   • Leukocytosis [D72.829]  Yes   • Hypoparathyroidism (CMS/HCC) [E20.9]  Yes   • Polyglandular autoimmune syndrome, 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          Hospital Course:  Krista Richter is a 30 y.o. female with PMH significant for polyglandular autoimmune syndrome, sleep apnea, prolonged QT syndrome, DM 2, hypoparathyroid, anxiety, asthma and adrenal insufficiency who presented to the ED with complaints of progressive shortness of breath and syncope. She notes that she has had increased shortness of breath and chest pain, and now requires supplemental oxygen on 2 L at all times due to oxygen saturation dropping to the 70s during episodes.  She has previously been evaluated per pulmonology (Dr aPrk at [facility]) with reported negative work-up.  On the night of admission, she reported 5 episodes of syncope prior to arrival at the ED.     She recently was evaluated by Electrophysiology Dr. Meyer  "who raised concern that her hypoxia was most likely secondary to cardiac shunting. He felt that she would be best served by seeing a structural heart specialist and assessed with BAO, right and left heart cath with shunt run, and possible split function pulmonary ventilation scan.      Syncope  Chest pain  Hypoxia  --patient seen and evaluated by Electrophysiology Dr Meyer, Cardiology Sudhir Fitzpatrick and Larry, Pulmonology Sudhir Long and [place] as well as Neurology Dr Aldana.   --CT angiogram of the chest showed no acute pulmonary process.   --right and left heart catheterization performed, showing:  · Normal coronary arteries  · Normal LV systolic function  · Moderately elevated LV filling pressure (LVEDP 24 mmHg)  · Mild pulmonary hypertension (mean PA pressure 32 mmHg)  · Supranormal cardiac output/index  · No evidence of intracardiac or pulmonary shunt.  --Pulmonary Dr Long opined regarding the pulmonary hypertension that referral to a larger facility for pulmonary hypertension management long term would be appropriate.    --Dr Long began symbicort  --Pulmonary Dr Niño recommended ENT consultation to see if vocal cord dysfunction may contribute to the patient's hypoxia. Dr Srinivasan with ENT did see the patient, however Ms Richter declined a scope evaluation. Dr Srinivasan did note that the patient has bilateral retracted TM's, likely mild serous effusions, and would benefit from an outpatient audio assessment. He will arrange for this exam.   --patient seen by neurology Dr Aldana, workup including EEG and MRI brain not suggestive of epileptic events or seizure disorder which would explain the syncopal events  --additional investigations were undertaken to see if a \"shunt\" with associated orthodexia could be found to explain the patient's hypoxia. A contrasted CT abdomen and pelvis was unremarkable. An echo with bubble study by Dr Juarez did not show a PFO. An ICE study was initially planned, " however Cardiology and EP subsequently felt that further evaluation at a higher level of care such as the [facility] or [facility] would be more appropriate. Records were faxed to the [facility]'s Dr Brito (adult congenital heart specialist).  Dr Graham's office will contact the family regarding admission and further evaluation at [facility] as soon as possible. From a cardiac perspective the patient could be discharged home until arrangements at [facility] are finalized. [employer]ilogy recommended to continue home oxygen and to provide the patient with a pulse oximeter at discharge.     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 Among her several endocrine medications Ms Richter typically takes calcitriol 0.5 mcg BID, calcium carbonate 500 mg daily, parathyroid hormone 25 mcg BID and cortef 20 mg am and 10 mg pm. She also takes oral potassium supplements TID at home.  During her hospitalization she received three IV calcium infusions to help relieve her tetany symptoms. These were discontinued after her calcium level returned at 11.2. Her calcium level was relatively stable at 11.0 the following day, however the day prior to discharge, her calcium level was noted to be 13.5 and her ionized calcium was 1.90.  This trended back down to within normal limits the day of discharge.  --IV fluids were started and her evening parathyroid dose was held along with calcium supplements. Ms Richter's case was discussed with her Endocrinologist Dr Morris who recommended transfer so that she could help guide further care of this complex patient. No beds were available at [facility] or [facility], however   Arturo's group does have privileges at [facility]), and she suggested transfer to that facility. Dr Miller kindly agreed to accept the patient in transfer pending bed availability, and ambulance transport has been arranged.  Patient has clinically improved since waiting on bed for transfer, calcium is now within normal range and she wishes to go home which I think is reasonable.  --In addition to IV fluids, Dr Morris recommended changing the patient's oral cortef to IV solucortef.  At discharge will switch back to oral    Hyperkalemia  - patient typically takes oral potassium supplements, however these were held shortly before transfer due to hyperkalemia (K+ 6.4).    -Now within normal range at discharge       Acute on chronic pain  - palliative evaluation, long term opiates not in patient's best interest, but my partner did suggest outpatient followup with pain management, recommended Dr Carbajal to family.     Muscle spasms  - patient seen by Neurology, felt not consistent with dystonia. No observed association with hypocalcemia noted. These events appear to be a chronic issue. Patient/family have been treating at home with IV calcium infusions, however calcium levels here are high. Infusions here stopped and discussion with patient and family detailed that continued calcium infusions would risk serious complications including calciphylaxis or arrhythmias.      Migraines  - continue followup with the [facility].       Discharge Follow Up Recommendations for outpatient labs/diagnostics:  Dr. Meyer is arranging evaluation at [facility]  She is to follow-up with her regular doctors at [facility] in [place], she states these have all been arranged.        Day of Discharge     HPI:   Fiancé at bedside.  Currently tell me that she feels improved now that calcium is down.  She voices no specific complaints.  She is ready to go home.  She tells me she has all the  follow-up appointments with her regular doctors arranged and has discussed transfer to [facility] with Dr. Meyer which will be arranged as an outpatient.    Review of Systems  Gen- No fevers, chills  CV- No chest pain, palpitations  Resp- No cough, dyspnea  GI- No N/V/D, abd pain        Vital Signs:   Temp:  [97.7 °F (36.5 °C)-98.2 °F (36.8 °C)] 98 °F (36.7 °C)  Heart Rate:  [66-92] 92  Resp:  [14-16] 14  BP: ()/(55-99) 120/81     Physical Exam:  Constitutional: No acute distress, awake, alert, sitting up in bed and talking to fiancé  HENT: NCAT, mucous membranes moist  Respiratory: Clear to auscultation bilaterally, respiratory effort normal on 2 L with 100% oxygenation  Cardiovascular: RRR, no murmurs, rubs, or gallops  Gastrointestinal: Positive bowel sounds, soft, nontender, nondistended  Musculoskeletal: No bilateral ankle edema  Psychiatric: Appropriate affect, cooperative  Neurologic: Oriented x 3, no focal deficits  Skin: No rashes        Pertinent  and/or Most Recent Results     LAB RESULTS:      Lab 04/12/21  0829 04/11/21  0916 04/09/21  1228   WBC 9.91 11.87* 9.38   HEMOGLOBIN 14.5 13.8 12.5   HEMATOCRIT 45.5 42.9 39.4   PLATELETS 271 242 244   NEUTROS ABS 5.89  --  6.98   IMMATURE GRANS (ABS) 0.03  --  0.05   LYMPHS ABS 1.85  --  0.86   MONOS ABS 1.42*  --  1.25*   EOS ABS 0.63*  --  0.19   MCV 98.5* 100.0* 99.5*         Lab 04/14/21  0747 04/14/21  0103 04/13/21  1858 04/13/21  1519 04/13/21  0857 04/12/21  0829 04/11/21  0916   SODIUM 139 138  --  135* 136 138 141   POTASSIUM 4.5 4.8 5.6* 6.4* 5.2 5.4* 4.3   CHLORIDE 97* 99  --  97* 94* 97* 98   CO2 34.0* 34.0*  --  32.0* 36.0* 36.0* 35.0*   ANION GAP 8.0 5.0  --  6.0 6.0 5.0 8.0   BUN 15 13  --  15 18 11 10   CREATININE 0.60 0.63  --  0.62 0.65 0.65 0.60   GLUCOSE 130* 132*  --  112* 96 106* 122*   CALCIUM 9.6 10.9*  --  11.8* 13.5* 11.0* 11.2*   IONIZED CALCIUM 1.43* 1.56*  --  1.73* 1.90* 1.59* 1.55*   MAGNESIUM  --   --   --   --    --   --  2.2         Lab 04/14/21  0747 04/14/21  0103 04/13/21  1519 04/13/21  0857 04/12/21  0829 04/11/21  0916   TOTAL PROTEIN 6.5 6.9 7.7 7.7 7.9 7.3   ALBUMIN 3.90 4.10 4.50 4.70 4.60 4.20   GLOBULIN 2.6 2.8 3.2 3.0 3.3  --    ALT (SGPT) 12 10 16 17 18 13   AST (SGOT) 17 19 27 27 32 23   BILIRUBIN 0.7 0.6 0.4 0.3 0.5 0.5   BILIRUBIN DIRECT  --   --   --   --   --  <0.2   ALK PHOS 70 75 89 101 83 77         Lab 04/08/21  0433   TROPONIN T <0.010                 Lab 04/08/21  1023 04/08/21  1021 04/08/21  1020   PH, ARTERIAL  --   --  7.287*   PCO2, ARTERIAL  --   --  63.6*   PO2 ART  --   --  62.5*   FIO2 21 21 21   HCO3 ART  --   --  30.4*   BASE EXCESS ART  --   --  2.3*   CARBOXYHEMOGLOBIN  --   --  0.2   CARBOXYHEMOGLOBIN (VENOUS) 0.3 0.3  --      Brief Urine Lab Results  (Last result in the past 365 days)      Color   Clarity   Blood   Leuk Est   Nitrite   Protein   CREAT   Urine HCG        04/08/21 0823 Yellow Clear Negative Negative Negative Negative             Microbiology Results (last 10 days)     Procedure Component Value - Date/Time    COVID PRE-OP / PRE-PROCEDURE SCREENING ORDER (NO ISOLATION) - Swab, Nasopharynx [[id number]]  (Normal) Collected: 04/07/21 0516    Lab Status: Final result Specimen: Swab from Nasopharynx Updated: 04/07/21 0637    Narrative:      The following orders were created for panel order COVID PRE-OP / PRE-PROCEDURE SCREENING ORDER (NO ISOLATION) - Swab, Nasopharynx.  Procedure                               Abnormality         Status                     ---------                               -----------         ------                     COVID-19 and FLU A/B PCR...[[id number]]  Normal              Final result                 Please view results for these tests on the individual orders.    COVID-19 and FLU A/B PCR - Swab, Nasopharynx [[id number]]  (Normal) Collected: 04/07/21 0516    Lab Status: Final result Specimen: Swab from Nasopharynx Updated: 04/07/21 0637     COVID19 Not  Detected     Influenza A PCR Not Detected     Influenza B PCR Not Detected    Narrative:      Fact sheet for providers: https://www.fda.gov/media/400212/download    Fact sheet for patients: https://www.fda.gov/media/022834/download    Test performed by PCR.          Adult Transesophageal Echo (BAO) W/ Cont if Necessary Per Protocol    Result Date: 4/8/2021  · Estimated left ventricular EF = 55%. Left ventricular systolic function is normal. · Saline test with Valsalva negative for evidence of an intra-atrial shunt.      Adult Transthoracic Echo Complete W/ Cont if Necessary Per Protocol    Addendum Date: 4/7/2021    · Calculated left ventricular EF = 55% · No significant valvular abnormalities · Technically difficult bubble study. Appears negative for interatrial shunting      Result Date: 4/7/2021  · Calculated left ventricular EF = 55% · No significant valvular abnormalities · Technically difficult study. Appears negative for interatrial shunting      EEG    Result Date: 4/9/2021  Reason for referral: 30 y.o.female with syncope Technical Summary:  A 19 channel digital EEG was performed using the international 10-20 placement system, including eye leads and EKG leads. Duration: 20 minutes Video: On Findings: The awake tracing shows a well-regulated medium amplitude 9 Hz posterior rhythm seen symmetrically over the occipital and posterior parietal head leads.  Low to medium amplitude theta and intermixed EMG artifact is present anteriorly.  Light sleep is seen with mild slowing of the background and vertex waves.  Photic stimulation does not change the background.  Hyperventilation is not performed.  No focal slowing or epileptiform activity is seen. EKG: Regular, 60-70 bpm     Normal EEG in the awake and lightly asleep states No epileptiform activity is seen This report is transcribed using the Dragon dictation system.      MRI Brain Without Contrast    Result Date: 4/11/2021  EXAMINATION: MRI BRAIN WO CONTRAST-   INDICATION: LUE [id number], syncope; R55-Syncope and collapse; R07.9-Chest pain, unspecified  TECHNIQUE: Multiplanar multisequence MRI of the brain performed without IV contrast  COMPARISON: NONE  FINDINGS: No acute infarct noted on diffusion weighted sequences. Midline structures are unremarkable and the craniocervical junction is satisfactory in appearance. There is no evidence of intracranial hemorrhage, mass or mass effect. The ventricles are normal in size and configuration. The orbits are normal and the paranasal sinuses are grossly clear.      Normal noncontrast MRI of the brain.   This report was finalized on 4/11/2021 9:42 AM by Cecil Colby.      CT Abdomen Pelvis With Contrast    Result Date: 4/9/2021  EXAMINATION: CT ABDOMEN PELVIS W CONTRAST-  INDICATION: Abdominal pain, acute, nonlocalized; R55-Syncope and collapse; R07.9-Chest pain, unspecified  TECHNIQUE: Axial IV contrast-enhanced CT of the abdomen and pelvis with multiplanar reconstruction  The radiation dose reduction device was turned on for each scan per the ALARA (As Low as Reasonably Achievable) protocol.  COMPARISON: NONE  FINDINGS: The lung bases are grossly clear. Body wall soft tissues are unremarkable. There is no evidence of acute fracture or aggressive osseous lesion. The liver, pancreas and bilateral adrenal glands demonstrate homogeneous enhancement without evidence of suspicious focal lesion. The spleen is absent. No free fluid or pneumoperitoneum. Normal caliber abdominal aorta. No bulky retroperitoneal adenopathy. The kidneys demonstrate symmetric nephrogram and contrast excretion without evidence of hydronephrosis or contour deforming mass. Small and large bowel loops are nondilated. There is no suspicious focal bowel wall thickening. Gallbladder is surgically absent. Prior appendectomy. The pelvic viscera are unremarkable.      No acute findings in the abdomen and pelvis.  Findings compatible with provided history of prior  cholecystectomy, appendectomy and asplenia.   This report was finalized on 4/9/2021 6:49 PM by Cecil Colby.      Cardiac Catheterization/Vascular Study    Result Date: 4/8/2021  · Normal coronary arteries · Normal LV systolic function · Moderately elevated LV filling pressure (LVEDP 24 mmHg) · Mild pulmonary hypertension (mean PA pressure 32 mmHg) · Supranormal cardiac output/index · No evidence of intracardiac or pulmonary shunt.      Bilateral Carotid Duplex    Result Date: 4/7/2021  · Mildly elevated carotid velocities bilaterally. · No evidence of atherosclerosis · Vertebral artery flow is antegrade bilaterally · Proximal right internal carotid artery is normal. · Proximal left internal carotid artery is normal.      Adult Transthoracic Echo Limited W/ Cont if Necessary Per Protocol    Result Date: 4/10/2021  · This study was done to evaluate a patient who develops arterial oxygen desaturation in the supine position. · I was present during the study and supervised the procedure(s). · The patient was receiving oxygen by nasal prongs at 2L/min throughout the period of evaluation. · In a sitting position, the patients arterial saturation is 97% and an agitated saline study reveals no evidence of R>L shunting. · When the patient assumes a supine position, saturation falls to 70% within one minute and no evidence of R>L shunting is noted. · When the patient assumes a supine position and carries out a Valsalva maneuver, saturation falls to 70% within a minute with no evidence of R>L shunting. · Bouts of positional oxygen desaturation resolve within one minute on assuming a sitting position. · NO evidence of intracardiac or pulmonary shunting is noted during this study despite the occurence of rapid and profound arterial desaturation when the patient assumes a supine position.      CT Angiogram Chest    Result Date: 4/7/2021  CTA Chest INDICATION: Chest pain and shortness of air. Syncope. TECHNIQUE: CT angiogram  of the chest with IV contrast. 3-D reconstructions were obtained and reviewed.   Radiation dose reduction techniques included automated exposure control or exposure modulation based on body size. Count of known CT and cardiac nuc med studies performed in previous 12 months: 1. COMPARISON: 2/25/2021 FINDINGS: No pulmonary embolism or aortic dissection is identified. The left vertebral artery has a separate origin from the aortic arch as an anatomic variant. Great vessels are otherwise unremarkable. No pleural or pericardial effusion is seen. There is a mildly enlarged right paratracheal lymph node measuring 1.1 cm. No other adenopathy is seen. Upper abdominal images show cholecystectomy. A loop recorder is present in the left chest wall. Central venous catheter tip is at the cavoatrial junction. Other than some mild atelectasis in the lower lobes, the lungs are clear. No CT findings present to indicate pneumonia. Note: CT may be negative in the earliest stages of COVID-19.     1. No PE or aortic dissection. 2. Negative for pneumonia. Signer Name: Elmer Mims MD  Signed: 4/7/2021 2:16 AM  Workstation Name: [email or web address]  [facility]    Doppler Transcranial Microbubble Injection CAR    Result Date: 4/9/2021  Mean velocities and waveforms in the right and left MCA and terminal ICA are normal Following injection of agitated saline, both before and after Valsalva, no abnormal signals are seen in the right MCA Negative bubble study                         Results for orders placed during the hospital encounter of 04/06/21    Doppler Transcranial Microbubble Injection CAR    Interpretation Summary  Mean velocities and waveforms in the right and left MCA and terminal ICA are normal    Following injection of agitated saline, both before and after Valsalva, no abnormal signals are seen in the right MCA    Negative bubble study      Results for orders placed during the hospital encounter of  04/06/21    Doppler Transcranial Microbubble Injection CAR    Interpretation Summary  Mean velocities and waveforms in the right and left MCA and terminal ICA are normal    Following injection of agitated saline, both before and after Valsalva, no abnormal signals are seen in the right MCA    Negative bubble study      Results for orders placed during the hospital encounter of 04/06/21    Adult Transthoracic Echo Limited W/ Cont if Necessary Per Protocol    Interpretation Summary  · This study was done to evaluate a patient who develops arterial oxygen desaturation in the supine position.  · I was present during the study and supervised the procedure(s).  · The patient was receiving oxygen by nasal prongs at 2L/min throughout the period of evaluation.  · In a sitting position, the patients arterial saturation is 97% and an agitated saline study reveals no evidence of R>L shunting.  · When the patient assumes a supine position, saturation falls to 70% within one minute and no evidence of R>L shunting is noted.  · When the patient assumes a supine position and carries out a Valsalva maneuver, saturation falls to 70% within a minute with no evidence of R>L shunting.  · Bouts of positional oxygen desaturation resolve within one minute on assuming a sitting position.  · NO evidence of intracardiac or pulmonary shunting is noted during this study despite the occurence of rapid and profound arterial desaturation when the patient assumes a supine position.      Plan for Follow-up of Pending Labs/Results:     Discharge Details        Discharge Medications      New Medications      Instructions Start Date   budesonide-formoterol 160-4.5 MCG/ACT inhaler  Commonly known as: SYMBICORT   2 puffs, Inhalation, 2 Times Daily - RT      sodium chloride 0.9 % solution   100 mL/hr (100 mL/hr), Intravenous, Continuous         Changes to Medications      Instructions Start Date   azaTHIOprine 100 MG tablet  Commonly known as: IMURAN  What  changed: Another medication with the same name was removed. Continue taking this medication, and follow the directions you see here.   150 mg, Oral, Daily      dronabinol 5 MG capsule  Commonly known as: MARINOL  What changed: Another medication with the same name was removed. Continue taking this medication, and follow the directions you see here.   5 mg, 4 Times Daily PRN      omeprazole 40 MG capsule  Commonly known as: priLOSEC  What changed: additional instructions   40 mg, Oral, 2 times daily, 30 minutes before a meal.         Continue These Medications      Instructions Start Date   albuterol sulfate  (90 Base) MCG/ACT inhaler  Commonly known as: PROVENTIL HFA;VENTOLIN HFA;PROAIR HFA   Take 2 Puffs by inhalation every 4 hours as needed for wheezing.      Alcohol Prep 70 % pads   Check blood glucose up to 2 times daily      ascorbic acid 1000 MG tablet  Commonly known as: VITAMIN C   1,000 mg, Oral, Daily      calcitriol 0.5 MCG capsule  Commonly known as: ROCALTROL   0.5 mcg, Oral, 2 Times Daily      Calcium Citrate 250 MG tablet   500 mg, Oral, Daily      cycloSPORINE 0.05 % ophthalmic emulsion  Commonly known as: RESTASIS   1 drop. PRN      EpiPen 2-Ck 0.3 MG/0.3ML solution auto-injector injection  Generic drug: EPINEPHrine   EpiPen 0.3 mg/0.3 mL injection, auto-injector   Take 1 auto by injection route.      ESTRADIOL PO   Oral      fludrocortisone 0.1 MG tablet   TAKE ONE TABLET BY MOUTH DAILY..05MG PER PT      fluticasone 50 MCG/ACT nasal spray  Commonly known as: FLONASE   1 spray      folic acid 1 MG tablet  Commonly known as: FOLVITE   4 mg, Oral, Daily      gabapentin 600 MG tablet  Commonly known as: NEURONTIN   900 mg, Oral, 3 Times Daily      hydrocortisone 20 MG tablet  Commonly known as: CORTEF   20 mg, Oral, Every Morning, 20 MG IN AM      Cortef 10 MG tablet  Generic drug: hydrocortisone   10 mg, Oral, Nightly      Insulin Pen Needle 32G X 4 MM misc   Use to administer Natpara  "subcutaneously daily      levothyroxine 75 MCG tablet  Commonly known as: SYNTHROID, LEVOTHROID   levothyroxine 75 mcg tablet      lidocaine 5 %  Commonly known as: LIDODERM   1 patch, Transdermal, Every 24 Hours, Remove & Discard patch within 12 hours or as directed by MD Mcintosh 2 MG tablet  Generic drug: eszopiclone   2 mg, Oral, Nightly, Take immediately before bedtime       magnesium oxide 400 (241.3 Mg) MG tablet tablet  Commonly known as: MAGOX   1,000 mg, Oral, 3 Times Daily      metFORMIN 500 MG tablet  Commonly known as: GLUCOPHAGE   500 mg, Oral, 2 Times Daily With Meals      methocarbamol 500 MG tablet  Commonly known as: ROBAXIN   500 mg, Oral, 3 Times Daily      multivitamin with minerals tablet tablet   1 tablet, Oral      nadolol 20 MG tablet  Commonly known as: CORGARD   20 mg, Oral, 2 times daily      NATPARA SC   1 Cartridge, Subcutaneous, 2 times daily      ONE TOUCH ULTRA 2 w/Device kit   Use to check blood glucose up to 2 times daily      OneTouch Delica Lancets 33G misc   Use to check blood glucose up to 2 times daily      OT ULTRA/FASTTK CNTRL SOLN solution   Use weekly and as needed to calibrate meter      progesterone 200 MG capsule  Commonly known as: PROMETRIUM   200 mg, Oral, Daily      promethazine 25 MG suppository  Commonly known as: PHENERGAN   25 mg, Rectal, Daily PRN      promethazine 25 MG tablet  Commonly known as: PHENERGAN   25 mg, Oral, Every 6 Hours PRN      RA XYDRA EF PO   Oral      Systane Overnight Therapy 0.3 % gel  Generic drug: Hypromellose   Systane Gel 0.3 % eye gel      Tegaderm Film 2-3/8\"x2-3/4\" misc   Apply over pump insertion site every 2 days..      venlafaxine 25 MG tablet  Commonly known as: EFFEXOR   75 mg, Oral, 3 Times Daily      Vitamin D (Cholecalciferol) 25 MCG (1000 UT) capsule   1,000 unit marking on U-100 syringe, Oral, 2 Times Daily      zinc sulfate 220 (50 Zn) MG capsule  Commonly known as: ZINCATE   zinc sulfate 220 mg (50 mg) capsule       " "  Stop These Medications    azithromycin 250 MG tablet  Commonly known as: ZITHROMAX     cyproheptadine 4 MG tablet  Commonly known as: PERIACTIN     KLOR-CON 10 PO     Solu-CORTEF 100 MG injection  Generic drug: hydrocortisone sodium succinate            Allergies   Allergen Reactions   • Azithromycin Hives   • Cefpodoxime Hives   • Cephalosporins Hives   • Clarithromycin Hives     biaxin  biaxin  biaxin   • Levofloxacin Hives   • Nitrofurantoin Hives   • Nystatin Hives   • Penicillins Hives   • Sulfa Antibiotics Hives   • Midazolam Unknown - Low Severity   • Morphine Other (See Comments)     \"it doesn't work. [facility] told me to list it as an allergy\"   • Nitrofurantoin Macrocrystal Other (See Comments) and Hives   • Ondansetron Other (See Comments)     \"it doesn't work. [facility] told me to list it as an allergy\"         Discharge Disposition:  Home or Self Care    Diet:  Hospital:  Diet Order   Procedures   • Diet Regular       Activity:  Activity Instructions     Activity as Tolerated            Restrictions or Other Recommendations:         CODE STATUS:    Code Status and Medical Interventions:   Ordered at: 04/07/21 0443     Level Of Support Discussed With:    Patient     Code Status:    CPR     Medical Interventions (Level of Support Prior to Arrest):    Full       Future Appointments   Date Time Provider Department Center   12/3/2021  2:15 PM Jeramy Meyer MD [facility]       Additional Instructions for the Follow-ups that You Need to Schedule     Discharge Follow-up with Specified Provider: Follow up with all of her outside providers as scheduled   As directed      To: Follow up with all of her outside providers as scheduled                     Martin Perry MD  04/14/21      Time Spent on Discharge:  I spent  45  minutes on this discharge activity which included: face-to-face encounter with the patient, reviewing the data in the system, coordination of the care with the nursing staff as well as " consultants, documentation, and entering orders.

## 2021-04-15 ENCOUNTER — READMISSION MANAGEMENT (OUTPATIENT)
Dept: CALL CENTER | Facility: HOSPITAL | Age: 31
End: 2021-04-15

## 2021-04-15 RX ORDER — OMEPRAZOLE 40 MG/1
CAPSULE, DELAYED RELEASE ORAL
Qty: 60 CAPSULE | Refills: 6 | Status: SHIPPED | OUTPATIENT
Start: 2021-04-15

## 2021-04-15 NOTE — OUTREACH NOTE
Prep Survey      Responses   [facility] facility patient discharged from?  [place]   Is LACE score < 7 ?  No   Emergency Room discharge w/ pulse ox?  No   Eligibility  Readm Mgmt   Discharge diagnosis  Syncope, chest pain, hypoxia [s/p right and left heart cath]   Does the patient have one of the following disease processes/diagnoses(primary or secondary)?  Other   Does the patient have Home health ordered?  No   Is there a DME ordered?  No   Comments regarding appointments  Per AVS   Medication alerts for this patient  Per AVS   Prep survey completed?  Yes          Tammy Singer RN

## 2021-04-16 ENCOUNTER — READMISSION MANAGEMENT (OUTPATIENT)
Dept: CALL CENTER | Facility: HOSPITAL | Age: 31
End: 2021-04-16

## 2021-04-16 NOTE — OUTREACH NOTE
Medical Week 1 Survey      Responses   [facility] patient discharged from?  [place]   Does the patient have one of the following disease processes/diagnoses(primary or secondary)?  Other   Week 1 attempt successful?  No   Unsuccessful attempts  Attempt 1          Georgiana Juarez RN

## 2021-04-20 ENCOUNTER — READMISSION MANAGEMENT (OUTPATIENT)
Dept: CALL CENTER | Facility: HOSPITAL | Age: 31
End: 2021-04-20

## 2021-04-20 NOTE — OUTREACH NOTE
Medical Week 1 Survey      Responses   [facility] [place] patient discharged from?  [place]   Does the patient have one of the following disease processes/diagnoses(primary or secondary)?  Other   Week 1 attempt successful?  No   Unsuccessful attempts  Attempt 2          Krista De La Cruz RN

## 2021-04-21 ENCOUNTER — READMISSION MANAGEMENT (OUTPATIENT)
Dept: CALL CENTER | Facility: HOSPITAL | Age: 31
End: 2021-04-21

## 2021-04-21 NOTE — OUTREACH NOTE
Medical Week 1 Survey      Responses   [facility] patient discharged from?  [place]   Does the patient have one of the following disease processes/diagnoses(primary or secondary)?  Other   Week 1 attempt successful?  No   Unsuccessful attempts  Attempt 3          Rand Granados RN

## 2021-04-30 ENCOUNTER — READMISSION MANAGEMENT (OUTPATIENT)
Dept: CALL CENTER | Facility: HOSPITAL | Age: 31
End: 2021-04-30

## 2021-04-30 NOTE — OUTREACH NOTE
Medical Week 2 Survey      Responses   [facility] patient discharged from?  [place]   Does the patient have one of the following disease processes/diagnoses(primary or secondary)?  Other   Week 2 attempt successful?  Yes   Call start time  1445   Discharge diagnosis  Syncope, chest pain, hypoxia   Call end time  1453   Meds reviewed with patient/caregiver?  Yes   Is the patient taking all medications as directed (includes completed medication regime)?  Yes   Has the patient kept scheduled appointments due by today?  N/A   Has home health visited the patient within 72 hours of discharge?  N/A   Did the patient receive a copy of their discharge instructions?  Yes   Nursing interventions  Reviewed instructions with patient   What is the patient's perception of their health status since discharge?  Same   If the patient is a current smoker, are they able to teach back resources for cessation?  Not a smoker   Week 2 Call Completed?  Yes   Wrap up additional comments  Awaiting higher level of care appt, possibly at [facility], no change is the same.            Krista De La Cruz RN

## 2021-05-06 ENCOUNTER — READMISSION MANAGEMENT (OUTPATIENT)
Dept: CALL CENTER | Facility: HOSPITAL | Age: 31
End: 2021-05-06

## 2021-05-06 NOTE — OUTREACH NOTE
Medical Week 3 Survey      Responses   [facility] patient discharged from?  [place]   Does the patient have one of the following disease processes/diagnoses(primary or secondary)?  Other   Week 3 attempt successful?  No   Unsuccessful attempts  Attempt 1          Rand Granados RN

## 2021-05-07 ENCOUNTER — READMISSION MANAGEMENT (OUTPATIENT)
Dept: CALL CENTER | Facility: HOSPITAL | Age: 31
End: 2021-05-07

## 2021-05-07 RX ORDER — AZATHIOPRINE 50 MG/1
150 TABLET ORAL DAILY
Qty: 90 TABLET | Refills: 1 | Status: SHIPPED | OUTPATIENT
Start: 2021-05-07 | End: 2021-08-02 | Stop reason: SDUPTHER

## 2021-05-07 NOTE — OUTREACH NOTE
Medical Week 3 Survey      Responses   [facility] patient discharged from?  [place]   Does the patient have one of the following disease processes/diagnoses(primary or secondary)?  Other   Week 3 attempt successful?  No   Unsuccessful attempts  Attempt 2          Leatha Gee RN

## 2021-08-02 DIAGNOSIS — Z79.899 HIGH RISK MEDICATION USE: Primary | ICD-10-CM

## 2021-08-04 RX ORDER — AZATHIOPRINE 50 MG/1
150 TABLET ORAL DAILY
Qty: 90 TABLET | Refills: 2 | Status: SHIPPED | OUTPATIENT
Start: 2021-08-04 | End: 2021-12-21 | Stop reason: SDUPTHER

## 2021-08-06 ENCOUNTER — PREP FOR SURGERY (OUTPATIENT)
Dept: OTHER | Facility: HOSPITAL | Age: 31
End: 2021-08-06

## 2021-08-06 DIAGNOSIS — R13.10 DYSPHAGIA, UNSPECIFIED TYPE: Primary | ICD-10-CM

## 2021-09-10 ENCOUNTER — APPOINTMENT (OUTPATIENT)
Dept: PREADMISSION TESTING | Facility: HOSPITAL | Age: 31
End: 2021-09-10

## 2021-09-13 RX ORDER — NADOLOL 20 MG/1
20 TABLET ORAL EVERY 12 HOURS SCHEDULED
Qty: 60 TABLET | Refills: 6 | Status: SHIPPED | OUTPATIENT
Start: 2021-09-13 | End: 2022-06-20 | Stop reason: SDUPTHER

## 2021-09-14 ENCOUNTER — APPOINTMENT (OUTPATIENT)
Dept: PREADMISSION TESTING | Facility: HOSPITAL | Age: 31
End: 2021-09-14

## 2021-10-09 PROCEDURE — U0004 COV-19 TEST NON-CDC HGH THRU: HCPCS | Performed by: NURSE PRACTITIONER

## 2021-11-09 ENCOUNTER — PRE-ADMISSION TESTING (OUTPATIENT)
Dept: PREADMISSION TESTING | Facility: HOSPITAL | Age: 31
End: 2021-11-09

## 2021-11-09 VITALS — BODY MASS INDEX: 27.91 KG/M2 | HEIGHT: 62 IN | WEIGHT: 151.68 LBS

## 2021-11-09 DIAGNOSIS — Z79.899 HIGH RISK MEDICATION USE: ICD-10-CM

## 2021-11-09 LAB
ALBUMIN SERPL-MCNC: 4.8 G/DL (ref 3.5–5.2)
ALBUMIN/GLOB SERPL: 1.8 G/DL
ALP SERPL-CCNC: 74 U/L (ref 39–117)
ALT SERPL W P-5'-P-CCNC: 15 U/L (ref 1–33)
ANION GAP SERPL CALCULATED.3IONS-SCNC: 12 MMOL/L (ref 5–15)
AST SERPL-CCNC: 21 U/L (ref 1–32)
BILIRUB SERPL-MCNC: 0.3 MG/DL (ref 0–1.2)
BUN SERPL-MCNC: 10 MG/DL (ref 6–20)
BUN/CREAT SERPL: 18.2 (ref 7–25)
CALCIUM SPEC-SCNC: 9.2 MG/DL (ref 8.6–10.5)
CHLORIDE SERPL-SCNC: 98 MMOL/L (ref 98–107)
CO2 SERPL-SCNC: 32 MMOL/L (ref 22–29)
CREAT SERPL-MCNC: 0.55 MG/DL (ref 0.57–1)
DEPRECATED RDW RBC AUTO: 47.7 FL (ref 37–54)
ERYTHROCYTE [DISTWIDTH] IN BLOOD BY AUTOMATED COUNT: 12.9 % (ref 12.3–15.4)
GFR SERPL CREATININE-BSD FRML MDRD: 128 ML/MIN/1.73
GLOBULIN UR ELPH-MCNC: 2.7 GM/DL
GLUCOSE SERPL-MCNC: 84 MG/DL (ref 65–99)
HCT VFR BLD AUTO: 41.1 % (ref 34–46.6)
HGB BLD-MCNC: 13 G/DL (ref 12–15.9)
MCH RBC QN AUTO: 31.7 PG (ref 26.6–33)
MCHC RBC AUTO-ENTMCNC: 31.6 G/DL (ref 31.5–35.7)
MCV RBC AUTO: 100.2 FL (ref 79–97)
PLATELET # BLD AUTO: 299 10*3/MM3 (ref 140–450)
PMV BLD AUTO: 11.7 FL (ref 6–12)
POTASSIUM SERPL-SCNC: 4 MMOL/L (ref 3.5–5.2)
PROT SERPL-MCNC: 7.5 G/DL (ref 6–8.5)
QT INTERVAL: 386 MS
QTC INTERVAL: 450 MS
RBC # BLD AUTO: 4.1 10*6/MM3 (ref 3.77–5.28)
SARS-COV-2 RNA PNL SPEC NAA+PROBE: NOT DETECTED
SODIUM SERPL-SCNC: 142 MMOL/L (ref 136–145)
WBC # BLD AUTO: 15.16 10*3/MM3 (ref 3.4–10.8)

## 2021-11-09 PROCEDURE — 93010 ELECTROCARDIOGRAM REPORT: CPT | Performed by: INTERNAL MEDICINE

## 2021-11-09 PROCEDURE — 80053 COMPREHEN METABOLIC PANEL: CPT

## 2021-11-09 PROCEDURE — U0004 COV-19 TEST NON-CDC HGH THRU: HCPCS

## 2021-11-09 PROCEDURE — 36415 COLL VENOUS BLD VENIPUNCTURE: CPT

## 2021-11-09 PROCEDURE — 85027 COMPLETE CBC AUTOMATED: CPT

## 2021-11-09 PROCEDURE — 93005 ELECTROCARDIOGRAM TRACING: CPT | Performed by: ANESTHESIOLOGY

## 2021-11-09 PROCEDURE — C9803 HOPD COVID-19 SPEC COLLECT: HCPCS

## 2021-11-11 ENCOUNTER — HOSPITAL ENCOUNTER (OUTPATIENT)
Facility: HOSPITAL | Age: 31
Setting detail: HOSPITAL OUTPATIENT SURGERY
Discharge: HOME | End: 2021-11-11
Attending: INTERNAL MEDICINE | Admitting: INTERNAL MEDICINE

## 2021-11-11 ENCOUNTER — ANESTHESIA (OUTPATIENT)
Dept: GASTROENTEROLOGY | Facility: HOSPITAL | Age: 31
End: 2021-11-11

## 2021-11-11 ENCOUNTER — ANESTHESIA EVENT (OUTPATIENT)
Dept: GASTROENTEROLOGY | Facility: HOSPITAL | Age: 31
End: 2021-11-11

## 2021-11-11 VITALS
TEMPERATURE: 97.9 F | RESPIRATION RATE: 22 BRPM | OXYGEN SATURATION: 97 % | HEART RATE: 51 BPM | SYSTOLIC BLOOD PRESSURE: 110 MMHG | DIASTOLIC BLOOD PRESSURE: 66 MMHG

## 2021-11-11 LAB — GLUCOSE BLDC GLUCOMTR-MCNC: 88 MG/DL (ref 70–130)

## 2021-11-11 PROCEDURE — 43249 ESOPH EGD DILATION <30 MM: CPT | Performed by: INTERNAL MEDICINE

## 2021-11-11 PROCEDURE — 25010000002 HYDROMORPHONE PER 4 MG: Performed by: INTERNAL MEDICINE

## 2021-11-11 PROCEDURE — 25010000002 MIDAZOLAM PER 1 MG: Performed by: NURSE ANESTHETIST, CERTIFIED REGISTERED

## 2021-11-11 PROCEDURE — 25010000002 PROPOFOL 10 MG/ML EMULSION: Performed by: NURSE ANESTHETIST, CERTIFIED REGISTERED

## 2021-11-11 PROCEDURE — 25010000002 FENTANYL CITRATE (PF) 50 MCG/ML SOLUTION: Performed by: NURSE ANESTHETIST, CERTIFIED REGISTERED

## 2021-11-11 PROCEDURE — 82962 GLUCOSE BLOOD TEST: CPT | Performed by: INTERNAL MEDICINE

## 2021-11-11 PROCEDURE — C1725 CATH, TRANSLUMIN NON-LASER: HCPCS | Performed by: INTERNAL MEDICINE

## 2021-11-11 PROCEDURE — 63710000001 PROMETHAZINE PER 25 MG: Performed by: NURSE ANESTHETIST, CERTIFIED REGISTERED

## 2021-11-11 PROCEDURE — 25010000002 ONDANSETRON PER 1 MG: Performed by: INTERNAL MEDICINE

## 2021-11-11 RX ORDER — FAMOTIDINE 10 MG/ML
20 INJECTION, SOLUTION INTRAVENOUS ONCE
Status: COMPLETED | OUTPATIENT
Start: 2021-11-11 | End: 2021-11-11

## 2021-11-11 RX ORDER — FAMOTIDINE 20 MG/1
20 TABLET, FILM COATED ORAL ONCE
Status: DISCONTINUED | OUTPATIENT
Start: 2021-11-11 | End: 2021-11-11 | Stop reason: HOSPADM

## 2021-11-11 RX ORDER — CALCIUM CHLORIDE 100 MG/ML
INJECTION INTRAVENOUS; INTRAVENTRICULAR AS NEEDED
Status: DISCONTINUED | OUTPATIENT
Start: 2021-11-11 | End: 2021-11-11 | Stop reason: SURG

## 2021-11-11 RX ORDER — HYDROMORPHONE HCL 110MG/55ML
0.5 PATIENT CONTROLLED ANALGESIA SYRINGE INTRAVENOUS
Status: DISCONTINUED | OUTPATIENT
Start: 2021-11-11 | End: 2021-11-11 | Stop reason: HOSPADM

## 2021-11-11 RX ORDER — CALCIUM CHLORIDE 100 MG/ML
1 INJECTION INTRAVENOUS; INTRAVENTRICULAR ONCE
Status: COMPLETED | OUTPATIENT
Start: 2021-11-11 | End: 2021-11-11

## 2021-11-11 RX ORDER — PROPOFOL 10 MG/ML
VIAL (ML) INTRAVENOUS AS NEEDED
Status: DISCONTINUED | OUTPATIENT
Start: 2021-11-11 | End: 2021-11-11 | Stop reason: SURG

## 2021-11-11 RX ORDER — MIDAZOLAM HYDROCHLORIDE 1 MG/ML
1 INJECTION INTRAMUSCULAR; INTRAVENOUS
Status: DISCONTINUED | OUTPATIENT
Start: 2021-11-11 | End: 2021-11-11 | Stop reason: HOSPADM

## 2021-11-11 RX ORDER — SODIUM CHLORIDE 0.9 % (FLUSH) 0.9 %
10 SYRINGE (ML) INJECTION EVERY 12 HOURS SCHEDULED
Status: DISCONTINUED | OUTPATIENT
Start: 2021-11-11 | End: 2021-11-11 | Stop reason: HOSPADM

## 2021-11-11 RX ORDER — MIDAZOLAM HYDROCHLORIDE 1 MG/ML
INJECTION INTRAMUSCULAR; INTRAVENOUS AS NEEDED
Status: DISCONTINUED | OUTPATIENT
Start: 2021-11-11 | End: 2021-11-11 | Stop reason: SURG

## 2021-11-11 RX ORDER — LIDOCAINE HYDROCHLORIDE 10 MG/ML
INJECTION, SOLUTION EPIDURAL; INFILTRATION; INTRACAUDAL; PERINEURAL AS NEEDED
Status: DISCONTINUED | OUTPATIENT
Start: 2021-11-11 | End: 2021-11-11 | Stop reason: SURG

## 2021-11-11 RX ORDER — FENTANYL CITRATE 50 UG/ML
50 INJECTION, SOLUTION INTRAMUSCULAR; INTRAVENOUS
Status: DISCONTINUED | OUTPATIENT
Start: 2021-11-11 | End: 2021-11-11 | Stop reason: HOSPADM

## 2021-11-11 RX ORDER — PROMETHAZINE HYDROCHLORIDE 25 MG/1
25 SUPPOSITORY RECTAL ONCE AS NEEDED
Status: COMPLETED | OUTPATIENT
Start: 2021-11-11 | End: 2021-11-11

## 2021-11-11 RX ORDER — SODIUM CHLORIDE, SODIUM LACTATE, POTASSIUM CHLORIDE, CALCIUM CHLORIDE 600; 310; 30; 20 MG/100ML; MG/100ML; MG/100ML; MG/100ML
9 INJECTION, SOLUTION INTRAVENOUS CONTINUOUS
Status: DISCONTINUED | OUTPATIENT
Start: 2021-11-11 | End: 2021-11-11 | Stop reason: HOSPADM

## 2021-11-11 RX ORDER — LIDOCAINE HYDROCHLORIDE 10 MG/ML
0.5 INJECTION, SOLUTION EPIDURAL; INFILTRATION; INTRACAUDAL; PERINEURAL ONCE AS NEEDED
Status: DISCONTINUED | OUTPATIENT
Start: 2021-11-11 | End: 2021-11-11 | Stop reason: HOSPADM

## 2021-11-11 RX ORDER — ONDANSETRON 2 MG/ML
4 INJECTION INTRAMUSCULAR; INTRAVENOUS EVERY 6 HOURS PRN
Status: DISCONTINUED | OUTPATIENT
Start: 2021-11-11 | End: 2021-11-11 | Stop reason: HOSPADM

## 2021-11-11 RX ORDER — SODIUM CHLORIDE 0.9 % (FLUSH) 0.9 %
10 SYRINGE (ML) INJECTION AS NEEDED
Status: DISCONTINUED | OUTPATIENT
Start: 2021-11-11 | End: 2021-11-11 | Stop reason: HOSPADM

## 2021-11-11 RX ORDER — PROMETHAZINE HYDROCHLORIDE 25 MG/1
25 TABLET ORAL ONCE AS NEEDED
Status: COMPLETED | OUTPATIENT
Start: 2021-11-11 | End: 2021-11-11

## 2021-11-11 RX ORDER — PROMETHAZINE HYDROCHLORIDE 25 MG/1
25 TABLET ORAL ONCE
Status: DISCONTINUED | OUTPATIENT
Start: 2021-11-11 | End: 2021-11-11 | Stop reason: HOSPADM

## 2021-11-11 RX ADMIN — HYDROMORPHONE HYDROCHLORIDE 0.5 MG: 2 INJECTION, SOLUTION INTRAMUSCULAR; INTRAVENOUS; SUBCUTANEOUS at 13:16

## 2021-11-11 RX ADMIN — MIDAZOLAM HYDROCHLORIDE 2 MG: 1 INJECTION, SOLUTION INTRAMUSCULAR; INTRAVENOUS at 11:23

## 2021-11-11 RX ADMIN — FENTANYL CITRATE 50 MCG: 50 INJECTION INTRAMUSCULAR; INTRAVENOUS at 12:43

## 2021-11-11 RX ADMIN — PROMETHAZINE HYDROCHLORIDE 25 MG: 25 TABLET ORAL at 13:25

## 2021-11-11 RX ADMIN — ONDANSETRON 4 MG: 2 INJECTION INTRAMUSCULAR; INTRAVENOUS at 12:43

## 2021-11-11 RX ADMIN — CALCIUM CHLORIDE 500 MG: 100 INJECTION INTRAVENOUS; INTRAVENTRICULAR at 13:02

## 2021-11-11 RX ADMIN — FAMOTIDINE 20 MG: 10 INJECTION INTRAVENOUS at 09:47

## 2021-11-11 RX ADMIN — FENTANYL CITRATE 25 MCG: 50 INJECTION INTRAMUSCULAR; INTRAVENOUS at 12:13

## 2021-11-11 RX ADMIN — FENTANYL CITRATE 25 MCG: 50 INJECTION INTRAMUSCULAR; INTRAVENOUS at 11:53

## 2021-11-11 RX ADMIN — LIDOCAINE HYDROCHLORIDE 100 MG: 10 INJECTION, SOLUTION EPIDURAL; INFILTRATION; INTRACAUDAL; PERINEURAL at 11:28

## 2021-11-11 RX ADMIN — PROPOFOL 150 MG: 10 INJECTION, EMULSION INTRAVENOUS at 11:28

## 2021-11-11 RX ADMIN — CALCIUM CHLORIDE 1 G: 100 INJECTION INTRAVENOUS; INTRAVENTRICULAR at 11:41

## 2021-11-11 RX ADMIN — SODIUM CHLORIDE, POTASSIUM CHLORIDE, SODIUM LACTATE AND CALCIUM CHLORIDE 9 ML/HR: 600; 310; 30; 20 INJECTION, SOLUTION INTRAVENOUS at 09:48

## 2021-11-11 NOTE — ANESTHESIA POSTPROCEDURE EVALUATION
Patient: Krista Richter    Procedure Summary     Date: 11/11/21 Room / Location:  [facility] /  [facility]    Anesthesia Start: 1122 Anesthesia Stop:     Procedure: ESOPHAGOGASTRODUODENOSCOPY (N/A ) Diagnosis:       Dysphagia, unspecified type      (Dysphagia, unspecified type [R13.10])    Surgeons: Gavin Vargas MD Provider: Emerson Andres MD    Anesthesia Type: general, MAC ASA Status: 3          Anesthesia Type: general, MAC    Vitals  Vitals Value Taken Time   /56 11/11/21 1146   Temp 97.9 °F (36.6 °C) 11/11/21 1145   Pulse 65 11/11/21 1149   Resp 22 11/11/21 1145   SpO2 94 % 11/11/21 1149   Vitals shown include unvalidated device data.        Post Anesthesia Care and Evaluation    Patient location during evaluation: PACU  Patient participation: complete - patient participated  Level of consciousness: awake and alert  Pain management: adequate  Airway patency: patent  Anesthetic complications: No anesthetic complications  PONV Status: none  Cardiovascular status: hemodynamically stable and acceptable  Respiratory status: nonlabored ventilation, acceptable and nasal cannula  Hydration status: acceptable

## 2021-11-11 NOTE — ANESTHESIA PREPROCEDURE EVALUATION
Anesthesia Evaluation     Patient summary reviewed and Nursing notes reviewed   NPO Solid Status: > 8 hours  NPO Liquid Status: > 8 hours           Airway   Mallampati: II  TM distance: >3 FB  Neck ROM: full  No difficulty expected  Dental      Pulmonary    (+) asthma (stable PRN MDI ),home oxygen, sleep apnea,   (-) shortness of breath, recent URI, not a smoker    ROS comment: HAS EPISODES OF DE-SATURATION   ALL TESTING NEG  Cardiovascular     ECG reviewed    (-) hypertension, past MI, angina (work up neg)    ROS comment: ECG NSR LAE     ECHO EF = 55%.NS/ Valsalva NEG  For Shunt     CATH · Normal CA v no CAD   Moderately elevated LVEDP 24 mmHg  Mild pulmonary hypertension (mean 32 mmHg)  Supranormal cardiac output/index  No evidence of intracardiac or pulmonary shunt        Neuro/Psych  (+) syncope, psychiatric history Depression and Anxiety,     (-) seizures, CVA  GI/Hepatic/Renal/Endo    (+)   hepatitis (AI HEPATITIS), liver disease, renal disease, diabetes mellitus type 2 well controlled,     Musculoskeletal     Abdominal    Substance History      OB/GYN          Other   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    Plan discussed with CRNA.

## 2021-11-12 DIAGNOSIS — R63.0 ANOREXIA: Primary | ICD-10-CM

## 2021-11-12 RX ORDER — PROMETHAZINE HYDROCHLORIDE 25 MG/1
25 SUPPOSITORY RECTAL DAILY PRN
Qty: 30 SUPPOSITORY | Refills: 3 | Status: SHIPPED | OUTPATIENT
Start: 2021-11-12

## 2021-11-12 RX ORDER — DRONABINOL 5 MG/1
5 CAPSULE ORAL
Qty: 60 CAPSULE | Refills: 3 | Status: ON HOLD | OUTPATIENT
Start: 2021-11-12 | End: 2023-03-23

## 2021-12-03 ENCOUNTER — OFFICE VISIT (OUTPATIENT)
Dept: CARDIOLOGY | Facility: CLINIC | Age: 31
End: 2021-12-03

## 2021-12-03 VITALS
SYSTOLIC BLOOD PRESSURE: 104 MMHG | HEART RATE: 60 BPM | BODY MASS INDEX: 25.76 KG/M2 | HEIGHT: 62 IN | DIASTOLIC BLOOD PRESSURE: 68 MMHG | WEIGHT: 140 LBS | OXYGEN SATURATION: 98 %

## 2021-12-03 DIAGNOSIS — E31.8 POLYGLANDULAR AUTOIMMUNE SYNDROME, TYPE 1 (HCC): ICD-10-CM

## 2021-12-03 DIAGNOSIS — I45.81 LONG Q-T SYNDROME: ICD-10-CM

## 2021-12-03 DIAGNOSIS — R09.02 HYPOXIA: Primary | ICD-10-CM

## 2021-12-03 PROCEDURE — 99213 OFFICE O/P EST LOW 20 MIN: CPT | Performed by: INTERNAL MEDICINE

## 2021-12-03 NOTE — PROGRESS NOTES
Krista Aiken [place]  1990  [phone]      12/03/2021      [facility] CARDIOLOGY     Agustin Turner MD  [address] [place], [place] [id number]    No chief complaint on file.      Problem List:    1. 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 Dx by Dr. Chapman at age 4 after abnormal EKG   b. Trouble regulating potassium   c. Black out periods and seizure like activity jayden with swimming and does get anxiolytic without a LifeVest.   d. Initiated on Nadolol for tachycardia with HR up into low high 190s.  e.  twelve-lead EKG 11/20 1/2015 QTc 490 ms  2. Orthodeoxia  a. 4/7/2021 bilateral carotid duplex: Mildly elevated carotid velocities bilaterally.  No evidence of atherosclerosis.  Vertebral artery flow is antegrade bilaterally  b. 4/8/2021 heart catheterization: Normal coronary arteries, normal LV systolic function. Moderately elevated LV filling pressure, mild pulmonary hypertension (mean PA pressure 32 mmHg), supranormal cardiac output/index.  No evidence of intracardiac or pulmonary shunt.  c. 4/8/2021 BAO estimated EF 55%, LV systolic function normal.  Saline test with Valsalva negative for evidence of intra-atrial shunt.  d. 4/9/2021 Doppler transcranial microbubble injection: Mean velocities and waveforms in the right and left MCA and terminal ICA are normal.  Following injection of agitated saline both before and after Valsalva no abnormal signal are seen in the right MCA.  Negative bubble study.  e.  4/10/21 complete echocardiogram: Patient received 2 L of oxygen per nasal cannula throughout the evaluation.  In the sitting position arterial saturation 97% and an agitated saline study reveals no evidence of right to left shunting. When patient assumes supine position saturation falls to 70% within 1 minute no evidence of right to left shunting noted. When the patient assumes supine position and carries a Valsalva maneuver saturation pulse of 70% within a minute with no evidence of  "right to left shunting.  Also positional oxygen desaturation resolved within 1 minute of assuming a seated position.  No evidence of intracardiac or pulmonary shunting noted.  3. Polyglandular autoimmune syndrome  a. Rituxan in the past   4. Chronic Lung Disease  a. Follows pulmonologist at [facility]  5. Esophageal stenosis   a. Status post dilatation 1-   6. T2DM  7. Hypoparathyroidism  8. Dermatomyositis  9. Autoimmune hepatitis  10. Obstructive sleep apnea  11. Adrenal Insufficiency  12. Gastroparesis  13. Depression  14. Anxiety   15. Surgeries  a. Cholecystectomy  b. Appendectomy  c. G-tube insertion and removal  d. Umbilical hernia repair  e. Implantable loop recorder insertion    Allergies  Allergies   Allergen Reactions   • Azithromycin Hives   • Cefpodoxime Hives   • Cephalosporins Hives   • Clarithromycin Hives     biaxin  biaxin  biaxin   • Levofloxacin Hives   • Nitrofurantoin Hives   • Nystatin Hives   • Penicillins Hives   • Sulfa Antibiotics Hives   • Morphine Other (See Comments)     \"it doesn't work. [facility] told me to list it as an allergy\"   • Nitrofurantoin Macrocrystal Other (See Comments) and Hives   • Ondansetron Other (See Comments)     \"it doesn't work. [facility] told me to list it as an allergy\"       Current Medications    Current Outpatient Medications:   •  albuterol (PROVENTIL HFA;VENTOLIN HFA) 108 (90 Base) MCG/ACT inhaler, Inhale 2 puffs Every 4 (Four) Hours As Needed for Wheezing or Shortness of Air., Disp: , Rfl:   •  Alcohol Swabs (ALCOHOL PREP) 70 % pads, Check blood glucose up to 2 times daily, Disp: , Rfl:   •  ascorbic acid (VITAMIN C) 1000 MG tablet, Take 1,000 mg by mouth Daily., Disp: , Rfl:   •  azaTHIOprine (IMURAN) 50 MG tablet, Take 3 tablets by mouth Daily., Disp: 90 tablet, Rfl: 2  •  Blood Glucose Calibration (OT ULTRA/FASTTK CNTRL SOLN) solution, Use weekly and as needed to calibrate meter, Disp: , Rfl:   •  Blood Glucose Monitoring Suppl (ONE TOUCH ULTRA 2) w/Device kit, Use " to check blood glucose up to 2 times daily, Disp: , Rfl:   •  budesonide-formoterol (SYMBICORT) 160-4.5 MCG/ACT inhaler, Inhale 2 puffs 2 (Two) Times a Day. (Patient not taking: Reported on 11/11/2021), Disp: 1 each, Rfl: 0  •  calcitriol (ROCALTROL) 0.5 MCG capsule, Take 0.5 mcg by mouth 2 (Two) Times a Day., Disp: , Rfl:   •  Calcium Citrate 250 MG tablet, Take 500 mg by mouth Daily., Disp: , Rfl:   •  cycloSPORINE (RESTASIS) 0.05 % ophthalmic emulsion, Administer 1 drop to both eyes Every 12 (Twelve) Hours., Disp: , Rfl:   •  dronabinol (MARINOL) 5 MG capsule, Take 5 mg by mouth 4 (Four) Times a Day As Needed., Disp: , Rfl:   •  dronabinol (Marinol) 5 MG capsule, Take 1 capsule by mouth 2 (Two) Times a Day Before Meals., Disp: 60 capsule, Rfl: 3  •  EPINEPHrine (EPIPEN 2-TEDDY) 0.3 MG/0.3ML solution auto-injector injection, EpiPen 0.3 mg/0.3 mL injection, auto-injector  Take 1 auto by injection route., Disp: , Rfl:   •  ESTRADIOL PO, Take  by mouth. (Patient not taking: Reported on 11/11/2021), Disp: , Rfl:   •  eszopiclone (Lunesta) 2 MG tablet, Take 2 mg by mouth Every Night. Take immediately before bedtime, Disp: , Rfl:   •  fludrocortisone 0.1 MG tablet, Take 0.1 mg by mouth Daily., Disp: , Rfl:   •  fluticasone (FLONASE) 50 MCG/ACT nasal spray, 1 spray into the nostril(s) as directed by provider Daily., Disp: , Rfl:   •  folic acid (FOLVITE) 1 MG tablet, Take 4 mg by mouth Daily., Disp: , Rfl:   •  gabapentin (NEURONTIN) 600 MG tablet, Take 900 mg by mouth 3 (Three) Times a Day., Disp: , Rfl:   •  hydrocortisone (CORTEF) 10 MG tablet, Take 10 mg by mouth Every Night., Disp: , Rfl:   •  hydrocortisone (CORTEF) 20 MG tablet, Take 20 mg by mouth Every Morning. 20 MG IN AM, Disp: , Rfl:   •  Hypromellose (SYSTANE OVERNIGHT THERAPY) 0.3 % gel, Systane Gel 0.3 % eye gel, Disp: , Rfl:   •  Insulin Pen Needle 32G X 4 MM misc, Use to administer Natpara subcutaneously daily, Disp: , Rfl:   •  levothyroxine (SYNTHROID,  LEVOTHROID) 75 MCG tablet, Take 75 mcg by mouth Daily., Disp: , Rfl:   •  lidocaine (LIDODERM) 5 %, Place 1 patch on the skin as directed by provider Daily. Remove & Discard patch within 12 hours or as directed by MD (Patient not taking: Reported on 11/11/2021), Disp: 14 patch, Rfl: 0  •  magnesium oxide (MAGOX) 400 (241.3 Mg) MG tablet tablet, Take 1,000 mg by mouth 3 (Three) Times a Day., Disp: , Rfl:   •  metFORMIN (GLUCOPHAGE) 500 MG tablet, Take 500 mg by mouth 2 (Two) Times a Day With Meals., Disp: , Rfl:   •  methocarbamol (ROBAXIN) 500 MG tablet, Take 1 tablet by mouth 3 (Three) Times a Day. (Patient not taking: Reported on 11/11/2021), Disp: 14 tablet, Rfl: 0  •  Misc Natural Products (RA XYDRA EF PO), Take 1 tablet by mouth Daily., Disp: , Rfl:   •  Multiple Vitamins-Minerals (MULTIVITAMIN ADULT PO), Take 1 tablet by mouth Daily., Disp: , Rfl:   •  nadolol (CORGARD) 20 MG tablet, Take 1 tablet by mouth Every 12 (Twelve) Hours., Disp: 60 tablet, Rfl: 6  •  omeprazole (priLOSEC) 40 MG capsule, TAKE ONE CAPSULE BY MOUTH TWICE A DAY TAKE 30 MINUTES PRIOR TO A MEAL (Patient taking differently: Take 40 mg by mouth 2 (Two) Times a Day.), Disp: 60 capsule, Rfl: 6  •  ONETOUCH DELICA LANCETS 33G misc, Use to check blood glucose up to 2 times daily, Disp: , Rfl:   •  Parathyroid Hormone, Recomb, (NATPARA SC), Inject 1 Cartridge under the skin into the appropriate area as directed 2 (two) times a day., Disp: , Rfl:   •  progesterone (PROMETRIUM) 200 MG capsule, Take 200 mg by mouth Daily. (Patient not taking: Reported on 11/11/2021), Disp: , Rfl:   •  promethazine (PHENERGAN) 25 MG suppository, Insert 1 suppository into the rectum Daily As Needed for Nausea or Vomiting., Disp: 30 suppository, Rfl: 3  •  promethazine (PHENERGAN) 25 MG tablet, Take 1 tablet by mouth Every 6 (Six) Hours As Needed for Nausea or Vomiting., Disp: 12 tablet, Rfl: 0  •  sodium chloride 0.9 % solution, Infuse 100 mL/hr into a venous catheter  "Continuous., Disp: , Rfl:   •  Transparent Dressings (TEGADERM FILM 2-3/8\"X2-3/4\") misc, Apply over pump insertion site every 2 days.., Disp: , Rfl:   •  venlafaxine (EFFEXOR) 25 MG tablet, Take 75 mg by mouth 3 (Three) Times a Day., Disp: , Rfl:   •  Vitamin D, Cholecalciferol, 1000 units capsule, Take 1,000 unit marking on U-100 syringe by mouth 2 (Two) Times a Day., Disp: , Rfl:   •  zinc sulfate (ZINCATE) 220 (50 Zn) MG capsule, Take 220 mg by mouth Daily., Disp: , Rfl:     History of Present Illness     Pt presents for follow up of .Since the pt has seen us in clinic last, pt was hospitalized for a prolonged hospitalization from 4/6/2021 to 4/14/2021 during which time she underwent extensive work-up for possible cardiac/pulmonary shunt.  Extensive work-up was unrevealing and subsequently patient was sent to [facility] for further evaluation and testing.  Unfortunately despite extensive testing there no etiology was found.  She states that at this point her oxygen levels continue to drop.  Her oxygen levels are now low with her just sitting around.  She is on 4 L of oxygen as needed.  She recently underwent an EGD and esophageal dilatation to help improve swallowing.  Her appetite remains poor.  She is very frustrated due to worsening of her underlying condition with no explanation.  There is a question that she might need a lung biopsy per the family at [facility].  Also, they may switch her cytotoxic agent as well.  She has had no near syncope or syncope per se that may be associated with arrhythmias.    ROS:  General:  + fatigue, No weight gain or loss  Cardiovascular:  +CP, PND, No syncope, near syncope, edema + palpitations.  Pulmonary: + VANESSA, cough, wheezing    Vitals:    12/03/21 1436   BP: 104/68   BP Location: Right arm   Patient Position: Sitting   Pulse: 60   SpO2: 98%   Weight: 63.5 kg (140 lb)   Height: 157.5 cm (62\")       PE:  General: NAD  Neck: no JVD, no carotid bruits, no TM  Heart: RRR, NL S1, S2,  no " rubs, murmurs  Lungs: CTA, no wheezes, rhonchi, or rales  Abd: soft, non-tender, NL BS  Ext: No musculoskeletal deformities, no edema, cyanosis, or clubbing  Psych: normal mood and affect    Diagnostic Data:  Procedures      1. Hypoxia    2. Polyglandular autoimmune syndrome, type 1 (HCC)    3. Long Q-T syndrome          Plan:    1.  We will follow up with [facility] regarding future studies and attempt to explain her orthodeoxia.    2.  Long QT syndrome stable on nadolol with no significant changes in her QT interval in the past.    3.  Polyglandular autoimmune syndrome      F/up in 6 months

## 2021-12-13 NOTE — PLAN OF CARE
Patient would like to know if we can call the pharmacy and give them permission for him to  his Rx's today instead of the 15. He states he works then next 2 days and will not be able to pick it up. The Rx's are at our [place] pharmacy. Please let patient know when this is done.  Magy Henson   [facility]  Central Scheduler  amphetamine-dextroamphetamine (ADDERALL) 10 MG tablet 30 tablet 0 12/15/2021  No   Sig - Route: Take 1 tablet (10 mg) by mouth daily - Oral   Sent to pharmacy as: Amphetamine-Dextroamphetamine 10 MG Oral Tablet (Adderall)   Class: E-Prescribe   Earliest Fill Date: 12/15/2021     amphetamine-dextroamphetamine (ADDERALL XR) 30 MG 24 hr capsule 30 capsule 0 12/15/2021  No   Sig - Route: Take 1 capsule (30 mg) by mouth daily - Oral   Sent to pharmacy as: Amphetamine-Dextroamphet ER 30 MG Oral Capsule Extended Release 24 Hour (Adderall XR)   Class: E-Prescribe   Earliest Fill Date: 12/15/2021        VSS. NSR on monitor and currently 3L NC. Patient is to be discharged home today. Patient has home O2. Discharge instructions reviewed with patient and she has no questions at this time.

## 2021-12-21 RX ORDER — AZATHIOPRINE 50 MG/1
150 TABLET ORAL DAILY
Qty: 90 TABLET | Refills: 2 | Status: SHIPPED | OUTPATIENT
Start: 2021-12-21 | End: 2022-05-16 | Stop reason: SDUPTHER

## 2021-12-21 NOTE — TELEPHONE ENCOUNTER
Rx Refill Note  Requested Prescriptions      No prescriptions requested or ordered in this encounter      Last office visit with prescribing clinician: NICOLE on 11/11/2021  Next office visit with prescribing clinician: Visit date not found            Mary Shepard MA  12/21/21, 14:20 EST

## 2022-01-13 ENCOUNTER — TELEPHONE (OUTPATIENT)
Dept: GASTROENTEROLOGY | Facility: CLINIC | Age: 32
End: 2022-01-13

## 2022-01-13 NOTE — TELEPHONE ENCOUNTER
Notified patient of  recommendations. Patient noted she has already tried Erin capsules and they were not effective for her so she is requesting a alternative recommendations. Patient also wants to know if you have given more thought to a Stomach stimulator previously discussed with you she thinks during her scope?

## 2022-01-13 NOTE — TELEPHONE ENCOUNTER
Patient called and LVM that discontinuing the Marinol did not help with her nausea/loss of appetite and actually just made things worse. She is unable to eat and had been losing weight. Patient is requesting further recommendations?

## 2022-01-29 ENCOUNTER — APPOINTMENT (OUTPATIENT)
Dept: GENERAL RADIOLOGY | Facility: HOSPITAL | Age: 32
End: 2022-01-29

## 2022-01-29 ENCOUNTER — HOSPITAL ENCOUNTER (EMERGENCY)
Facility: HOSPITAL | Age: 32
Discharge: HOME | End: 2022-01-29
Attending: EMERGENCY MEDICINE | Admitting: EMERGENCY MEDICINE

## 2022-01-29 VITALS
WEIGHT: 130 LBS | TEMPERATURE: 98.1 F | SYSTOLIC BLOOD PRESSURE: 103 MMHG | HEART RATE: 90 BPM | DIASTOLIC BLOOD PRESSURE: 68 MMHG | HEIGHT: 62 IN | OXYGEN SATURATION: 93 % | BODY MASS INDEX: 23.92 KG/M2 | RESPIRATION RATE: 16 BRPM

## 2022-01-29 DIAGNOSIS — U07.1 COVID-19: Primary | ICD-10-CM

## 2022-01-29 PROCEDURE — 99282 EMERGENCY DEPT VISIT SF MDM: CPT

## 2022-01-29 PROCEDURE — 71045 X-RAY EXAM CHEST 1 VIEW: CPT

## 2022-01-29 RX ORDER — SODIUM CHLORIDE 0.9 % (FLUSH) 0.9 %
10 SYRINGE (ML) INJECTION AS NEEDED
Status: DISCONTINUED | OUTPATIENT
Start: 2022-01-29 | End: 2022-01-29

## 2022-03-28 ENCOUNTER — PREP FOR SURGERY (OUTPATIENT)
Dept: OTHER | Facility: HOSPITAL | Age: 32
End: 2022-03-28

## 2022-03-28 DIAGNOSIS — R13.10 DYSPHAGIA, UNSPECIFIED TYPE: Primary | ICD-10-CM

## 2022-04-26 ENCOUNTER — PRE-ADMISSION TESTING (OUTPATIENT)
Dept: PREADMISSION TESTING | Facility: HOSPITAL | Age: 32
End: 2022-04-26

## 2022-04-26 VITALS — WEIGHT: 133.27 LBS | HEIGHT: 62 IN | BODY MASS INDEX: 24.52 KG/M2

## 2022-04-26 DIAGNOSIS — R13.10 DYSPHAGIA, UNSPECIFIED TYPE: ICD-10-CM

## 2022-04-26 LAB
DEPRECATED RDW RBC AUTO: 48.4 FL (ref 37–54)
ERYTHROCYTE [DISTWIDTH] IN BLOOD BY AUTOMATED COUNT: 13.9 % (ref 12.3–15.4)
HCT VFR BLD AUTO: 37.6 % (ref 34–46.6)
HGB BLD-MCNC: 12.7 G/DL (ref 12–15.9)
MCH RBC QN AUTO: 32.1 PG (ref 26.6–33)
MCHC RBC AUTO-ENTMCNC: 33.8 G/DL (ref 31.5–35.7)
MCV RBC AUTO: 94.9 FL (ref 79–97)
PLATELET # BLD AUTO: 273 10*3/MM3 (ref 140–450)
PMV BLD AUTO: 11.6 FL (ref 6–12)
POTASSIUM SERPL-SCNC: 3.8 MMOL/L (ref 3.5–5.2)
QT INTERVAL: 436 MS
QTC INTERVAL: 438 MS
RBC # BLD AUTO: 3.96 10*6/MM3 (ref 3.77–5.28)
SARS-COV-2 RNA PNL SPEC NAA+PROBE: NOT DETECTED
WBC NRBC COR # BLD: 9.54 10*3/MM3 (ref 3.4–10.8)

## 2022-04-26 PROCEDURE — 85027 COMPLETE CBC AUTOMATED: CPT

## 2022-04-26 PROCEDURE — 36415 COLL VENOUS BLD VENIPUNCTURE: CPT

## 2022-04-26 PROCEDURE — 93010 ELECTROCARDIOGRAM REPORT: CPT | Performed by: INTERNAL MEDICINE

## 2022-04-26 PROCEDURE — 84132 ASSAY OF SERUM POTASSIUM: CPT

## 2022-04-26 PROCEDURE — C9803 HOPD COVID-19 SPEC COLLECT: HCPCS

## 2022-04-26 PROCEDURE — U0004 COV-19 TEST NON-CDC HGH THRU: HCPCS

## 2022-04-26 PROCEDURE — 93005 ELECTROCARDIOGRAM TRACING: CPT

## 2022-04-26 RX ORDER — HYDROCORTISONE SOD SUCCINATE 100 MG
VIAL (EA) INJECTION
COMMUNITY
Start: 2022-01-31

## 2022-04-26 RX ORDER — ESTRADIOL 0.03 MG/D
1 FILM, EXTENDED RELEASE TRANSDERMAL 2 TIMES WEEKLY
COMMUNITY
Start: 2022-03-10 | End: 2022-06-08

## 2022-04-26 RX ORDER — POTASSIUM CHLORIDE 750 MG/1
TABLET, EXTENDED RELEASE ORAL
COMMUNITY
Start: 2022-01-13

## 2022-04-26 RX ORDER — PROGESTERONE 100 MG/1
100 CAPSULE ORAL
COMMUNITY
Start: 2022-03-08 | End: 2022-06-06

## 2022-04-28 ENCOUNTER — ANESTHESIA (OUTPATIENT)
Dept: GASTROENTEROLOGY | Facility: HOSPITAL | Age: 32
End: 2022-04-28

## 2022-04-28 ENCOUNTER — HOSPITAL ENCOUNTER (OUTPATIENT)
Facility: HOSPITAL | Age: 32
Setting detail: HOSPITAL OUTPATIENT SURGERY
Discharge: HOME | End: 2022-04-28
Attending: INTERNAL MEDICINE | Admitting: INTERNAL MEDICINE

## 2022-04-28 ENCOUNTER — ANESTHESIA EVENT (OUTPATIENT)
Dept: GASTROENTEROLOGY | Facility: HOSPITAL | Age: 32
End: 2022-04-28

## 2022-04-28 VITALS
TEMPERATURE: 98.6 F | SYSTOLIC BLOOD PRESSURE: 128 MMHG | DIASTOLIC BLOOD PRESSURE: 77 MMHG | OXYGEN SATURATION: 96 % | HEART RATE: 59 BPM | RESPIRATION RATE: 20 BRPM

## 2022-04-28 DIAGNOSIS — E27.1 ADRENAL INSUFFICIENCY (ADDISON'S DISEASE): Primary | ICD-10-CM

## 2022-04-28 LAB
B-HCG UR QL: NEGATIVE
EXPIRATION DATE: NORMAL
GLUCOSE BLDC GLUCOMTR-MCNC: 83 MG/DL (ref 70–130)
INTERNAL NEGATIVE CONTROL: NEGATIVE
INTERNAL POSITIVE CONTROL: POSITIVE
Lab: NORMAL

## 2022-04-28 PROCEDURE — 25010000002 HYDROMORPHONE 1 MG/ML SOLUTION: Performed by: NURSE ANESTHETIST, CERTIFIED REGISTERED

## 2022-04-28 PROCEDURE — 82962 GLUCOSE BLOOD TEST: CPT | Performed by: INTERNAL MEDICINE

## 2022-04-28 PROCEDURE — 25010000002 HYDROMORPHONE PER 4 MG: Performed by: NURSE ANESTHETIST, CERTIFIED REGISTERED

## 2022-04-28 PROCEDURE — 25010000002 PROMETHAZINE PER 50 MG: Performed by: ANESTHESIOLOGY

## 2022-04-28 PROCEDURE — 25010000002 PROPOFOL 10 MG/ML EMULSION: Performed by: NURSE ANESTHETIST, CERTIFIED REGISTERED

## 2022-04-28 PROCEDURE — 25010000002 HYDROCORTISONE SODIUM SUCCINATE 100 MG RECONSTITUTED SOLUTION: Performed by: ANESTHESIOLOGY

## 2022-04-28 PROCEDURE — 81025 URINE PREGNANCY TEST: CPT | Performed by: ANESTHESIOLOGY

## 2022-04-28 PROCEDURE — 43249 ESOPH EGD DILATION <30 MM: CPT | Performed by: INTERNAL MEDICINE

## 2022-04-28 PROCEDURE — 0 LIDOCAINE 1 % SOLUTION: Performed by: NURSE ANESTHETIST, CERTIFIED REGISTERED

## 2022-04-28 PROCEDURE — C1725 CATH, TRANSLUMIN NON-LASER: HCPCS | Performed by: INTERNAL MEDICINE

## 2022-04-28 RX ORDER — HYDROMORPHONE HYDROCHLORIDE 1 MG/ML
0.5 INJECTION, SOLUTION INTRAMUSCULAR; INTRAVENOUS; SUBCUTANEOUS
Status: DISCONTINUED | OUTPATIENT
Start: 2022-04-28 | End: 2022-04-28 | Stop reason: HOSPADM

## 2022-04-28 RX ORDER — HEPARIN SODIUM (PORCINE) LOCK FLUSH IV SOLN 100 UNIT/ML 100 UNIT/ML
500 SOLUTION INTRAVENOUS ONCE
Status: DISCONTINUED | OUTPATIENT
Start: 2022-04-28 | End: 2022-04-28 | Stop reason: HOSPADM

## 2022-04-28 RX ORDER — EPHEDRINE SULFATE 50 MG/ML
INJECTION, SOLUTION INTRAVENOUS AS NEEDED
Status: DISCONTINUED | OUTPATIENT
Start: 2022-04-28 | End: 2022-04-28 | Stop reason: SURG

## 2022-04-28 RX ORDER — FENTANYL CITRATE 50 UG/ML
50 INJECTION, SOLUTION INTRAMUSCULAR; INTRAVENOUS
Status: DISCONTINUED | OUTPATIENT
Start: 2022-04-28 | End: 2022-04-28 | Stop reason: HOSPADM

## 2022-04-28 RX ORDER — LIDOCAINE HYDROCHLORIDE 10 MG/ML
INJECTION, SOLUTION INFILTRATION; PERINEURAL AS NEEDED
Status: DISCONTINUED | OUTPATIENT
Start: 2022-04-28 | End: 2022-04-28 | Stop reason: SURG

## 2022-04-28 RX ORDER — CALCIUM CHLORIDE 100 MG/ML
INJECTION INTRAVENOUS; INTRAVENTRICULAR AS NEEDED
Status: DISCONTINUED | OUTPATIENT
Start: 2022-04-28 | End: 2022-04-28 | Stop reason: SURG

## 2022-04-28 RX ORDER — BUPROPION HYDROCHLORIDE 75 MG/1
75 TABLET ORAL DAILY
COMMUNITY

## 2022-04-28 RX ORDER — DRONABINOL 5 MG/1
5 CAPSULE ORAL 4 TIMES DAILY
Qty: 120 CAPSULE | Refills: 2 | Status: SHIPPED | OUTPATIENT
Start: 2022-04-28

## 2022-04-28 RX ORDER — CALCIUM CHLORIDE 100 MG/ML
1 INJECTION INTRAVENOUS; INTRAVENTRICULAR ONCE
Status: COMPLETED | OUTPATIENT
Start: 2022-04-28 | End: 2022-04-28

## 2022-04-28 RX ORDER — SODIUM CHLORIDE, SODIUM LACTATE, POTASSIUM CHLORIDE, AND CALCIUM CHLORIDE .6; .31; .03; .02 G/100ML; G/100ML; G/100ML; G/100ML
20 INJECTION, SOLUTION INTRAVENOUS CONTINUOUS
Status: DISCONTINUED | OUTPATIENT
Start: 2022-04-28 | End: 2022-04-28 | Stop reason: HOSPADM

## 2022-04-28 RX ORDER — PROPOFOL 10 MG/ML
VIAL (ML) INTRAVENOUS AS NEEDED
Status: DISCONTINUED | OUTPATIENT
Start: 2022-04-28 | End: 2022-04-28 | Stop reason: SURG

## 2022-04-28 RX ADMIN — CALCIUM CHLORIDE 1 G: 100 INJECTION INTRAVENOUS; INTRAVENTRICULAR at 14:31

## 2022-04-28 RX ADMIN — PROMETHAZINE HYDROCHLORIDE 12.5 MG: 25 INJECTION INTRAMUSCULAR; INTRAVENOUS at 14:23

## 2022-04-28 RX ADMIN — HYDROCORTISONE SODIUM SUCCINATE 100 MG: 100 INJECTION, POWDER, FOR SOLUTION INTRAMUSCULAR; INTRAVENOUS at 14:26

## 2022-04-28 RX ADMIN — EPHEDRINE SULFATE 20 MG: 50 INJECTION, SOLUTION INTRAVENOUS at 14:36

## 2022-04-28 RX ADMIN — HYDROMORPHONE HYDROCHLORIDE 0.5 MG: 1 INJECTION, SOLUTION INTRAMUSCULAR; INTRAVENOUS; SUBCUTANEOUS at 15:07

## 2022-04-28 RX ADMIN — PROPOFOL 80 MG: 10 INJECTION, EMULSION INTRAVENOUS at 14:06

## 2022-04-28 RX ADMIN — HYDROMORPHONE HYDROCHLORIDE 1 MG: 1 INJECTION, SOLUTION INTRAMUSCULAR; INTRAVENOUS; SUBCUTANEOUS at 14:21

## 2022-04-28 RX ADMIN — LIDOCAINE HYDROCHLORIDE 60 MG: 10 INJECTION, SOLUTION INFILTRATION; PERINEURAL at 14:06

## 2022-04-28 RX ADMIN — HYDROMORPHONE HYDROCHLORIDE 0.5 MG: 1 INJECTION, SOLUTION INTRAMUSCULAR; INTRAVENOUS; SUBCUTANEOUS at 15:35

## 2022-04-28 RX ADMIN — PROPOFOL 50 MG: 10 INJECTION, EMULSION INTRAVENOUS at 14:37

## 2022-04-28 RX ADMIN — SODIUM CHLORIDE, POTASSIUM CHLORIDE, SODIUM LACTATE AND CALCIUM CHLORIDE 20 ML/HR: 600; 310; 30; 20 INJECTION, SOLUTION INTRAVENOUS at 11:40

## 2022-04-28 RX ADMIN — PROMETHAZINE HYDROCHLORIDE 12.5 MG: 25 INJECTION INTRAMUSCULAR; INTRAVENOUS at 15:36

## 2022-04-28 RX ADMIN — CALCIUM CHLORIDE 1 G: 100 INJECTION INTRAVENOUS; INTRAVENTRICULAR at 14:25

## 2022-04-28 RX ADMIN — CALCIUM CHLORIDE 1 G: 100 INJECTION INTRAVENOUS; INTRAVENTRICULAR at 15:05

## 2022-04-28 NOTE — ANESTHESIA POSTPROCEDURE EVALUATION
Patient: Krista Richter    Procedure Summary     Date: 04/28/22 Room / Location:  [facility]    Anesthesia Start: 1418 Anesthesia Stop: 1450    Procedure: ESOPHAGOGASTRODUODENOSCOPY WITH DIALATION (N/A ) Diagnosis:       Dysphagia, unspecified type      (Dysphagia, unspecified type [R13.10])    Surgeons: Gavin Vargas MD Provider: Jorje Robin MD    Anesthesia Type: general ASA Status: 3          Anesthesia Type: general    Vitals  No vitals data found for the desired time range.          Post Anesthesia Care and Evaluation    Patient location during evaluation: PACU  Patient participation: complete - patient participated  Level of consciousness: awake and responsive to verbal stimuli  Pain score: 2  Pain management: adequate  Airway patency: patent  Anesthetic complications: No anesthetic complications    Cardiovascular status: acceptable  Respiratory status: acceptable  Hydration status: acceptable    Comments: Pt awake and responsive. SV. VSS. Report to RN. Patient Vitals in the past 24 hrs:  04/28/22 1129, BP:105/71, Temp:96.9 °F (36.1 °C), Temp src:Temporal, Pulse:65, Resp:16, SpO2:94 %  133/78. p 72. r 16. t 98.1

## 2022-04-28 NOTE — ANESTHESIA PREPROCEDURE EVALUATION
Anesthesia Evaluation                  Airway   Mallampati: I  TM distance: >3 FB  Neck ROM: full  No difficulty expected  Dental      Pulmonary    (+) asthma,sleep apnea,     ROS comment: Sudden desat when supine, etiology  Cardiovascular     ECG reviewed        Neuro/Psych  GI/Hepatic/Renal/Endo    (+)  GERD,  hepatitis, liver disease, renal disease, diabetes mellitus, thyroid problem     Musculoskeletal     Abdominal    Substance History      OB/GYN          Other                        Anesthesia Plan    ASA 3     general     intravenous induction     Anesthetic plan, all risks, benefits, and alternatives have been provided, discussed and informed consent has been obtained with: patient.    Plan discussed with CRNA.        CODE STATUS:

## 2022-05-16 DIAGNOSIS — Z79.899 HIGH RISK MEDICATION USE: Primary | ICD-10-CM

## 2022-05-16 NOTE — TELEPHONE ENCOUNTER
Rx Refill Note  Requested Prescriptions     Pending Prescriptions Disp Refills   • azaTHIOprine (IMURAN) 50 MG tablet 90 tablet 4     Sig: Take 3 tablets by mouth Daily.      Last office visit with prescribing clinician: Visit date not found      Next office visit with prescribing clinician: Visit date not found            Rachel Buchanan MA  05/17/22, 08:49 EDT

## 2022-05-17 RX ORDER — AZATHIOPRINE 50 MG/1
150 TABLET ORAL DAILY
Qty: 90 TABLET | Refills: 4 | Status: SHIPPED | OUTPATIENT
Start: 2022-05-17 | End: 2022-11-10 | Stop reason: SDUPTHER

## 2022-06-20 RX ORDER — NADOLOL 20 MG/1
20 TABLET ORAL EVERY 12 HOURS SCHEDULED
Qty: 60 TABLET | Refills: 4 | Status: SHIPPED | OUTPATIENT
Start: 2022-06-20 | End: 2022-12-06 | Stop reason: SDUPTHER

## 2022-10-28 ENCOUNTER — TELEPHONE (OUTPATIENT)
Dept: CARDIOLOGY | Facility: CLINIC | Age: 32
End: 2022-10-28

## 2022-10-28 NOTE — TELEPHONE ENCOUNTER
Patient has been diagnosed with ADHD and need to know if ok to take Adderall from EP standpoint.  Has already been started on low dose and it's helping.    Per Dr. Meyer Adderall would not be recommended however if pt is already on low dose then she should have EKG and if EKG is ok and the Adderall is helping she could stay on low dose as long as she is being followed and has routine EKGs.     LVM to discuss.

## 2022-11-10 RX ORDER — AZATHIOPRINE 50 MG/1
150 TABLET ORAL DAILY
Qty: 90 TABLET | Refills: 4 | Status: SHIPPED | OUTPATIENT
Start: 2022-11-10 | End: 2023-02-22 | Stop reason: SDUPTHER

## 2022-11-10 NOTE — TELEPHONE ENCOUNTER
Pt's mother called and said Pt is almost out of Imuran and will need them before the week is out .

## 2022-11-23 ENCOUNTER — CLINICAL SUPPORT (OUTPATIENT)
Dept: CARDIOLOGY | Facility: CLINIC | Age: 32
End: 2022-11-23

## 2022-12-06 RX ORDER — NADOLOL 20 MG/1
20 TABLET ORAL EVERY 12 HOURS SCHEDULED
Qty: 60 TABLET | Refills: 6 | Status: SHIPPED | OUTPATIENT
Start: 2022-12-06

## 2022-12-06 NOTE — TELEPHONE ENCOUNTER
Patient's mother called and requested a refill of Corgard. She would like it sent to [facility] in [place].

## 2022-12-07 ENCOUNTER — CLINICAL SUPPORT (OUTPATIENT)
Dept: CARDIOLOGY | Facility: CLINIC | Age: 32
End: 2022-12-07

## 2022-12-07 DIAGNOSIS — I45.81 LONG Q-T SYNDROME: Primary | ICD-10-CM

## 2022-12-07 PROBLEM — R94.31 PROLONGED Q-T INTERVAL ON ECG: Status: ACTIVE | Noted: 2022-12-07

## 2022-12-07 PROCEDURE — 93000 ELECTROCARDIOGRAM COMPLETE: CPT | Performed by: INTERNAL MEDICINE

## 2023-02-20 ENCOUNTER — OFFICE VISIT (OUTPATIENT)
Dept: FAMILY MEDICINE CLINIC | Facility: CLINIC | Age: 33
End: 2023-02-20
Payer: COMMERCIAL

## 2023-02-20 VITALS
HEART RATE: 75 BPM | DIASTOLIC BLOOD PRESSURE: 76 MMHG | SYSTOLIC BLOOD PRESSURE: 112 MMHG | WEIGHT: 136 LBS | BODY MASS INDEX: 25.03 KG/M2 | OXYGEN SATURATION: 97 % | HEIGHT: 62 IN

## 2023-02-20 DIAGNOSIS — H93.13 TINNITUS OF BOTH EARS: ICD-10-CM

## 2023-02-20 DIAGNOSIS — R06.09 PLATYPNEA-ORTHODEOXIA SYNDROME: ICD-10-CM

## 2023-02-20 DIAGNOSIS — E31.8: ICD-10-CM

## 2023-02-20 DIAGNOSIS — E27.1 ADRENAL INSUFFICIENCY (ADDISON'S DISEASE): ICD-10-CM

## 2023-02-20 DIAGNOSIS — R94.31 PROLONGED Q-T INTERVAL ON ECG: ICD-10-CM

## 2023-02-20 DIAGNOSIS — M33.90 DERMATOMYOSITIS: ICD-10-CM

## 2023-02-20 DIAGNOSIS — I45.81 PROLONGED QT INTERVAL SYNDROME: ICD-10-CM

## 2023-02-20 DIAGNOSIS — Z51.81 MEDICATION MONITORING ENCOUNTER: ICD-10-CM

## 2023-02-20 DIAGNOSIS — E11.42 TYPE 2 DIABETES MELLITUS WITH DIABETIC POLYNEUROPATHY, WITHOUT LONG-TERM CURRENT USE OF INSULIN: ICD-10-CM

## 2023-02-20 DIAGNOSIS — I77.6 VASCULITIS: ICD-10-CM

## 2023-02-20 DIAGNOSIS — R13.10 DYSPHAGIA, UNSPECIFIED TYPE: ICD-10-CM

## 2023-02-20 DIAGNOSIS — Q89.01 ASPLENIA: ICD-10-CM

## 2023-02-20 DIAGNOSIS — G62.9 NEUROPATHY: Primary | ICD-10-CM

## 2023-02-20 DIAGNOSIS — H92.13 EAR DRAINAGE, BILATERAL: ICD-10-CM

## 2023-02-20 DIAGNOSIS — E31.9 POLYGLANDULAR DYSFUNCTION: ICD-10-CM

## 2023-02-20 DIAGNOSIS — E20.8 OTHER HYPOPARATHYROIDISM: ICD-10-CM

## 2023-02-20 PROBLEM — IMO0002 DISORDER OF LOWER EXTREMITY: Status: ACTIVE | Noted: 2017-04-29

## 2023-02-20 PROBLEM — M79.7 FIBROMYOSITIS: Status: ACTIVE | Noted: 2021-08-02

## 2023-02-20 PROBLEM — D50.9 IRON DEFICIENCY ANEMIA: Status: ACTIVE | Noted: 2021-05-14

## 2023-02-20 PROBLEM — R93.7 ABNORMAL BONE DENSITY SCREENING: Status: ACTIVE | Noted: 2018-01-10

## 2023-02-20 PROBLEM — L65.9 ALOPECIA: Status: ACTIVE | Noted: 2021-05-14

## 2023-02-20 PROBLEM — D72.829 LEUKOCYTOSIS: Status: RESOLVED | Noted: 2021-04-07 | Resolved: 2023-02-20

## 2023-02-20 PROBLEM — T38.0X5A STEROID-INDUCED HYPERGLYCEMIA: Status: ACTIVE | Noted: 2019-04-14

## 2023-02-20 PROBLEM — IMO0002 UNCONTROLLED TYPE 2 DIABETES MELLITUS: Status: ACTIVE | Noted: 2019-08-20

## 2023-02-20 PROBLEM — R19.7 DIARRHEA: Status: ACTIVE | Noted: 2021-08-02

## 2023-02-20 PROBLEM — R10.2 PAIN IN FEMALE PELVIS: Status: RESOLVED | Noted: 2019-09-03 | Resolved: 2023-02-20

## 2023-02-20 PROBLEM — M54.50 CHRONIC LOW BACK PAIN: Status: ACTIVE | Noted: 2018-11-16

## 2023-02-20 PROBLEM — R73.9 STEROID-INDUCED HYPERGLYCEMIA: Status: ACTIVE | Noted: 2019-04-14

## 2023-02-20 PROBLEM — E83.52 HYPERCALCEMIA: Status: ACTIVE | Noted: 2018-05-11

## 2023-02-20 PROBLEM — G89.29 CHRONIC LOW BACK PAIN: Status: ACTIVE | Noted: 2018-11-16

## 2023-02-20 PROBLEM — G47.00 INSOMNIA: Status: ACTIVE | Noted: 2022-06-22

## 2023-02-20 PROBLEM — R10.9 FLANK PAIN: Status: ACTIVE | Noted: 2022-06-22

## 2023-02-20 PROBLEM — J40 BRONCHITIS: Status: RESOLVED | Noted: 2020-06-03 | Resolved: 2023-02-20

## 2023-02-20 PROBLEM — B37.0 ORAL THRUSH: Status: ACTIVE | Noted: 2019-12-03

## 2023-02-20 PROBLEM — R00.0 TACHYCARDIA: Status: ACTIVE | Noted: 2021-05-14

## 2023-02-20 PROBLEM — F41.9 ANXIETY: Status: ACTIVE | Noted: 2022-06-22

## 2023-02-20 PROBLEM — R10.2 PAIN IN FEMALE PELVIS: Status: ACTIVE | Noted: 2019-09-03

## 2023-02-20 PROBLEM — J96.11 CHRONIC HYPOXEMIC RESPIRATORY FAILURE: Status: ACTIVE | Noted: 2021-05-25

## 2023-02-20 PROBLEM — L63.9 ALOPECIA AREATA: Status: ACTIVE | Noted: 2021-08-02

## 2023-02-20 PROBLEM — M81.0 OSTEOPOROSIS: Status: ACTIVE | Noted: 2018-03-06

## 2023-02-20 PROBLEM — E03.9 HYPOTHYROIDISM: Status: ACTIVE | Noted: 2018-03-06

## 2023-02-20 PROBLEM — T82.898A: Status: ACTIVE | Noted: 2021-08-24

## 2023-02-20 PROBLEM — R29.0 TETANY: Status: ACTIVE | Noted: 2018-09-11

## 2023-02-20 PROBLEM — M25.569 KNEE PAIN: Status: RESOLVED | Noted: 2018-11-16 | Resolved: 2023-02-20

## 2023-02-20 PROBLEM — F32.A DEPRESSIVE DISORDER: Status: ACTIVE | Noted: 2022-06-22

## 2023-02-20 PROBLEM — E27.49 DECREASED CORTISOL LEVEL: Status: RESOLVED | Noted: 2022-06-22 | Resolved: 2023-02-20

## 2023-02-20 PROBLEM — J30.2 SEASONAL ALLERGIES: Status: ACTIVE | Noted: 2018-03-06

## 2023-02-20 PROBLEM — E86.0 LUETSCHER'S SYNDROME: Status: ACTIVE | Noted: 2019-08-21

## 2023-02-20 PROBLEM — R13.19 ESOPHAGEAL DYSPHAGIA: Status: RESOLVED | Noted: 2018-08-09 | Resolved: 2023-02-20

## 2023-02-20 PROBLEM — IMO0002 DISORDER OF LOWER EXTREMITY: Status: RESOLVED | Noted: 2017-04-29 | Resolved: 2023-02-20

## 2023-02-20 PROBLEM — IMO0002 UNCONTROLLED TYPE 2 DIABETES MELLITUS: Status: RESOLVED | Noted: 2019-08-20 | Resolved: 2023-02-20

## 2023-02-20 PROBLEM — N20.0 RENAL STONE: Status: ACTIVE | Noted: 2018-03-06

## 2023-02-20 PROBLEM — J96.90 RESPIRATORY FAILURE (HCC): Status: ACTIVE | Noted: 2022-06-22

## 2023-02-20 PROBLEM — E27.49 DECREASED CORTISOL LEVEL: Status: ACTIVE | Noted: 2022-06-22

## 2023-02-20 PROBLEM — G43.719 INTRACTABLE CHRONIC MIGRAINE WITHOUT AURA: Status: ACTIVE | Noted: 2017-08-30

## 2023-02-20 PROBLEM — S93.409A SPRAIN OF ANKLE: Status: ACTIVE | Noted: 2022-06-22

## 2023-02-20 PROBLEM — M25.569 KNEE PAIN: Status: ACTIVE | Noted: 2018-11-16

## 2023-02-20 PROCEDURE — 99214 OFFICE O/P EST MOD 30 MIN: CPT | Performed by: INTERNAL MEDICINE

## 2023-02-20 RX ORDER — GABAPENTIN 600 MG/1
900 TABLET ORAL 3 TIMES DAILY
Qty: 270 TABLET | Refills: 2 | Status: SHIPPED | OUTPATIENT
Start: 2023-02-20

## 2023-02-20 NOTE — ASSESSMENT & PLAN NOTE
Possibly Sjogren's related, UDS, CSA Lee was interrogated.  We will continue gabapentin 600 mg 1/2 tablets p.o. 3 times daily.

## 2023-02-20 NOTE — PROGRESS NOTES
"Krista Richter  1990  [id number]  Patient Care Team:  Cecil Sen MD as PCP - General (Internal Medicine)  Jeramy Meyer MD as Consulting Physician (Cardiac Electrophysiology)    Krista Richter is a 32 y.o. female here today to establish care.  This patient is accompanied by their self who contributes to the history of their care.    Chief Complaint:    Chief Complaint   Patient presents with   • auto immune         History of Present Illness:     She is here to transfer primary care. She is followed by multiple specailists. She has been chronically ill since a pediatric patient with Automimmune poly glandular failure affectting adrenal, ovarian, thyroid, intestines,  Stomache, parathyroid gland. She is also being evaluated with interstitial pneumonitis.platypnea/orthodeoxia. She is followed by endocrinology ( Skip), EP ( joceline), Gastroenterology ( juan)), [place] [facility] ( as needed). She is followed by [facility] primarily for APECED. She is immunocompromised on immuran, hydrocortisone. She receives daily injection of pTH. Occasionally requires iv calicum and ivf at home. She has not had tetany in 6 months.  She is also followed by Dr. Park  With [facility]  She routinely has esophageal dilatations by Dr. Vargas for progressive dysphagia. Sees Endocrine typically q 3mon and q 6 mon in person. Currently not on any medications for diabetes. She does tolerate with metfomin and has been on insulin in the past. DM medications has been held for 1.5 years. ( describes as cyclic).  She is not on IVIG, has been on this inpatient at [facility], however not  As mantenance.    She takes rituxan as needed for a \"lung flare) this is prescribed by her hematology ( isela gamino. Last rituxan use was > 2 years ago    She is concerned about the potential of developing an recurrent umbilical hernia. There is a small bulge, noted this am. It is tender. There is some " nausea but no vomiting.  She had an umbilical hernia at age 13. There is a mesh wall in place.     She cannot received covid vaccination 2/2 to absent B cells.    She has polyneuropathy for she takes gabapentin which wells.    She would like to see Dr. Stevens for concerns over vasculitis. She has been seen in the distant past by rheumatology ( as a child- she has dermatomyositis, sjogrens    She wears O2 secondary to eleanor and CSA.    Would also like to see ENT 2/2 to progressive tinnitus, hearing loss. Tubes for the third time has been proposed. There is drainage.    Past Medical History:   Diagnosis Date   • ADHD    • Adrenal insufficiency (HCC)    • Alopecia    • Anemia    • Anxiety    • APS type 1 (HCC)    • Asthma    • Autism    • Autoimmune enteropathy    • Autoimmune hepatitis (HCC)    • Autoimmune urticaria    • Depression    • Dermatomyositis (HCC)    • Disease of thyroid gland    • Fibromyalgia    • Functional asplenia    • GERD (gastroesophageal reflux disease)    • History of acute renal failure x 2    • History of Clostridioides difficile infection 2018   • History of COVID-19 01/29/2022   • History of kidney stones    • History of streptococcal septicemia 2017   • Hypoparathyroidism ([facility])    • Insulin dependent diabetes mellitus    • Long Q-T syndrome    • Malabsorption    • Migraine    • Nephrocalcinosis    • Neuropathy     BLE   • Oxygen desaturation    • Pancreatic insufficiency    • Parathyroid abnormality ([facility])    • Polyglandular deficiency syndrome (HCC)    • Premature ovarian failure    • PTSD (post-traumatic stress disorder)    • Sjogren's syndrome (HCC)    • Sleep apnea (no CPAP, uses only oxygen at 4L per NC at bedtime)    • Spleen absent    • Tetany     HAS EPISODES   • Transfusion history     Denies transfusion reaction   • Umbilical hernia        Past Surgical History:   Procedure Laterality Date   • APPENDECTOMY     • CARDIAC CATHETERIZATION N/A 04/08/2021    Procedure: RIGHT AND LEFT HEART  CATH;  Surgeon: Poli Anderson IV, MD;  Location:  [facility];  Service: Cardiovascular;  Laterality: N/A;   • CARDIAC SURGERY      LOOP RECORDER   • CHOLECYSTECTOMY     • COLONOSCOPY N/A 06/05/2020    Procedure: COLONOSCOPY;  Surgeon: Gavin Vargas MD;  Location:  [facility] ENDOSCOPY;  Service: Gastroenterology;  Laterality: N/A;   • ENDOSCOPY N/A 04/26/2018    Procedure: ESOPHAGOGASTRODUODENOSCOPY;  Surgeon: Gavin Vargas MD;  Location:  [facility]OS[facility];  Service: Gastroenterology   • ENDOSCOPY N/A 08/09/2018    Procedure: ESOPHAGOGASTRODUODENOSCOPY-DILATION;  Surgeon: Gavin Vargas MD;  Location:  [facility]OS[facility];  Service: Gastroenterology   • ENDOSCOPY     • ENDOSCOPY N/A 02/06/2019    Procedure: ESOPHAGOGASTRODUODENOSCOPY;  Surgeon: Gavin Vargas MD;  Location:  [facility];  Service: Gastroenterology   • ENDOSCOPY N/A 07/24/2019    Procedure: ESOPHAGOGASTRODUODENOSCOPY;  Surgeon: Gavin Vargas MD;  Location:  [facility];  Service: Gastroenterology   • ENDOSCOPY N/A 10/18/2019    Procedure: ESOPHAGOGASTRODUODENOSCOPY WITH BALLOON DILATION;  Surgeon: Gavin Vargas MD;  Location:  [facility];  Service: Gastroenterology   • ENDOSCOPY N/A 06/04/2020    Procedure: ESOPHAGOGASTRODUODENOSCOPY  WITH ESOPHAGEAL BALLOON DILATATION;  Surgeon: Gavin Vargas MD;  Location:  [facility];  Service: Gastroenterology;  Laterality: N/A;  dilation done with 60F dilator    • ENDOSCOPY N/A 01/29/2021    Procedure: ESOPHAGOGASTRODUODENOSCOPY;  Surgeon: Gavin Vargas MD;  Location:  [facility];  Service: Gastroenterology;  Laterality: N/A;   • ENDOSCOPY  10/2021    EGD   • ENDOSCOPY N/A 11/11/2021    Procedure: ESOPHAGOGASTRODUODENOSCOPY;  Surgeon: Gavin Vargas MD;  Location:  [facility];  Service: Gastroenterology;  Laterality: N/A;   balloon dilation 18-20   • ENDOSCOPY N/A 4/28/2022    Procedure: ESOPHAGOGASTRODUODENOSCOPY WITH DIALATION;  Surgeon: Gavin Vargas MD;  Location: [facility] END[facility]COPY;  Service: Gastroenterology;  Laterality: N/A;  balloon dilation to 20   • EXPLORATORY LAPAROTOMY      MULTIPLE   • HAND SURGERY      4 TOTAL   • HERNIA REPAIR     • LIVER BIOPSY     • MUSCLE BIOPSY     • MUSCLE BIOPSY     • PEG TUBE INSERTION     • PEG TUBE REMOVAL     • PORTACATH PLACEMENT Left     Left Chest Portacath present        Family History   Problem Relation Age of Onset   • Mental illness Mother    • Arthritis Mother    • Autism Mother    • Bleeding Disorder Mother    • Migraine headaches Mother    • Diabetes Father    • Kidney disease Father    • Diabetes Brother    • Thyroid disease Maternal Grandmother    • Cancer Maternal Grandmother    • Bleeding Disorder Maternal Grandmother    • Arthritis Maternal Grandmother    • Kidney disease Maternal Grandmother    • Heart disease Maternal Grandfather    • Hyperlipidemia Maternal Grandfather    • Hypertension Maternal Grandfather    • Diabetes Maternal Grandfather    • Cancer Maternal Grandfather    • Arthritis Maternal Grandfather    • Cancer Paternal Grandmother    • Arthritis Paternal Grandmother    • Stroke Paternal Grandfather    • Cancer Paternal Grandfather    • Arthritis Paternal Grandfather        Social History     Socioeconomic History   • Marital status: Significant Other   Tobacco Use   • Smoking status: Never   • Smokeless tobacco: Never   Vaping Use   • Vaping Use: Never used   Substance and Sexual Activity   • Alcohol use: Yes     Alcohol/week: 1.0 standard drink     Types: 1 Cans of beer per week     Comment: SOCIAL   • Drug use: No   • Sexual activity: Defer       Allergies   Allergen Reactions   • Azithromycin Hives   • Cefpodoxime Hives   • Cephalosporins Hives   • Clarithromycin Hives     biaxin   • Levofloxacin Hives   • Nitrofurantoin Hives   • Nystatin Hives  "  • Penicillins Hives   • Sulfa Antibiotics Hives   • Nitrofurantoin Macrocrystal Hives   • Ondansetron Other (See Comments)     \"it doesn't work. [facility] told me to list it as an allergy\"       Review of Systems:    Review of Systems   Constitutional: Negative.    HENT: Positive for ear discharge.    Eyes: Negative.    Gastrointestinal: Positive for abdominal pain.   Endocrine: Negative.    Genitourinary: Negative.    Musculoskeletal: Negative.    Skin:        Total alopecia   Neurological: Positive for numbness.   Psychiatric/Behavioral: Positive for sleep disturbance and depressed mood. The patient is nervous/anxious.        Vitals:    02/20/23 1304   BP: 112/76   Pulse: 75   SpO2: 97%   Weight: 61.7 kg (136 lb)   Height: 157.3 cm (61.93\")     Body mass index is 24.93 kg/m².      Current Outpatient Medications:   •  gabapentin (NEURONTIN) 600 MG tablet, Take 1.5 tablets by mouth 3 (Three) Times a Day., Disp: 270 tablet, Rfl: 2  •  albuterol (PROVENTIL HFA;VENTOLIN HFA) 108 (90 Base) MCG/ACT inhaler, Inhale 2 puffs Every 4 (Four) Hours As Needed for Wheezing or Shortness of Air., Disp: , Rfl:   •  Alcohol Swabs (ALCOHOL PREP) 70 % pads, Check blood glucose up to 2 times daily, Disp: , Rfl:   •  ascorbic acid (VITAMIN C) 1000 MG tablet, Take 1,000 mg by mouth Daily., Disp: , Rfl:   •  azaTHIOprine (IMURAN) 50 MG tablet, Take 3 tablets by mouth Daily., Disp: 90 tablet, Rfl: 4  •  Blood Glucose Calibration (OT ULTRA/FASTTK CNTRL SOLN) solution, Use weekly and as needed to calibrate meter, Disp: , Rfl:   •  Blood Glucose Monitoring Suppl (ONE TOUCH ULTRA 2) w/Device kit, Use to check blood glucose up to 2 times daily, Disp: , Rfl:   •  budesonide-formoterol (SYMBICORT) 160-4.5 MCG/ACT inhaler, Inhale 2 puffs 2 (Two) Times a Day. (Patient not taking: No sig reported), Disp: 1 each, Rfl: 0  •  buPROPion (WELLBUTRIN) 75 MG tablet, Take 75 mg by mouth Daily., Disp: , Rfl:   •  calcitriol (ROCALTROL) 0.5 MCG capsule, Take 0.5 " mcg by mouth 2 (Two) Times a Day., Disp: , Rfl:   •  Calcium Citrate 250 MG tablet, Take 500 mg by mouth Daily., Disp: , Rfl:   •  cycloSPORINE (RESTASIS) 0.05 % ophthalmic emulsion, Administer 1 drop to both eyes Every 12 (Twelve) Hours., Disp: , Rfl:   •  dronabinol (MARINOL) 5 MG capsule, Take 5 mg by mouth 4 (Four) Times a Day As Needed., Disp: , Rfl:   •  dronabinol (Marinol) 5 MG capsule, Take 1 capsule by mouth 2 (Two) Times a Day Before Meals., Disp: 60 capsule, Rfl: 3  •  dronabinol (Marinol) 5 MG capsule, Take 1 capsule by mouth 4 (Four) Times a Day., Disp: 120 capsule, Rfl: 2  •  EPINEPHrine (EPIPEN) 0.3 MG/0.3ML solution auto-injector injection, EpiPen 0.3 mg/0.3 mL injection, auto-injector  Take 1 auto by injection route., Disp: , Rfl:   •  estradiol (VIVELLE-DOT) 0.025 MG/24HR patch, Place 1 patch on the skin as directed by provider 2 (Two) Times a Week., Disp: , Rfl:   •  ESTRADIOL PO, Take  by mouth. (Patient not taking: No sig reported), Disp: , Rfl:   •  eszopiclone (LUNESTA) 2 MG tablet, Take 2 mg by mouth Every Night. Take immediately before bedtime, Disp: , Rfl:   •  fludrocortisone 0.1 MG tablet, Take 0.1 mg by mouth Daily., Disp: , Rfl:   •  fluticasone (FLONASE) 50 MCG/ACT nasal spray, 1 spray into the nostril(s) as directed by provider Daily., Disp: , Rfl:   •  folic acid (FOLVITE) 1 MG tablet, Take 4 mg by mouth Daily., Disp: , Rfl:   •  hydrocortisone (CORTEF) 10 MG tablet, Take 10 mg by mouth Every Night., Disp: , Rfl:   •  hydrocortisone (CORTEF) 20 MG tablet, Take 20 mg by mouth Every Morning. 20 MG IN AM, Disp: , Rfl:   •  hydrocortisone sodium succinate (Solu-CORTEF) 100 MG injection, Inject 50mg (1ml) IM as directed, Disp: , Rfl:   •  Hypromellose (GENTEAL SEVERE) 0.3 % ophthalmic gel, Systane Gel 0.3 % eye gel, Disp: , Rfl:   •  Insulin Pen Needle 32G X 4 MM misc, Use to administer Natpara subcutaneously daily, Disp: , Rfl:   •  levothyroxine (SYNTHROID, LEVOTHROID) 75 MCG tablet,  Take 75 mcg by mouth Daily., Disp: , Rfl:   •  lidocaine (LIDODERM) 5 %, Place 1 patch on the skin as directed by provider Daily. Remove & Discard patch within 12 hours or as directed by MD, Disp: 14 patch, Rfl: 0  •  magnesium oxide (MAGOX) 400 (241.3 Mg) MG tablet tablet, Take 1,000 mg by mouth 3 (Three) Times a Day., Disp: , Rfl:   •  metFORMIN (GLUCOPHAGE) 500 MG tablet, Take 500 mg by mouth 2 (Two) Times a Day With Meals., Disp: , Rfl:   •  methocarbamol (ROBAXIN) 500 MG tablet, Take 1 tablet by mouth 3 (Three) Times a Day., Disp: 14 tablet, Rfl: 0  •  Misc Natural Products (RA XYDRA EF PO), Take 1 tablet by mouth Daily., Disp: , Rfl:   •  Multiple Vitamins-Minerals (MULTIVITAMIN ADULT PO), Take 1 tablet by mouth Daily., Disp: , Rfl:   •  nadolol (CORGARD) 20 MG tablet, Take 1 tablet by mouth Every 12 (Twelve) Hours., Disp: 60 tablet, Rfl: 6  •  O2 (OXYGEN), Inhale 4 L/min every night at bedtime. Also uses with activity as needed, Disp: , Rfl:   •  omeprazole (priLOSEC) 40 MG capsule, TAKE ONE CAPSULE BY MOUTH TWICE A DAY TAKE 30 MINUTES PRIOR TO A MEAL (Patient taking differently: Take 40 mg by mouth 2 (Two) Times a Day.), Disp: 60 capsule, Rfl: 6  •  ONETOUCH DELICA LANCETS 33G misc, Use to check blood glucose up to 2 times daily, Disp: , Rfl:   •  Parathyroid Hormone, Recomb, (NATPARA SC), Inject 1 Cartridge under the skin into the appropriate area as directed 2 (two) times a day., Disp: , Rfl:   •  potassium chloride (K-DUR,KLOR-CON) 10 MEQ CR tablet, Take 4 Capsules by mouth 3 times daily, Disp: , Rfl:   •  progesterone (PROMETRIUM) 200 MG capsule, Take 200 mg by mouth Daily. (Patient not taking: No sig reported), Disp: , Rfl:   •  promethazine (PHENERGAN) 25 MG suppository, Insert 1 suppository into the rectum Daily As Needed for Nausea or Vomiting., Disp: 30 suppository, Rfl: 3  •  promethazine (PHENERGAN) 25 MG tablet, Take 1 tablet by mouth Every 6 (Six) Hours As Needed for Nausea or Vomiting., Disp:  "12 tablet, Rfl: 0  •  sodium chloride 0.9 % solution, Infuse 100 mL/hr into a venous catheter Continuous. (Patient taking differently: Infuse 100 mL/hr into a venous catheter Daily As Needed (As needed for autoimmune disorder).), Disp: , Rfl:   •  Transparent Dressings (TEGADERM FILM 2-3/8\"X2-3/4\") misc, Apply over pump insertion site every 2 days.., Disp: , Rfl:   •  venlafaxine (EFFEXOR) 25 MG tablet, Take 75 mg by mouth 3 (Three) Times a Day., Disp: , Rfl:   •  Vitamin D, Cholecalciferol, 1000 units capsule, Take 1,000 unit marking on U-100 syringe by mouth 2 (Two) Times a Day., Disp: , Rfl:   •  zinc sulfate (ZINCATE) 220 (50 Zn) MG capsule, Take 220 mg by mouth Daily., Disp: , Rfl:     Physical Exam:    Physical Exam  Vitals and nursing note reviewed.   Constitutional:       General: She is not in acute distress.     Appearance: Normal appearance. She is well-developed. She is not diaphoretic.   HENT:      Head: Normocephalic and atraumatic.      Right Ear: External ear normal.      Left Ear: External ear normal.      Mouth/Throat:      Pharynx: No oropharyngeal exudate.   Eyes:      General: No scleral icterus.        Right eye: No discharge.         Left eye: No discharge.      Extraocular Movements: Extraocular movements intact.      Conjunctiva/sclera: Conjunctivae normal.   Neck:      Thyroid: No thyromegaly.      Vascular: No JVD.      Trachea: No tracheal deviation.   Cardiovascular:      Rate and Rhythm: Normal rate and regular rhythm.      Heart sounds: Normal heart sounds.      Comments: PMI nondisplaced  Pulmonary:      Effort: Pulmonary effort is normal.      Breath sounds: Normal breath sounds. No wheezing or rales.   Abdominal:      General: Bowel sounds are normal.      Palpations: Abdomen is soft.      Tenderness: There is no abdominal tenderness. There is no guarding or rebound.      Hernia: No hernia is present.      Comments: Slight tenderness in the periumbilical region but no hernial " impulse with Valsalva.   Musculoskeletal:      Cervical back: Normal range of motion and neck supple.      Comments: Normal gait   Lymphadenopathy:      Cervical: No cervical adenopathy.   Skin:     General: Skin is warm and dry.      Capillary Refill: Capillary refill takes less than 2 seconds.      Coloration: Skin is not pale.      Findings: No rash.   Neurological:      Mental Status: She is alert and oriented to person, place, and time.      Motor: No abnormal muscle tone.      Coordination: Coordination normal.   Psychiatric:         Mood and Affect: Mood normal.         Behavior: Behavior normal.         Judgment: Judgment normal.         Procedures    Results Review:    None reviewed extensive medical records on EMR.    Assessment/Plan:     Problem List Items Addressed This Visit        Cardiac and Vasculature    Prolonged QT interval syndrome    Current Assessment & Plan     Continues on carvedilol, continues to follow with EP as facilitated her platypnea orthodeoxia evaluation.         Relevant Medications    EPINEPHrine (EPIPEN) 0.3 MG/0.3ML solution auto-injector injection    nadolol (CORGARD) 20 MG tablet    RESOLVED: Prolonged Q-T interval on ECG       Endocrine and Metabolic    Type 2 diabetes mellitus (HCC)    Relevant Medications    metFORMIN (GLUCOPHAGE) 500 MG tablet    Hypoparathyroidism (HCC)    Current Assessment & Plan     Related to her underlying autoimmune disorder, previously frequent episodes of tetany requiring IV calcium and fluids at home or in the emergency room.  Improved with home use of parathyroid hormone hormone replacement         Adrenal insufficiency ([place]'s disease) (HCC)    Current Assessment & Plan     She will continue her current hydrocortisone dose, she has ability to stress dose herself at home.  Followed by endocrinology.         Relevant Medications    dronabinol (Marinol) 5 MG capsule    Polyglandular dysfunction (HCC)    APEDED (autoimmune  otgiydvfrfdfihsydd-fknpuxqiffz-qrihjhgcvt dystrophy) ([facility])    Current Assessment & Plan     She is followed by multiple specialists including [facility].  All things considered she is done remarkably well.  She is taking a break from school.  She will continue to engage with [facility].  He is currently being evaluated for her pneumonitis and continued platypnea orthodeoxia syndrome.         Relevant Medications    fludrocortisone 0.1 MG tablet    hydrocortisone (CORTEF) 20 MG tablet    hydrocortisone (CORTEF) 10 MG tablet    hydrocortisone sodium succinate (Solu-CORTEF) 100 MG injection       Gastrointestinal Abdominal     Asplenia    Current Assessment & Plan     Is concerning she cannot tell me whether she has been vaccinated with meningococcemia and hib.  She did receive Prevnar today however.         Dysphagia    Overview     Added automatically from request for surgery [id number]         Current Assessment & Plan     She continues on PPI.  She requires frequent and recurrent esophageal dilatations.  Stable at present            Multi-system (Lupus, Sarcoid...)    Vasculitis ([facility])    Current Assessment & Plan     Given her overlapping autoimmune issues, history of myositis and Sjogren's, there is apparent concern for potential vasculitis contributing to her low oxygen saturations, she has not been evaluated by rheumatology and is requesting a referral.  This seems very reasonable.         Relevant Orders    Ambulatory Referral to Rheumatology       Neuro    Neuropathy - Primary    Current Assessment & Plan     Possibly Sjogren's related, UDS, KALEB Howard was interrogated.  We will continue gabapentin 600 mg 1/2 tablets p.o. 3 times daily.         Relevant Medications    gabapentin (NEURONTIN) 600 MG tablet       Symptoms and Signs    Platypnea-orthodeoxia syndrome   Other Visit Diagnoses     Dermatomyositis ([facility])        Relevant Orders    Ambulatory Referral to Rheumatology    Ear drainage, bilateral        Relevant Orders     Ambulatory Referral to ENT (Otolaryngology)    Tinnitus of both ears        Relevant Orders    Ambulatory Referral to ENT (Otolaryngology)    Medication monitoring encounter        Relevant Orders    Compliance Drug Analysis, Ur - Urine, Clean Catch          Plan of care reviewed with patient at the conclusion of today's visit. Education was provided regarding diagnosis and management.  Patient verbalizes understanding of and agreement with management plan.    Return in about 3 months (around 5/20/2023) for forrest pentin.    Cecil Sen MD      Please note than portions of this note were completed [facility] a Voice Recognition Program

## 2023-02-20 NOTE — ASSESSMENT & PLAN NOTE
She will continue her current hydrocortisone dose, she has ability to stress dose herself at home.  Followed by endocrinology.

## 2023-02-20 NOTE — ASSESSMENT & PLAN NOTE
She continues on PPI.  She requires frequent and recurrent esophageal dilatations.  Stable at present

## 2023-02-20 NOTE — ASSESSMENT & PLAN NOTE
Continues on carvedilol, continues to follow with EP as facilitated her platypnea orthodeoxia evaluation.

## 2023-02-20 NOTE — ASSESSMENT & PLAN NOTE
Related to her underlying autoimmune disorder, previously frequent episodes of tetany requiring IV calcium and fluids at home or in the emergency room.  Improved with home use of parathyroid hormone hormone replacement

## 2023-02-20 NOTE — ASSESSMENT & PLAN NOTE
Given her overlapping autoimmune issues, history of myositis and Sjogren's, there is apparent concern for potential vasculitis contributing to her low oxygen saturations, she has not been evaluated by rheumatology and is requesting a referral.  This seems very reasonable.

## 2023-02-20 NOTE — ASSESSMENT & PLAN NOTE
Is concerning she cannot tell me whether she has been vaccinated with meningococcemia and hib.  She did receive Prevnar today however.

## 2023-02-20 NOTE — ASSESSMENT & PLAN NOTE
She is followed by multiple specialists including [facility].  All things considered she is done remarkably well.  She is taking a break from school.  She will continue to engage with [facility].  He is currently being evaluated for her pneumonitis and continued platypnea orthodeoxia syndrome.

## 2023-02-22 ENCOUNTER — TELEPHONE (OUTPATIENT)
Dept: FAMILY MEDICINE CLINIC | Facility: CLINIC | Age: 33
End: 2023-02-22
Payer: COMMERCIAL

## 2023-02-22 RX ORDER — AZATHIOPRINE 50 MG/1
150 TABLET ORAL DAILY
Qty: 90 TABLET | Refills: 4 | Status: SHIPPED | OUTPATIENT
Start: 2023-02-22

## 2023-02-22 NOTE — TELEPHONE ENCOUNTER
Spoke with patient's mother, she reports that after completing autoimmune testing at [facility] she was prescribed levofloaxcin to keep on hand in case she develops fever     Patient is concerned about possible infection or sepsis due to lack of spleen and requesting refills to keep on hand in case of emergency, if allowed

## 2023-02-22 NOTE — TELEPHONE ENCOUNTER
Caller: TONY SOOD    Relationship: Mother    Best call back number: [phone]    What medication are you requesting: LEVOFLOXACIN 750MG ONCE DAILY    What are your current symptoms: [facility] TOLD HER IF SHE IS RUNNING A FEVER AND CANNOT GET TO A DR IN A COUPLE OF DAYS. SHE IS TO TAKE THIS. BECAUSE SHE IS IMMUNOCOMPROMISED AND HAS NO SPLEEN    If a prescription is needed, what is your preferred pharmacy and phone number: [id number] - [address] AT [facility] ( [address]) & [facility] [phone] [facility] [phone] FX     Additional notes: ASAP    PLEASE CALL WHEN THIS IS SENT IN TO THE PHARMACY

## 2023-02-26 LAB — DRUGS UR: NORMAL

## 2023-03-06 ENCOUNTER — TELEPHONE (OUTPATIENT)
Dept: GASTROENTEROLOGY | Facility: CLINIC | Age: 33
End: 2023-03-06
Payer: COMMERCIAL

## 2023-03-06 ENCOUNTER — PREP FOR SURGERY (OUTPATIENT)
Dept: OTHER | Facility: HOSPITAL | Age: 33
End: 2023-03-06

## 2023-03-06 DIAGNOSIS — R13.10 DYSPHAGIA, UNSPECIFIED TYPE: Primary | ICD-10-CM

## 2023-03-09 DIAGNOSIS — I77.6 VASCULITIS: Primary | ICD-10-CM

## 2023-03-23 ENCOUNTER — ANESTHESIA EVENT (OUTPATIENT)
Dept: GASTROENTEROLOGY | Facility: HOSPITAL | Age: 33
End: 2023-03-23
Payer: COMMERCIAL

## 2023-03-23 ENCOUNTER — HOSPITAL ENCOUNTER (OUTPATIENT)
Facility: HOSPITAL | Age: 33
Setting detail: HOSPITAL OUTPATIENT SURGERY
Discharge: HOME | End: 2023-03-23
Attending: INTERNAL MEDICINE | Admitting: INTERNAL MEDICINE
Payer: COMMERCIAL

## 2023-03-23 ENCOUNTER — ANESTHESIA (OUTPATIENT)
Dept: GASTROENTEROLOGY | Facility: HOSPITAL | Age: 33
End: 2023-03-23
Payer: COMMERCIAL

## 2023-03-23 VITALS
HEIGHT: 62 IN | WEIGHT: 135 LBS | OXYGEN SATURATION: 93 % | TEMPERATURE: 98.2 F | DIASTOLIC BLOOD PRESSURE: 80 MMHG | RESPIRATION RATE: 16 BRPM | SYSTOLIC BLOOD PRESSURE: 123 MMHG | BODY MASS INDEX: 24.84 KG/M2 | HEART RATE: 101 BPM

## 2023-03-23 LAB
HCT VFR BLD AUTO: 35 % (ref 34–46.6)
HGB BLD-MCNC: 11.4 G/DL (ref 12–15.9)
POTASSIUM SERPL-SCNC: 4.6 MMOL/L (ref 3.5–5.2)

## 2023-03-23 PROCEDURE — 85018 HEMOGLOBIN: CPT | Performed by: ANESTHESIOLOGY

## 2023-03-23 PROCEDURE — 85014 HEMATOCRIT: CPT | Performed by: ANESTHESIOLOGY

## 2023-03-23 PROCEDURE — 25010000002 DROPERIDOL PER 5 MG: Performed by: INTERNAL MEDICINE

## 2023-03-23 PROCEDURE — 25010000002 PROPOFOL 10 MG/ML EMULSION

## 2023-03-23 PROCEDURE — 25010000002 MIDAZOLAM PER 1 MG

## 2023-03-23 PROCEDURE — 43249 ESOPH EGD DILATION <30 MM: CPT | Performed by: INTERNAL MEDICINE

## 2023-03-23 PROCEDURE — 84132 ASSAY OF SERUM POTASSIUM: CPT | Performed by: ANESTHESIOLOGY

## 2023-03-23 PROCEDURE — 25010000002 HYDROMORPHONE PER 4 MG: Performed by: INTERNAL MEDICINE

## 2023-03-23 PROCEDURE — C1725 CATH, TRANSLUMIN NON-LASER: HCPCS | Performed by: INTERNAL MEDICINE

## 2023-03-23 PROCEDURE — 25010000002 HYDROCORTISONE SOD SUC (PF) 100 MG RECONSTITUTED SOLUTION

## 2023-03-23 RX ORDER — GLYCOPYRROLATE 0.2 MG/ML
INJECTION INTRAMUSCULAR; INTRAVENOUS AS NEEDED
Status: DISCONTINUED | OUTPATIENT
Start: 2023-03-23 | End: 2023-03-23 | Stop reason: SURG

## 2023-03-23 RX ORDER — FAMOTIDINE 20 MG/1
20 TABLET, FILM COATED ORAL ONCE
Status: DISCONTINUED | OUTPATIENT
Start: 2023-03-23 | End: 2023-03-23

## 2023-03-23 RX ORDER — DEXTROAMPHETAMINE SACCHARATE, AMPHETAMINE ASPARTATE, DEXTROAMPHETAMINE SULFATE AND AMPHETAMINE SULFATE 2.5; 2.5; 2.5; 2.5 MG/1; MG/1; MG/1; MG/1
12 TABLET ORAL DAILY
COMMUNITY

## 2023-03-23 RX ORDER — ESTRADIOL 0.03 MG/D
1 FILM, EXTENDED RELEASE TRANSDERMAL 2 TIMES WEEKLY
COMMUNITY

## 2023-03-23 RX ORDER — FAMOTIDINE 10 MG/ML
20 INJECTION, SOLUTION INTRAVENOUS ONCE
Status: DISCONTINUED | OUTPATIENT
Start: 2023-03-23 | End: 2023-03-23 | Stop reason: HOSPADM

## 2023-03-23 RX ORDER — IPRATROPIUM BROMIDE AND ALBUTEROL SULFATE 2.5; .5 MG/3ML; MG/3ML
3 SOLUTION RESPIRATORY (INHALATION) ONCE AS NEEDED
Status: DISCONTINUED | OUTPATIENT
Start: 2023-03-23 | End: 2023-03-23 | Stop reason: HOSPADM

## 2023-03-23 RX ORDER — CALCIUM CHLORIDE 100 MG/ML
INJECTION INTRAVENOUS; INTRAVENTRICULAR AS NEEDED
Status: DISCONTINUED | OUTPATIENT
Start: 2023-03-23 | End: 2023-03-23 | Stop reason: SURG

## 2023-03-23 RX ORDER — SODIUM CHLORIDE 0.9 % (FLUSH) 0.9 %
10 SYRINGE (ML) INJECTION AS NEEDED
Status: DISCONTINUED | OUTPATIENT
Start: 2023-03-23 | End: 2023-03-23 | Stop reason: HOSPADM

## 2023-03-23 RX ORDER — SODIUM CHLORIDE 0.9 % (FLUSH) 0.9 %
10 SYRINGE (ML) INJECTION EVERY 12 HOURS SCHEDULED
Status: DISCONTINUED | OUTPATIENT
Start: 2023-03-23 | End: 2023-03-23 | Stop reason: HOSPADM

## 2023-03-23 RX ORDER — SODIUM CHLORIDE 9 MG/ML
50 INJECTION, SOLUTION INTRAVENOUS CONTINUOUS
Status: DISCONTINUED | OUTPATIENT
Start: 2023-03-23 | End: 2023-03-23 | Stop reason: HOSPADM

## 2023-03-23 RX ORDER — LIDOCAINE HYDROCHLORIDE 10 MG/ML
INJECTION, SOLUTION EPIDURAL; INFILTRATION; INTRACAUDAL; PERINEURAL AS NEEDED
Status: DISCONTINUED | OUTPATIENT
Start: 2023-03-23 | End: 2023-03-23 | Stop reason: SURG

## 2023-03-23 RX ORDER — LIDOCAINE HYDROCHLORIDE 10 MG/ML
0.5 INJECTION, SOLUTION EPIDURAL; INFILTRATION; INTRACAUDAL; PERINEURAL ONCE AS NEEDED
Status: DISCONTINUED | OUTPATIENT
Start: 2023-03-23 | End: 2023-03-23

## 2023-03-23 RX ORDER — SODIUM CHLORIDE, SODIUM LACTATE, POTASSIUM CHLORIDE, CALCIUM CHLORIDE 600; 310; 30; 20 MG/100ML; MG/100ML; MG/100ML; MG/100ML
9 INJECTION, SOLUTION INTRAVENOUS CONTINUOUS
Status: DISCONTINUED | OUTPATIENT
Start: 2023-03-23 | End: 2023-03-23

## 2023-03-23 RX ORDER — HYDROMORPHONE HYDROCHLORIDE 1 MG/ML
0.5 INJECTION, SOLUTION INTRAMUSCULAR; INTRAVENOUS; SUBCUTANEOUS
Status: ACTIVE | OUTPATIENT
Start: 2023-03-23 | End: 2023-03-23

## 2023-03-23 RX ORDER — PROPOFOL 10 MG/ML
VIAL (ML) INTRAVENOUS AS NEEDED
Status: DISCONTINUED | OUTPATIENT
Start: 2023-03-23 | End: 2023-03-23 | Stop reason: SURG

## 2023-03-23 RX ORDER — MIDAZOLAM HYDROCHLORIDE 1 MG/ML
INJECTION INTRAMUSCULAR; INTRAVENOUS AS NEEDED
Status: DISCONTINUED | OUTPATIENT
Start: 2023-03-23 | End: 2023-03-23 | Stop reason: SURG

## 2023-03-23 RX ORDER — FENTANYL CITRATE 50 UG/ML
25 INJECTION, SOLUTION INTRAMUSCULAR; INTRAVENOUS
Status: DISCONTINUED | OUTPATIENT
Start: 2023-03-23 | End: 2023-03-23 | Stop reason: HOSPADM

## 2023-03-23 RX ORDER — DROPERIDOL 2.5 MG/ML
2.5 INJECTION, SOLUTION INTRAMUSCULAR; INTRAVENOUS ONCE
Status: COMPLETED | OUTPATIENT
Start: 2023-03-23 | End: 2023-03-23

## 2023-03-23 RX ORDER — SODIUM CHLORIDE 9 MG/ML
40 INJECTION, SOLUTION INTRAVENOUS AS NEEDED
Status: DISCONTINUED | OUTPATIENT
Start: 2023-03-23 | End: 2023-03-23

## 2023-03-23 RX ORDER — DROPERIDOL 2.5 MG/ML
2.5 INJECTION, SOLUTION INTRAMUSCULAR; INTRAVENOUS ONCE
Status: DISCONTINUED | OUTPATIENT
Start: 2023-03-23 | End: 2023-03-23 | Stop reason: HOSPADM

## 2023-03-23 RX ADMIN — CALCIUM CHLORIDE 1 G: 100 INJECTION INTRAVENOUS; INTRAVENTRICULAR at 13:38

## 2023-03-23 RX ADMIN — GLYCOPYRROLATE 0.2 MCG: 0.2 INJECTION INTRAMUSCULAR; INTRAVENOUS at 13:43

## 2023-03-23 RX ADMIN — LIDOCAINE HYDROCHLORIDE 50 MG: 10 INJECTION, SOLUTION EPIDURAL; INFILTRATION; INTRACAUDAL; PERINEURAL at 13:33

## 2023-03-23 RX ADMIN — CALCIUM CHLORIDE 1 G: 100 INJECTION INTRAVENOUS; INTRAVENTRICULAR at 13:33

## 2023-03-23 RX ADMIN — PROPOFOL 50 MG: 10 INJECTION, EMULSION INTRAVENOUS at 13:36

## 2023-03-23 RX ADMIN — PROPOFOL 50 MG: 10 INJECTION, EMULSION INTRAVENOUS at 13:33

## 2023-03-23 RX ADMIN — DROPERIDOL 2.5 MG: 2.5 INJECTION, SOLUTION INTRAMUSCULAR; INTRAVENOUS at 14:07

## 2023-03-23 RX ADMIN — HYDROMORPHONE HYDROCHLORIDE 0.5 MG: 1 INJECTION, SOLUTION INTRAMUSCULAR; INTRAVENOUS; SUBCUTANEOUS at 14:04

## 2023-03-23 RX ADMIN — MIDAZOLAM HYDROCHLORIDE 2 MG: 1 INJECTION, SOLUTION INTRAMUSCULAR; INTRAVENOUS at 13:31

## 2023-03-23 RX ADMIN — HYDROCORTISONE SODIUM SUCCINATE 100 MG: 100 INJECTION, POWDER, FOR SOLUTION INTRAMUSCULAR; INTRAVENOUS at 13:35

## 2023-03-23 RX ADMIN — SODIUM CHLORIDE 50 ML/HR: 9 INJECTION, SOLUTION INTRAVENOUS at 12:04

## 2023-03-23 RX ADMIN — PROPOFOL 50 MCG/KG/MIN: 10 INJECTION, EMULSION INTRAVENOUS at 13:34

## 2023-03-23 RX ADMIN — HYDROMORPHONE HYDROCHLORIDE 0.5 MG: 1 INJECTION, SOLUTION INTRAMUSCULAR; INTRAVENOUS; SUBCUTANEOUS at 14:10

## 2023-03-23 NOTE — H&P
GASTROENTEROLOGY OFFICE NOTE  Krista Richter  [id number]  1990      CHIEF COMPLAINT   Intermittent dysphagia to solids    HISTORY OF PRESENT ILLNESS:  Patient well-known to me with complicated medical history detailed below presents for esophageal dilation which results in resolution of her intermittent dysphagia to solids.  She has had multiple endoscopies for this reason and has always done well and responded well to this intervention.    Last EGD was April 2022.    PAST MEDICAL HISTORY  Past Medical History:   • ADHD   • Adrenal insufficiency ([facility])   • Alopecia   • Anemia   • Anxiety   • APS type 1 ([facility])   • Asthma   • Autism   • Autoimmune enteropathy   • Autoimmune hepatitis ([facility])   • Autoimmune urticaria   • Depression   • Dermatomyositis ([facility])   • Disease of thyroid gland   • Fibromyalgia   • Functional asplenia   • GERD (gastroesophageal reflux disease)   • History of acute renal failure x 2   • History of Clostridioides difficile infection   • History of COVID-19   • History of kidney stones   • History of streptococcal septicemia   • Hypoparathyroidism ([facility])   • Insulin dependent diabetes mellitus   • Long Q-T syndrome   • Malabsorption   • Migraine   • Nephrocalcinosis   • Neuropathy    BLE   • Oxygen desaturation   • Pancreatic insufficiency   • Parathyroid abnormality ([facility])   • Polyglandular deficiency syndrome ([facility])   • Premature ovarian failure   • PTSD (post-traumatic stress disorder)   • Sjogren's syndrome ([facility])   • Sleep apnea (no CPAP, uses only oxygen at 4L per NC at bedtime)   • Spleen absent   • Tetany    HAS EPISODES   • Transfusion history    Denies transfusion reaction   • Umbilical hernia        PAST SURGICAL HISTORY  Past Surgical History:   • APPENDECTOMY   • CARDIAC CATHETERIZATION    Procedure: RIGHT AND LEFT HEART CATH;  Surgeon: Poli Anderson IV, MD;  Location: [facility] INVASIVE LOCATION;  Service: Cardiovascular;  Laterality: N/A;   • CARDIAC SURGERY    LOOP  RECORDER   • CHOLECYSTECTOMY   • COLONOSCOPY    Procedure: COLONOSCOPY;  Surgeon: Gavin Vargas MD;  Location:  [facility];  Service: Gastroenterology;  Laterality: N/A;   • ENDOSCOPY    Procedure: ESOPHAGOGASTRODUODENOSCOPY;  Surgeon: Gavin Vargas MD;  Location:  [facility];  Service: Gastroenterology   • ENDOSCOPY    Procedure: ESOPHAGOGASTRODUODENOSCOPY-DILATION;  Surgeon: Gavin Vargas MD;  Location:  [facility];  Service: Gastroenterology   • ENDOSCOPY   • ENDOSCOPY    Procedure: ESOPHAGOGASTRODUODENOSCOPY;  Surgeon: Gavin Vargas MD;  Location:  [facility];  Service: Gastroenterology   • ENDOSCOPY    Procedure: ESOPHAGOGASTRODUODENOSCOPY;  Surgeon: Gavin Vargas MD;  Location:  [facility];  Service: Gastroenterology   • ENDOSCOPY    Procedure: ESOPHAGOGASTRODUODENOSCOPY WITH BALLOON DILATION;  Surgeon: Gavin Vargas MD;  Location:  [facility];  Service: Gastroenterology   • ENDOSCOPY    Procedure: ESOPHAGOGASTRODUODENOSCOPY  WITH ESOPHAGEAL BALLOON DILATATION;  Surgeon: Gavin Vargas MD;  Location:  [facility];  Service: Gastroenterology;  Laterality: N/A;  dilation done with 60F dilator    • ENDOSCOPY    Procedure: ESOPHAGOGASTRODUODENOSCOPY;  Surgeon: Gavin Vargas MD;  Location:  [facility];  Service: Gastroenterology;  Laterality: N/A;   • ENDOSCOPY    EGD   • ENDOSCOPY    Procedure: ESOPHAGOGASTRODUODENOSCOPY;  Surgeon: Gavin Vargas MD;  Location:  [facility];  Service: Gastroenterology;  Laterality: N/A;  balloon dilation 18-20   • ENDOSCOPY    Procedure: ESOPHAGOGASTRODUODENOSCOPY WITH DIALATION;  Surgeon: Gavin Vargas MD;  Location:  [facility];  Service: Gastroenterology;  Laterality: N/A;  balloon dilation to 20   • EXPLORATORY LAPAROTOMY    MULTIPLE   • HAND SURGERY    4 TOTAL   • HERNIA  "REPAIR   • LIVER BIOPSY   • MUSCLE BIOPSY   • MUSCLE BIOPSY   • PEG TUBE INSERTION   • PEG TUBE REMOVAL   • PORTACATH PLACEMENT    Left Chest Portacath present        MEDICATIONS:    Current Facility-Administered Medications:   •  famotidine (PEPCID) injection 20 mg, 20 mg, Intravenous, Once, Emerson Andres MD  •  sodium chloride 0.9 % flush 10 mL, 10 mL, Intravenous, Q12H, Emerson Andres MD  •  sodium chloride 0.9 % flush 10 mL, 10 mL, Intravenous, PRN, Emerson Andres MD  •  sodium chloride 0.9 % infusion, 50 mL/hr, Intravenous, Continuous, Emerson Andres MD, Last Rate: 50 mL/hr at 03/23/23 1204, 50 mL/hr at 03/23/23 1204    ALLERGIES  is allergic to azithromycin, cefpodoxime, cephalosporins, clarithromycin, levofloxacin, nitrofurantoin, nystatin, penicillins, sulfa antibiotics, nitrofurantoin macrocrystal, and ondansetron.    FAMILY HISTORY:  Cancer-related family history includes Cancer in her maternal grandfather, maternal grandmother, paternal grandfather, and paternal grandmother.  Colon Cancer-related family history is not on file.    SOCIAL HISTORY  She  reports that she has never smoked. She has never used smokeless tobacco. She reports current alcohol use of about 1.0 standard drink per week. She reports that she does not use drugs.   She is .  She owns her own business.    REVIEW OF SYSTEMS  Cardiovascular, pulmonary and generalized review of system pertinent as reviewed above    PHYSICAL EXAM   /72 (BP Location: Left arm, Patient Position: Sitting)   Pulse 64   Temp 98.2 °F (36.8 °C) (Tympanic)   Resp 16   Ht 157.5 cm (62\")   Wt 61.2 kg (135 lb)   SpO2 98%   BMI 24.69 kg/m²   General: Pleasant, no apparent acute distress.  Alert and oriented.  HEENT: Anicteric sclera.  Alopecia  Lungs: Grossly normal respiration without labored breathing or audible wheezing noted.  Speaking in full sentences  Abdomen: Without gross or obvious distention  Neurologic: Normal cognition and affect.  " Alert and oriented      ASSESSMENT  1.-  Intermittent dysphagia to solids responsive to esophageal dilation.  Patient requesting repeat EGD with dilation  2.-  Complex medical history detailed above  3.-  History of eating disorder currently not problematic    PLAN  1.-  EGD with esophageal dilation.  Risk, benefits, options reviewed she is agreeable to proceeding.      Gavin Vargas MD  3/23/2023   12:26 EDT      Addendum  Upper endoscopy was unremarkable except for small sliding hiatal hernia.  No obvious stricturing.  Empirical esophageal dilation performed to 20 mm with a through-the-scope balloon across upper and lower esophageal sphincters, proximal and distal esophagus.  No mucosal tear or bleeding noted.  Plan is to repeat dilation as needed for recurrent dysphagia to solids

## 2023-03-23 NOTE — ANESTHESIA POSTPROCEDURE EVALUATION
Patient: Krista Richter    Procedure Summary     Date: 03/23/23 Room / Location:  [facility]    Anesthesia Start: 1327 Anesthesia Stop: 1400    Procedure: ESOPHAGOGASTRODUODENOSCOPY WITH DIALATION Diagnosis:       Dysphagia, unspecified type      (Dysphagia, unspecified type [R13.10])    Surgeons: Gavin Vargas MD Provider: Emerson Andres MD    Anesthesia Type: general, MAC ASA Status: 3          Anesthesia Type: general, MAC    Vitals  Vitals Value Taken Time   BP     Temp     Pulse 111 03/23/23 1359   Resp     SpO2 96 % 03/23/23 1359   Vitals shown include unvalidated device data.        Post Anesthesia Care and Evaluation    Patient location during evaluation: PACU  Patient participation: complete - patient participated  Level of consciousness: awake and alert  Pain management: adequate    Airway patency: patent  Anesthetic complications: No anesthetic complications  PONV Status: none  Cardiovascular status: hemodynamically stable and acceptable  Respiratory status: nonlabored ventilation, acceptable and nasal cannula  Hydration status: acceptable    Comments: /93  Sats 97%    Patient received 1mg of dilaudid, 2g of Ca, and 1 mg of Mg

## 2023-03-23 NOTE — ANESTHESIA PREPROCEDURE EVALUATION
Anesthesia Evaluation     Patient summary reviewed and Nursing notes reviewed   NPO Solid Status: > 8 hours  NPO Liquid Status: > 2 hours           Airway   Mallampati: II  TM distance: >3 FB  Neck ROM: full  Possible difficult intubation  Dental      Pulmonary    (+) asthma (MDI),home oxygen, shortness of breath, sleep apnea,   (-) recent URI    ROS comment: Chronic hypoxia Home O2 desats in prone position   Autoimmune lung disease ?   Cardiovascular     ECG reviewed    (-) hypertension, past MI, dysrhythmias, angina, cardiac stents    ROS comment: ECG NSR  ( prol QT syndrome in past)     ECHO 2021 EF = 55%.NS/ Valsalva NEG  For Shunt   NO evidence of intracardiac or pulmonary shunting is noted during this study despite the occurence of rapid and profound arterial desaturation when the patient assumes a supine position.     CATH 2021 Normal CA v no CAD   Moderately elevated LVEDP 24 mmHg  Mild pulmonary hypertension (mean 32 mmHg)  Supranormal cardiac output/index  No evidence of intracardiac or pulmonary shunt     Neuro/Psych  (+) headaches, syncope, psychiatric history,    (-) seizures, CVA  GI/Hepatic/Renal/Endo    (+)  GERD,  hepatitis (AI hep), liver disease, renal disease stones, diabetes mellitus type 2, thyroid problem hypothyroidism    ROS Comment: G  paresis     Musculoskeletal     (+) myalgias,   Abdominal    Substance History      OB/GYN          Other   autoimmune disease Sjogren syndrome,      ROS/Med Hx Other: Adrenal insuff RX- minaralo and gluco   Parathyroid insuff RX     Prolonged QT Lidocaine versed combo OK     Avoid zofran sux (use alfentanil) Volatiles                 Anesthesia Plan    ASA 3     general and MAC     (POM Hi flow O2   ETT ready- many EGD/dilations without intubation/ETT  Steroids calcium )  intravenous induction     Anesthetic plan, risks, benefits, and alternatives have been provided, discussed and informed consent has been obtained with: patient.    Plan discussed with  CRNA.        CODE STATUS:

## 2023-07-06 PROBLEM — E88.09 HYPOALBUMINEMIA: Status: ACTIVE | Noted: 2018-09-21

## 2023-07-06 PROBLEM — D72.823 NEUTROPHILIC LEUKEMOID REACTION: Status: ACTIVE | Noted: 2017-08-01

## 2023-07-06 PROBLEM — Z90.81 ACQUIRED ASPLENIA: Status: ACTIVE | Noted: 2017-08-01

## 2023-07-06 PROBLEM — R07.81 PLEURITIC CHEST PAIN: Status: RESOLVED | Noted: 2020-06-03 | Resolved: 2023-07-06

## 2023-07-06 PROBLEM — T85.868A: Status: ACTIVE | Noted: 2018-09-21

## 2023-07-06 PROBLEM — Z79.52 LONG TERM (CURRENT) USE OF SYSTEMIC STEROIDS: Status: ACTIVE | Noted: 2017-08-01

## 2023-07-06 PROBLEM — R09.02 HYPOXEMIA: Status: ACTIVE | Noted: 2023-02-20

## 2023-07-06 PROBLEM — T80.211D: Status: ACTIVE | Noted: 2018-09-17

## 2023-07-06 PROBLEM — I82.C11 ACUTE EMBOLISM AND THROMBOSIS OF RIGHT INTERNAL JUGULAR VEIN: Status: ACTIVE | Noted: 2018-09-21

## 2023-07-06 PROBLEM — R10.9 FLANK PAIN: Status: RESOLVED | Noted: 2022-06-22 | Resolved: 2023-07-06

## 2023-07-06 PROBLEM — R78.81 POSITIVE BLOOD CULTURE: Status: ACTIVE | Noted: 2018-09-21

## 2023-07-31 RX ORDER — AZATHIOPRINE 50 MG/1
150 TABLET ORAL DAILY
Qty: 90 TABLET | Refills: 4 | Status: SHIPPED | OUTPATIENT
Start: 2023-07-31

## 2023-08-03 DIAGNOSIS — R10.9 ABDOMINAL PAIN, UNSPECIFIED ABDOMINAL LOCATION: Primary | ICD-10-CM

## 2023-08-11 RX ORDER — LANCETS 33 GAUGE
1 EACH MISCELLANEOUS 2 TIMES DAILY
Qty: 100 EACH | Refills: 1 | Status: SHIPPED | OUTPATIENT
Start: 2023-08-11

## 2023-08-11 NOTE — TELEPHONE ENCOUNTER
Rx Refill Note  Requested Prescriptions     Signed Prescriptions Disp Refills    OneTouch Delica Lancets 33G misc 100 each 1     Si each by Other route 2 (Two) Times a Day.     Authorizing Provider: HAN KEE     Ordering User: YOAV ZUÑIGA    glucose blood test strip 100 each 1     Si each by Other route 2 (Two) Times a Day. Use as instructed     Authorizing Provider: HAN KEE     Ordering User: YOAV ZUÑIGA      Last office visit with prescribing clinician: 2023     Next office visit with prescribing clinician: 2024   Yoav Zuñiga MA  23, 10:10 EDT

## 2023-08-24 ENCOUNTER — HOSPITAL ENCOUNTER (OUTPATIENT)
Dept: CT IMAGING | Facility: HOSPITAL | Age: 33
Discharge: HOME | End: 2023-08-24
Admitting: INTERNAL MEDICINE
Payer: COMMERCIAL

## 2023-08-24 DIAGNOSIS — R10.9 ABDOMINAL PAIN, UNSPECIFIED ABDOMINAL LOCATION: ICD-10-CM

## 2023-08-24 PROCEDURE — 74177 CT ABD & PELVIS W/CONTRAST: CPT

## 2023-08-24 PROCEDURE — 25510000001 IOPAMIDOL 61 % SOLUTION: Performed by: INTERNAL MEDICINE

## 2023-08-24 PROCEDURE — 82565 ASSAY OF CREATININE: CPT

## 2023-08-24 RX ADMIN — IOPAMIDOL 95 ML: 612 INJECTION, SOLUTION INTRAVENOUS at 10:16

## 2023-08-26 LAB — CREAT BLDA-MCNC: 0.7 MG/DL (ref 0.6–1.3)

## 2023-08-30 ENCOUNTER — OFFICE VISIT (OUTPATIENT)
Dept: FAMILY MEDICINE CLINIC | Facility: CLINIC | Age: 33
End: 2023-08-30
Payer: COMMERCIAL

## 2023-08-30 ENCOUNTER — LAB (OUTPATIENT)
Dept: LAB | Facility: HOSPITAL | Age: 33
End: 2023-08-30
Payer: COMMERCIAL

## 2023-08-30 ENCOUNTER — HOSPITAL ENCOUNTER (OUTPATIENT)
Dept: GENERAL RADIOLOGY | Facility: HOSPITAL | Age: 33
Discharge: HOME | End: 2023-08-30
Payer: COMMERCIAL

## 2023-08-30 VITALS
HEART RATE: 59 BPM | WEIGHT: 156.4 LBS | SYSTOLIC BLOOD PRESSURE: 110 MMHG | HEIGHT: 62 IN | DIASTOLIC BLOOD PRESSURE: 72 MMHG | OXYGEN SATURATION: 95 % | BODY MASS INDEX: 28.78 KG/M2

## 2023-08-30 DIAGNOSIS — F90.2 ATTENTION DEFICIT HYPERACTIVITY DISORDER (ADHD), COMBINED TYPE: ICD-10-CM

## 2023-08-30 DIAGNOSIS — R55 SYNCOPE, UNSPECIFIED SYNCOPE TYPE: Primary | ICD-10-CM

## 2023-08-30 DIAGNOSIS — G47.09 OTHER INSOMNIA: ICD-10-CM

## 2023-08-30 DIAGNOSIS — R23.0 CYANOSIS: ICD-10-CM

## 2023-08-30 DIAGNOSIS — Z11.1 SCREENING-PULMONARY TB: ICD-10-CM

## 2023-08-30 DIAGNOSIS — R93.7 ABNORMAL X-RAY OF LUMBAR SPINE: ICD-10-CM

## 2023-08-30 DIAGNOSIS — R53.1 WEAKNESS: ICD-10-CM

## 2023-08-30 PROBLEM — M54.50 CHRONIC MIDLINE LOW BACK PAIN WITHOUT SCIATICA: Status: ACTIVE | Noted: 2023-08-30

## 2023-08-30 PROBLEM — G89.29 CHRONIC MIDLINE LOW BACK PAIN WITHOUT SCIATICA: Status: ACTIVE | Noted: 2023-08-30

## 2023-08-30 PROCEDURE — 82376 ASSAY CARBOXYHB QUAL: CPT

## 2023-08-30 PROCEDURE — 83050 HGB METHEMOGLOBIN QUAN: CPT

## 2023-08-30 PROCEDURE — 72110 X-RAY EXAM L-2 SPINE 4/>VWS: CPT

## 2023-08-30 PROCEDURE — 86480 TB TEST CELL IMMUN MEASURE: CPT

## 2023-08-30 PROCEDURE — 36415 COLL VENOUS BLD VENIPUNCTURE: CPT

## 2023-08-30 PROCEDURE — 83060 HGB SULFHEMOGLOBIN QUAN: CPT

## 2023-08-30 RX ORDER — ESZOPICLONE 2 MG/1
2 TABLET, FILM COATED ORAL NIGHTLY
Qty: 90 TABLET | Refills: 0 | Status: SHIPPED | OUTPATIENT
Start: 2023-08-30

## 2023-08-30 RX ORDER — LEVOFLOXACIN 750 MG/1
750 TABLET ORAL DAILY
Qty: 7 TABLET | Refills: 0 | Status: SHIPPED | OUTPATIENT
Start: 2023-08-30

## 2023-08-30 RX ORDER — VENLAFAXINE 25 MG/1
75 TABLET ORAL 3 TIMES DAILY
Qty: 90 TABLET | Refills: 3 | Status: SHIPPED | OUTPATIENT
Start: 2023-08-30

## 2023-08-30 RX ORDER — DEXTROAMPHETAMINE SACCHARATE, AMPHETAMINE ASPARTATE, DEXTROAMPHETAMINE SULFATE AND AMPHETAMINE SULFATE 1.25; 1.25; 1.25; 1.25 MG/1; MG/1; MG/1; MG/1
TABLET ORAL
Qty: 75 TABLET | Refills: 0 | Status: SHIPPED | OUTPATIENT
Start: 2023-08-30

## 2023-08-30 RX ORDER — BACLOFEN 10 MG/1
10 TABLET ORAL 4 TIMES DAILY
Qty: 90 TABLET | Refills: 2 | Status: SHIPPED | OUTPATIENT
Start: 2023-08-30

## 2023-08-30 NOTE — PROGRESS NOTES
Krista Richter  1990  [id number]  Patient Care Team:  Cecil Sen MD as PCP - General (Internal Medicine)  Jeramy Meyer MD as Consulting Physician (Cardiac Electrophysiology)    Krista Richter is a 32 y.o. female here today for follow up.     This patient is accompanied by their self who contributes to the history of their care.    Chief Complaint:    Chief Complaint   Patient presents with    Hospital Follow Up Visit        History of Present Illness:  I have reviewed and/or updated the patient's past medical, past surgical, family, social history, problem list and allergies as appropriate.     Recent syncope 2 weeks ago. She has not driven since.   Passing out spells. Variable. Jay not recall detains. Sometimes prodromal.tingling. she feels her hear pounding. She sees Dr. Meyer.- She has not had a monitor in years. Feels diaphoretic and overheated. . Her endocrinologist aware but felt this is not endocrine.  Spells persistent but less very infrequently. She does not have a neurologist. Has had tilt loop, etc. Remains on betablocker 2/2 to tachycardia as a child. ( Hx long Qt    Falls quite frequently-feels 2.2 to muscle weakness. Awaiting referral to Rheum.    She is having trouble reestablishing with psychiatry after her assisted.     but very infrequently. She does not have a neurologist currently ( strong family history of MS)- has not seen neurology since  at which time she was dx with Neuropath. Feels here legs and speech give out.    Still with periodic destaturations.- workup - ? [facility]    She and her mother very frustrated as it is difficult to find specialists willing to change in long-term care with his very complicated patient.    2-year history of chronic back pain.  Seen chiropractory.  Being pain can radiate to her knees is sharp in nature.  Pain when lying supine with her legs extended it is very painful.    Review of Systems   Constitutional:  Positive for fatigue.  "  Respiratory: Negative.     Cardiovascular: Negative.    Gastrointestinal: Negative.    Musculoskeletal:  Positive for back pain.   Neurological:  Positive for syncope and weakness.     Vitals:    08/30/23 1226   BP: 110/72   Pulse: 59   SpO2: 95%   Weight: 70.9 kg (156 lb 6.4 oz)   Height: 157.5 cm (62.01\")     Body mass index is 28.6 kg/mý.    Physical Exam  Vitals and nursing note reviewed.   Constitutional:       General: She is not in acute distress.     Appearance: She is well-developed. She is not diaphoretic.   HENT:      Head: Normocephalic and atraumatic.      Right Ear: External ear normal.      Left Ear: External ear normal.      Mouth/Throat:      Pharynx: No oropharyngeal exudate.   Eyes:      General: No scleral icterus.        Right eye: No discharge.      Conjunctiva/sclera: Conjunctivae normal.   Neck:      Thyroid: No thyromegaly.      Vascular: No JVD.      Trachea: No tracheal deviation.   Cardiovascular:      Rate and Rhythm: Normal rate and regular rhythm.      Heart sounds: Normal heart sounds.      Comments: PMI nondisplaced  Pulmonary:      Effort: Pulmonary effort is normal.      Breath sounds: Normal breath sounds. No wheezing or rales.   Abdominal:      General: Bowel sounds are normal.      Palpations: Abdomen is soft.      Tenderness: There is no abdominal tenderness. There is no guarding or rebound.   Musculoskeletal:      Cervical back: Normal range of motion and neck supple.   Lymphadenopathy:      Cervical: No cervical adenopathy.   Skin:     General: Skin is warm and dry.      Capillary Refill: Capillary refill takes less than 2 seconds.      Coloration: Skin is not pale.      Findings: No rash.   Neurological:      Mental Status: She is alert and oriented to person, place, and time.      Motor: No abnormal muscle tone.      Coordination: Coordination normal.   Psychiatric:         Judgment: Judgment normal.       Procedures    Results " Review:    None    Assessment/Plan:    Problem List Items Addressed This Visit          Sleep    Insomnia    Relevant Medications    eszopiclone (LUNESTA) 2 MG tablet       Symptoms and Signs    Syncope - Primary    Relevant Orders    Ambulatory Referral to [facility]    Ambulatory Referral to Neurology (Completed)     Other Visit Diagnoses       Attention deficit hyperactivity disorder (ADHD), combined type        Relevant Medications    eszopiclone (LUNESTA) 2 MG tablet    amphetamine-dextroamphetamine (Adderall) 5 MG tablet    venlafaxine (EFFEXOR) 25 MG tablet    Weakness        Relevant Orders    Ambulatory Referral to Neurology (Completed)    Screening-pulmonary TB        Relevant Orders    QuantiFERON TB Gold    Cyanosis        Relevant Orders    CARBOXY-, MET-, SULF-HEMOGLOBIN    Abnormal x-ray of lumbar spine        Relevant Orders    XR Spine Lumbar Complete With Flex & Ext        I have urged her to try to establish with a psychiatrist, however we will continue to cover insomnia and Adderall medications while she is looking for 1.    Plan of care reviewed with patient at the conclusion of today's visit. Education was provided regarding diagnosis and management.  Patient verbalizes understanding of and agreement with management plan.    No follow-ups on file.    Cecil Sen MD      Please note than portions of this note were completed [facility] a Voice Recognition Program

## 2023-08-31 NOTE — PROGRESS NOTES
No evidence of compression fracture or acute fracture.  There is a endplate irregularity that they think is related to previous injury at lumbar vertebrae 4.  Mild arthritic changes noted.  Can consider pain management referral.

## 2023-09-01 ENCOUNTER — TELEPHONE (OUTPATIENT)
Dept: FAMILY MEDICINE CLINIC | Facility: CLINIC | Age: 33
End: 2023-09-01
Payer: COMMERCIAL

## 2023-09-01 NOTE — TELEPHONE ENCOUNTER
----- Message from Cecil Sen MD sent at 8/31/2023  5:22 PM EDT -----    No evidence of compression fracture or acute fracture.  There is a endplate irregularity that they think is related to previous injury at lumbar vertebrae 4.  Mild arthritic changes noted.  Can consider pain management referral.    Attempted to contact patient, no answer. LVM with office # given.     Hub may relay message and document.

## 2023-09-04 LAB
GAMMA INTERFERON BACKGROUND BLD IA-ACNC: 0.05 IU/ML
M TB IFN-G BLD-IMP: NEGATIVE
M TB IFN-G CD4+ T-CELLS BLD-ACNC: 0.03 IU/ML
M TBIFN-G CD4+ CD8+T-CELLS BLD-ACNC: 0.03 IU/ML
MITOGEN IGNF BLD-ACNC: >10 IU/ML
SERVICE CMNT-IMP: NORMAL

## 2023-09-05 NOTE — TELEPHONE ENCOUNTER
Called patient and discussed her results with her. She explained that the xray tech she does not think had her in an appropriate position to image the area she was having pain in and the tech was also very rude to her. However she is agreeable to pain management referral. She requested to see someone within the [facility] if insurance allows it. She does not want to see .   She stated that we can leave detailed voicemails if she does not answer and also send [facility] messages if needed. She does actively use her [employer],

## 2023-09-12 ENCOUNTER — PATIENT MESSAGE (OUTPATIENT)
Dept: FAMILY MEDICINE CLINIC | Facility: CLINIC | Age: 33
End: 2023-09-12
Payer: COMMERCIAL

## 2023-09-13 ENCOUNTER — TELEPHONE (OUTPATIENT)
Dept: CARDIOLOGY | Facility: CLINIC | Age: 33
End: 2023-09-13
Payer: COMMERCIAL

## 2023-09-13 DIAGNOSIS — G89.29 CHRONIC BILATERAL LOW BACK PAIN WITHOUT SCIATICA: Primary | ICD-10-CM

## 2023-09-13 DIAGNOSIS — M54.50 CHRONIC BILATERAL LOW BACK PAIN WITHOUT SCIATICA: Primary | ICD-10-CM

## 2023-09-13 RX ORDER — BACLOFEN 10 MG/1
10 TABLET ORAL 4 TIMES DAILY
Qty: 120 TABLET | Refills: 2 | Status: SHIPPED | OUTPATIENT
Start: 2023-09-13

## 2023-09-13 NOTE — TELEPHONE ENCOUNTER
Caller: TONY SOOD    Relationship to patient: Mother    Best call back number: [phone]    Chief complaint: SYNCOPE     Type of visit: FOLLOW UP     Requested date: ASAP     Additional notes:PATIENT MOTHER CALLED IN REQUESTING TO SCHEDULE A APPOINTMENT WITH DR. PEREIRA. PATIENT WAS REFERRED TO [facility] BY DR. KEE BUT STATED THAT SHE WISHES TO SEE DR. PEREIRA DUE TO HIM KNOWING PATIENTS HISTORY. PATIENT HAS STILL BEEN HAVING SYNCOPE EPISODES AND HER LAST ONE WAS 1 MONTH AGO. PATIENTS MOTHER MENTIONED DR. KEE SAID SOMETHING ABOUT THE PATIENT GETTING A HOLTER MONITOR TO WEAR TO SEE IF ITS RELATED TO THE BEATS OF HER HEART.  PATIENT IS NOT CURRENTLY HAVING THESE SYMPTOMS.     PATIENT IS NOT CURRENTLY HAVING ANY CARDIAC ISSUES

## 2023-09-15 LAB
COHGB MFR BLD: NORMAL %SATURATION
COHGB MFR BLD: NORMAL %SATURATION
METHGB MFR BLD: 3 %SATURATION
SULFHGB MFR BLD: 5.1 %SATURATION

## 2023-09-19 DIAGNOSIS — R09.02 HYPOXIA: ICD-10-CM

## 2023-09-19 DIAGNOSIS — D74.8: Primary | ICD-10-CM

## 2023-09-19 NOTE — PROGRESS NOTES
Referred her to hematology.  She has a slightly elevated methemoglobin as well as self hemoglobin which can make hypoxia worse.  I am concerned that her Imuran may be causing herself hemoglobinemia-in turn driving some of the hypoxemia

## 2023-10-05 ENCOUNTER — TELEPHONE (OUTPATIENT)
Dept: GASTROENTEROLOGY | Facility: CLINIC | Age: 33
End: 2023-10-05
Payer: COMMERCIAL

## 2023-10-05 RX ORDER — FLUCONAZOLE 100 MG/1
100 TABLET ORAL DAILY
Qty: 5 TABLET | Refills: 0 | Status: SHIPPED | OUTPATIENT
Start: 2023-10-05

## 2023-10-05 NOTE — TELEPHONE ENCOUNTER
Pt mother called and stated that Pt has yeast in her mouth and it may be in her throat now as well. Mom says Pt is familiar with symptoms so she is sure that this is what is happening and Diflucan usually clears it up. Pt is requesting Diflucan.   Upcoming OV: 10/10/2023  Procedure: EGD 3/23/2023    Alma Delia: Please let them know I sent Diflucan 100 mg daily x5 days to her pharmacy.  If she continues to have problems and an upper endoscopy for further evaluation is recommended

## 2023-10-06 RX ORDER — VENLAFAXINE 25 MG/1
TABLET ORAL
Qty: 90 TABLET | Refills: 3 | Status: SHIPPED | OUTPATIENT
Start: 2023-10-06

## 2023-10-06 NOTE — TELEPHONE ENCOUNTER
Rx Refill Note  Requested Prescriptions     Pending Prescriptions Disp Refills    venlafaxine (EFFEXOR) 25 MG tablet [Pharmacy Med Name: VENLAFAXINE 25MG TABLETS] 90 tablet 3     Sig: TAKE 3 TABLETS BY MOUTH THREE TIMES DAILY      Last office visit with prescribing clinician: 8/30/2023   Last telemedicine visit with prescribing clinician: Visit date not found   Next office visit with prescribing clinician: 1/8/2024                         Would you like a call back once the refill request has been completed: [] Yes [] No    If the office needs to give you a call back, can they leave a voicemail: [] Yes [] No    Deb Millan MA  10/06/23, 13:57 EDT

## 2023-10-10 DIAGNOSIS — G47.09 OTHER INSOMNIA: ICD-10-CM

## 2023-10-10 RX ORDER — ESZOPICLONE 2 MG/1
2 TABLET, FILM COATED ORAL NIGHTLY
Qty: 30 TABLET | Refills: 2 | Status: SHIPPED | OUTPATIENT
Start: 2023-10-10

## 2023-10-12 RX ORDER — BACLOFEN 10 MG/1
10 TABLET ORAL 3 TIMES DAILY
Qty: 90 TABLET | Refills: 9 | Status: SHIPPED | OUTPATIENT
Start: 2023-10-12

## 2023-10-19 RX ORDER — AZATHIOPRINE 50 MG/1
150 TABLET ORAL DAILY
Qty: 90 TABLET | Refills: 4 | Status: SHIPPED | OUTPATIENT
Start: 2023-10-19

## 2024-01-09 RX ORDER — FLUCONAZOLE 100 MG/1
100 TABLET ORAL DAILY
Qty: 5 TABLET | Refills: 0 | Status: SHIPPED | OUTPATIENT
Start: 2024-01-09

## 2024-02-26 ENCOUNTER — TELEPHONE (OUTPATIENT)
Dept: CARDIOLOGY | Facility: CLINIC | Age: 34
End: 2024-02-26
Payer: COMMERCIAL

## 2024-02-26 NOTE — TELEPHONE ENCOUNTER
[facility] Appt needed at this time.

## 2024-03-07 ENCOUNTER — PREP FOR SURGERY (OUTPATIENT)
Dept: OTHER | Facility: HOSPITAL | Age: 34
End: 2024-03-07
Payer: COMMERCIAL

## 2024-03-07 ENCOUNTER — TELEPHONE (OUTPATIENT)
Dept: GASTROENTEROLOGY | Facility: CLINIC | Age: 34
End: 2024-03-07
Payer: COMMERCIAL

## 2024-03-07 DIAGNOSIS — R13.10 DYSPHAGIA, UNSPECIFIED TYPE: Primary | ICD-10-CM

## 2024-03-21 ENCOUNTER — ANESTHESIA EVENT (OUTPATIENT)
Dept: GASTROENTEROLOGY | Facility: HOSPITAL | Age: 34
End: 2024-03-21
Payer: COMMERCIAL

## 2024-03-22 ENCOUNTER — ANESTHESIA (OUTPATIENT)
Dept: GASTROENTEROLOGY | Facility: HOSPITAL | Age: 34
End: 2024-03-22
Payer: COMMERCIAL

## 2024-03-22 ENCOUNTER — HOSPITAL ENCOUNTER (OUTPATIENT)
Facility: HOSPITAL | Age: 34
Setting detail: HOSPITAL OUTPATIENT SURGERY
Discharge: HOME | End: 2024-03-22
Attending: INTERNAL MEDICINE | Admitting: INTERNAL MEDICINE
Payer: COMMERCIAL

## 2024-03-22 VITALS
RESPIRATION RATE: 18 BRPM | DIASTOLIC BLOOD PRESSURE: 90 MMHG | SYSTOLIC BLOOD PRESSURE: 137 MMHG | HEART RATE: 89 BPM | BODY MASS INDEX: 27.59 KG/M2 | HEIGHT: 62 IN | OXYGEN SATURATION: 92 % | WEIGHT: 149.91 LBS | TEMPERATURE: 97 F

## 2024-03-22 LAB
ANION GAP SERPL CALCULATED.3IONS-SCNC: 10 MMOL/L (ref 5–15)
BUN SERPL-MCNC: 12 MG/DL (ref 6–20)
BUN/CREAT SERPL: 21.4 (ref 7–25)
CALCIUM SPEC-SCNC: 8 MG/DL (ref 8.6–10.5)
CHLORIDE SERPL-SCNC: 100 MMOL/L (ref 98–107)
CO2 SERPL-SCNC: 31 MMOL/L (ref 22–29)
CREAT SERPL-MCNC: 0.56 MG/DL (ref 0.57–1)
EGFRCR SERPLBLD CKD-EPI 2021: 123 ML/MIN/1.73
GLUCOSE SERPL-MCNC: 93 MG/DL (ref 65–99)
MAGNESIUM SERPL-MCNC: 1.8 MG/DL (ref 1.6–2.6)
POTASSIUM SERPL-SCNC: 3.6 MMOL/L (ref 3.5–5.2)
QT INTERVAL: 460 MS
QTC INTERVAL: 431 MS
SODIUM SERPL-SCNC: 141 MMOL/L (ref 136–145)

## 2024-03-22 PROCEDURE — 93005 ELECTROCARDIOGRAM TRACING: CPT | Performed by: ANESTHESIOLOGY

## 2024-03-22 PROCEDURE — 25010000002 HYDROCORTISONE SOD SUC (PF) 100 MG RECONSTITUTED SOLUTION

## 2024-03-22 PROCEDURE — 25010000002 PROMETHAZINE PER 50 MG: Performed by: INTERNAL MEDICINE

## 2024-03-22 PROCEDURE — 25010000002 PROPOFOL 10 MG/ML EMULSION

## 2024-03-22 PROCEDURE — 25010000002 MIDAZOLAM PER 1 MG

## 2024-03-22 PROCEDURE — 25810000003 LACTATED RINGERS PER 1000 ML: Performed by: ANESTHESIOLOGY

## 2024-03-22 PROCEDURE — 25010000002 CALCIUM GLUCONATE PER 10 ML: Performed by: ANESTHESIOLOGY

## 2024-03-22 PROCEDURE — 25010000002 GLYCOPYRROLATE 0.4 MG/2ML SOLUTION

## 2024-03-22 PROCEDURE — C1725 CATH, TRANSLUMIN NON-LASER: HCPCS | Performed by: INTERNAL MEDICINE

## 2024-03-22 PROCEDURE — 93010 ELECTROCARDIOGRAM REPORT: CPT | Performed by: INTERNAL MEDICINE

## 2024-03-22 PROCEDURE — 43249 ESOPH EGD DILATION <30 MM: CPT | Performed by: INTERNAL MEDICINE

## 2024-03-22 PROCEDURE — 80048 BASIC METABOLIC PNL TOTAL CA: CPT | Performed by: ANESTHESIOLOGY

## 2024-03-22 PROCEDURE — 25810000003 SODIUM CHLORIDE 0.9 % SOLUTION 1,000 ML FLEX CONT: Performed by: ANESTHESIOLOGY

## 2024-03-22 PROCEDURE — 83735 ASSAY OF MAGNESIUM: CPT | Performed by: ANESTHESIOLOGY

## 2024-03-22 PROCEDURE — 25010000002 HYDROMORPHONE PER 4 MG: Performed by: INTERNAL MEDICINE

## 2024-03-22 PROCEDURE — 25010000002 HEPARIN LOCK FLUSH PER 10 UNITS: Performed by: INTERNAL MEDICINE

## 2024-03-22 RX ORDER — CALCIUM CHLORIDE 100 MG/ML
INJECTION INTRAVENOUS; INTRAVENTRICULAR AS NEEDED
Status: DISCONTINUED | OUTPATIENT
Start: 2024-03-22 | End: 2024-03-22 | Stop reason: SURG

## 2024-03-22 RX ORDER — GLYCOPYRROLATE 0.2 MG/ML
INJECTION INTRAMUSCULAR; INTRAVENOUS AS NEEDED
Status: DISCONTINUED | OUTPATIENT
Start: 2024-03-22 | End: 2024-03-22 | Stop reason: SURG

## 2024-03-22 RX ORDER — HYDROMORPHONE HYDROCHLORIDE 1 MG/ML
0.5 INJECTION, SOLUTION INTRAMUSCULAR; INTRAVENOUS; SUBCUTANEOUS
Status: DISCONTINUED | OUTPATIENT
Start: 2024-03-22 | End: 2024-03-22 | Stop reason: SDUPTHER

## 2024-03-22 RX ORDER — MIDAZOLAM HYDROCHLORIDE 1 MG/ML
1 INJECTION INTRAMUSCULAR; INTRAVENOUS
Status: DISCONTINUED | OUTPATIENT
Start: 2024-03-22 | End: 2024-03-22 | Stop reason: HOSPADM

## 2024-03-22 RX ORDER — FLUCONAZOLE 100 MG/1
100 TABLET ORAL DAILY
Qty: 5 TABLET | Refills: 1 | Status: SHIPPED | OUTPATIENT
Start: 2024-03-22

## 2024-03-22 RX ORDER — PROPOFOL 10 MG/ML
VIAL (ML) INTRAVENOUS AS NEEDED
Status: DISCONTINUED | OUTPATIENT
Start: 2024-03-22 | End: 2024-03-22 | Stop reason: SURG

## 2024-03-22 RX ORDER — SODIUM CHLORIDE 0.9 % (FLUSH) 0.9 %
10 SYRINGE (ML) INJECTION AS NEEDED
Status: DISCONTINUED | OUTPATIENT
Start: 2024-03-22 | End: 2024-03-22 | Stop reason: HOSPADM

## 2024-03-22 RX ORDER — SODIUM CHLORIDE 9 MG/ML
40 INJECTION, SOLUTION INTRAVENOUS AS NEEDED
Status: DISCONTINUED | OUTPATIENT
Start: 2024-03-22 | End: 2024-03-22 | Stop reason: HOSPADM

## 2024-03-22 RX ORDER — HYDROMORPHONE HYDROCHLORIDE 1 MG/ML
0.5 INJECTION, SOLUTION INTRAMUSCULAR; INTRAVENOUS; SUBCUTANEOUS
Status: DISCONTINUED | OUTPATIENT
Start: 2024-03-22 | End: 2024-03-22 | Stop reason: HOSPADM

## 2024-03-22 RX ORDER — HEPARIN SODIUM (PORCINE) LOCK FLUSH IV SOLN 100 UNIT/ML 100 UNIT/ML
500 SOLUTION INTRAVENOUS ONCE
Status: COMPLETED | OUTPATIENT
Start: 2024-03-22 | End: 2024-03-22

## 2024-03-22 RX ORDER — LIDOCAINE HYDROCHLORIDE 10 MG/ML
0.5 INJECTION, SOLUTION EPIDURAL; INFILTRATION; INTRACAUDAL; PERINEURAL ONCE AS NEEDED
Status: DISCONTINUED | OUTPATIENT
Start: 2024-03-22 | End: 2024-03-22 | Stop reason: HOSPADM

## 2024-03-22 RX ORDER — MIDAZOLAM HYDROCHLORIDE 1 MG/ML
INJECTION INTRAMUSCULAR; INTRAVENOUS AS NEEDED
Status: DISCONTINUED | OUTPATIENT
Start: 2024-03-22 | End: 2024-03-22 | Stop reason: SURG

## 2024-03-22 RX ORDER — CALCIUM GLUCONATE 94 MG/ML
1 INJECTION, SOLUTION INTRAVENOUS ONCE
Status: COMPLETED | OUTPATIENT
Start: 2024-03-22 | End: 2024-03-22

## 2024-03-22 RX ORDER — LIDOCAINE HYDROCHLORIDE 10 MG/ML
INJECTION, SOLUTION EPIDURAL; INFILTRATION; INTRACAUDAL; PERINEURAL AS NEEDED
Status: DISCONTINUED | OUTPATIENT
Start: 2024-03-22 | End: 2024-03-22 | Stop reason: SURG

## 2024-03-22 RX ORDER — FAMOTIDINE 10 MG/ML
20 INJECTION, SOLUTION INTRAVENOUS ONCE
Status: COMPLETED | OUTPATIENT
Start: 2024-03-22 | End: 2024-03-22

## 2024-03-22 RX ORDER — SODIUM CHLORIDE 0.9 % (FLUSH) 0.9 %
10 SYRINGE (ML) INJECTION EVERY 12 HOURS SCHEDULED
Status: DISCONTINUED | OUTPATIENT
Start: 2024-03-22 | End: 2024-03-22 | Stop reason: HOSPADM

## 2024-03-22 RX ORDER — FAMOTIDINE 20 MG/1
20 TABLET, FILM COATED ORAL ONCE
Status: DISCONTINUED | OUTPATIENT
Start: 2024-03-22 | End: 2024-03-22 | Stop reason: HOSPADM

## 2024-03-22 RX ORDER — SODIUM CHLORIDE, SODIUM LACTATE, POTASSIUM CHLORIDE, CALCIUM CHLORIDE 600; 310; 30; 20 MG/100ML; MG/100ML; MG/100ML; MG/100ML
9 INJECTION, SOLUTION INTRAVENOUS CONTINUOUS
Status: DISCONTINUED | OUTPATIENT
Start: 2024-03-22 | End: 2024-03-22 | Stop reason: HOSPADM

## 2024-03-22 RX ADMIN — PROPOFOL 100 MG: 10 INJECTION, EMULSION INTRAVENOUS at 15:21

## 2024-03-22 RX ADMIN — CALCIUM GLUCONATE 1 G: 98 INJECTION, SOLUTION INTRAVENOUS at 16:01

## 2024-03-22 RX ADMIN — PROMETHAZINE HYDROCHLORIDE 12.5 MG: 25 INJECTION INTRAMUSCULAR; INTRAVENOUS at 16:18

## 2024-03-22 RX ADMIN — SODIUM CHLORIDE, POTASSIUM CHLORIDE, SODIUM LACTATE AND CALCIUM CHLORIDE: 600; 310; 30; 20 INJECTION, SOLUTION INTRAVENOUS at 15:14

## 2024-03-22 RX ADMIN — SODIUM CHLORIDE 40 ML: 9 INJECTION, SOLUTION INTRAVENOUS at 14:50

## 2024-03-22 RX ADMIN — HEPARIN 500 UNITS: 100 SYRINGE at 16:55

## 2024-03-22 RX ADMIN — HYDROMORPHONE HYDROCHLORIDE 0.5 MG: 1 INJECTION, SOLUTION INTRAMUSCULAR; INTRAVENOUS; SUBCUTANEOUS at 16:54

## 2024-03-22 RX ADMIN — LIDOCAINE HYDROCHLORIDE 100 MG: 10 INJECTION, SOLUTION EPIDURAL; INFILTRATION; INTRACAUDAL; PERINEURAL at 15:19

## 2024-03-22 RX ADMIN — HYDROCORTISONE SODIUM SUCCINATE 100 MG: 100 INJECTION, POWDER, FOR SOLUTION INTRAMUSCULAR; INTRAVENOUS at 15:22

## 2024-03-22 RX ADMIN — HYDROMORPHONE HYDROCHLORIDE 0.5 MG: 1 INJECTION, SOLUTION INTRAMUSCULAR; INTRAVENOUS; SUBCUTANEOUS at 15:49

## 2024-03-22 RX ADMIN — CALCIUM CHLORIDE 1 G: 100 INJECTION INTRAVENOUS; INTRAVENTRICULAR at 15:22

## 2024-03-22 RX ADMIN — CALCIUM CHLORIDE 1 G: 100 INJECTION INTRAVENOUS; INTRAVENTRICULAR at 15:21

## 2024-03-22 RX ADMIN — HYDROMORPHONE HYDROCHLORIDE 0.5 MG: 1 INJECTION, SOLUTION INTRAMUSCULAR; INTRAVENOUS; SUBCUTANEOUS at 16:09

## 2024-03-22 RX ADMIN — GLYCOPYRROLATE 0.2 MG: 0.4 INJECTION INTRAMUSCULAR; INTRAVENOUS at 15:19

## 2024-03-22 RX ADMIN — FAMOTIDINE 20 MG: 10 INJECTION, SOLUTION INTRAVENOUS at 14:49

## 2024-03-22 RX ADMIN — MIDAZOLAM HYDROCHLORIDE 2 MG: 1 INJECTION, SOLUTION INTRAMUSCULAR; INTRAVENOUS at 15:19

## 2024-03-22 RX ADMIN — PROPOFOL 100 MG: 10 INJECTION, EMULSION INTRAVENOUS at 15:19

## 2024-03-22 NOTE — H&P
GASTROENTEROLOGY OFFICE NOTE  Krista Richter  [id number]  1990    CHIEF COMPLAINT  Intermittent dysphagia to solids       HISTORY OF PRESENT ILLNESS:  Patient known to me.  33-year-old white female with complex medical history detailed below in the past medical history.  She has required esophageal dilation from time to time for intermittent dysphagia to solids which has responded very well to esophageal dilation.  She presents requesting repeat esophageal dilation as a stating that her appetite has been poor.  In the past Marinol has been helpful in improving her appetite but that no longer seems to be the case and she is wondering whether a trial of Megace may be in order.        PAST MEDICAL HISTORY  Past Medical History:    ADHD    Adrenal insufficiency    Alopecia    Anemia    Anesthesia complication    tetany    Anxiety    APS type 1    Asthma    Autism    Autoimmune enteropathy    Autoimmune hepatitis    Autoimmune urticaria    Depression    Dermatomyositis    Disease of thyroid gland    Fibromyalgia    Functional asplenia    GERD (gastroesophageal reflux disease)    History of acute renal failure x 2    History of Clostridioides difficile infection    History of COVID-19    History of kidney stones    History of streptococcal septicemia    Hypoparathyroidism    Insulin dependent diabetes mellitus    Long Q-T syndrome    Malabsorption    Migraine    Nephrocalcinosis    Neuropathy    BLE    Oxygen desaturation    Pancreatic insufficiency    Parathyroid abnormality    Polyglandular deficiency syndrome    Premature ovarian failure    PTSD (post-traumatic stress disorder)    Sjogren's syndrome    Sleep apnea (no CPAP, uses only oxygen at 4L per [place] at bedtime)    Spleen absent    auto infarct    Tetany    HAS EPISODES    Transfusion history    Denies transfusion reaction    Umbilical hernia    Wears eyeglasses        PAST SURGICAL HISTORY  Past Surgical History:    APPENDECTOMY    CARDIAC  CATHETERIZATION    Procedure: RIGHT AND LEFT HEART CATH;  Surgeon: Poli Anderson IV, MD;  Location:  [facility];  Service: Cardiovascular;  Laterality: N/A;    CARDIAC SURGERY    LOOP RECORDER    CHOLECYSTECTOMY    COLONOSCOPY    Procedure: COLONOSCOPY;  Surgeon: Gavin Vargas MD;  Location:  [facility];  Service: Gastroenterology;  Laterality: N/A;    ENDOSCOPY    Procedure: ESOPHAGOGASTRODUODENOSCOPY;  Surgeon: Gavin Vargas MD;  Location:  [facility];  Service: Gastroenterology    ENDOSCOPY    Procedure: ESOPHAGOGASTRODUODENOSCOPY-DILATION;  Surgeon: Gavin Vargas MD;  Location:  [facility];  Service: Gastroenterology    ENDOSCOPY    ENDOSCOPY    Procedure: ESOPHAGOGASTRODUODENOSCOPY;  Surgeon: Gavin Vargas MD;  Location:  [facility];  Service: Gastroenterology    ENDOSCOPY    Procedure: ESOPHAGOGASTRODUODENOSCOPY;  Surgeon: Gavin Vargas MD;  Location:  [facility];  Service: Gastroenterology    ENDOSCOPY    Procedure: ESOPHAGOGASTRODUODENOSCOPY WITH BALLOON DILATION;  Surgeon: Gavin Vargas MD;  Location:  [facility];  Service: Gastroenterology    ENDOSCOPY    Procedure: ESOPHAGOGASTRODUODENOSCOPY  WITH ESOPHAGEAL BALLOON DILATATION;  Surgeon: Gavin Vargas MD;  Location:  [facility];  Service: Gastroenterology;  Laterality: N/A;  dilation done with 60F dilator     ENDOSCOPY    Procedure: ESOPHAGOGASTRODUODENOSCOPY;  Surgeon: Gavin Vargas MD;  Location:  [facility];  Service: Gastroenterology;  Laterality: N/A;    ENDOSCOPY    EGD    ENDOSCOPY    Procedure: ESOPHAGOGASTRODUODENOSCOPY;  Surgeon: Gavin Vargas MD;  Location:  [facility];  Service: Gastroenterology;  Laterality: N/A;  balloon dilation 18-20    ENDOSCOPY    Procedure: ESOPHAGOGASTRODUODENOSCOPY WITH DIALATION;  Surgeon: Ailcia  Gavin Sue MD;  Location:  [facility];  Service: Gastroenterology;  Laterality: N/A;  balloon dilation to 20    ENDOSCOPY    Procedure: ESOPHAGOGASTRODUODENOSCOPY WITH DIALATION;  Surgeon: Gavin Vargas MD;  Location:  [facility];  Service: Gastroenterology;  Laterality: N/A;  dilation of of esophagus done with 18-20 balloon    EXPLORATORY LAPAROTOMY    MULTIPLE    HAND SURGERY    4 TOTAL    HERNIA REPAIR    LIVER BIOPSY    MUSCLE BIOPSY    MUSCLE BIOPSY    PEG TUBE INSERTION    PEG TUBE REMOVAL    PORTACATH PLACEMENT    Left Chest Portacath present        MEDICATIONS:    Current Facility-Administered Medications:     famotidine (PEPCID) tablet 20 mg, 20 mg, Oral, Once, Karissa Martinez MD    lactated ringers infusion, 9 mL/hr, Intravenous, Continuous, Karissa Martinez MD    lidocaine PF 1% (XYLOCAINE) injection 0.5 mL, 0.5 mL, Injection, Once PRN, Karissa Martinez MD    midazolam (VERSED) injection 1 mg, 1 mg, Intravenous, Q10 Min PRN, Karissa Martinez MD    sodium chloride 0.9 % flush 10 mL, 10 mL, Intravenous, Q12H, Karissa Martinez MD    sodium chloride 0.9 % flush 10 mL, 10 mL, Intravenous, PRN, Karissa Martinez MD    sodium chloride 0.9 % infusion 40 mL, 40 mL, Intravenous, PRN, Karissa Martinez MD, 40 mL at 03/22/24 1450    ALLERGIES  is allergic to azithromycin, cefpodoxime, cefpodoxime proxetil, cephalosporins, clarithromycin, levofloxacin, nitrofurantoin, nystatin, penicillins, sulfa antibiotics, nitrofurantoin macrocrystal, and ondansetron.    FAMILY HISTORY:  Cancer-related family history includes Cancer in her maternal grandfather, maternal grandmother, paternal grandfather, and paternal grandmother.  Colon Cancer-related family history is not on file.    SOCIAL HISTORY  She  reports that she has never smoked. She has been exposed to tobacco smoke. She has never used smokeless tobacco. She reports that she does not currently use alcohol. She reports  "that she does not use drugs.   .  Recently started mottling.  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 In no apparent or acute distress.  and No stigmata of chronic liver disease  HEENT: Anicteric sclerae. Normal oropharynx  Neck: Supple. Without lymphadenopathy  CV: Regular rate and rhythm, S1, S2  Lungs: Clear to ausculation. Without rales, rhonchi and wheezing  Abdomen:  Soft,non-distended without palpable masses or hepatosplenomeagaly, areas of rebound tenderness or guarding.   Extremeties: without clubbing, cyanosis or edema  Neurologic:  Alert and oriented x 3 without focal motor or sensory deficits  Rectal exam: deferred     Results for orders placed or performed during the hospital encounter of 03/22/24   ECG 12 Lead Pre-Op / Pre-Procedure   Result Value Ref Range    QT Interval 460 ms    QTC Interval 431 ms        Results Review:  I reviewed the patient's new clinical results.      ASSESSMENT  1.-Intermittent dysphagia to solids.  Responsive to esophageal dilation  2.-Poor appetite.  History of eating disorder.  Requesting Megace    PLAN  1.-EGD.  Further recommendation deferred pending findings of today's upper endoscopy.      Gavin Vargas MD  3/22/2024   15:06 EDT      Addendum  EGD revealed significant esophageal candidiasis.  This likely may explain not just her dysphagia but also perhaps some changes in her appetite we will see how she does with a trial of Diflucan first and then perhaps consider starting Megace.  Esophageal dilation was performed to 20 mm            "

## 2024-03-22 NOTE — ANESTHESIA POSTPROCEDURE EVALUATION
Patient: Krista Richter    Procedure Summary       Date: 03/22/24 Room / Location:  [facility] /  [facility]    Anesthesia Start: 1514 Anesthesia Stop: 1527    Procedure: ESOPHAGOGASTRODUODENOSCOPY Diagnosis:       Dysphagia, unspecified type      (Dysphagia, unspecified type [R13.10])    Surgeons: Gavin Vargas MD Provider: Karissa Martinez MD    Anesthesia Type: general ASA Status: 3            Anesthesia Type: general    Vitals  Bp 134/80  Hr 89  Spo2 96%  T 97  Rr 16      Post Anesthesia Care and Evaluation    Patient location during evaluation: PACU  Patient participation: complete - patient participated  Level of consciousness: awake and alert  Pain management: adequate    Airway patency: patent  Anesthetic complications: No anesthetic complications  PONV Status: none  Cardiovascular status: hemodynamically stable and acceptable  Respiratory status: nonlabored ventilation, acceptable and nasal cannula  Hydration status: acceptable

## 2024-03-22 NOTE — ANESTHESIA PREPROCEDURE EVALUATION
Anesthesia Evaluation     Patient summary reviewed and Nursing notes reviewed   history of anesthetic complications (tetany):   NPO Solid Status: > 8 hours  NPO Liquid Status: > 2 hours           Airway   Mallampati: II  TM distance: >3 FB  Neck ROM: full  Possible difficult intubation  Dental - normal exam     Pulmonary - normal exam   (+) asthma (MDI),home oxygen, shortness of breath, sleep apnea  (-) recent URI    ROS comment: Chronic hypoxia Home O2 desats in prone position   Autoimmune lung disease ?   Cardiovascular - normal exam    ECG reviewed    (-) hypertension, past MI, dysrhythmias, angina, cardiac stents    ROS comment: ECG NSR  ( prol QT syndrome in past)     ECHO 2021 EF = 55%.NS/ Valsalva NEG  For Shunt   NO evidence of intracardiac or pulmonary shunting is noted during this study despite the occurence of rapid and profound arterial desaturation when the patient assumes a supine position.     CATH 2021 Normal CA v no CAD   Moderately elevated LVEDP 24 mmHg  Mild pulmonary hypertension (mean 32 mmHg)  Supranormal cardiac output/index  No evidence of intracardiac or pulmonary shunt     Neuro/Psych  (+) headaches, syncope, psychiatric history  (-) seizures, CVA  GI/Hepatic/Renal/Endo    (+) GERD, hepatitis (AI hep), liver disease, renal disease- stones, diabetes mellitus type 2, thyroid problem hypothyroidism    ROS Comment: G  paresis   H/o dysphagia    Musculoskeletal     (+) myalgias  Abdominal    Substance History      OB/GYN          Other   autoimmune disease Sjogren syndrome,     ROS/Med Hx Other: Adrenal insuff RX- minaralo and gluco   Parathyroid insuff RX     Prolonged QT Lidocaine versed combo OK     Avoid zofran sux (use alfentanil) Volatiles                 Anesthesia Plan    ASA 3     general     (POM Hi flow O2   ETT ready- many EGD/dilations without intubation/ETT  Calcium choloride/steroids +/- Mg)  intravenous induction     Anesthetic plan, risks, benefits, and alternatives have been  provided, discussed and informed consent has been obtained with: patient.    Plan discussed with CRNA.      CODE STATUS:

## 2024-05-09 RX ORDER — CYPROHEPTADINE HYDROCHLORIDE 4 MG/1
4 TABLET ORAL 2 TIMES DAILY
Qty: 180 TABLET | Refills: 2 | Status: SHIPPED | OUTPATIENT
Start: 2024-05-09

## 2024-05-13 RX ORDER — LEVOFLOXACIN 750 MG/1
750 TABLET, FILM COATED ORAL DAILY
Qty: 7 TABLET | Refills: 0 | Status: SHIPPED | OUTPATIENT
Start: 2024-05-13

## 2024-05-13 NOTE — TELEPHONE ENCOUNTER
"    Caller: Krista Richter Nelli \"Nelli\"    Relationship: Self    Best call back number: [phone]     Requested Prescriptions:   Requested Prescriptions     Pending Prescriptions Disp Refills    levoFLOXacin (Levaquin) 750 MG tablet 7 tablet 0     Sig: Take 1 tablet by mouth Daily.        Pharmacy where request should be sent: [id number] - [facility] [address] [place] [address] AT [facility] ( [address] & [facility] [phone] [facility] [phone]      Last office visit with prescribing clinician: 8/30/2023   Last telemedicine visit with prescribing clinician: Visit date not found   Next office visit with prescribing clinician: Visit date not found     Additional details provided by patient:     Does the patient have less than a 3 day supply:  [x] Yes  [] No    Would you like a call back once the refill request has been completed: [] Yes [x] No    If the office needs to give you a call back, can they leave a voicemail: [] Yes [x] No    Jonathan Kevin Rep   05/13/24 10:31 EDT     "

## 2024-07-08 RX ORDER — AZATHIOPRINE 50 MG/1
150 TABLET ORAL DAILY
Qty: 90 TABLET | Refills: 4 | Status: SHIPPED | OUTPATIENT
Start: 2024-07-08

## 2024-08-05 RX ORDER — AZATHIOPRINE 50 MG/1
150 TABLET ORAL DAILY
Qty: 90 TABLET | Refills: 4 | Status: SHIPPED | OUTPATIENT
Start: 2024-08-05

## 2024-08-26 ENCOUNTER — TELEPHONE (OUTPATIENT)
Dept: CARDIOLOGY | Facility: CLINIC | Age: 34
End: 2024-08-26
Payer: COMMERCIAL

## 2024-08-26 NOTE — TELEPHONE ENCOUNTER
"      Caller: Krista Richter \"Nelli\"    Relationship to patient: Self    Best call back number: [phone]    Chief complaint: CHEST PAIN/TIGHTNESS, FATIGUED, HR DROPPING BELOW THE 40'S (APPLE WATCH HAS BEEN NOTIFYING PT)    Type of visit: FOLLOW UP     Requested date: ASAP       Additional notes: PT HAS BEEN FEELING FATIGUED OVER THE LAST FEW MONTHS. CHEST PAIN HAS BEEN GOING ON FOR THE LAST MONTH. LOW HR STARTED A COUPLE NIGHTS AGO THAT SHE KNOWS OF. TRANSFERRED TO THE OFFICE DUE TO ACTIVE SYMPTOMS.           "

## 2024-08-29 ENCOUNTER — OFFICE VISIT (OUTPATIENT)
Dept: CARDIOLOGY | Facility: CLINIC | Age: 34
End: 2024-08-29
Payer: COMMERCIAL

## 2024-08-29 VITALS
SYSTOLIC BLOOD PRESSURE: 92 MMHG | WEIGHT: 146 LBS | HEART RATE: 74 BPM | HEIGHT: 62 IN | DIASTOLIC BLOOD PRESSURE: 68 MMHG | OXYGEN SATURATION: 95 % | BODY MASS INDEX: 26.87 KG/M2

## 2024-08-29 DIAGNOSIS — G47.30 SLEEP APNEA, UNSPECIFIED TYPE: Primary | ICD-10-CM

## 2024-08-29 DIAGNOSIS — T50.905A BRADYCARDIA, DRUG INDUCED: ICD-10-CM

## 2024-08-29 DIAGNOSIS — R00.1 BRADYCARDIA, DRUG INDUCED: ICD-10-CM

## 2024-08-29 RX ORDER — NADOLOL 20 MG/1
10 TABLET ORAL EVERY 12 HOURS SCHEDULED
Qty: 60 TABLET | Refills: 6 | Status: SHIPPED | OUTPATIENT
Start: 2024-08-29

## 2024-08-29 NOTE — PROGRESS NOTES
[facility]  INITIAL OFFICE CONSULT      Krista Richter  1990  PCP: Cecil Sen MD    SUBJECTIVE:   Krista Richter is a 33 y.o. female seen for a consultation visit regarding the following:     Chief Complaint:   Chief Complaint   Patient presents with    Chest Pain    Low Heart Rate          Consultation is requested by No ref. provider found for evaluation of Chest Pain and Low Heart Rate        History:  Pleasant 33-year-old female presents today for reevaluation regarding long QT syndrome known history of orthodeoxia and autoimmune disease including severe anemia, autoimmune Polyendocrinopathy.  She follows with [facility] in the past has not seen them for some time due to COVID but recently is trying to get back in the system.  She is here today as she states that recently she had some episodes tracked by her Apple Watch her heart was getting low especially in the evening hours.  She feeling more fatigue and weakness.  She is concerned about taking that a lot of therapy which she has been on for years for her long QT.  She therefore stopped the nadolol she is worried that she was having side effects from this.  She has not had any syncope or near syncope.  She denies any chest pain chest angina pectoris.  She denies any heart failure symptoms.      Cardiac PMH: (Old records have been reviewed and summarized below)  Long QT syndrome (probable congenital LQT1)  Dx by Dr. Chapman at age 4 after abnormal EKG   Trouble regulating potassium   Black out periods and seizure like activity jayden with swimming and does get anxiolytic without a LifeVest.   Initiated on Nadolol for tachycardia with HR up into low high 190s.   twelve-lead EKG 11/20 1/2015 QTc 490 ms  Orthodeoxia  4/7/2021 bilateral carotid duplex: Mildly elevated carotid velocities bilaterally.  No evidence of atherosclerosis.  Vertebral artery flow is antegrade bilaterally  4/8/2021 heart catheterization:  Normal coronary arteries, normal LV systolic function. Moderately elevated LV filling pressure, mild pulmonary hypertension (mean PA pressure 32 mmHg), supranormal cardiac output/index.  No evidence of intracardiac or pulmonary shunt.  4/8/2021 BAO estimated EF 55%, LV systolic function normal.  Saline test with Valsalva negative for evidence of intra-atrial shunt.  4/9/2021 Doppler transcranial microbubble injection: Mean velocities and waveforms in the right and left MCA and terminal ICA are normal.  Following injection of agitated saline both before and after Valsalva no abnormal signal are seen in the right MCA.  Negative bubble study.   4/10/21 complete echocardiogram: Patient received 2 L of oxygen per nasal cannula throughout the evaluation.  In the sitting position arterial saturation 97% and an agitated saline study reveals no evidence of right to left shunting. When patient assumes supine position saturation falls to 70% within 1 minute no evidence of right to left shunting noted. When the patient assumes supine position and carries a Valsalva maneuver saturation pulse of 70% within a minute with no evidence of right to left shunting.  Also positional oxygen desaturation resolved within 1 minute of assuming a seated position.  No evidence of intracardiac or pulmonary shunting noted.  Polyglandular autoimmune syndrome  Rituxan in the past   Chronic Lung Disease  Follows pulmonologist at [facility]  Esophageal stenosis   Status post dilatation 1-   T2DM  Hypoparathyroidism  Dermatomyositis  Autoimmune hepatitis  Obstructive sleep apnea  Adrenal Insufficiency  Gastroparesis  Depression  Anxiety   Surgeries  Cholecystectomy  Appendectomy  G-tube insertion and removal  Umbilical hernia repair  Implantable loop recorder insertion    Past Medical History, Past Surgical History, Family history, Social History, and Medications were all reviewed with the patient today and updated as necessary.     Current Outpatient  Medications   Medication Sig Dispense Refill    albuterol (PROVENTIL HFA;VENTOLIN HFA) 108 (90 Base) MCG/ACT inhaler Inhale 2 puffs Every 4 (Four) Hours As Needed for Wheezing or Shortness of Air.      Alcohol Swabs (ALCOHOL PREP) 70 % pads Check blood glucose up to 2 times daily      amphetamine-dextroamphetamine (Adderall) 5 MG tablet Take one tab  each am and 1.5  tab in after noon (Patient taking differently: Take As Directed. Take one and half  tab  each am and one   tab in after noon) 75 tablet 0    ascorbic acid (VITAMIN C) 1000 MG tablet Take 1 tablet by mouth Daily.      azaTHIOprine (IMURAN) 50 MG tablet Take 3 tablets by mouth Daily. 90 tablet 4    baclofen (LIORESAL) 10 MG tablet TAKE 1 TABLET BY MOUTH THREE TIMES DAILY 90 tablet 9    Blood Glucose Calibration (OT ULTRA/FASTTK CNTRL SOLN) solution Use weekly and as needed to calibrate meter      Blood Glucose Monitoring Suppl (ONE TOUCH ULTRA 2) w/Device kit Use to check blood glucose up to 2 times daily      calcitriol (ROCALTROL) 0.5 MCG capsule Take 1 capsule by mouth 2 (Two) Times a Day.      Calcium Citrate 250 MG tablet Take 2 tablets by mouth Daily.      cycloSPORINE (RESTASIS) 0.05 % ophthalmic emulsion Administer 1 drop to both eyes Every 12 (Twelve) Hours.      cyproheptadine (PERIACTIN) 4 MG tablet TAKE 1 TABLET BY MOUTH TWICE DAILY 180 tablet 2    dronabinol (MARINOL) 5 MG capsule Take 1 capsule by mouth 4 (Four) Times a Day As Needed.      EPINEPHrine (EPIPEN) 0.3 MG/0.3ML solution auto-injector injection EpiPen 0.3 mg/0.3 mL injection, auto-injector   Take 1 auto by injection route.      estradiol (MINIVELLE, VIVELLE-DOT) 0.05 MG/24HR patch Place 1 patch on the skin as directed by provider 2 (Two) Times a Week.      eszopiclone (LUNESTA) 2 MG tablet Take 1 tablet by mouth Every Night. Take immediately before bedtime 30 tablet 2    fluconazole (Diflucan) 100 MG tablet Take 1 tablet by mouth Daily. 5 tablet 1    fludrocortisone 0.1 MG tablet  Take 1 tablet by mouth Daily.      fluticasone (FLONASE) 50 MCG/ACT nasal spray 1 spray into the nostril(s) as directed by provider As Needed.      folic acid (FOLVITE) 1 MG tablet Take 4 tablets by mouth Daily.      glucose blood test strip 1 each by Other route 2 (Two) Times a Day. Use as instructed 100 each 1    hydrocortisone (CORTEF) 10 MG tablet Take 1 tablet by mouth Every Night.      hydrocortisone (CORTEF) 20 MG tablet Take 1 tablet by mouth Every Morning. 20 MG IN AM      hydrocortisone sodium succinate (Solu-CORTEF) 100 MG injection Emergency for adrenal crisis      Hypromellose (GENTEAL SEVERE) 0.3 % ophthalmic gel Administer 1 Application to both eyes As Needed.      Insulin Pen Needle 32G X 4 MM misc Use to administer Natpara subcutaneously daily      levoFLOXacin (Levaquin) 750 MG tablet Take 1 tablet by mouth Daily. 7 tablet 0    levothyroxine (SYNTHROID, LEVOTHROID) 75 MCG tablet Take 1 tablet by mouth Daily.      magnesium oxide (MAGOX) 400 (241.3 Mg) MG tablet tablet Take 2.5 tablets by mouth 3 (Three) Times a Day.      Misc Natural Products (RA XYDRA EF PO) Take 1 tablet by mouth As Needed (sjrogran).      Multiple Vitamins-Minerals (MULTIVITAMIN ADULT PO) Take 1 tablet by mouth Daily.      nadolol (CORGARD) 20 MG tablet Take 0.5 tablets by mouth Every 12 (Twelve) Hours. 60 tablet 6    omeprazole (priLOSEC) 40 MG capsule TAKE ONE CAPSULE BY MOUTH TWICE A DAY TAKE 30 MINUTES PRIOR TO A MEAL (Patient taking differently: Take 1 capsule by mouth 2 (Two) Times a Day.) 60 capsule 6    OneTouch Delica Lancets [facility] 1 each by Other route 2 (Two) Times a Day. 100 each 1    Parathyroid Hormone, Recomb, (NATPARA SC) Inject 1 Cartridge under the skin into the appropriate area as directed 2 (two) times a day.      potassium chloride (K-DUR,KLOR-CON) 10 MEQ CR tablet Take 4 Capsules by mouth 3 times daily      progesterone (PROMETRIUM) 200 MG capsule Take 1 capsule by mouth Daily.      promethazine  "(PHENERGAN) 25 MG suppository Insert 1 suppository into the rectum Daily As Needed for Nausea or Vomiting. 30 suppository 3    sodium chloride 0.9 % solution Infuse 100 mL/hr into a venous catheter Continuous. (Patient taking differently: Infuse 100 mL/hr into a venous catheter Daily As Needed (As needed for autoimmune disorder).)      Transparent Dressings (TEGADERM FILM 2-3/8\"X2-3/4\") misc Apply over pump insertion site every 2 days..      venlafaxine (EFFEXOR) 25 MG tablet TAKE 3 TABLETS BY MOUTH THREE TIMES DAILY (Patient taking differently: Take 1 tablet by mouth 3 (Three) Times a Day.) 90 tablet 3    Vitamin D, Cholecalciferol, 1000 units capsule Take 1,000 unit marking on U-100 syringe by mouth 2 (Two) Times a Day.      zinc sulfate (ZINCATE) 220 (50 Zn) MG capsule Take 1 capsule by mouth Daily.      Brexpiprazole 0.25 MG tablet Take 0.25 mg by mouth Daily. (Patient not taking: Reported on 8/29/2024)      gabapentin (NEURONTIN) 600 MG tablet Take 1.5 tablets by mouth 3 (Three) Times a Day. (Patient not taking: Reported on 8/29/2024) 270 tablet 2    lidocaine (LIDODERM) 5 % Place 1 patch on the skin as directed by provider Daily. Remove & Discard patch within 12 hours or as directed by MD (Patient not taking: Reported on 8/29/2024) 14 patch 0    O2 (OXYGEN) Inhale 4 L/min every night at bedtime. Also uses with activity as needed (Patient not taking: Reported on 8/29/2024)       No current facility-administered medications for this visit.     Allergies   Allergen Reactions    Azithromycin Hives    Cefpodoxime Hives    Cefpodoxime Proxetil Hives    Cephalosporins Hives    Clarithromycin Hives     biaxin    Levofloxacin Hives    Nitrofurantoin Hives    Nystatin Hives    Penicillins Hives    Sulfa Antibiotics Hives    Nitrofurantoin Macrocrystal Hives    Ondansetron Other (See Comments)     \"it doesn't work. [facility] told me to list it as an allergy\"         Past Medical History:   Diagnosis Date    ADHD     Adrenal " insufficiency     Alopecia     Anemia     Anesthesia complication     tetany    Anxiety     APS type 1     Asthma     Autism     Autoimmune enteropathy     Autoimmune hepatitis     Autoimmune urticaria     Depression     Dermatomyositis     Disease of thyroid gland     Fibromyalgia     Functional asplenia     GERD (gastroesophageal reflux disease)     History of acute renal failure x 2     History of Clostridioides difficile infection 2018    History of COVID-19 01/29/2022    History of kidney stones     History of streptococcal septicemia 2017    Hypoparathyroidism     Insulin dependent diabetes mellitus     Long Q-T syndrome     Malabsorption     Migraine     Nephrocalcinosis     Neuropathy     BLE    Oxygen desaturation     Pancreatic insufficiency     Parathyroid abnormality     Polyglandular deficiency syndrome     Premature ovarian failure     PTSD (post-traumatic stress disorder)     Sjogren's syndrome     Sleep apnea (no CPAP, uses only oxygen at 4L per NC at bedtime)     Spleen absent     auto infarct    Sulfhemoglobinemia 2023    Tetany     HAS EPISODES    Transfusion history     Denies transfusion reaction    Umbilical hernia     Wears eyeglasses      Past Surgical History:   Procedure Laterality Date    APPENDECTOMY      CARDIAC CATHETERIZATION N/A 04/08/2021    Procedure: RIGHT AND LEFT HEART CATH;  Surgeon: Poli Anderson IV, MD;  Location:  [facility];  Service: Cardiovascular;  Laterality: N/A;    CARDIAC SURGERY      LOOP RECORDER    CHOLECYSTECTOMY      COLONOSCOPY N/A 06/05/2020    Procedure: COLONOSCOPY;  Surgeon: Gavin Vargas MD;  Location:  [facility];  Service: Gastroenterology;  Laterality: N/A;    ENDOSCOPY N/A 04/26/2018    Procedure: ESOPHAGOGASTRODUODENOSCOPY;  Surgeon: Gavin Vargas MD;  Location:  [facility];  Service: Gastroenterology    ENDOSCOPY N/A 08/09/2018    Procedure: ESOPHAGOGASTRODUODENOSCOPY-DILATION;   Surgeon: Gavin Vargas MD;  Location:  [facility];  Service: Gastroenterology    ENDOSCOPY      ENDOSCOPY N/A 02/06/2019    Procedure: ESOPHAGOGASTRODUODENOSCOPY;  Surgeon: Gavin Vargas MD;  Location:  [facility];  Service: Gastroenterology    ENDOSCOPY N/A 07/24/2019    Procedure: ESOPHAGOGASTRODUODENOSCOPY;  Surgeon: Gavin Vargas MD;  Location:  [facility];  Service: Gastroenterology    ENDOSCOPY N/A 10/18/2019    Procedure: ESOPHAGOGASTRODUODENOSCOPY WITH BALLOON DILATION;  Surgeon: Gavin Vargas MD;  Location:  [facility];  Service: Gastroenterology    ENDOSCOPY N/A 06/04/2020    Procedure: ESOPHAGOGASTRODUODENOSCOPY  WITH ESOPHAGEAL BALLOON DILATATION;  Surgeon: Gavin Vargas MD;  Location:  [facility];  Service: Gastroenterology;  Laterality: N/A;  dilation done with 60F dilator     ENDOSCOPY N/A 01/29/2021    Procedure: ESOPHAGOGASTRODUODENOSCOPY;  Surgeon: Gavin Vargas MD;  Location:  [facility];  Service: Gastroenterology;  Laterality: N/A;    ENDOSCOPY  10/2021    EGD    ENDOSCOPY N/A 11/11/2021    Procedure: ESOPHAGOGASTRODUODENOSCOPY;  Surgeon: Gavin Vargas MD;  Location:  [facility];  Service: Gastroenterology;  Laterality: N/A;  balloon dilation 18-20    ENDOSCOPY N/A 4/28/2022    Procedure: ESOPHAGOGASTRODUODENOSCOPY WITH DIALATION;  Surgeon: Gavin Vargas MD;  Location:  [facility];  Service: Gastroenterology;  Laterality: N/A;  balloon dilation to 20    ENDOSCOPY N/A 3/23/2023    Procedure: ESOPHAGOGASTRODUODENOSCOPY WITH DIALATION;  Surgeon: Gavin Vargas MD;  Location:  [facility];  Service: Gastroenterology;  Laterality: N/A;  dilation of of esophagus done with 18-20 balloon    ENDOSCOPY N/A 3/22/2024    Procedure: ESOPHAGOGASTRODUODENOSCOPY;  Surgeon: Gavin Vargas MD;  Location:  [facility];  Service: Gastroenterology;  Laterality: N/A;  balloon dilation to 20    EXPLORATORY LAPAROTOMY      MULTIPLE    HAND SURGERY      4 TOTAL    HERNIA REPAIR      LIVER BIOPSY      MUSCLE BIOPSY      MUSCLE BIOPSY      PEG TUBE INSERTION      PEG TUBE REMOVAL      PORTACATH PLACEMENT Left     Left Chest Portacath present     Family History   Problem Relation Age of Onset    Mental illness Mother     Arthritis Mother     Autism Mother     Bleeding Disorder Mother     Migraine headaches Mother     Diabetes Father     Kidney disease Father     Diabetes Brother     Thyroid disease Maternal Grandmother     Cancer Maternal Grandmother     Bleeding Disorder Maternal Grandmother     Arthritis Maternal Grandmother     Kidney disease Maternal Grandmother     Heart disease Maternal Grandfather     Hyperlipidemia Maternal Grandfather     Hypertension Maternal Grandfather     Diabetes Maternal Grandfather     Cancer Maternal Grandfather     Arthritis Maternal Grandfather     Cancer Paternal Grandmother     Arthritis Paternal Grandmother     Stroke Paternal Grandfather     Cancer Paternal Grandfather     Arthritis Paternal Grandfather      Social History     Tobacco Use    Smoking status: Never     Passive exposure: Past    Smokeless tobacco: Never   Substance Use Topics    Alcohol use: Not Currently     Comment: SOCIAL       ROS:  Review of Symptoms:  General: Other fatigue fatigue  Skin: no rashes, lumps, or other skin changes  HEENT: no dizziness, lightheadedness, or vision changes  Respiratory: no cough or hemoptysis  Cardiovascular: no palpitations, and tachycardia  Gastrointestinal: no black/tarry stools or diarrhea  Urinary: no change in frequency or urgency  Peripheral Vascular: no claudication or leg cramps  Musculoskeletal: no muscle or joint pain/stiffness  Psychiatric: no depression or excessive stress  Neurological: no sensory or motor loss, no syncope  Hematologic: Positive for autoimmune anemia  Endocrine:  "no thyroid problems, nor heat or cold intolerance         PHYSICAL EXAM:   BP 92/68 (BP Location: Right arm, Patient Position: Sitting, Cuff Size: Adult)   Pulse 74   Ht 157.5 cm (62\")   Wt 66.2 kg (146 lb)   SpO2 95%   BMI 26.70 kg/m²      Wt Readings from Last 5 Encounters:   08/29/24 66.2 kg (146 lb)   03/22/24 68 kg (149 lb 14.6 oz)   08/30/23 70.9 kg (156 lb 6.4 oz)   07/06/23 70.2 kg (154 lb 12.8 oz)   03/23/23 61.2 kg (135 lb)     BP Readings from Last 5 Encounters:   08/29/24 92/68   03/22/24 137/90   08/30/23 110/72   07/06/23 118/84   03/23/23 123/80       General-Well Nourished, Well developed  Eyes - PERRLA  Neck- supple, No mass  CV- regular rate and rhythm, no MRG  Lung- clear bilaterally  Abd- soft, +BS  Musc/skel - Norm strength and range of motion  Skin- warm and dry  Neuro - Alert & Oriented x 3, appropriate mood.    Patient's external notes were reviewed.  Independent interpretation of test performed by another physician in facility were reviewed.  Outside laboratory data was also reviewed.    Medical problems and test results were reviewed with the patient today.     Results for orders placed or performed during the hospital encounter of 03/22/24   Basic Metabolic Panel    Specimen: Blood   Result Value Ref Range    Glucose 93 65 - 99 mg/dL    BUN 12 6 - 20 mg/dL    Creatinine 0.56 (L) 0.57 - 1.00 mg/dL    Sodium 141 136 - 145 mmol/L    Potassium 3.6 3.5 - 5.2 mmol/L    Chloride 100 98 - 107 mmol/L    CO2 31.0 (H) 22.0 - 29.0 mmol/L    Calcium 8.0 (L) 8.6 - 10.5 mg/dL    BUN/Creatinine Ratio 21.4 7.0 - 25.0    Anion Gap 10.0 5.0 - 15.0 mmol/L    eGFR 123.0 >60.0 mL/min/1.73   Magnesium    Specimen: Blood   Result Value Ref Range    Magnesium 1.8 1.6 - 2.6 mg/dL   ECG 12 Lead Pre-Op / Pre-Procedure   Result Value Ref Range    QT Interval 460 ms    QTC Interval 431 ms         No results found for: \"CHOL\", \"HDL\", \"HDLC\", \"LDL\", \"LDLC\", \"VLDL\"    EKG:  (EKG/Tracing has been independently " visualized by me and summarized below)      ECG 12 Lead    Date/Time: 8/29/2024 10:49 AM  Performed by: Cecil Borrego PA    Authorized by: Cecil Borrego PA  Comparison: compared with previous ECG from 3/22/2024  Similar to previous ECG  Rhythm: sinus rhythm  Rate: normal  Conduction: conduction normal  ST Segments: ST segments normal  T Waves: T waves normal  QRS axis: normal    Clinical impression: normal ECG          ASSESSMENT   1.  Long QT syndrome: No recurrent syncopal episodes previously treated with medical therapy.  EKG today stable QTc measuring 441 ms.  Would like her to try lower dose nadolol 10 mg twice daily as she states the higher dose she seems to think she is having sinus bradycardia.  However some of these episodes are tracked by her Apple Watch in the evening hours could also be related to sleep apnea.    2.  Central sleep apnea: Patient tells me she was diagnosed central sleep apnea years ago.  She was intolerant to CPAP therefore this has not been treated in the past.  Try to reeval with a repeat study may consider her an option treatment wise inspire or remedy.    3. Orthodeoxia, Previous extensive CV evaluation: 2021    4. Polyglandular Autoimmune syndrome        PLAN  Will trial low-dose nadolol due to bradycardia tracked by her Apple Watch and fatigue.  She will try 10 mg twice daily seems to tolerate this today.  EKG is stable QTc acceptable.  Ultimately like her to repeat study consider other options treatment for sleep apnea which could related to her bradycardia in the evening hours.  She wore a ZIO monitor to determine similar significant arrhythmias.  She returned from her office with Dr. Meyer in 6 months or sooner as needed.          Cardiology/Electrophysiology  08/29/24  10:46 EDT  Electronically signed by MIRELA Ashby, 08/29/24, 10:51 AM EDT.

## 2025-04-04 ENCOUNTER — OFFICE VISIT (OUTPATIENT)
Dept: FAMILY MEDICINE CLINIC | Facility: CLINIC | Age: 35
End: 2025-04-04
Payer: COMMERCIAL

## 2025-04-04 VITALS
BODY MASS INDEX: 29.08 KG/M2 | HEIGHT: 62 IN | SYSTOLIC BLOOD PRESSURE: 124 MMHG | WEIGHT: 158 LBS | DIASTOLIC BLOOD PRESSURE: 86 MMHG | OXYGEN SATURATION: 97 % | HEART RATE: 93 BPM

## 2025-04-04 DIAGNOSIS — Z00.00 ANNUAL PHYSICAL EXAM: ICD-10-CM

## 2025-04-04 DIAGNOSIS — J30.89 ALLERGIC RHINITIS DUE TO OTHER ALLERGIC TRIGGER, UNSPECIFIED SEASONALITY: ICD-10-CM

## 2025-04-04 DIAGNOSIS — R13.19 ESOPHAGEAL DYSPHAGIA: Primary | ICD-10-CM

## 2025-04-04 DIAGNOSIS — G62.9 NEUROPATHY: ICD-10-CM

## 2025-04-04 DIAGNOSIS — R10.9 BILATERAL FLANK PAIN: ICD-10-CM

## 2025-04-04 DIAGNOSIS — Q89.01 ASPLENIA: ICD-10-CM

## 2025-04-04 DIAGNOSIS — N23 RENAL COLIC: ICD-10-CM

## 2025-04-04 RX ORDER — LEVOFLOXACIN 750 MG/1
750 TABLET, FILM COATED ORAL DAILY
Qty: 14 TABLET | Refills: 0 | Status: SHIPPED | OUTPATIENT
Start: 2025-04-04

## 2025-04-04 RX ORDER — VALACYCLOVIR HYDROCHLORIDE 1 G/1
1000 TABLET, FILM COATED ORAL 2 TIMES DAILY
Qty: 180 TABLET | Refills: 2 | Status: SHIPPED | OUTPATIENT
Start: 2025-04-04

## 2025-04-04 RX ORDER — EPINEPHRINE 0.3 MG/.3ML
0.3 INJECTION SUBCUTANEOUS ONCE
Qty: 1 EACH | Refills: 0 | Status: SHIPPED | OUTPATIENT
Start: 2025-04-04 | End: 2025-04-04

## 2025-04-04 RX ORDER — LEVOFLOXACIN 750 MG/1
750 TABLET, FILM COATED ORAL DAILY
Qty: 7 TABLET | Refills: 0 | Status: SHIPPED | OUTPATIENT
Start: 2025-04-04

## 2025-04-04 RX ORDER — ERYTHROMYCIN 250 MG/1
250 TABLET, COATED ORAL 2 TIMES DAILY
Qty: 180 TABLET | Refills: 3 | Status: SHIPPED | OUTPATIENT
Start: 2025-04-04

## 2025-04-04 RX ORDER — FLUTICASONE PROPIONATE 50 MCG
2 SPRAY, SUSPENSION (ML) NASAL DAILY
Qty: 15 G | Refills: 4 | Status: SHIPPED | OUTPATIENT
Start: 2025-04-04

## 2025-04-04 NOTE — PROGRESS NOTES
Krista Richter  1990  [id number]  Patient Care Team:  Cecil Sen MD as PCP - General (Internal Medicine)  Jeramy Meyer MD as Consulting Physician (Cardiac Electrophysiology)  Sabine Yost MD as Consulting Physician (Hematology)  Cecil Borrego PA as Physician Assistant (Cardiology)    Krista Richter is a 34 y.o. female here today for annual exam.  This patient is accompanied by their self who contributes to the history of their care.    Chief Complaint:    Chief Complaint   Patient presents with    Annual Exam       History of Present Illness:   Here for annual exam- continues to see endocrine in [place]. Patient has noted pain - back ( cva) sharp- bilateral. She has no blood in her urine. She does a history of kidney stones 2/2 to her endocrine - had blood work done this am. She needs to see ophthalmology.  ( Dr. Okeefe)    Needs refill on her levaquin. 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   She requires periodic esophageal dilatations and has noted progressive dysphagia.  She feels her esophagus needs to be stretched again and continues on PPI.  She will be seeing gynecology in [place]- she is on contraception patch  Past Medical History:   Diagnosis Date    ADHD     Adrenal insufficiency     Allergic     Alopecia     Anemia     Anesthesia complication     tetany    Anxiety     APS type 1     Arthritis     Asthma     Autism     Autoimmune enteropathy     Autoimmune hepatitis     Autoimmune urticaria     Cholelithiasis     Deep vein thrombosis     Depression     Dermatomyositis     Disease of thyroid gland     Eating disorder     Fibromyalgia     Fibromyalgia, primary     Functional asplenia     GERD (gastroesophageal reflux disease)     History of acute renal failure x 2     History of Clostridioides difficile infection 2018    History of COVID-19 01/29/2022     History of kidney stones     History of streptococcal septicemia 2017    Hypoparathyroidism     Hypothyroidism     Insulin dependent diabetes mellitus     Kidney stone     Long Q-T syndrome     Low back pain     Malabsorption     Migraine     Nephrocalcinosis     Neuropathy     BLE    Osteopenia     Oxygen desaturation     Pancreatic insufficiency     Parathyroid abnormality     Pneumonia     Polyglandular deficiency syndrome     Premature ovarian failure     PTSD (post-traumatic stress disorder)     Sjogren's syndrome     Sleep apnea (no CPAP, uses only oxygen at 4L per [place] at bedtime)     Spleen absent     auto infarct    Sulfhemoglobinemia 2023    Tetany     HAS EPISODES    Transfusion history     Denies transfusion reaction    Tremor     Umbilical hernia     Urinary tract infection     Visual impairment     Wears eyeglasses        Past Surgical History:   Procedure Laterality Date    APPENDECTOMY      CARDIAC CATHETERIZATION N/A 04/08/2021    Procedure: RIGHT AND LEFT HEART CATH;  Surgeon: Poli Anderson IV, MD;  Location: [facility] CATH INVASIVE LOCATION;  Service: Cardiovascular;  Laterality: N/A;    CARDIAC SURGERY      LOOP RECORDER    CHOLECYSTECTOMY      COLONOSCOPY N/A 06/05/2020    Procedure: COLONOSCOPY;  Surgeon: Gavin Vargas MD;  Location:  [facility];  Service: Gastroenterology;  Laterality: N/A;    ENDOSCOPY N/A 04/26/2018    Procedure: ESOPHAGOGASTRODUODENOSCOPY;  Surgeon: Gavin Vargas MD;  Location:  [facility];  Service: Gastroenterology    ENDOSCOPY N/A 08/09/2018    Procedure: ESOPHAGOGASTRODUODENOSCOPY-DILATION;  Surgeon: Gavin Vargas MD;  Location:  [facility];  Service: Gastroenterology    ENDOSCOPY      ENDOSCOPY N/A 02/06/2019    Procedure: ESOPHAGOGASTRODUODENOSCOPY;  Surgeon: Gavin Vargas MD;  Location:  [facility];  Service: Gastroenterology    ENDOSCOPY N/A 07/24/2019    Procedure:  ESOPHAGOGASTRODUODENOSCOPY;  Surgeon: Gavin Vargas MD;  Location:  [facility];  Service: Gastroenterology    ENDOSCOPY N/A 10/18/2019    Procedure: ESOPHAGOGASTRODUODENOSCOPY WITH BALLOON DILATION;  Surgeon: Gavin Vargas MD;  Location:  [facility];  Service: Gastroenterology    ENDOSCOPY N/A 06/04/2020    Procedure: ESOPHAGOGASTRODUODENOSCOPY  WITH ESOPHAGEAL BALLOON DILATATION;  Surgeon: Gavin Vargas MD;  Location:  [facility];  Service: Gastroenterology;  Laterality: N/A;  dilation done with 60F dilator     ENDOSCOPY N/A 01/29/2021    Procedure: ESOPHAGOGASTRODUODENOSCOPY;  Surgeon: Gavin Vargas MD;  Location:  [facility];  Service: Gastroenterology;  Laterality: N/A;    ENDOSCOPY  10/2021    EGD    ENDOSCOPY N/A 11/11/2021    Procedure: ESOPHAGOGASTRODUODENOSCOPY;  Surgeon: Gavin Vargas MD;  Location:  [facility];  Service: Gastroenterology;  Laterality: N/A;  balloon dilation 18-20    ENDOSCOPY N/A 04/28/2022    Procedure: ESOPHAGOGASTRODUODENOSCOPY WITH DIALATION;  Surgeon: Gavin Vargas MD;  Location:  [facility];  Service: Gastroenterology;  Laterality: N/A;  balloon dilation to 20    ENDOSCOPY N/A 03/23/2023    Procedure: ESOPHAGOGASTRODUODENOSCOPY WITH DIALATION;  Surgeon: Gavin Vargas MD;  Location:  [facility];  Service: Gastroenterology;  Laterality: N/A;  dilation of of esophagus done with 18-20 balloon    ENDOSCOPY N/A 03/22/2024    Procedure: ESOPHAGOGASTRODUODENOSCOPY;  Surgeon: Gavin Vargas MD;  Location:  [facility];  Service: Gastroenterology;  Laterality: N/A;  balloon dilation to 20    EXPLORATORY LAPAROTOMY      MULTIPLE    GASTROSTOMY      HAND SURGERY      4 TOTAL    HERNIA REPAIR      LIVER BIOPSY      MUSCLE BIOPSY      MUSCLE BIOPSY      PEG TUBE INSERTION      PEG TUBE REMOVAL      PORTACATH PLACEMENT Left     Left  "Chest Portacath present    UMBILICAL HERNIA REPAIR          Family History   Problem Relation Age of Onset    Mental illness Mother     Arthritis Mother     Autism Mother     Bleeding Disorder Mother     Migraine headaches Mother     Depression Mother     Miscarriages / Stillbirths Mother     Diabetes Father     Kidney disease Father     Cancer Father     Diabetes Brother     Thyroid disease Maternal Grandmother     Cancer Maternal Grandmother     Bleeding Disorder Maternal Grandmother     Arthritis Maternal Grandmother     Kidney disease Maternal Grandmother     Heart disease Maternal Grandfather     Hyperlipidemia Maternal Grandfather     Hypertension Maternal Grandfather     Diabetes Maternal Grandfather     Cancer Maternal Grandfather     Arthritis Maternal Grandfather     Colon cancer Maternal Grandfather     Cancer Paternal Grandmother     Arthritis Paternal Grandmother     Stroke Paternal Grandfather     Cancer Paternal Grandfather     Arthritis Paternal Grandfather     Alcohol abuse Paternal Grandfather     Depression Paternal Grandfather        Social History     Socioeconomic History    Marital status:    Tobacco Use    Smoking status: Never     Passive exposure: Past    Smokeless tobacco: Never   Vaping Use    Vaping status: Never Used   Substance and Sexual Activity    Alcohol use: Yes     Comment: Social    Drug use: No    Sexual activity: Not Currently     Partners: Female, Male     Birth control/protection: Patch, Post-menopausal       Allergies   Allergen Reactions    Azithromycin Hives    Cefpodoxime Hives    Cefpodoxime Proxetil Hives    Cephalosporins Hives    Clarithromycin Hives     biaxin    Levofloxacin Hives    Nitrofurantoin Hives    Nystatin Hives    Penicillins Hives    Sulfa Antibiotics Hives    Nitrofurantoin Macrocrystal Hives    Ondansetron Other (See Comments)     \"it doesn't work. [facility] told me to list it as an allergy\"       Depression: PHQ-2 Depression Screening    Little " "interest or pleasure in doing things?     Feeling down, depressed, or hopeless?     PHQ-2 Total Score         Immunization History   Administered Date(s) Administered    Flu Vaccine Split Quad 10/24/2018    Fluad Quad 65+ 10/01/2008, 12/28/2009, 10/08/2013, 10/13/2014, 10/06/2015, 02/13/2017    Fluzone (or Fluarix & Flulaval for VFC) >6mos 10/01/2008, 12/28/2009, 10/08/2013, 10/13/2014, 10/06/2015, 02/13/2017, 10/24/2018, 10/27/2020    Fluzone Quad >6mos (Multi-dose) 12/02/2019    HPV Quadrivalent 07/12/2013    Influenza Injectable Mdck Pf Quad 10/27/2020    Influenza Seasonal Injectable 10/01/2013, 11/01/2014    Influenza, Unspecified 10/01/2013, 10/09/2013, 11/01/2014    Pneumococcal Conjugate 20-Valent (PCV20) 04/04/2025    Tdap 03/20/2015, 10/29/2016       Intimate partner violence ( Screen on initial visit, pregnant women, women with injuries, older adult with injury or evidence of neglect):  -Violence can be a problem in many people's lives, so I now ask every patient about trauma or abuse they may have experienced in a relationship.   Stress/Safety - Do you feel safe in your relationship?   Afraid/Abused - Have you ever been in a relationship where you were threatened, hurt, or afraid?   Friend/Family - Are your friends aware you have been hurt?   Emergency Plan - Do you have a safe place to go and the resources you need in an emergency?    Review of Systems:    Review of Systems   Constitutional:  Positive for fatigue.   Eyes: Negative.    Respiratory: Negative.     Cardiovascular: Negative.    Gastrointestinal: Negative.    Endocrine: Positive for polydipsia.   Genitourinary:  Positive for flank pain and frequency. Negative for dysuria and hematuria.   Musculoskeletal: Negative.    Neurological:         Painful neuropathy   Hematological: Negative.        Vitals:    04/04/25 1410   BP: 124/86   Pulse: 93   SpO2: 97%   Weight: 71.7 kg (158 lb)   Height: 157.5 cm (62.01\")     Body mass index is 28.89 " kg/m².      Current Outpatient Medications:     albuterol (PROVENTIL HFA;VENTOLIN HFA) 108 (90 Base) MCG/ACT inhaler, Inhale 2 puffs Every 4 (Four) Hours As Needed for Wheezing or Shortness of Air., Disp: , Rfl:     Alcohol Swabs (ALCOHOL PREP) 70 % pads, Check blood glucose up to 2 times daily, Disp: , Rfl:     ascorbic acid (VITAMIN C) 1000 MG tablet, Take 1 tablet by mouth Daily., Disp: , Rfl:     azaTHIOprine (IMURAN) 50 MG tablet, Take 3 tablets by mouth Daily., Disp: 90 tablet, Rfl: 4    baclofen (LIORESAL) 10 MG tablet, TAKE 1 TABLET BY MOUTH THREE TIMES DAILY, Disp: 90 tablet, Rfl: 9    Blood Glucose Calibration (OT ULTRA/FASTTK CNTRL SOLN) solution, Use weekly and as needed to calibrate meter, Disp: , Rfl:     Blood Glucose Monitoring Suppl (ONE TOUCH ULTRA 2) w/Device kit, Use to check blood glucose up to 2 times daily, Disp: , Rfl:     calcitriol (ROCALTROL) 0.5 MCG capsule, Take 1 capsule by mouth 2 (Two) Times a Day., Disp: , Rfl:     Calcium Citrate 250 MG tablet, Take 2 tablets by mouth Daily., Disp: , Rfl:     cycloSPORINE (RESTASIS) 0.05 % ophthalmic emulsion, Administer 1 drop to both eyes Every 12 (Twelve) Hours., Disp: , Rfl:     cyproheptadine (PERIACTIN) 4 MG tablet, TAKE 1 TABLET BY MOUTH TWICE DAILY, Disp: 180 tablet, Rfl: 2    dronabinol (MARINOL) 5 MG capsule, Take 1 capsule by mouth 4 (Four) Times a Day As Needed., Disp: , Rfl:     EPINEPHrine (EPIPEN) 0.3 MG/0.3ML solution auto-injector injection, Inject 0.3 mL under the skin into the appropriate area as directed 1 (One) Time for 1 dose., Disp: 1 each, Rfl: 0    estradiol (MINIVELLE, VIVELLE-DOT) 0.05 MG/24HR patch, Place 1 patch on the skin as directed by provider 2 (Two) Times a Week., Disp: , Rfl:     eszopiclone (LUNESTA) 2 MG tablet, Take 1 tablet by mouth Every Night. Take immediately before bedtime, Disp: 30 tablet, Rfl: 2    fludrocortisone 0.1 MG tablet, Take 1 tablet by mouth Daily., Disp: , Rfl:     fluticasone (FLONASE) 50  MCG/ACT nasal spray, Administer 1 spray into the nostril(s) as directed by provider As Needed., Disp: , Rfl:     folic acid (FOLVITE) 1 MG tablet, Take 4 tablets by mouth Daily., Disp: , Rfl:     glucose blood test strip, 1 each by Other route 2 (Two) Times a Day. Use as instructed, Disp: 100 each, Rfl: 1    hydrocortisone (CORTEF) 10 MG tablet, Take 1 tablet by mouth Every Night., Disp: , Rfl:     hydrocortisone (CORTEF) 20 MG tablet, Take 1 tablet by mouth Every Morning. 20 MG IN AM, Disp: , Rfl:     hydrocortisone sodium succinate (Solu-CORTEF) 100 MG injection, Emergency for adrenal crisis, Disp: , Rfl:     Insulin Pen Needle 32G X 4 MM misc, Use to administer Natpara subcutaneously daily, Disp: , Rfl:     levoFLOXacin (Levaquin) 750 MG tablet, Take 1 tablet by mouth Daily., Disp: 7 tablet, Rfl: 0    levothyroxine (SYNTHROID, LEVOTHROID) 75 MCG tablet, Take 1 tablet by mouth Daily., Disp: , Rfl:     magnesium oxide (MAGOX) 400 (241.3 Mg) MG tablet tablet, Take 2.5 tablets by mouth 3 (Three) Times a Day., Disp: , Rfl:     Misc Natural Products (RA XYDRA EF PO), Take 1 tablet by mouth As Needed (sjrogran)., Disp: , Rfl:     Multiple Vitamins-Minerals (MULTIVITAMIN ADULT PO), Take 1 tablet by mouth Daily., Disp: , Rfl:     nadolol (CORGARD) 20 MG tablet, Take 0.5 tablets by mouth Every 12 (Twelve) Hours., Disp: 60 tablet, Rfl: 6    O2 (OXYGEN), Inhale 4 L/min every night at bedtime. Also uses with activity as needed, Disp: , Rfl:     omeprazole (priLOSEC) 40 MG capsule, TAKE ONE CAPSULE BY MOUTH TWICE A DAY TAKE 30 MINUTES PRIOR TO A MEAL (Patient taking differently: Take 1 capsule by mouth 2 (Two) Times a Day.), Disp: 60 capsule, Rfl: 6    OneTouch Delica Lancets 33G misc, 1 each by Other route 2 (Two) Times a Day., Disp: 100 each, Rfl: 1    Parathyroid Hormone, Recomb, (NATPARA SC), Inject 1 Cartridge under the skin into the appropriate area as directed 2 (two) times a day., Disp: , Rfl:     potassium chloride  "(K-DUR,KLOR-CON) 10 MEQ CR tablet, Take 4 Capsules by mouth 3 times daily, Disp: , Rfl:     progesterone (PROMETRIUM) 200 MG capsule, Take 1 capsule by mouth Daily., Disp: , Rfl:     promethazine (PHENERGAN) 25 MG suppository, Insert 1 suppository into the rectum Daily As Needed for Nausea or Vomiting., Disp: 30 suppository, Rfl: 3    sodium chloride 0.9 % solution, Infuse 100 mL/hr into a venous catheter Continuous. (Patient taking differently: Infuse 100 mL/hr into a venous catheter Daily As Needed (As needed for autoimmune disorder).), Disp: , Rfl:     Transparent Dressings (TEGADERM FILM 2-3/8\"X2-3/4\") misc, Apply over pump insertion site every 2 days.., Disp: , Rfl:     Vitamin D, Cholecalciferol, 1000 units capsule, Take 1,000 unit marking on U-100 syringe by mouth 2 (Two) Times a Day., Disp: , Rfl:     zinc sulfate (ZINCATE) 220 (50 Zn) MG capsule, Take 1 capsule by mouth Daily., Disp: , Rfl:     amphetamine-dextroamphetamine (Adderall) 5 MG tablet, Take one tab  each am and 1.5  tab in after noon (Patient not taking: Reported on 4/4/2025), Disp: 75 tablet, Rfl: 0    Brexpiprazole 0.25 MG tablet, Take 0.25 mg by mouth Daily. (Patient not taking: Reported on 8/29/2024), Disp: , Rfl:     erythromycin base (E-MYCIN) 250 MG tablet, Take 1 tablet by mouth 2 (Two) Times a Day., Disp: 180 tablet, Rfl: 3    fluconazole (Diflucan) 100 MG tablet, Take 1 tablet by mouth Daily., Disp: 5 tablet, Rfl: 1    fluticasone (FLONASE) 50 MCG/ACT nasal spray, Administer 2 sprays into the nostril(s) as directed by provider Daily., Disp: 15 g, Rfl: 4    gabapentin (NEURONTIN) 600 MG tablet, Take 1.5 tablets by mouth 3 (Three) Times a Day. (Patient not taking: Reported on 8/29/2024), Disp: 270 tablet, Rfl: 2    Hypromellose (GENTEAL SEVERE) 0.3 % ophthalmic gel, Administer 1 Application to both eyes As Needed., Disp: , Rfl:     levoFLOXacin (Levaquin) 750 MG tablet, Take 1 tablet by mouth Daily., Disp: 14 tablet, Rfl: 0    lidocaine " (LIDODERM) 5 %, Place 1 patch on the skin as directed by provider Daily. Remove & Discard patch within 12 hours or as directed by MD (Patient not taking: Reported on 8/29/2024), Disp: 14 patch, Rfl: 0    valACYclovir (Valtrex) 1000 MG tablet, Take 1 tablet by mouth 2 (Two) Times a Day., Disp: 180 tablet, Rfl: 2    Physical Exam:    Physical Exam  Vitals and nursing note reviewed.   Constitutional:       General: She is not in acute distress.     Appearance: She is well-developed. She is not diaphoretic.   HENT:      Head: Normocephalic and atraumatic.      Right Ear: External ear normal.      Left Ear: External ear normal.      Mouth/Throat:      Pharynx: No oropharyngeal exudate.   Eyes:      General: No scleral icterus.        Right eye: No discharge.      Conjunctiva/sclera: Conjunctivae normal.   Neck:      Thyroid: No thyromegaly.      Vascular: No JVD.      Trachea: No tracheal deviation.   Cardiovascular:      Rate and Rhythm: Normal rate and regular rhythm.      Heart sounds: Normal heart sounds.      Comments: PMI nondisplaced  Pulmonary:      Effort: Pulmonary effort is normal.      Breath sounds: Normal breath sounds. No wheezing or rales.   Abdominal:      General: Bowel sounds are normal.      Palpations: Abdomen is soft.      Tenderness: There is no abdominal tenderness. There is right CVA tenderness and left CVA tenderness. There is no guarding or rebound.   Musculoskeletal:      Cervical back: Normal range of motion and neck supple.   Lymphadenopathy:      Cervical: No cervical adenopathy.   Skin:     General: Skin is warm and dry.      Capillary Refill: Capillary refill takes less than 2 seconds.      Coloration: Skin is not pale.      Findings: No rash.   Neurological:      Mental Status: She is alert and oriented to person, place, and time.      Motor: No abnormal muscle tone.      Coordination: Coordination normal.   Psychiatric:         Mood and Affect: Mood normal.         Behavior: Behavior  normal.         Judgment: Judgment normal.         Procedures    Results Review:    I reviewed the patient's new clinical results.    Assessment/Plan:    Problem List Items Addressed This Visit       Asplenia    Relevant Medications    erythromycin base (E-MYCIN) 250 MG tablet    levoFLOXacin (Levaquin) 750 MG tablet    levoFLOXacin (Levaquin) 750 MG tablet    Other Relevant Orders    Pneumococcal Conjugate Vaccine 20-Valent (PCV20) (Completed)    Dysphagia - Primary    Overview   Added automatically from request for surgery [id number]         Relevant Orders    Ambulatory referral for Screening EGD    Neuropathy    Relevant Medications    gabapentin (NEURONTIN) 600 MG tablet    Other Relevant Orders    Ambulatory Referral to Neurology    Annual physical exam     Other Visit Diagnoses         Allergic rhinitis due to other allergic trigger, unspecified seasonality        Relevant Medications    fluticasone (FLONASE) 50 MCG/ACT nasal spray      Bilateral flank pain        Relevant Orders    CT Abdomen Pelvis Without Contrast      Renal colic        Relevant Orders    CT Abdomen Pelvis Without Contrast            Plan of care was reviewed with patient at the conclusion of today's visit. Counseled patient with regards to good nutrition and diet. Maintaining a healthy lifestyle including exercise and physical activities. Spoke with patient on ways to reduce stress, getting adequate sleep and injury prevention.  Discussed mammogram, colon cancer screening, osteoporosis and pap smear including benefit of early detection and potential need for follow-up. Patient agrees to screening mammogram, colonoscopy, bone density and gyn referral today. Annual dental and eye exams were encouraged. Encouraged patient to continue to follow up with annual immunizations.     No follow-ups on file.    Cecil Sen MD    Please note than portions of this note were completed wtBfly a Voice Recognition Program

## 2025-04-07 ENCOUNTER — PREP FOR SURGERY (OUTPATIENT)
Dept: OTHER | Facility: HOSPITAL | Age: 35
End: 2025-04-07
Payer: COMMERCIAL

## 2025-04-07 DIAGNOSIS — R13.10 DYSPHAGIA, UNSPECIFIED TYPE: Primary | ICD-10-CM

## 2025-04-08 ENCOUNTER — TELEPHONE (OUTPATIENT)
Dept: FAMILY MEDICINE CLINIC | Facility: CLINIC | Age: 35
End: 2025-04-08
Payer: COMMERCIAL

## 2025-04-08 RX ORDER — AZITHROMYCIN 250 MG/1
250 TABLET, FILM COATED ORAL DAILY
Qty: 90 TABLET | Refills: 3 | Status: SHIPPED | OUTPATIENT
Start: 2025-04-08

## 2025-04-08 NOTE — TELEPHONE ENCOUNTER
Caller: TONY SOOD    Relationship: Mother    Best call back number: [phone]  Which medication are you concerned about: STATED THAT RX    erythromycin base (E-MYCIN) 250 MG tablet   IS TOO EXPENSIVE AND REQUEST FOR RX TO BE CHANGED TO AZITHROMYCIN 250MG FOR ONCE A DAY      [id number] & [facility] - [phone]  - [phone] FX

## 2025-05-19 RX ORDER — LEVOTHYROXINE SODIUM 75 UG/1
75 TABLET ORAL DAILY
Qty: 90 TABLET | Refills: 3 | Status: SHIPPED | OUTPATIENT
Start: 2025-05-19

## 2025-06-02 ENCOUNTER — TELEPHONE (OUTPATIENT)
Dept: NEUROLOGY | Facility: CLINIC | Age: 35
End: 2025-06-02
Payer: COMMERCIAL

## 2025-07-18 RX ORDER — CYPROHEPTADINE HYDROCHLORIDE 4 MG/1
4 TABLET ORAL EVERY 12 HOURS SCHEDULED
Qty: 180 TABLET | Refills: 0 | Status: SHIPPED | OUTPATIENT
Start: 2025-07-18

## 2025-07-29 RX ORDER — AZATHIOPRINE 50 MG/1
150 TABLET ORAL DAILY
Qty: 90 TABLET | Refills: 4 | Status: SHIPPED | OUTPATIENT
Start: 2025-07-29

## 2025-08-13 ENCOUNTER — OFFICE VISIT (OUTPATIENT)
Dept: FAMILY MEDICINE CLINIC | Facility: CLINIC | Age: 35
End: 2025-08-13
Payer: COMMERCIAL

## 2025-08-13 VITALS
OXYGEN SATURATION: 96 % | BODY MASS INDEX: 29.08 KG/M2 | SYSTOLIC BLOOD PRESSURE: 122 MMHG | WEIGHT: 158 LBS | HEIGHT: 62 IN | DIASTOLIC BLOOD PRESSURE: 84 MMHG | HEART RATE: 81 BPM

## 2025-08-13 DIAGNOSIS — E31.8 POLYGLANDULAR AUTOIMMUNE SYNDROME, TYPE 1: ICD-10-CM

## 2025-08-13 DIAGNOSIS — B07.9 VERRUCAS: Primary | ICD-10-CM

## 2025-08-13 DIAGNOSIS — M79.10 MYALGIA: ICD-10-CM

## 2025-08-13 DIAGNOSIS — M25.50 POLYARTHRALGIA: ICD-10-CM

## 2025-08-20 ENCOUNTER — OFFICE VISIT (OUTPATIENT)
Dept: CARDIOLOGY | Facility: CLINIC | Age: 35
End: 2025-08-20
Payer: COMMERCIAL

## 2025-08-20 VITALS
DIASTOLIC BLOOD PRESSURE: 68 MMHG | OXYGEN SATURATION: 95 % | HEIGHT: 62 IN | HEART RATE: 76 BPM | WEIGHT: 165 LBS | BODY MASS INDEX: 30.36 KG/M2 | SYSTOLIC BLOOD PRESSURE: 96 MMHG

## 2025-08-20 DIAGNOSIS — R07.89 CHEST PAIN, ATYPICAL: ICD-10-CM

## 2025-08-20 DIAGNOSIS — I45.81 PROLONGED QT INTERVAL SYNDROME: ICD-10-CM

## 2025-08-20 DIAGNOSIS — E31.8 POLYGLANDULAR AUTOIMMUNE SYNDROME, TYPE 1: ICD-10-CM

## 2025-08-20 DIAGNOSIS — I45.81 LONG Q-T SYNDROME: Primary | ICD-10-CM

## 2025-08-20 DIAGNOSIS — R06.09 PLATYPNEA-ORTHODEOXIA SYNDROME: ICD-10-CM

## 2025-08-20 PROCEDURE — 99214 OFFICE O/P EST MOD 30 MIN: CPT | Performed by: INTERNAL MEDICINE

## 2025-08-20 PROCEDURE — 93000 ELECTROCARDIOGRAM COMPLETE: CPT | Performed by: INTERNAL MEDICINE

## 2025-08-21 ENCOUNTER — PRE-ADMISSION TESTING (OUTPATIENT)
Dept: PREADMISSION TESTING | Facility: HOSPITAL | Age: 35
End: 2025-08-21
Payer: COMMERCIAL

## 2025-08-21 VITALS — WEIGHT: 166.12 LBS | HEIGHT: 62 IN | BODY MASS INDEX: 30.57 KG/M2

## 2025-08-21 LAB
DEPRECATED RDW RBC AUTO: 56.5 FL (ref 37–54)
ERYTHROCYTE [DISTWIDTH] IN BLOOD BY AUTOMATED COUNT: 15.5 % (ref 12.3–15.4)
HCT VFR BLD AUTO: 46 % (ref 34–46.6)
HGB BLD-MCNC: 14.8 G/DL (ref 12–15.9)
MCH RBC QN AUTO: 31.9 PG (ref 26.6–33)
MCHC RBC AUTO-ENTMCNC: 32.2 G/DL (ref 31.5–35.7)
MCV RBC AUTO: 99.1 FL (ref 79–97)
PLATELET # BLD AUTO: 322 10*3/MM3 (ref 140–450)
PMV BLD AUTO: 11.7 FL (ref 6–12)
RBC # BLD AUTO: 4.64 10*6/MM3 (ref 3.77–5.28)
WBC NRBC COR # BLD AUTO: 11.39 10*3/MM3 (ref 3.4–10.8)

## 2025-08-21 PROCEDURE — 85027 COMPLETE CBC AUTOMATED: CPT

## 2025-08-21 PROCEDURE — 36415 COLL VENOUS BLD VENIPUNCTURE: CPT

## 2025-08-26 ENCOUNTER — OFFICE VISIT (OUTPATIENT)
Dept: FAMILY MEDICINE CLINIC | Facility: CLINIC | Age: 35
End: 2025-08-26
Payer: COMMERCIAL

## 2025-08-26 VITALS
OXYGEN SATURATION: 94 % | HEART RATE: 92 BPM | SYSTOLIC BLOOD PRESSURE: 130 MMHG | WEIGHT: 166 LBS | DIASTOLIC BLOOD PRESSURE: 80 MMHG | HEIGHT: 62 IN | BODY MASS INDEX: 30.55 KG/M2

## 2025-08-26 DIAGNOSIS — M25.562 ACUTE PAIN OF LEFT KNEE: Primary | ICD-10-CM

## 2025-08-26 PROCEDURE — 99213 OFFICE O/P EST LOW 20 MIN: CPT

## 2025-08-28 ENCOUNTER — HOSPITAL ENCOUNTER (OUTPATIENT)
Facility: HOSPITAL | Age: 35
Setting detail: HOSPITAL OUTPATIENT SURGERY
Discharge: HOME | End: 2025-08-28
Attending: INTERNAL MEDICINE | Admitting: INTERNAL MEDICINE
Payer: COMMERCIAL

## 2025-08-28 ENCOUNTER — ANESTHESIA EVENT (OUTPATIENT)
Dept: GASTROENTEROLOGY | Facility: HOSPITAL | Age: 35
End: 2025-08-28
Payer: COMMERCIAL

## 2025-08-28 ENCOUNTER — ANESTHESIA (OUTPATIENT)
Dept: GASTROENTEROLOGY | Facility: HOSPITAL | Age: 35
End: 2025-08-28
Payer: COMMERCIAL

## 2025-08-28 VITALS
RESPIRATION RATE: 16 BRPM | DIASTOLIC BLOOD PRESSURE: 64 MMHG | WEIGHT: 166 LBS | BODY MASS INDEX: 30.55 KG/M2 | SYSTOLIC BLOOD PRESSURE: 108 MMHG | TEMPERATURE: 97.3 F | HEIGHT: 62 IN | OXYGEN SATURATION: 91 % | HEART RATE: 73 BPM

## 2025-08-28 LAB
ALBUMIN SERPL-MCNC: 4 G/DL (ref 3.5–5.2)
ALP SERPL-CCNC: 66 U/L (ref 39–117)
ALT SERPL W P-5'-P-CCNC: 14 U/L (ref 1–33)
AST SERPL-CCNC: 19 U/L (ref 1–32)
BILIRUB CONJ SERPL-MCNC: 0.7 MG/DL (ref 0–0.3)
BILIRUB INDIRECT SERPL-MCNC: 0.9 MG/DL
BILIRUB SERPL-MCNC: 1.6 MG/DL (ref 0–1.2)
PROT SERPL-MCNC: 6.8 G/DL (ref 6–8.5)

## 2025-08-28 PROCEDURE — 63710000001 PROMETHAZINE PER 25 MG: Performed by: INTERNAL MEDICINE

## 2025-08-28 PROCEDURE — 25010000002 HYDROMORPHONE PER 4 MG: Performed by: INTERNAL MEDICINE

## 2025-08-28 PROCEDURE — 25810000003 SODIUM CHLORIDE 0.9 % SOLUTION

## 2025-08-28 PROCEDURE — 25010000002 HEPARIN LOCK FLUSH PER 10 UNITS: Performed by: INTERNAL MEDICINE

## 2025-08-28 PROCEDURE — 25010000002 CALCIUM CHLORIDE 10 % SOLUTION

## 2025-08-28 PROCEDURE — 25010000002 MIDAZOLAM PER 1 MG

## 2025-08-28 PROCEDURE — C1725 CATH, TRANSLUMIN NON-LASER: HCPCS | Performed by: INTERNAL MEDICINE

## 2025-08-28 PROCEDURE — 80076 HEPATIC FUNCTION PANEL: CPT | Performed by: INTERNAL MEDICINE

## 2025-08-28 PROCEDURE — 25010000002 LIDOCAINE PF 1% 1 % SOLUTION

## 2025-08-28 PROCEDURE — 25010000002 METHYLPREDNISOLONE PER 125 MG

## 2025-08-28 PROCEDURE — 25010000002 PROPOFOL 10 MG/ML EMULSION

## 2025-08-28 RX ORDER — METHYLPREDNISOLONE SODIUM SUCCINATE 125 MG/2ML
INJECTION INTRAMUSCULAR; INTRAVENOUS AS NEEDED
Status: DISCONTINUED | OUTPATIENT
Start: 2025-08-28 | End: 2025-08-28 | Stop reason: SURG

## 2025-08-28 RX ORDER — HEPARIN SODIUM (PORCINE) LOCK FLUSH IV SOLN 100 UNIT/ML 100 UNIT/ML
100 SOLUTION INTRAVENOUS ONCE
Status: COMPLETED | OUTPATIENT
Start: 2025-08-28 | End: 2025-08-28

## 2025-08-28 RX ORDER — CALCIUM CHLORIDE 100 MG/ML
INJECTION INTRAVENOUS; INTRAVENTRICULAR AS NEEDED
Status: DISCONTINUED | OUTPATIENT
Start: 2025-08-28 | End: 2025-08-28 | Stop reason: SURG

## 2025-08-28 RX ORDER — SODIUM CHLORIDE 9 MG/ML
INJECTION, SOLUTION INTRAVENOUS CONTINUOUS PRN
Status: DISCONTINUED | OUTPATIENT
Start: 2025-08-28 | End: 2025-08-28 | Stop reason: SURG

## 2025-08-28 RX ORDER — HYDROMORPHONE HYDROCHLORIDE 1 MG/ML
0.5 INJECTION, SOLUTION INTRAMUSCULAR; INTRAVENOUS; SUBCUTANEOUS
Status: DISCONTINUED | OUTPATIENT
Start: 2025-08-28 | End: 2025-08-28 | Stop reason: HOSPADM

## 2025-08-28 RX ORDER — LIDOCAINE HYDROCHLORIDE 10 MG/ML
INJECTION, SOLUTION EPIDURAL; INFILTRATION; INTRACAUDAL; PERINEURAL AS NEEDED
Status: DISCONTINUED | OUTPATIENT
Start: 2025-08-28 | End: 2025-08-28 | Stop reason: SURG

## 2025-08-28 RX ORDER — CALCIUM CHLORIDE 100 MG/ML
0.5 INJECTION INTRAVENOUS; INTRAVENTRICULAR ONCE
Status: COMPLETED | OUTPATIENT
Start: 2025-08-28 | End: 2025-08-28

## 2025-08-28 RX ORDER — MIDAZOLAM HYDROCHLORIDE 1 MG/ML
INJECTION, SOLUTION INTRAMUSCULAR; INTRAVENOUS AS NEEDED
Status: DISCONTINUED | OUTPATIENT
Start: 2025-08-28 | End: 2025-08-28 | Stop reason: SURG

## 2025-08-28 RX ORDER — PROMETHAZINE HYDROCHLORIDE 25 MG/1
25 TABLET ORAL ONCE
Status: COMPLETED | OUTPATIENT
Start: 2025-08-28 | End: 2025-08-28

## 2025-08-28 RX ORDER — IPRATROPIUM BROMIDE AND ALBUTEROL SULFATE 2.5; .5 MG/3ML; MG/3ML
3 SOLUTION RESPIRATORY (INHALATION) ONCE AS NEEDED
Status: DISCONTINUED | OUTPATIENT
Start: 2025-08-28 | End: 2025-08-28 | Stop reason: HOSPADM

## 2025-08-28 RX ORDER — PROPOFOL 10 MG/ML
VIAL (ML) INTRAVENOUS AS NEEDED
Status: DISCONTINUED | OUTPATIENT
Start: 2025-08-28 | End: 2025-08-28 | Stop reason: SURG

## 2025-08-28 RX ADMIN — MIDAZOLAM HYDROCHLORIDE 2 MG: 1 INJECTION, SOLUTION INTRAMUSCULAR; INTRAVENOUS at 14:29

## 2025-08-28 RX ADMIN — HYDROMORPHONE HYDROCHLORIDE 0.5 MG: 1 INJECTION, SOLUTION INTRAMUSCULAR; INTRAVENOUS; SUBCUTANEOUS at 15:40

## 2025-08-28 RX ADMIN — PROPOFOL INJECTABLE EMULSION 150 MCG/KG/MIN: 10 INJECTION, EMULSION INTRAVENOUS at 14:33

## 2025-08-28 RX ADMIN — SODIUM CHLORIDE: 9 INJECTION, SOLUTION INTRAVENOUS at 14:28

## 2025-08-28 RX ADMIN — CALCIUM CHLORIDE 0.5 G: 100 INJECTION INTRAVENOUS; INTRAVENTRICULAR at 16:19

## 2025-08-28 RX ADMIN — PROMETHAZINE HYDROCHLORIDE 25 MG: 25 TABLET ORAL at 15:53

## 2025-08-28 RX ADMIN — LIDOCAINE HYDROCHLORIDE 100 MG: 10 INJECTION, SOLUTION EPIDURAL; INFILTRATION; INTRACAUDAL; PERINEURAL at 14:32

## 2025-08-28 RX ADMIN — CALCIUM CHLORIDE 0.5 G: 100 INJECTION INTRAVENOUS; INTRAVENTRICULAR at 15:40

## 2025-08-28 RX ADMIN — METHYLPREDNISOLONE SODIUM SUCCINATE 125 MG: 125 INJECTION INTRAMUSCULAR; INTRAVENOUS at 14:32

## 2025-08-28 RX ADMIN — HYDROMORPHONE HYDROCHLORIDE 0.5 MG: 1 INJECTION, SOLUTION INTRAMUSCULAR; INTRAVENOUS; SUBCUTANEOUS at 16:11

## 2025-08-28 RX ADMIN — HYDROMORPHONE HYDROCHLORIDE 0.5 MG: 1 INJECTION, SOLUTION INTRAMUSCULAR; INTRAVENOUS; SUBCUTANEOUS at 15:27

## 2025-08-28 RX ADMIN — PROPOFOL INJECTABLE EMULSION 100 MG: 10 INJECTION, EMULSION INTRAVENOUS at 14:32

## 2025-08-28 RX ADMIN — CALCIUM CHLORIDE 0.5 G: 100 INJECTION INTRAVENOUS; INTRAVENTRICULAR at 14:40

## 2025-08-28 RX ADMIN — HEPARIN 100 UNITS: 100 SYRINGE at 16:38

## 2025-08-29 ENCOUNTER — OFFICE VISIT (OUTPATIENT)
Dept: ORTHOPEDIC SURGERY | Facility: CLINIC | Age: 35
End: 2025-08-29
Payer: COMMERCIAL

## 2025-08-29 VITALS
HEIGHT: 62 IN | WEIGHT: 166 LBS | BODY MASS INDEX: 30.55 KG/M2 | SYSTOLIC BLOOD PRESSURE: 144 MMHG | DIASTOLIC BLOOD PRESSURE: 82 MMHG

## 2025-08-29 DIAGNOSIS — M25.562 LEFT KNEE PAIN, UNSPECIFIED CHRONICITY: Primary | ICD-10-CM

## 2025-08-29 PROCEDURE — 99204 OFFICE O/P NEW MOD 45 MIN: CPT | Performed by: ORTHOPAEDIC SURGERY

## 2025-08-29 RX ORDER — IMIQUIMOD 12.5 MG/.25G
CREAM TOPICAL
COMMUNITY
Start: 2025-08-27

## (undated) DEVICE — "MH-438 A/W VLVE F/140 EVIS-140": Brand: AIR/WATER VALVE

## (undated) DEVICE — DEV INFL CRE STERIFLATE 60CC DISP

## (undated) DEVICE — SOL IRR H2O BTL 1000ML STRL

## (undated) DEVICE — KT ORCA ORCAPOD DISP STRL

## (undated) DEVICE — CONTN GRAD MEAS TRIANG 32OZ BLK

## (undated) DEVICE — ESOPHAGEAL BALLOON DILATATION CATHETER: Brand: CRE FIXED WIRE

## (undated) DEVICE — THE BITE BLOCK MAXI, LATEX FREE STRAP IS USED TO PROTECT THE ENDOSCOPE INSERTION TUBE FROM BEING BITTEN BY THE PATIENT.

## (undated) DEVICE — ESOPHAGEAL WIREGUIDED BALLOON DILATATION CATHETER: Brand: CRE WIREGUIDED

## (undated) DEVICE — "MH-948 A/W CHANNEL CLEANING ADPTR -VIDEO": Brand: AW CHANNEL CLEANING ADAPTE

## (undated) DEVICE — SOLIDIFIER LIQ PREMISORB 1500CC

## (undated) DEVICE — ENDOGATOR HYBRID TUBING KIT FOR USE WITH ENDOGATOR IRRIGATION PUMP, OLYMPUS PUMP, GI4000 ESU, AND TORRENT IRRIGATION PUMP.: Brand: ENDOGATOR KIT

## (undated) DEVICE — ADAPT CLN SCPE ENDO PORPOISE BX/50 DISP

## (undated) DEVICE — BALN DIL ELATION FIX WR 7.5F 180CM 18X19X20MM 1P/U

## (undated) DEVICE — FIRST STEP BEDSIDE ADD WATER KIT - RESEALABLE STAND-UP POUCH, ENDOSCOPIC CLEANING PAD - 1 POUCH: Brand: FIRST STEP BEDSIDE ADD WATER KIT - RESEALABLE STAND-UP POUCH, ENDOSCOPIC CLEANIN

## (undated) DEVICE — DEV INFL ALLIANCE2 SYS

## (undated) DEVICE — TUBING,OXYGEN,CRUSH RES,7',CLEAR,UC: Brand: MEDLINE INDUSTRIES, INC.

## (undated) DEVICE — PINNACLE INTRODUCER SHEATH: Brand: PINNACLE

## (undated) DEVICE — SYR LUERLOK 50ML

## (undated) DEVICE — "MH-443 SUCTION VALVE F/EVIS140 EVIS160": Brand: SUCTION VALVE

## (undated) DEVICE — MODEL BT2000 P/N 700287-012KIT CONTENTS: MANIFOLD WITH SALINE AND CONTRAST PORTS, SALINE TUBING WITH SPIKE AND HAND SYRINGE, TRANSDUCER: Brand: BT2000 AUTOMATED MANIFOLD KIT

## (undated) DEVICE — SOL IRR H2O BO 1000ML STRL

## (undated) DEVICE — CATH DIAG EXPO M/ PK 6FR FL4/FR4 PIG 3PK

## (undated) DEVICE — INTRO ACCSR BLNT TP

## (undated) DEVICE — HYBRID CO2 TUBING/CAP SET FOR OLYMPUS® SCOPES & CO2 SOURCE: Brand: ERBE

## (undated) DEVICE — Device

## (undated) DEVICE — SAFELINER SUCTION CANISTER 1000CC: Brand: DEROYAL

## (undated) DEVICE — Device: Brand: AIR/WATER CHANNEL CLEANING ADAPTER

## (undated) DEVICE — PK CATH CARD 10

## (undated) DEVICE — TUBING, SUCTION, 1/4" X 10', STRAIGHT: Brand: MEDLINE

## (undated) DEVICE — LUBE JELLY FOIL PACK 1.4 OZ: Brand: MEDLINE INDUSTRIES, INC.

## (undated) DEVICE — ADAPT CLN LUM OLYMP AIR/H20

## (undated) DEVICE — CO-SET DELIVERY SYSTEM FOR 123 ROOM TEMPATURE INJECTATE: Brand: CO-SET+

## (undated) DEVICE — ST LINER SAFECAP GRN RED CP STRL

## (undated) DEVICE — Device: Brand: DEFENDO AIR/WATER/SUCTION AND BIOPSY VALVE

## (undated) DEVICE — MODEL AT P65, P/N 701554-001KIT CONTENTS: HAND CONTROLLER, 3-WAY HIGH-PRESSURE STOPCOCK WITH ROTATING END AND PREMIUM HIGH-PRESSURE TUBING: Brand: ANGIOTOUCH® KIT

## (undated) DEVICE — ST ACC MICROPUNCTURE .018 TRANSLSS/SS/TP 5F/10CM 21G/7CM

## (undated) DEVICE — CANN NASL CO2 DIVIDED A/

## (undated) DEVICE — SWAN-GANZ THERMODILUTION CATHETER: Brand: SWAN-GANZ

## (undated) DEVICE — DEFENDO AIR WATER SUCTION AND BIOPSY VALVE KIT: Brand: DEFENDO AIR/WATER/SUCTION AND BIOPSY VALVE

## (undated) DEVICE — CVR TRANSD FLX 3DIMEN 14X29.2CM LF STRL

## (undated) DEVICE — MSK ENDO PORT O2 POM ELITE CURAPLEX A/

## (undated) DEVICE — Device: Brand: ENDOGATOR

## (undated) DEVICE — GW PERIPH VASC ADX J/TP SS .035 150CM 3MM